# Patient Record
Sex: FEMALE | Race: WHITE | NOT HISPANIC OR LATINO | Employment: OTHER | URBAN - METROPOLITAN AREA
[De-identification: names, ages, dates, MRNs, and addresses within clinical notes are randomized per-mention and may not be internally consistent; named-entity substitution may affect disease eponyms.]

---

## 2017-01-13 ENCOUNTER — OFFICE VISIT (OUTPATIENT)
Dept: INTERNAL MEDICINE | Facility: CLINIC | Age: 52
End: 2017-01-13
Payer: MEDICARE

## 2017-01-13 VITALS
SYSTOLIC BLOOD PRESSURE: 140 MMHG | TEMPERATURE: 99 F | WEIGHT: 240.31 LBS | HEART RATE: 90 BPM | BODY MASS INDEX: 45.4 KG/M2 | DIASTOLIC BLOOD PRESSURE: 80 MMHG | OXYGEN SATURATION: 98 %

## 2017-01-13 DIAGNOSIS — R39.81 FUNCTIONAL URINARY INCONTINENCE: ICD-10-CM

## 2017-01-13 DIAGNOSIS — I89.0 LYMPHEDEMA OF BOTH LOWER EXTREMITIES: ICD-10-CM

## 2017-01-13 DIAGNOSIS — N39.46 MIXED STRESS AND URGE URINARY INCONTINENCE: ICD-10-CM

## 2017-01-13 DIAGNOSIS — G80.9 CEREBRAL PALSY, UNSPECIFIED TYPE: ICD-10-CM

## 2017-01-13 DIAGNOSIS — I10 ESSENTIAL HYPERTENSION: Primary | ICD-10-CM

## 2017-01-13 PROCEDURE — 99999 PR PBB SHADOW E&M-EST. PATIENT-LVL IV: CPT | Mod: PBBFAC,,, | Performed by: FAMILY MEDICINE

## 2017-01-13 PROCEDURE — 99213 OFFICE O/P EST LOW 20 MIN: CPT | Mod: S$PBB,,, | Performed by: FAMILY MEDICINE

## 2017-01-13 PROCEDURE — 99214 OFFICE O/P EST MOD 30 MIN: CPT | Mod: PBBFAC,PO | Performed by: FAMILY MEDICINE

## 2017-01-13 RX ORDER — OXYMORPHONE HYDROCHLORIDE 20 MG/1
20 TABLET, FILM COATED, EXTENDED RELEASE ORAL
COMMUNITY
End: 2020-05-15 | Stop reason: ALTCHOICE

## 2017-01-13 RX ORDER — AMLODIPINE AND BENAZEPRIL HYDROCHLORIDE 5; 10 MG/1; MG/1
1 CAPSULE ORAL DAILY
Qty: 30 CAPSULE | Refills: 2 | Status: SHIPPED | OUTPATIENT
Start: 2017-01-13 | End: 2017-02-06 | Stop reason: DRUGHIGH

## 2017-01-13 NOTE — MR AVS SNAPSHOT
[facility]  [address] [facility] [address] [address]  Phone: [phone]  Fax: [phone]                  Jimena Pierce   2017 2:20 PM   Office Visit    Description:  Female : 1965   Provider:  Sayda Skelton MD   Department:  [facility]           Reason for Visit     Follow-up     discuss about BP medication     referral to lymphedema clinic           Diagnoses this Visit        Comments    Essential hypertension    -  Primary     Cerebral palsy, unspecified type         Lymphedema of both lower extremities         Mixed stress and urge urinary incontinence         Functional urinary incontinence                To Do List           Future Appointments        Provider Department Dept Phone    2017 4:20 PM Moy Chaudhary IV, MD [facility] [phone]      Goals (5 Years of Data)     None       These Medications        Disp Refills Start End    amlodipine-benazepril 5-10 mg (LOTREL) 5-10 mg per capsule 30 capsule 2 2017    Take 1 capsule by mouth once daily. - Oral    Pharmacy: [facility] [address] Ph #: [phone]         [facility] On Call     [facility] On Call Nurse [facility] -  Assistance  Registered nurses in the [facility] Center provide clinical advisement, health education, appointment booking, and other advisory services.  Call for this free service at [phone].             Medications           Message regarding Medications     Verify the changes and/or additions to your medication regime listed below are the same as discussed with your clinician today.  If any of these changes or additions are incorrect, please notify your healthcare provider.        START taking these NEW medications        Refills    amlodipine-benazepril 5-10 mg (LOTREL) 5-10 mg per capsule 2    Sig: Take 1 capsule by mouth once daily.    Class: Normal    Route: Oral      STOP taking  these medications     lisinopril-hydrochlorothiazide (PRINZIDE,ZESTORETIC) 10-12.5 mg per tablet Take 1 tablet by mouth once daily.           Verify that the below list of medications is an accurate representation of the medications you are currently taking.  If none reported, the list may be blank. If incorrect, please contact your healthcare provider. Carry this list with you in case of emergency.           Current Medications     NAPROXEN ORAL Take by mouth as needed.    oxycodone (ROXICODONE) 30 MG Tab Take 5 mg by mouth 3 (three) times daily as needed.    oxyMORphone (OPANA ER) 20 MG 12 hr tablet Take 20 mg by mouth every 12 (twelve) hours.    promethazine (PHENERGAN) 25 MG tablet Take 25 mg by mouth every 4 (four) hours as needed for Nausea.    amlodipine-benazepril 5-10 mg (LOTREL) 5-10 mg per capsule Take 1 capsule by mouth once daily.           Clinical Reference Information           Vital Signs - Last Recorded  Most recent update: 1/13/2017  3:12 PM by Sayda Skelton MD    BP Pulse Temp Wt SpO2 BMI    (!) 140/80 90 98.6 °F (37 °C) (Tympanic) 109 kg (240 lb 4.8 oz) 98% 45.4 kg/m2      Blood Pressure          Most Recent Value    BP  (!)  140/80      Allergies as of 1/13/2017     No Known Allergies      Immunizations Administered on Date of Encounter - 1/13/2017     None      Orders Placed During Today's Visit      Normal Orders This Visit    Ambulatory Referral to Physical/Occupational Therapy     Ambulatory referral to Urology     COMMODE FOR HOME USE       [facility] Sign-Up     Activating your [facility] account is as easy as 1-2-3!     1) Visit [email or web address], select Sign Up Now, enter this activation code and your date of birth, then select Next.  [email or web address]  Expires: 2/27/2017  3:45 PM      2) Create a username and password to use when you visit [facility] in the future and select a security question in case you lose your password and select Next.    3) Enter your e-mail address and click  Sign Up!    Additional Information  If you have questions, please e-mail [email or web address] or call [phone] to talk to our [facility] staff. Remember, [facility] is NOT to be used for urgent needs. For medical emergencies, dial 911.

## 2017-01-13 NOTE — PROGRESS NOTES
Subjective:       Patient ID: Jimena Pierce is a 51 y.o. female.    Chief Complaint: Follow-up; discuss about BP medication; and referral to lymphedema clinic    HPI Comments: She is here for recheck - HTN, lymphedema. She saw [facility] LYmphedema clinic in past 2012 or so - finds increased swelling to LLE. She is seeing [facility] Care for R lower leg wound almost healed.  She would like a new referral to the lymphedema clinic for help with the left lower extremity problems.    She wants to be on a BP med without a diuretic. Her BP checks are 110s/80s.  She is having severe urge incontinence for 6 months.  She has severe financial constraints and cannot afford to use commercial depends etc.    Review of Systems   Cardiovascular: Positive for leg swelling.   Genitourinary: Positive for enuresis.   Musculoskeletal: Positive for gait problem.   Skin: Positive for wound.       Objective:      Physical Exam   Constitutional: She is oriented to person, place, and time. She appears well-developed.   Using Quad cane   HENT:   Head: Normocephalic and atraumatic.   Cardiovascular: Normal rate, regular rhythm and normal heart sounds.    Pulmonary/Chest: Effort normal and breath sounds normal.   Musculoskeletal: She exhibits edema (to LLE 1+ edema).   RLE enlarged and wrapped to knee.   Neurological: She is alert and oriented to person, place, and time.   Skin: Skin is warm and dry.   Psychiatric: She has a normal mood and affect. Her behavior is normal.       Assessment:       1. Essential hypertension    2. Cerebral palsy, unspecified type    3. Lymphedema of both lower extremities    4. Mixed stress and urge urinary incontinence    5. Functional urinary incontinence        Plan:     1. Essential hypertension  amlodipine-benazepril 5-10 mg (LOTREL) 5-10 mg per capsule   2. Cerebral palsy, unspecified type     3. Lymphedema of both lower extremities  Ambulatory Referral to Physical/Occupational Therapy   4. Mixed stress  and urge urinary incontinence  Ambulatory referral to Urology   5. Functional urinary incontinence  COMMODE FOR HOME USE    Ambulatory referral to Urology    she was offered a trial of medication for overactive bladder but defers until she sees urology.  I ordered bedside commode to see if this would help her problem on getting out of bed in the morning and need to urinate immediately.  She obviously has a component of functional incontinence, but she is having more problem now than in the past probably due to urge incontinence.    She may call if swelling increases off the HCTZ componet. She has new hospital bed just arrived this week - this may help her dependent edema.

## 2017-02-06 ENCOUNTER — TELEPHONE (OUTPATIENT)
Dept: INTERNAL MEDICINE | Facility: CLINIC | Age: 52
End: 2017-02-06

## 2017-02-06 DIAGNOSIS — I10 ESSENTIAL HYPERTENSION: Primary | ICD-10-CM

## 2017-02-06 RX ORDER — AMLODIPINE AND BENAZEPRIL HYDROCHLORIDE 10; 20 MG/1; MG/1
1 CAPSULE ORAL DAILY
Qty: 90 CAPSULE | Refills: 0 | Status: SHIPPED | OUTPATIENT
Start: 2017-02-06 | End: 2018-02-06

## 2017-02-06 NOTE — TELEPHONE ENCOUNTER
Mendy with [facility] said that every time she is asked about her pain the patient says its at a 6   [facility] has been notified of the lab orders and will get them at the next visit       Also called and notified the patient of the change in her blood pressure medication.  Asked her about her pain and she said it was continuous pain but better then it has been before

## 2017-02-06 NOTE — TELEPHONE ENCOUNTER
Mendy states that her BP has been high for the last couple of weeks.  The patient has not been complaining of any other systems

## 2017-02-06 NOTE — TELEPHONE ENCOUNTER
Is her pain level controlled?  1. Please give order to HH to have pt increase her amlodipine/benazepril 5/10 by taking two daily - to give 10/20 dose.. Continue to check BPS. I will send rX to her pharmacy for the single tablet 10/20.    2. There are no recent labs - please give verbal order for BMP/CBC to be drawn by HH.

## 2017-02-09 ENCOUNTER — TELEPHONE (OUTPATIENT)
Dept: INTERNAL MEDICINE | Facility: CLINIC | Age: 52
End: 2017-02-09

## 2017-02-09 NOTE — TELEPHONE ENCOUNTER
----- Message from Mary Adams sent at 2/9/2017  2:46 PM CST -----  Contact: pt  Pt is requesting nurse give her a call regarding status of referral to lymphedema clinic. Pls call pt back at [phone]

## 2017-02-10 ENCOUNTER — TELEPHONE (OUTPATIENT)
Dept: INTERNAL MEDICINE | Facility: CLINIC | Age: 52
End: 2017-02-10

## 2017-02-10 NOTE — TELEPHONE ENCOUNTER
----- Message from Ricky Ruelas sent at 2/10/2017 10:39 AM CST -----  Contact: [facility]  Caller request call from nurse to have pt order for PT refax due to a technical issue they had, please fax to [phone], please contact caller at [phone]

## 2017-02-14 ENCOUNTER — OFFICE VISIT (OUTPATIENT)
Dept: UROLOGY | Facility: CLINIC | Age: 52
End: 2017-02-14
Payer: MEDICARE

## 2017-02-14 VITALS
DIASTOLIC BLOOD PRESSURE: 88 MMHG | BODY MASS INDEX: 44.4 KG/M2 | WEIGHT: 235 LBS | HEART RATE: 86 BPM | SYSTOLIC BLOOD PRESSURE: 142 MMHG

## 2017-02-14 DIAGNOSIS — R31.29 HEMATURIA, MICROSCOPIC: ICD-10-CM

## 2017-02-14 DIAGNOSIS — N32.81 OAB (OVERACTIVE BLADDER): Primary | ICD-10-CM

## 2017-02-14 LAB
BILIRUB SERPL-MCNC: NORMAL MG/DL
BLOOD URINE, POC: NORMAL
COLOR, POC UA: YELLOW
GLUCOSE UR QL STRIP: NORMAL
KETONES UR QL STRIP: NORMAL
LEUKOCYTE ESTERASE URINE, POC: NORMAL
NITRITE, POC UA: NORMAL
PH, POC UA: 6
POC RESIDUAL URINE VOLUME: 0 ML (ref 0–100)
PROTEIN, POC: NORMAL
SPECIFIC GRAVITY, POC UA: 1.01
UROBILINOGEN, POC UA: NORMAL

## 2017-02-14 PROCEDURE — 99204 OFFICE O/P NEW MOD 45 MIN: CPT | Mod: S$PBB,,, | Performed by: UROLOGY

## 2017-02-14 PROCEDURE — 81002 URINALYSIS NONAUTO W/O SCOPE: CPT | Mod: PBBFAC | Performed by: UROLOGY

## 2017-02-14 PROCEDURE — 99213 OFFICE O/P EST LOW 20 MIN: CPT | Mod: PBBFAC | Performed by: UROLOGY

## 2017-02-14 PROCEDURE — 81001 URINALYSIS AUTO W/SCOPE: CPT

## 2017-02-14 PROCEDURE — 87086 URINE CULTURE/COLONY COUNT: CPT

## 2017-02-14 PROCEDURE — 99999 PR PBB SHADOW E&M-EST. PATIENT-LVL III: CPT | Mod: PBBFAC,,, | Performed by: UROLOGY

## 2017-02-14 PROCEDURE — 51798 US URINE CAPACITY MEASURE: CPT | Mod: PBBFAC | Performed by: UROLOGY

## 2017-02-14 NOTE — PATIENT INSTRUCTIONS
For constipation please purchase Miralax at the drug store.  One capful per day is the starting dose.  If the stool gets too loose take 1/2 cap a day or 1/4 cap a day.  Adjust the amount so you can use it every day without causing diarrhea because it works better taken every day.

## 2017-02-14 NOTE — MR AVS SNAPSHOT
[facility]  [address] [facility]  [place] [address]  Phone: [phone]  Fax: [phone]                  Jimena Pierce   2017 4:20 PM   Office Visit    Description:  Female : 1965   Provider:  Moy Chaudhary IV, MD   Department:  [facility]                To Do List           Goals (5 Years of Data)     None      Follow-Up and Disposition     Return if symptoms worsen or fail to improve.      [facility] On Call     [facility] On Call Nurse Care Line -  Assistance  Registered nurses in the [facility] On Call Center provide clinical advisement, health education, appointment booking, and other advisory services.  Call for this free service at [phone].             Medications           Message regarding Medications     Verify the changes and/or additions to your medication regime listed below are the same as discussed with your clinician today.  If any of these changes or additions are incorrect, please notify your healthcare provider.             Verify that the below list of medications is an accurate representation of the medications you are currently taking.  If none reported, the list may be blank. If incorrect, please contact your healthcare provider. Carry this list with you in case of emergency.           Current Medications     amlodipine-benazepril 10-20mg (LOTREL) 10-20 mg per capsule Take 1 capsule by mouth once daily.    NAPROXEN ORAL Take by mouth as needed.    oxycodone (ROXICODONE) 30 MG Tab Take 5 mg by mouth 3 (three) times daily as needed.    oxyMORphone (OPANA ER) 20 MG 12 hr tablet Take 20 mg by mouth every 12 (twelve) hours.    promethazine (PHENERGAN) 25 MG tablet Take 25 mg by mouth every 4 (four) hours as needed for Nausea.           Clinical Reference Information           Your Vitals Were     BP Pulse Weight BMI       142/88 86 106.6 kg (235 lb) 44.4 kg/m2       Blood Pressure          Most Recent Value    BP  (!)  142/88       Allergies as of 2/14/2017     No Known Allergies      Immunizations Administered on Date of Encounter - 2/14/2017     None      [facility] Sign-Up     Activating your [facility] account is as easy as 1-2-3!     1) Visit [email or web address], select Sign Up Now, enter this activation code and your date of birth, then select Next.  [email or web address]  Expires: 2/27/2017  3:45 PM      2) Create a username and password to use when you visit [facility] in the future and select a security question in case you lose your password and select Next.    3) Enter your e-mail address and click Sign Up!    Additional Information  If you have questions, please e-mail [email or web address] or call [phone] to talk to our [facility] staff. Remember, [facility] is NOT to be used for urgent needs. For medical emergencies, dial 911.         Instructions    For constipation please purchase Miralax at the drug store.  One capful per day is the starting dose.  If the stool gets too loose take 1/2 cap a day or 1/4 cap a day.  Adjust the amount so you can use it every day without causing diarrhea because it works better taken every day.         Language Assistance Services     ATTENTION: Language assistance services are available, free of charge. Please call [phone].      ATENCIÓN: Si habla español, tiene a king disposición servicios gratuitos de asistencia lingüística. Llame al [phone].     CHÚ Ý: N?u b?n nói Ti?ng Vi?t, có các d?ch v? h? tr? ngôn ng? mi?n phí dành cho b?n. G?i s? [phone].         [facility] complies with applicable Federal civil rights laws and does not discriminate on the basis of race, color, national origin, age, disability, or sex.

## 2017-02-14 NOTE — LETTER
February 14, 2017      Sayda Skelton MD  [address] [place] [address] [address] [place] [address]           [facility]  [address] [place] [address]  [place] [address]  Phone: [phone]  Fax: [phone]          Patient: Jimena Pierce   MR Number: [id number]   YOB: 1965   Date of Visit: 2/14/2017       Dear Dr. Sayda Skelton:    Thank you for referring Jimena iPerce to me for evaluation. Attached you will find relevant portions of my assessment and plan of care.    If you have questions, please do not hesitate to call me. I look forward to following Jimena Pierce along with you.    Sincerely,    Moy Chaudhary IV, MD    Enclosure  CC:  No Recipients    If you would like to receive this communication electronically, please contact [email or web address] or [phone] to request more information on [facility] access.    For providers and/or their staff who would like to refer a patient to [facility], please contact us through our one-stop-shop provider referral line, [facility], at [phone].    If you feel you have received this communication in error or would no longer like to receive these types of communications, please e-mail [email or web address]

## 2017-02-14 NOTE — PROGRESS NOTES
Chief Complaint: LUTS    HPI:   2/14/17: 50 yo woman for 6-12 mo has had worsening problems of frequency, hesitancy, starting/stopping stream, need to push, urgency with low output.  No abd/pelvic pain and no exac/rel factors.  No hematuria.  No urolithiasis.  No other urinary bother.  No  history.  Normal sexual function but inactive.  Chronic lower back pain in pain management.  Coffee two cups a day, not much other caffeine, no chocolate.  Often constipated and finds she can clear that without meds by drinking a lot of water.  Nocturia x2.  Bothersome symptoms predominate in daytime.    Allergies:  Review of patient's allergies indicates no known allergies.    Medications: has a current medication list which includes the following prescription(s): amlodipine-benazepril 10-20mg, naproxen, oxycodone, oxymorphone, and promethazine.    Review of Systems:  General: No fever, chills, fatigability, or weight loss.  Skin: No rashes, itching, or changes in color or texture of skin.  Chest: Denies JAMES, cyanosis, wheezing, cough, and sputum production.  Abdomen: Appetite fine. No weight loss. Denies diarrhea, abdominal pain, hematemesis, or blood in stool.  Musculoskeletal: Some joint stiffness or swelling. Some back pain.  : As above.  All other review of systems negative.    PMH:   has a past medical history of Chronic low back pain; Congenital cerebral palsy; HTN (hypertension); and Lymphedema.    PSH:   has a past surgical history that includes Tonsillectomy; Hip surgery (Bilateral); Tendon release (Bilateral); and Colonoscopy (N/A, 10/5/2016).    FamHx: family history includes Hypertension in her mother.    SocHx:  reports that she has quit smoking. She has a 0.75 pack-year smoking history. She does not have any smokeless tobacco history on file. She reports that she drinks alcohol. She reports that she does not use illicit drugs.     Physical Exam:  Vitals:   Vitals:    02/14/17 1638   BP: (!) 142/88   Pulse: 86      General: A&Ox3. No apparent distress. No deformities.  Neck: No masses. Normal thyroid.  Lungs: normal inspiration. No use of accessory muscles.  Heart: normal pulse. No arrhythmias.  Abdomen: Soft. NT. ND. No masses. No hernias. No hepatosplenomegaly.  Lymphatic: Neck and groin nodes negative.  Skin: The skin is warm and dry. No jaundice.  Ext: No c/c/e.  : External genitalia normal.     Labs/Studies: Urinalysis performed in clinic, summary: UA normal exc tr blood  Bladder Scan performed in office: PVR 0 ml.    Impression/Plan:   1. Will send cath UA/UCx, but OAB is most likely diagnosis.  Caffeine cessation and better constipation management may be all that's needed.  Reluctant to add anticholinergic due to constipation concerns.

## 2017-02-15 LAB
BACTERIA #/AREA URNS AUTO: ABNORMAL /HPF
MICROSCOPIC COMMENT: ABNORMAL
RBC #/AREA URNS AUTO: >100 /HPF (ref 0–4)
SQUAMOUS #/AREA URNS AUTO: 13 /HPF
WBC #/AREA URNS AUTO: 21 /HPF (ref 0–5)

## 2017-02-16 LAB — BACTERIA UR CULT: NO GROWTH

## 2017-02-17 ENCOUNTER — TELEPHONE (OUTPATIENT)
Dept: UROLOGY | Facility: CLINIC | Age: 52
End: 2017-02-17

## 2017-02-17 DIAGNOSIS — R31.9 HEMATURIA: Primary | ICD-10-CM

## 2017-02-17 NOTE — TELEPHONE ENCOUNTER
Called patient to inform her of U/A results and schedule CT and Cysto. No answer; left message to call back.

## 2017-02-21 ENCOUNTER — TELEPHONE (OUTPATIENT)
Dept: INTERNAL MEDICINE | Facility: CLINIC | Age: 52
End: 2017-02-21

## 2017-02-22 NOTE — TELEPHONE ENCOUNTER
Recent labs ordered for CBC/BMP through home health.  Results dated 2/16/17 are received.  BMP all within normal limits with an EGFR of greater than 100.  Glucose is 95.  CBC with white blood cells 8.1, H&H 14.3/44.6, platelets 224.    Please inform patient all lab results are good.

## 2017-02-23 ENCOUNTER — TELEPHONE (OUTPATIENT)
Dept: UROLOGY | Facility: CLINIC | Age: 52
End: 2017-02-23

## 2017-02-23 NOTE — TELEPHONE ENCOUNTER
----- Message from Ronda Sexton sent at 2/23/2017  1:48 PM CST -----  Contact: self- [phone]  Pt needs a return call regarding her test results. Pt can be reached at [phone]. Thanks, SOHAN

## 2017-02-27 ENCOUNTER — TELEPHONE (OUTPATIENT)
Dept: RADIOLOGY | Facility: HOSPITAL | Age: 52
End: 2017-02-27

## 2017-02-28 ENCOUNTER — HOSPITAL ENCOUNTER (OUTPATIENT)
Dept: RADIOLOGY | Facility: HOSPITAL | Age: 52
Discharge: HOME | End: 2017-02-28
Attending: UROLOGY
Payer: MEDICARE

## 2017-02-28 DIAGNOSIS — R31.9 HEMATURIA: ICD-10-CM

## 2017-02-28 PROCEDURE — 74178 CT ABD&PLV WO CNTR FLWD CNTR: CPT | Mod: TC,PO

## 2017-02-28 PROCEDURE — 74178 CT ABD&PLV WO CNTR FLWD CNTR: CPT | Mod: 26,,, | Performed by: RADIOLOGY

## 2017-02-28 PROCEDURE — 25500020 PHARM REV CODE 255: Mod: PO | Performed by: UROLOGY

## 2017-02-28 RX ADMIN — IOHEXOL 120 ML: 350 INJECTION, SOLUTION INTRAVENOUS at 03:02

## 2017-03-30 ENCOUNTER — TELEPHONE (OUTPATIENT)
Dept: UROLOGY | Facility: CLINIC | Age: 52
End: 2017-03-30

## 2017-05-15 ENCOUNTER — OFFICE VISIT (OUTPATIENT)
Dept: INTERNAL MEDICINE | Facility: CLINIC | Age: 52
End: 2017-05-15
Payer: MEDICARE

## 2017-05-15 VITALS
OXYGEN SATURATION: 96 % | DIASTOLIC BLOOD PRESSURE: 84 MMHG | HEART RATE: 70 BPM | BODY MASS INDEX: 45.05 KG/M2 | TEMPERATURE: 98 F | WEIGHT: 238.44 LBS | SYSTOLIC BLOOD PRESSURE: 131 MMHG

## 2017-05-15 DIAGNOSIS — R23.4 SKIN INDURATION: ICD-10-CM

## 2017-05-15 DIAGNOSIS — H57.11 PAIN IN RIGHT EYE: Primary | ICD-10-CM

## 2017-05-15 DIAGNOSIS — M79.89 RIGHT LEG SWELLING: ICD-10-CM

## 2017-05-15 PROCEDURE — 99213 OFFICE O/P EST LOW 20 MIN: CPT | Mod: PBBFAC,PO | Performed by: FAMILY MEDICINE

## 2017-05-15 PROCEDURE — 99213 OFFICE O/P EST LOW 20 MIN: CPT | Mod: S$PBB,,, | Performed by: FAMILY MEDICINE

## 2017-05-15 PROCEDURE — 99999 PR PBB SHADOW E&M-EST. PATIENT-LVL III: CPT | Mod: PBBFAC,,, | Performed by: FAMILY MEDICINE

## 2017-05-15 RX ORDER — OXYMORPHONE HYDROCHLORIDE 15 MG/1
TABLET, FILM COATED, EXTENDED RELEASE ORAL
COMMUNITY
Start: 2017-05-08 | End: 2020-05-15 | Stop reason: ALTCHOICE

## 2017-05-15 NOTE — MR AVS SNAPSHOT
[address]  [place] [address]  Phone: [phone]  Fax: [phone]                  Jimena Pierce   5/15/2017 1:40 PM   Office Visit    Description:  Female : 1965   Provider:  Sayda Skelton MD   Department:  [facility]           Reason for Visit     right eye pain           Diagnoses this Visit        Comments    Pain in right eye    -  Primary     Right leg swelling         Skin induration                To Do List           Future Appointments        Provider Department Dept Phone    2017 1:20 PM MD Anju Figueroa [facility] - Urology [phone]    2017 3:45 PM Magnus Fierro OD [facility] [phone]    2017 1:15 PM [address] - OB/ [phone]      Goals (5 Years of Data)     None      Follow-Up and Disposition     Follow-up and Disposition History      [facility]     [facility] On Call Nurse Care Line -  Assistance  Unless otherwise directed by your provider, please contact [facility], our nurse care line that is available for  assistance.     Registered nurses in the [facility] Center provide: appointment scheduling, clinical advisement, health education, and other advisory services.  Call: [phone] (toll free)               Medications           Message regarding Medications     Verify the changes and/or additions to your medication regime listed below are the same as discussed with your clinician today.  If any of these changes or additions are incorrect, please notify your healthcare provider.             Verify that the below list of medications is an accurate representation of the medications you are currently taking.  If none reported, the list may be blank. If incorrect, please contact your healthcare provider. Carry this list with you in case of emergency.           Current Medications     amlodipine-benazepril 10-20mg (LOTREL) 10-20 mg per  "capsule Take 1 capsule by mouth once daily.    NAPROXEN ORAL Take by mouth as needed.    oxycodone (ROXICODONE) 30 MG Tab Take 5 mg by mouth 3 (three) times daily as needed.    oxyMORphone (OPANA ER) 15 MG 12 hr tablet     promethazine (PHENERGAN) 25 MG tablet Take 25 mg by mouth every 4 (four) hours as needed for Nausea.    oxyMORphone (OPANA ER) 20 MG 12 hr tablet Take 20 mg by mouth every 12 (twelve) hours.           Clinical Reference Information           Your Vitals Were     BP Pulse Temp Weight    131/84 (BP Location: Right arm, Patient Position: Sitting, BP Method: Automatic) 70 97.6 °F (36.4 °C) (Tympanic) 108.2 kg (238 lb 6.8 oz)    Last Period SpO2 BMI    02/23/2016 96% 45.05 kg/m2      Blood Pressure          Most Recent Value    BP  131/84      Allergies as of 5/15/2017     No Known Allergies      Immunizations Administered on Date of Encounter - 5/15/2017     None      Orders Placed During Today's Visit      Normal Orders This Visit    Ambulatory consult to Optometry       [facility] Sign-Up     Activating your [facility] account is as easy as 1-2-3!     1) Visit [email or web address], select Sign Up Now, enter this activation code and your date of birth, then select Next.  [email or web address]  Expires: 6/29/2017  3:16 PM      2) Create a username and password to use when you visit [facility] in the future and select a security question in case you lose your password and select Next.    3) Enter your e-mail address and click Sign Up!    Additional Information  If you have questions, please e-mail [email or web address] or call [phone] to talk to our [facility] staff. Remember, [facility] is NOT to be used for urgent needs. For medical emergencies, dial 911.         Instructions    Use "natural tears" type eye product         Language Assistance Services     ATTENTION: Language assistance services are available, free of charge. Please call [phone].      ATENCIÓN: Si habla español, tiene a king disposición " servicios gratuitos de asistencia lingüística. Eusebio geller [phone].     [facility] Ý: N?u b?n nói Ti?ng Vi?t, có các d?ch v? h? tr? ngôn ng? mi?n phí dành cho b?n. G?i s? [phone].         [facility] complies with applicable Federal civil rights laws and does not discriminate on the basis of race, color, national origin, age, disability, or sex.

## 2017-05-15 NOTE — PROGRESS NOTES
Subjective:       Patient ID: Jimena Pierce is a 51 y.o. female.    Chief Complaint: right eye pain (feelis like something is in it, pt states that it maybe scar tissue)    HPI Comments: Here with c/o right eye pain in AM on awakening. First occurred approx 2 weeks ago. She sees an optometrist for glasses - last time more than a year.  She is on chronic pain med 8 AM and 8 PM.  Feels like there is something in it. Tries to blink it out. Painful open and closed.    She is also c/o contd redness to her RLE. She was DCd from wound care about 6 weeks ago for ulcerations to ant and post lower leg.     Eye Pain    The right eye is affected. This is a recurrent problem. The current episode started 1 to 4 weeks ago. Episode frequency: occurs only in AM and resolves after an hour. Does not occur every AM. Progression since onset: no change in severity. last occurrence 4 dyas ago and apprx  2 days prior to  that. The pain is severe. There is no known exposure to pink eye. She does not wear contacts. Associated symptoms include eye redness. Pertinent negatives include no blurred vision, eye discharge, fever, itching or photophobia. She has tried nothing for the symptoms.     Review of Systems   Constitutional: Negative for fever.   HENT: Negative for congestion, rhinorrhea and sneezing.    Eyes: Positive for pain and redness. Negative for blurred vision, photophobia and discharge.   Skin: Negative for itching.       Objective:      Physical Exam   Constitutional: She is oriented to person, place, and time. She appears well-developed.   HENT:   Head: Normocephalic and atraumatic.   Cardiovascular: Normal rate, regular rhythm and normal heart sounds.    Pulmonary/Chest: Effort normal and breath sounds normal.   Musculoskeletal: She exhibits edema (to RLE only - LLE without edema.).   Neurological: She is alert and oriented to person, place, and time.   Skin: Skin is warm and dry.   R lower leg swollen. Band of  erythema to lower 1/3, with brightest erythema to anterior lower leg. Wounds closed.   Psychiatric: She has a normal mood and affect. Her behavior is normal.         Assessment/Plan:     1. Pain in right eye  Ambulatory consult to Optometry   2. Right leg swelling     3. Skin induration     continue current care for RLE - pt advised to elevate leg at rest.  Recheck 3 months.  rec she see gyn for well woman exam.

## 2017-05-17 ENCOUNTER — OFFICE VISIT (OUTPATIENT)
Dept: OPHTHALMOLOGY | Facility: CLINIC | Age: 52
End: 2017-05-17
Payer: MEDICARE

## 2017-05-17 ENCOUNTER — OFFICE VISIT (OUTPATIENT)
Dept: UROLOGY | Facility: CLINIC | Age: 52
End: 2017-05-17
Payer: MEDICARE

## 2017-05-17 VITALS — SYSTOLIC BLOOD PRESSURE: 132 MMHG | BODY MASS INDEX: 44.97 KG/M2 | WEIGHT: 238 LBS | DIASTOLIC BLOOD PRESSURE: 88 MMHG

## 2017-05-17 DIAGNOSIS — H16.9 KERATITIS: Primary | ICD-10-CM

## 2017-05-17 DIAGNOSIS — H52.4 BILATERAL PRESBYOPIA: ICD-10-CM

## 2017-05-17 DIAGNOSIS — H52.13 MYOPIA, BILATERAL: ICD-10-CM

## 2017-05-17 DIAGNOSIS — I10 ESSENTIAL HYPERTENSION: ICD-10-CM

## 2017-05-17 DIAGNOSIS — R31.9 HEMATURIA: Primary | ICD-10-CM

## 2017-05-17 LAB
BILIRUB SERPL-MCNC: NORMAL MG/DL
BLOOD URINE, POC: NORMAL
COLOR, POC UA: NORMAL
GLUCOSE UR QL STRIP: NORMAL
KETONES UR QL STRIP: NORMAL
LEUKOCYTE ESTERASE URINE, POC: NORMAL
NITRITE, POC UA: NORMAL
PH, POC UA: 5
PROTEIN, POC: NORMAL
SPECIFIC GRAVITY, POC UA: 1.02
UROBILINOGEN, POC UA: NORMAL

## 2017-05-17 PROCEDURE — 92004 COMPRE OPH EXAM NEW PT 1/>: CPT | Mod: S$PBB,,, | Performed by: OPTOMETRIST

## 2017-05-17 PROCEDURE — 92015 DETERMINE REFRACTIVE STATE: CPT | Mod: ,,, | Performed by: OPTOMETRIST

## 2017-05-17 PROCEDURE — 99999 PR PBB SHADOW E&M-EST. PATIENT-LVL I: CPT | Mod: PBBFAC,,, | Performed by: OPTOMETRIST

## 2017-05-17 PROCEDURE — 99499 UNLISTED E&M SERVICE: CPT | Mod: S$PBB,,, | Performed by: UROLOGY

## 2017-05-17 PROCEDURE — 99999 PR PBB SHADOW E&M-EST. PATIENT-LVL II: CPT | Mod: PBBFAC,,, | Performed by: UROLOGY

## 2017-05-17 PROCEDURE — 52000 CYSTOURETHROSCOPY: CPT | Mod: S$PBB,,, | Performed by: UROLOGY

## 2017-05-17 PROCEDURE — 99211 OFF/OP EST MAY X REQ PHY/QHP: CPT | Mod: PBBFAC,27 | Performed by: OPTOMETRIST

## 2017-05-17 NOTE — PROGRESS NOTES
HPI     No visual complaints. New patient last eye exam 3 years. Update glasses   RX. Patient broke glasses a few days ago. Patient wearing ex-   glasses today.        Last edited by Shabana Garza on 5/17/2017  2:48 PM.         Assessment /Plan     For exam results, see Encounter Report.    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  RTC 1 year

## 2017-05-17 NOTE — LETTER
May 17, 2017      Sayda Skelton MD  [address] [place] [address] Dr Candace RIOS [address]           [facility]  [address] [place] [address]  [place] [address]  Phone: [phone]  Fax: [phone]          Patient: Jimena Pierce   MR Number: [id number]   YOB: 1965   Date of Visit: 5/17/2017       Dear Dr. Sayda Skelton:    Thank you for referring Jimena Pierce to me for evaluation. Attached you will find relevant portions of my assessment and plan of care.    If you have questions, please do not hesitate to call me. I look forward to following Jimena Pierce along with you.    Sincerely,    Magnus Fierro, OD    Enclosure  CC:  No Recipients    If you would like to receive this communication electronically, please contact [email or web address] or [phone] to request more information on [facility] access.    For providers and/or their staff who would like to refer a patient to [facility], please contact us through our one-stop-shop provider referral line, [facility], at [phone].    If you feel you have received this communication in error or would no longer like to receive these types of communications, please e-mail [email or web address]

## 2017-05-17 NOTE — PROGRESS NOTES
Chief Complaint: LUTS    HPI:   5/17/17: Hematuria without UTI led to workup.  CT Urogram normal except simple right renal cyst of no concern.  Cysto normal.  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  2/14/17: 50 yo woman for 6-12 mo has had worsening problems of frequency, hesitancy, starting/stopping stream, need to push, urgency with low output.  No abd/pelvic pain and no exac/rel factors.  No hematuria.  No urolithiasis.  No other urinary bother.  No  history.  Normal sexual function but inactive.  Chronic lower back pain in pain management.  Coffee two cups a day, not much other caffeine, no chocolate.  Often constipated and finds she can clear that without meds by drinking a lot of water.  Nocturia x2.  Bothersome symptoms predominate in daytime.    Allergies:  Review of patient's allergies indicates no known allergies.    Medications: has a current medication list which includes the following prescription(s): amlodipine-benazepril 10-20mg, naproxen, oxycodone, oxymorphone, oxymorphone, and promethazine.    Review of Systems:  General: No fever, chills, fatigability, or weight loss.  Skin: No rashes, itching, or changes in color or texture of skin.  Chest: Denies JAMES, cyanosis, wheezing, cough, and sputum production.  Abdomen: Appetite fine. No weight loss. Denies diarrhea, abdominal pain, hematemesis, or blood in stool.  Musculoskeletal: Some joint stiffness or swelling. Some back pain.  : As above.  All other review of systems negative.    PMH:   has a past medical history of Chronic low back pain; Congenital cerebral palsy; HTN (hypertension); and Lymphedema.    PSH:   has a past surgical history that includes Tonsillectomy; Hip surgery (Bilateral); Tendon release (Bilateral); and Colonoscopy (N/A, 10/5/2016).    FamHx: family history includes Hypertension in her mother.    SocHx:  reports that  she has quit smoking. She has a 0.75 pack-year smoking history. She does not have any smokeless tobacco history on file. She reports that she drinks alcohol. She reports that she does not use illicit drugs.     Physical Exam:  Vitals:   Vitals:    05/17/17 1405   BP: 132/88     General: A&Ox3. No apparent distress. No deformities.  Neck: No masses. Normal thyroid.  Lungs: normal inspiration. No use of accessory muscles.  Heart: normal pulse. No arrhythmias.  Abdomen: Soft. NT. ND.   Skin: The skin is warm and dry. No jaundice.  Ext: No c/c/e.  : External genitalia normal.     Labs/Studies:   Urinalysis performed in clinic, summary: UA normal   Bladder Scan performed in office:   2/14/17: PVR 0 ml.    Procedure: Diagnostic Cystoscopy    Procedure in Detail: After proper consents were obtained, the patient was prepped and draped in normal sterile fashion for diagnostic cystoscopy. 5 ml of lidocaine jelly was instilled in the urethra. The flexible cystoscope was then introduced into the urethra, and advanced into the bladder under direct vision. The urethral mucosa appeared normal, and no strictures were noted. The sphincter appeared to be normal. The bladder neck was normal. Inspection of the interior of the bladder was then carried out. The trigone was unremarkable, with no mucosal lesions. The ureteral orifices were normal in position and configuration. Systematic inspection of the mucosa of the bladder it was then carried out, rotating the cystoscope so that all areas of the left and right lateral walls, the dome of the bladder, and the posterior wall were all visualized. The cystoscope was then advanced further into the bladder, and maximum deflection of the scope was performed so that the bladder neck could be inspected. No mucosal lesions were noted there. The cystoscope was then removed, and the procedure terminated.     Findings: normal cysto except for two prominent 1-2cm diverticula of the right lateral  bladder wall.    Impression/Plan:   1. OAB, working on constipation and not adding anticholinergics  2. Idiopathic microhematuria, RTC 1 year.  3. Discussed avoiding voluntary urine retention (diverticula).

## 2017-05-17 NOTE — MR AVS SNAPSHOT
[facility]  [address] [facility] [address]  Phone: [phone]  Fax: [phone]                  Jimena Pierce   2017 1:20 PM   Office Visit    Description:  Female : 1965   Provider:  Moy Chaudhary IV, MD   Department:  [facility]           Diagnoses this Visit        Comments    Hematuria    -  Primary            To Do List           Future Appointments        Provider Department Dept Phone    2017 3:45 PM Magnus Fierro OD [facility] Ophthalmology [phone]    2017 1:15 PM Vi Gabriel NP [facility] - OB/ -[phone]    2018 1:20 PM Moy Chaudhary IV, MD [facility] [phone]      Goals (5 Years of Data)     None      [facility]     [facility] On Call Nurse Care Line -  Assistance  Unless otherwise directed by your provider, please contact [facility], our nurse care line that is available for  assistance.     Registered nurses in the [facility] provide: appointment scheduling, clinical advisement, health education, and other advisory services.  Call: [phone] (toll free)               Medications           Message regarding Medications     Verify the changes and/or additions to your medication regime listed below are the same as discussed with your clinician today.  If any of these changes or additions are incorrect, please notify your healthcare provider.             Verify that the below list of medications is an accurate representation of the medications you are currently taking.  If none reported, the list may be blank. If incorrect, please contact your healthcare provider. Carry this list with you in case of emergency.           Current Medications     amlodipine-benazepril 10-20mg (LOTREL) 10-20 mg per capsule Take 1 capsule by mouth once daily.    NAPROXEN ORAL Take by mouth as needed.    oxycodone (ROXICODONE) 30 MG Tab Take 5 mg by mouth 3 (three) times daily as needed.     oxyMORphone (OPANA ER) 15 MG 12 hr tablet     oxyMORphone (OPANA ER) 20 MG 12 hr tablet Take 20 mg by mouth every 12 (twelve) hours.    promethazine (PHENERGAN) 25 MG tablet Take 25 mg by mouth every 4 (four) hours as needed for Nausea.           Clinical Reference Information           Your Vitals Were     BP Weight Last Period BMI       132/88 108 kg (238 lb) 02/23/2016 44.97 kg/m2       Blood Pressure          Most Recent Value    BP  132/88      Allergies as of 5/17/2017     No Known Allergies      Immunizations Administered on Date of Encounter - 5/17/2017     None      Orders Placed During Today's Visit      Normal Orders This Visit    POCT URINE DIPSTICK WITHOUT MICROSCOPE       [facility] Sign-Up     Activating your [facility] account is as easy as 1-2-3!     1) Visit [email or web address], select Sign Up Now, enter this activation code and your date of birth, then select Next.  [email or web address]  Expires: 6/29/2017  3:16 PM      2) Create a username and password to use when you visit [facility] in the future and select a security question in case you lose your password and select Next.    3) Enter your e-mail address and click Sign Up!    Additional Information  If you have questions, please e-mail [email or web address] or call [phone] to talk to our [facility] staff. Remember, [facility] is NOT to be used for urgent needs. For medical emergencies, dial 911.         Language Assistance Services     ATTENTION: Language assistance services are available, free of charge. Please call [phone].      ATENCIÓN: Si habla español, tiene a king disposición servicios gratuitos de asistencia lingüística. Llame al [phone].     CHÚ Ý: N?u b?n nói Ti?ng Vi?t, có các d?ch v? h? tr? ngôn ng? mi?n phí dành cho b?n. G?i s? [phone].         [facility] complies with applicable Federal civil rights laws and does not discriminate on the basis of race, color, national origin, age, disability, or sex.

## 2018-03-12 DIAGNOSIS — I10 ESSENTIAL HYPERTENSION: ICD-10-CM

## 2018-03-12 RX ORDER — AMLODIPINE AND BENAZEPRIL HYDROCHLORIDE 5; 10 MG/1; MG/1
1 CAPSULE ORAL DAILY
Qty: 30 CAPSULE | Refills: 0 | Status: SHIPPED | OUTPATIENT
Start: 2018-03-12 | End: 2018-04-12 | Stop reason: DRUGHIGH

## 2018-04-09 ENCOUNTER — TELEPHONE (OUTPATIENT)
Dept: INTERNAL MEDICINE | Facility: CLINIC | Age: 53
End: 2018-04-09

## 2018-04-09 NOTE — TELEPHONE ENCOUNTER
Patient was calling in regards to being seen on today for a possible UTI. Advised the patient that there were not availability for today. Pt was offered to see another provider. Pt declined. Pt is scheduled to see Dr. Skelton on Thursday. Pt verbalized understanding of the appointment information given.

## 2018-04-09 NOTE — TELEPHONE ENCOUNTER
----- Message from Camilo Coy sent at 4/9/2018 12:11 PM CDT -----  Contact: pt  Call pt at [phone] (home)   Pt has a possible UTI and wants to see if she can be worked in today.        [email or web address] [address] - [facility] [address] AT [facility]  [address] [facility] [address]  Phone: [phone] Fax: [phone]    [facility] - [place] - [address] [address] [facility]  [address] [facility] [address]  Phone: [phone] Fax: [phone]

## 2018-04-12 ENCOUNTER — OFFICE VISIT (OUTPATIENT)
Dept: INTERNAL MEDICINE | Facility: CLINIC | Age: 53
End: 2018-04-12
Payer: MEDICARE

## 2018-04-12 DIAGNOSIS — N30.90 BLADDER INFECTION: Primary | ICD-10-CM

## 2018-04-12 DIAGNOSIS — Z12.31 ENCOUNTER FOR SCREENING MAMMOGRAM FOR BREAST CANCER: ICD-10-CM

## 2018-04-12 DIAGNOSIS — R35.0 URINARY FREQUENCY: ICD-10-CM

## 2018-04-12 DIAGNOSIS — I10 ESSENTIAL HYPERTENSION: ICD-10-CM

## 2018-04-12 LAB
ALBUMIN SERPL BCP-MCNC: 4.1 G/DL
ALP SERPL-CCNC: 94 U/L
ALT SERPL W/O P-5'-P-CCNC: 31 U/L
ANION GAP SERPL CALC-SCNC: 9 MMOL/L
AST SERPL-CCNC: 21 U/L
BACTERIA #/AREA URNS AUTO: ABNORMAL /HPF
BASOPHILS # BLD AUTO: 0.06 K/UL
BASOPHILS NFR BLD: 0.7 %
BILIRUB SERPL-MCNC: 0.7 MG/DL
BILIRUB SERPL-MCNC: NEGATIVE MG/DL
BILIRUB UR QL STRIP: NEGATIVE
BLOOD URINE, POC: ABNORMAL
BUN SERPL-MCNC: 14 MG/DL
CALCIUM SERPL-MCNC: 10 MG/DL
CHLORIDE SERPL-SCNC: 106 MMOL/L
CHOLEST SERPL-MCNC: 211 MG/DL
CHOLEST/HDLC SERPL: 3.6 {RATIO}
CLARITY UR REFRACT.AUTO: ABNORMAL
CO2 SERPL-SCNC: 26 MMOL/L
COLOR UR AUTO: ABNORMAL
COLOR, POC UA: ABNORMAL
CREAT SERPL-MCNC: 0.8 MG/DL
DIFFERENTIAL METHOD: NORMAL
EOSINOPHIL # BLD AUTO: 0.1 K/UL
EOSINOPHIL NFR BLD: 0.6 %
ERYTHROCYTE [DISTWIDTH] IN BLOOD BY AUTOMATED COUNT: 12.6 %
EST. GFR  (AFRICAN AMERICAN): >60 ML/MIN/1.73 M^2
EST. GFR  (NON AFRICAN AMERICAN): >60 ML/MIN/1.73 M^2
GLUCOSE SERPL-MCNC: 96 MG/DL
GLUCOSE UR QL STRIP: NEGATIVE
GLUCOSE UR QL STRIP: NORMAL
HCT VFR BLD AUTO: 44.1 %
HDLC SERPL-MCNC: 58 MG/DL
HDLC SERPL: 27.5 %
HGB BLD-MCNC: 14.2 G/DL
HGB UR QL STRIP: ABNORMAL
HYALINE CASTS UR QL AUTO: 7 /LPF
IMM GRANULOCYTES # BLD AUTO: 0.03 K/UL
IMM GRANULOCYTES NFR BLD AUTO: 0.4 %
KETONES UR QL STRIP: NEGATIVE
KETONES UR QL STRIP: NEGATIVE
LDLC SERPL CALC-MCNC: 131.8 MG/DL
LEUKOCYTE ESTERASE UR QL STRIP: ABNORMAL
LEUKOCYTE ESTERASE URINE, POC: ABNORMAL
LYMPHOCYTES # BLD AUTO: 1.8 K/UL
LYMPHOCYTES NFR BLD: 21.1 %
MCH RBC QN AUTO: 28.3 PG
MCHC RBC AUTO-ENTMCNC: 32.2 G/DL
MCV RBC AUTO: 88 FL
MICROSCOPIC COMMENT: ABNORMAL
MONOCYTES # BLD AUTO: 0.4 K/UL
MONOCYTES NFR BLD: 5.3 %
NEUTROPHILS # BLD AUTO: 6 K/UL
NEUTROPHILS NFR BLD: 71.9 %
NITRITE UR QL STRIP: NEGATIVE
NITRITE, POC UA: POSITIVE
NONHDLC SERPL-MCNC: 153 MG/DL
NRBC BLD-RTO: 0 /100 WBC
PH UR STRIP: 5 [PH] (ref 5–8)
PH, POC UA: 5
PLATELET # BLD AUTO: 273 K/UL
PMV BLD AUTO: 11.4 FL
POTASSIUM SERPL-SCNC: 4.1 MMOL/L
PROT SERPL-MCNC: 8.2 G/DL
PROT UR QL STRIP: ABNORMAL
PROTEIN, POC: ABNORMAL
RBC # BLD AUTO: 5.02 M/UL
RBC #/AREA URNS AUTO: 8 /HPF (ref 0–4)
SODIUM SERPL-SCNC: 141 MMOL/L
SP GR UR STRIP: 1.02 (ref 1–1.03)
SPECIFIC GRAVITY, POC UA: 1.02
SQUAMOUS #/AREA URNS AUTO: 19 /HPF
TRIGL SERPL-MCNC: 106 MG/DL
URN SPEC COLLECT METH UR: ABNORMAL
UROBILINOGEN UR STRIP-ACNC: NEGATIVE EU/DL
UROBILINOGEN, POC UA: NORMAL
WBC # BLD AUTO: 8.3 K/UL
WBC #/AREA URNS AUTO: >100 /HPF (ref 0–5)

## 2018-04-12 PROCEDURE — 99213 OFFICE O/P EST LOW 20 MIN: CPT | Mod: PBBFAC,PO | Performed by: FAMILY MEDICINE

## 2018-04-12 PROCEDURE — 81001 URINALYSIS AUTO W/SCOPE: CPT

## 2018-04-12 PROCEDURE — 80053 COMPREHEN METABOLIC PANEL: CPT

## 2018-04-12 PROCEDURE — 87086 URINE CULTURE/COLONY COUNT: CPT

## 2018-04-12 PROCEDURE — 99999 PR PBB SHADOW E&M-EST. PATIENT-LVL III: CPT | Mod: PBBFAC,,, | Performed by: FAMILY MEDICINE

## 2018-04-12 PROCEDURE — 87186 SC STD MICRODIL/AGAR DIL: CPT

## 2018-04-12 PROCEDURE — 85025 COMPLETE CBC W/AUTO DIFF WBC: CPT

## 2018-04-12 PROCEDURE — 87088 URINE BACTERIA CULTURE: CPT

## 2018-04-12 PROCEDURE — 87077 CULTURE AEROBIC IDENTIFY: CPT

## 2018-04-12 PROCEDURE — 80061 LIPID PANEL: CPT

## 2018-04-12 PROCEDURE — 81002 URINALYSIS NONAUTO W/O SCOPE: CPT | Mod: PBBFAC,PO | Performed by: FAMILY MEDICINE

## 2018-04-12 PROCEDURE — 99213 OFFICE O/P EST LOW 20 MIN: CPT | Mod: S$PBB,,, | Performed by: FAMILY MEDICINE

## 2018-04-12 RX ORDER — TIZANIDINE 2 MG/1
TABLET ORAL DAILY PRN
COMMUNITY
Start: 2018-03-12 | End: 2023-03-30

## 2018-04-12 RX ORDER — AMLODIPINE AND BENAZEPRIL HYDROCHLORIDE 5; 20 MG/1; MG/1
1 CAPSULE ORAL DAILY
Qty: 90 CAPSULE | Refills: 0 | Status: SHIPPED | OUTPATIENT
Start: 2018-04-12 | End: 2019-01-18 | Stop reason: SDUPTHER

## 2018-04-12 RX ORDER — CIPROFLOXACIN 500 MG/1
500 TABLET ORAL EVERY 12 HOURS
Qty: 10 TABLET | Refills: 0 | Status: SHIPPED | OUTPATIENT
Start: 2018-04-12 | End: 2018-04-17

## 2018-04-12 RX ORDER — AMLODIPINE AND BENAZEPRIL HYDROCHLORIDE 5; 10 MG/1; MG/1
1 CAPSULE ORAL DAILY
Qty: 30 CAPSULE | Refills: 0 | Status: CANCELLED | OUTPATIENT
Start: 2018-04-12 | End: 2019-04-12

## 2018-04-12 NOTE — PROGRESS NOTES
Subjective:       Patient ID: Jimena Pierce is a 52 y.o. female.    Chief Complaint: possible UTI    Bladder symptoms since 6 days.      Urinary Tract Infection    This is a new problem. The current episode started in the past 7 days. The problem occurs every urination. The problem has been unchanged. The quality of the pain is described as burning. There has been no fever. Associated symptoms include frequency and urgency. Pertinent negatives include no chills, flank pain, hematuria, nausea or vomiting. She has tried increased fluids and home medications for the symptoms.     Review of Systems   Constitutional: Negative for chills.   Gastrointestinal: Negative for nausea and vomiting.   Genitourinary: Positive for frequency and urgency. Negative for flank pain and hematuria.       Objective:      Physical Exam   Constitutional: She is oriented to person, place, and time. She appears well-developed.   HENT:   Head: Normocephalic and atraumatic.   Cardiovascular: Normal rate, regular rhythm and normal heart sounds.    Pulmonary/Chest: Effort normal and breath sounds normal.   Abdominal: Soft. She exhibits no distension. There is no tenderness. There is no guarding.   Musculoskeletal: She exhibits edema (swelling to right ankle - chronic hyperpig B ankles).   Neurological: She is alert and oriented to person, place, and time.   Skin: Skin is warm and dry.   Psychiatric: She has a normal mood and affect. Her behavior is normal.         Assessment/Plan:     1. Bladder infection  Urinalysis    Urine culture    ciprofloxacin HCl (CIPRO) 500 MG tablet   2. Essential hypertension  Lipid panel    CBC auto differential    Comprehensive metabolic panel    amlodipine-benazepril 5-20 mg (LOTREL) 5-20 mg per capsule   3. Urinary frequency  POCT urine dipstick without microscope   4. Encounter for screening mammogram for breast cancer  Mammo Digital Screening Bilat with CAD    BP controlled on my repeat.  Will refill  her meds at current dose and check labs.

## 2018-04-15 VITALS
WEIGHT: 251.19 LBS | OXYGEN SATURATION: 93 % | DIASTOLIC BLOOD PRESSURE: 84 MMHG | HEART RATE: 96 BPM | BODY MASS INDEX: 47.47 KG/M2 | TEMPERATURE: 98 F | SYSTOLIC BLOOD PRESSURE: 120 MMHG

## 2018-04-16 LAB — BACTERIA UR CULT: NORMAL

## 2018-11-20 ENCOUNTER — PATIENT OUTREACH (OUTPATIENT)
Dept: ADMINISTRATIVE | Facility: HOSPITAL | Age: 53
End: 2018-11-20

## 2019-01-18 DIAGNOSIS — I10 ESSENTIAL HYPERTENSION: ICD-10-CM

## 2019-01-18 RX ORDER — AMLODIPINE AND BENAZEPRIL HYDROCHLORIDE 5; 20 MG/1; MG/1
1 CAPSULE ORAL DAILY
Qty: 90 CAPSULE | Refills: 0 | Status: SHIPPED | OUTPATIENT
Start: 2019-01-18 | End: 2020-05-15

## 2019-09-10 ENCOUNTER — PATIENT OUTREACH (OUTPATIENT)
Dept: ADMINISTRATIVE | Facility: HOSPITAL | Age: 54
End: 2019-09-10

## 2019-09-26 ENCOUNTER — PES CALL (OUTPATIENT)
Dept: ADMINISTRATIVE | Facility: CLINIC | Age: 54
End: 2019-09-26

## 2019-11-29 ENCOUNTER — PATIENT OUTREACH (OUTPATIENT)
Dept: ADMINISTRATIVE | Facility: HOSPITAL | Age: 54
End: 2019-11-29

## 2020-05-15 ENCOUNTER — OFFICE VISIT (OUTPATIENT)
Dept: INTERNAL MEDICINE | Facility: CLINIC | Age: 55
End: 2020-05-15
Payer: MEDICARE

## 2020-05-15 DIAGNOSIS — K21.9 GASTROESOPHAGEAL REFLUX DISEASE, ESOPHAGITIS PRESENCE NOT SPECIFIED: ICD-10-CM

## 2020-05-15 DIAGNOSIS — I10 ESSENTIAL HYPERTENSION: Primary | ICD-10-CM

## 2020-05-15 PROCEDURE — 99213 PR OFFICE/OUTPT VISIT, EST, LEVL III, 20-29 MIN: ICD-10-PCS | Mod: 95,,, | Performed by: FAMILY MEDICINE

## 2020-05-15 PROCEDURE — 99213 OFFICE O/P EST LOW 20 MIN: CPT | Mod: 95,,, | Performed by: FAMILY MEDICINE

## 2020-05-15 PROCEDURE — 99211 OFF/OP EST MAY X REQ PHY/QHP: CPT

## 2020-05-15 PROCEDURE — G0463 HOSPITAL OUTPT CLINIC VISIT: HCPCS

## 2020-05-15 RX ORDER — AMLODIPINE BESYLATE 5 MG/1
5 TABLET ORAL DAILY
Qty: 30 TABLET | Refills: 3 | Status: SHIPPED | OUTPATIENT
Start: 2020-05-15 | End: 2020-06-15

## 2020-05-15 RX ORDER — OXYMORPHONE HYDROCHLORIDE 10 MG/1
TABLET, FILM COATED, EXTENDED RELEASE ORAL
COMMUNITY
Start: 2020-04-20 | End: 2021-06-23

## 2020-05-15 RX ORDER — FAMOTIDINE 40 MG/1
40 TABLET, FILM COATED ORAL DAILY
Qty: 30 TABLET | Refills: 11 | Status: SHIPPED | OUTPATIENT
Start: 2020-05-15 | End: 2021-05-27 | Stop reason: SDUPTHER

## 2020-05-15 NOTE — PROGRESS NOTES
Subjective:       Patient ID: Jimena Pierce is a 54 y.o. female.    Chief Complaint: Hypertension    The patient location is: [place] /[place]  The chief complaint leading to consultation is: high blood pressure  Visit type: audiovisual  Total time spent with patient:  - 11:55 - 12:29  Each patient to whom he or she provides medical services by telemedicine is:  (1) informed of the relationship between the physician and patient and the respective role of any other health care provider with respect to management of the patient; and (2) notified that he or she may decline to receive medical services by telemedicine and may withdraw from such care at any time.    Notes: 170/99 at pain mgmt yesterday. She was out of BP meds. (last Rx from me was for 90 days Dec 2018.) She was not taking it - BP may have been high alst month as it was not checked last month. Prior checks showed no problem.    Was on ranitidine prev and then stopped and was doing well for months until lately - persistent acid reflux. No diet changes recalled. Wants an altrernate to ranitidine.  Discussed risks of PPIs. Reviewed exacerbating items for GERD.        Hypertension   This is a recurrent problem. The current episode started more than 1 year ago. The problem has been waxing and waning since onset. The problem is uncontrolled. Associated symptoms include anxiety, headaches (this is new for her - frontal) and malaise/fatigue. Pertinent negatives include no chest pain or shortness of breath (with walking 2/2 weight). Risk factors for coronary artery disease include obesity and sedentary lifestyle. The current treatment provides significant improvement. Compliance problems include exercise.      Review of Systems   Constitutional: Positive for malaise/fatigue.   HENT: Positive for sneezing.    Respiratory: Positive for cough (little bit). Negative for chest tightness and shortness of breath (with walking 2/2 weight).    Cardiovascular: Negative  for chest pain.   Musculoskeletal: Positive for back pain.   Allergic/Immunologic: Positive for environmental allergies.   Neurological: Positive for headaches (this is new for her - frontal).   Psychiatric/Behavioral: Positive for sleep disturbance (always a problem for years).       Objective:      Physical Exam   Constitutional: She is oriented to person, place, and time. She appears well-developed and well-nourished. No distress.   HENT:   Head: Normocephalic and atraumatic.   Pulmonary/Chest: Effort normal. No respiratory distress.   Neurological: She is alert and oriented to person, place, and time.   Psychiatric: She has a normal mood and affect. Her behavior is normal.         Assessment/Plan:     1. Essential hypertension  amLODIPine (NORVASC) 5 MG tablet   2. Gastroesophageal reflux disease, esophagitis presence not specified  famotidine (PEPCID) 40 MG tablet

## 2020-05-15 NOTE — PATIENT INSTRUCTIONS
Controlling High Blood Pressure  High blood pressure (hypertension) is often called the silent killer. This is because many people who have it dont know it. High blood pressure is defined as 140/90 mm Hg or higher. Know your blood pressure and remember to check it regularly. Doing so can save your life. Here are some things you can do to help control your blood pressure.    Choose heart-healthy foods  · Select low-salt, low-fat foods. Limit sodium intake to 2,400 mg per day or the amount suggested by your healthcare provider.  · Limit canned, dried, cured, packaged, and fast foods. These can contain a lot of salt.  · Eat 8 to 10 servings of fruits and vegetables every day.  · Choose lean meats, fish, or chicken.  · Eat whole-grain pasta, brown rice, and beans.  · Eat 2 to 3 servings of low-fat or fat-free dairy products.  · Ask your doctor about the DASH eating plan. This plan helps reduce blood pressure.  · When you go to a restaurant, ask that your meal be prepared with no added salt.  Maintain a healthy weight  · Ask your healthcare provider how many calories to eat a day. Then stick to that number.  · Ask your healthcare provider what weight range is healthiest for you. If you are overweight, a weight loss of only 3% to 5% of your body weight can help lower blood pressure. Generally, a good weight loss goal is to lose 10% of your body weight in a year.  · Limit snacks and sweets.  · Get regular exercise.  Get up and get active  · Choose activities you enjoy. Find ones you can do with friends or family. This includes bicycling, dancing, walking, and jogging.  · Park farther away from building entrances.  · Use stairs instead of the elevator.  · When you can, walk or bike instead of driving.  · [place] leaves, garden, or do household repairs.  · Be active at a moderate to vigorous level of physical activity for at least 40 minutes for a minimum of 3 to 4 days a week.   Manage stress  · Make time to relax and enjoy  life. Find time to laugh.  · Communicate your concerns with your loved ones and your healthcare provider.  · Visit with family and friends, and keep up with hobbies.  Limit alcohol and quit smoking  · Men should have no more than 2 drinks per day.  · Women should have no more than 1 drink per day.  · Talk with your healthcare provider about quitting smoking. Smoking significantly increases your risk for heart disease and stroke. Ask your healthcare provider about community smoking cessation programs and other options.  Medicines  If lifestyle changes arent enough, your healthcare provider may prescribe high blood pressure medicine. Take all medicines as prescribed. If you have any questions about your medicines, ask your healthcare provider before stopping or changing them.   Date Last Reviewed: 4/27/2016 © 2000-2017 [employer]. [address] [place] [address], [place] [address]. All rights reserved. This information is not intended as a substitute for professional medical care. Always follow your healthcare professional's instructions.          Lifestyle Changes for Controlling GERD  When you have GERD, stomach acid feels as if its backing up toward your mouth. Whether or not you take medicine to control your GERD, your symptoms can often be improved with lifestyle changes. Talk to your healthcare provider about the following suggestions. These suggestions may help you get relief from your symptoms.      Raise your head  Reflux is more likely to strike when youre lying down flat, because stomach fluid can flow backward more easily. Raising the head of your bed 4 to 6 inches can help. To do this:  · Slide blocks or books under the legs at the head of your bed. Or, place a wedge under the mattress. Many [employer] can make a suitable wedge for you. The wedge should run from your waist to the top of your head.  · Dont just prop your head on several pillows. This increases pressure on your stomach. It can  make GERD worse.  Watch your eating habits  Certain foods may increase the acid in your stomach or relax the lower esophageal sphincter. This makes GERD more likely. Its best to avoid the following if they cause you symptoms:  · Coffee, tea, and carbonated drinks (with and without caffeine)  · Fatty, fried, or spicy food  · Mint, chocolate, onions, and tomatoes  · Peppermint  · Any other foods that seem to irritate your stomach or cause you pain  Relieve the pressure  Tips include the following:  · Eat smaller meals, even if you have to eat more often.  · Dont lie down right after you eat. Wait a few hours for your stomach to empty.  · Avoid tight belts and tight-fitting clothes.  · Lose excess weight.  Tobacco and alcohol  Avoid smoking tobacco and drinking alcohol. They can make GERD symptoms worse.  Date Last Reviewed: 7/1/2016  © 0186-8805 [employer]. [address] [place] [address], [place] [address]. All rights reserved. This information is not intended as a substitute for professional medical care. Always follow your healthcare professional's instructions.

## 2020-06-15 ENCOUNTER — OFFICE VISIT (OUTPATIENT)
Dept: INTERNAL MEDICINE | Facility: CLINIC | Age: 55
End: 2020-06-15
Payer: MEDICARE

## 2020-06-15 VITALS
HEIGHT: 61 IN | OXYGEN SATURATION: 97 % | HEART RATE: 85 BPM | WEIGHT: 264.31 LBS | SYSTOLIC BLOOD PRESSURE: 132 MMHG | BODY MASS INDEX: 49.9 KG/M2 | DIASTOLIC BLOOD PRESSURE: 80 MMHG | TEMPERATURE: 98 F

## 2020-06-15 DIAGNOSIS — Z11.4 SCREENING FOR HIV WITHOUT PRESENCE OF RISK FACTORS: ICD-10-CM

## 2020-06-15 DIAGNOSIS — I10 ESSENTIAL HYPERTENSION: Primary | ICD-10-CM

## 2020-06-15 DIAGNOSIS — Z12.31 ENCOUNTER FOR SCREENING MAMMOGRAM FOR BREAST CANCER: ICD-10-CM

## 2020-06-15 PROCEDURE — 99214 OFFICE O/P EST MOD 30 MIN: CPT | Mod: PBBFAC | Performed by: FAMILY MEDICINE

## 2020-06-15 PROCEDURE — 99999 PR PBB SHADOW E&M-EST. PATIENT-LVL IV: ICD-10-PCS | Mod: PBBFAC,,, | Performed by: FAMILY MEDICINE

## 2020-06-15 PROCEDURE — 99999 PR PBB SHADOW E&M-EST. PATIENT-LVL IV: CPT | Mod: PBBFAC,,, | Performed by: FAMILY MEDICINE

## 2020-06-15 PROCEDURE — 99213 PR OFFICE/OUTPT VISIT, EST, LEVL III, 20-29 MIN: ICD-10-PCS | Mod: S$PBB,,, | Performed by: FAMILY MEDICINE

## 2020-06-15 PROCEDURE — 99213 OFFICE O/P EST LOW 20 MIN: CPT | Mod: S$PBB,,, | Performed by: FAMILY MEDICINE

## 2020-06-15 RX ORDER — AMLODIPINE AND VALSARTAN 5; 160 MG/1; MG/1
1 TABLET ORAL DAILY
Qty: 30 TABLET | Refills: 3 | Status: SHIPPED | OUTPATIENT
Start: 2020-06-15 | End: 2020-11-05 | Stop reason: SDUPTHER

## 2020-06-15 NOTE — PROGRESS NOTES
Subjective:       Patient ID: Jimena Pierce is a 54 y.o. female.    Chief Complaint: Follow-up    Patient with hypertension, hyperlipidemia here for recheck.  Saw her a month ago and she has been taking the amlodipine daily since then.  She states that her pain management visit last week her blood pressure was in the 160s over 100s initially and then 160s/90s on 2nd try.  She has been checking her own blood pressures in shows me her meter.  They run in the 120s-140s/70s-90s.  BP controlled in clinic today.  Last labs 2018.  Due for screening lipids/HIV/mammogram/Pap.      BP Readings from Last 3 Encounters:  04/12/18 : 120/84  05/17/17 : 132/88  05/15/17 : 131/84  Intake today:132/80    Hypertension Medications        amLODIPine (NORVASC) 5 MG tablet Take 1 tablet (5 mg total) by mouth once daily.    Lab Results       Component                Value               Date                       WBC                      8.30                04/12/2018                 HGB                      14.2                04/12/2018                 HCT                      44.1                04/12/2018                 PLT                      273                 04/12/2018                 CHOL                     211 (H)             04/12/2018                 TRIG                     106                 04/12/2018                 HDL                      58                  04/12/2018                 LDLCALC                  131.8               04/12/2018                 ALT                      31                  04/12/2018                 AST                      21                  04/12/2018                 NA                       141                 04/12/2018                 K                        4.1                 04/12/2018                 CL                       106                 04/12/2018                 CALCIUM                  10.0                04/12/2018                 CREATININE                0.8                 04/12/2018                 BUN                      14                  04/12/2018                 CO2                      26                  04/12/2018                 GLU                      96                  04/12/2018                 ESTGFRAFRICA             >60.0               04/12/2018                 EGFRNONAA                >60.0               04/12/2018                Lab Results - diet alone       Component                Value               Date                       CHOL                     211 (H)             04/12/2018                 CHOL                     217 (H)             06/09/2016            Lab Results       Component                Value               Date                       HDL                      58                  04/12/2018                 HDL                      61                  06/09/2016            Lab Results       Component                Value               Date                       LDLCALC                  131.8               04/12/2018                 LDLCALC                  137.2               06/09/2016            Lab Results       Component                Value               Date                       TRIG                     106                 04/12/2018                 TRIG                     94                  06/09/2016                  Review of Systems   Constitutional: Negative for fever.   Respiratory: Positive for shortness of breath. Negative for cough.    Cardiovascular: Positive for leg swelling (R chronic - better than usual). Negative for chest pain.   Musculoskeletal: Positive for arthralgias (knee pain B R>L) and back pain.   Neurological: Negative for light-headedness and headaches.   Psychiatric/Behavioral: Positive for sleep disturbance (chronic insomnia - meds help).       Objective:      Physical Exam  Constitutional:       Appearance: She is well-developed.      Comments: In wheelchair   HENT:      Head:  Normocephalic and atraumatic.   Cardiovascular:      Rate and Rhythm: Normal rate and regular rhythm.      Heart sounds: Normal heart sounds.   Pulmonary:      Effort: Pulmonary effort is normal. No respiratory distress.      Breath sounds: Normal breath sounds.   Musculoskeletal:         General: Swelling (mild to RLE) present.   Skin:     General: Skin is warm and dry.   Neurological:      Mental Status: She is alert and oriented to person, place, and time.   Psychiatric:         Behavior: Behavior normal.           Assessment/Plan:     1. Essential hypertension  Lipid Panel    Comprehensive metabolic panel    amlodipine-valsartan (EXFORGE) 5-160 mg per tablet   2. Encounter for screening mammogram for breast cancer  Mammo Digital Screening Bilat   3. Screening for HIV without presence of risk factors  HIV 1/2 Ag/Ab (4th Gen)   appt for MMG. Lab check.  Add arb to [facility]. Amlo/valsartan 5/160 and recheck 6 weeks.  rec appt with gyn for PAP 2/2 difficulty getting up on tables I have in clinic for pelvic exam.

## 2020-07-17 ENCOUNTER — PATIENT OUTREACH (OUTPATIENT)
Dept: ADMINISTRATIVE | Facility: HOSPITAL | Age: 55
End: 2020-07-17

## 2020-07-17 NOTE — PROGRESS NOTES
HM reviewed and updated. Immunizations abstracted.  Care Everywhere abstracted. DigMed:n/a   AWV:jaida  CC note updated.  Attempted to call pt to schedule overdue HM items and AWV. No answer. Sent portal message.  Health Maintenance Due   Topic    HIV Screening     TETANUS VACCINE     Cervical Cancer Screening     Shingles Vaccine (1 of 2)    Mammogram     Lipid Panel      Previsit chart audit completed.  *KDL*

## 2020-09-25 ENCOUNTER — PES CALL (OUTPATIENT)
Dept: ADMINISTRATIVE | Facility: CLINIC | Age: 55
End: 2020-09-25

## 2020-10-01 ENCOUNTER — PATIENT OUTREACH (OUTPATIENT)
Dept: ADMINISTRATIVE | Facility: HOSPITAL | Age: 55
End: 2020-10-01

## 2020-10-01 NOTE — PROGRESS NOTES
Pap Smear Report. [facility] checked, no results found. Attempting to contact pt to schedule overdue pap smear. Unable to reach patient at this time. Left voicemail.

## 2020-10-15 ENCOUNTER — PATIENT OUTREACH (OUTPATIENT)
Dept: ADMINISTRATIVE | Facility: HOSPITAL | Age: 55
End: 2020-10-15

## 2020-10-15 NOTE — PROGRESS NOTES
Mammogram Report. Attempting to contact pt to schedule overdue mammogram. Unable to reach patient at this time. Left voicemail.

## 2020-11-05 ENCOUNTER — OFFICE VISIT (OUTPATIENT)
Dept: INTERNAL MEDICINE | Facility: CLINIC | Age: 55
End: 2020-11-05
Payer: MEDICARE

## 2020-11-05 ENCOUNTER — LAB VISIT (OUTPATIENT)
Dept: LAB | Facility: HOSPITAL | Age: 55
End: 2020-11-05
Attending: FAMILY MEDICINE
Payer: MEDICARE

## 2020-11-05 VITALS
BODY MASS INDEX: 49.94 KG/M2 | SYSTOLIC BLOOD PRESSURE: 116 MMHG | DIASTOLIC BLOOD PRESSURE: 80 MMHG | OXYGEN SATURATION: 97 % | HEIGHT: 61 IN | HEART RATE: 87 BPM | TEMPERATURE: 96 F

## 2020-11-05 DIAGNOSIS — I10 ESSENTIAL HYPERTENSION: Primary | ICD-10-CM

## 2020-11-05 DIAGNOSIS — Z11.4 SCREENING FOR HIV WITHOUT PRESENCE OF RISK FACTORS: ICD-10-CM

## 2020-11-05 DIAGNOSIS — I10 ESSENTIAL HYPERTENSION: ICD-10-CM

## 2020-11-05 DIAGNOSIS — H61.23 EXCESSIVE CERUMEN IN EAR CANAL, BILATERAL: ICD-10-CM

## 2020-11-05 DIAGNOSIS — E66.01 OBESITY, CLASS III, BMI 40-49.9 (MORBID OBESITY): ICD-10-CM

## 2020-11-05 LAB
ALBUMIN SERPL BCP-MCNC: 4.1 G/DL (ref 3.5–5.2)
ALP SERPL-CCNC: 90 U/L (ref 55–135)
ALT SERPL W/O P-5'-P-CCNC: 36 U/L (ref 10–44)
ANION GAP SERPL CALC-SCNC: 10 MMOL/L (ref 8–16)
AST SERPL-CCNC: 23 U/L (ref 10–40)
BILIRUB SERPL-MCNC: 0.7 MG/DL (ref 0.1–1)
BUN SERPL-MCNC: 14 MG/DL (ref 6–20)
CALCIUM SERPL-MCNC: 9.7 MG/DL (ref 8.7–10.5)
CHLORIDE SERPL-SCNC: 108 MMOL/L (ref 95–110)
CHOLEST SERPL-MCNC: 239 MG/DL (ref 120–199)
CHOLEST/HDLC SERPL: 3.6 {RATIO} (ref 2–5)
CO2 SERPL-SCNC: 25 MMOL/L (ref 23–29)
CREAT SERPL-MCNC: 0.8 MG/DL (ref 0.5–1.4)
EST. GFR  (AFRICAN AMERICAN): >60 ML/MIN/1.73 M^2
EST. GFR  (NON AFRICAN AMERICAN): >60 ML/MIN/1.73 M^2
GLUCOSE SERPL-MCNC: 95 MG/DL (ref 70–110)
HDLC SERPL-MCNC: 67 MG/DL (ref 40–75)
HDLC SERPL: 28 % (ref 20–50)
LDLC SERPL CALC-MCNC: 146.2 MG/DL (ref 63–159)
NONHDLC SERPL-MCNC: 172 MG/DL
POTASSIUM SERPL-SCNC: 4.3 MMOL/L (ref 3.5–5.1)
PROT SERPL-MCNC: 7.9 G/DL (ref 6–8.4)
SODIUM SERPL-SCNC: 143 MMOL/L (ref 136–145)
TRIGL SERPL-MCNC: 129 MG/DL (ref 30–150)

## 2020-11-05 PROCEDURE — 99999 PR PBB SHADOW E&M-EST. PATIENT-LVL IV: ICD-10-PCS | Mod: PBBFAC,,, | Performed by: FAMILY MEDICINE

## 2020-11-05 PROCEDURE — 99213 PR OFFICE/OUTPT VISIT, EST, LEVL III, 20-29 MIN: ICD-10-PCS | Mod: S$PBB,,, | Performed by: FAMILY MEDICINE

## 2020-11-05 PROCEDURE — 80061 LIPID PANEL: CPT

## 2020-11-05 PROCEDURE — 86703 HIV-1/HIV-2 1 RESULT ANTBDY: CPT

## 2020-11-05 PROCEDURE — 90686 IIV4 VACC NO PRSV 0.5 ML IM: CPT | Mod: PBBFAC

## 2020-11-05 PROCEDURE — 99213 OFFICE O/P EST LOW 20 MIN: CPT | Mod: S$PBB,,, | Performed by: FAMILY MEDICINE

## 2020-11-05 PROCEDURE — 36415 COLL VENOUS BLD VENIPUNCTURE: CPT

## 2020-11-05 PROCEDURE — 99999 PR PBB SHADOW E&M-EST. PATIENT-LVL IV: CPT | Mod: PBBFAC,,, | Performed by: FAMILY MEDICINE

## 2020-11-05 PROCEDURE — 80053 COMPREHEN METABOLIC PANEL: CPT

## 2020-11-05 PROCEDURE — 99214 OFFICE O/P EST MOD 30 MIN: CPT | Mod: PBBFAC | Performed by: FAMILY MEDICINE

## 2020-11-05 RX ORDER — AMLODIPINE AND VALSARTAN 5; 160 MG/1; MG/1
1 TABLET ORAL DAILY
Qty: 90 TABLET | Refills: 2 | Status: SHIPPED | OUTPATIENT
Start: 2020-11-05 | End: 2021-08-11 | Stop reason: SDUPTHER

## 2020-11-05 NOTE — PROGRESS NOTES
Subjective:       Patient ID: Jimena Pierce is a 55 y.o. female.    Chief Complaint: Follow-up (med)    Here for BP recheck. 110s - occas 120s/70s-80s.  Current amlodipine/valsartan 5/160 is new since June.  Previously on amlodipine/benazepril 5/20.    BP Readings from Last 3 Encounters:  11/05/20 : 116/80  06/15/20 : 132/80  04/12/18 : 120/84    Hypertension Medications        amlodipine-valsartan (EXFORGE) 5-160 mg per tablet Take 1 tablet by mouth once daily.    She is complaining problems with her ears.           Hypertension  This is a chronic problem. The current episode started more than 1 year ago. Pertinent negatives include no chest pain or shortness of breath. There are no associated agents to hypertension. Risk factors for coronary artery disease include obesity, post-menopausal state and sedentary lifestyle. Past treatments include calcium channel blockers and angiotensin blockers. The current treatment provides moderate improvement. There are no compliance problems.  There is no history of a hypertension causing med.     Review of Systems   Constitutional: Negative for fever.   HENT: Negative for congestion.    Respiratory: Negative for cough and shortness of breath.    Cardiovascular: Positive for leg swelling (RLE swelling). Negative for chest pain.       Objective:      Physical Exam  Constitutional:       Appearance: She is well-developed. She is obese.   HENT:      Head: Normocephalic and atraumatic.      Right Ear: Hearing normal. There is impacted cerumen.      Left Ear: Hearing normal. There is impacted cerumen.   Cardiovascular:      Rate and Rhythm: Normal rate and regular rhythm.      Heart sounds: Normal heart sounds.   Pulmonary:      Effort: Pulmonary effort is normal. No respiratory distress.      Breath sounds: Normal breath sounds.   Musculoskeletal:      Right lower leg: Edema (chronic lymphedema to RLE - much improved  from past) present.      Left lower leg: No edema.    Skin:     General: Skin is warm and dry.   Neurological:      Mental Status: She is alert and oriented to person, place, and time.   Psychiatric:         Behavior: Behavior normal.           Assessment/Plan:     1. Essential hypertension  amlodipine-valsartan (EXFORGE) 5-160 mg per tablet    Hypertension Digital Medicine (HDMP) Enrollment Order    Hypertension Digital Medicine ([facility]): Assign Onboarding Questionnaires   2. Obesity, Class III, BMI 40-49.9 (morbid obesity)     3. Excessive cerumen in ear canal, bilateral  Ambulatory referral/consult to ENT    Continue current dose BP med.  Advised/recommended signing up with digital hypertension program.  Impacted cerumen present bilateral EACs.

## 2020-11-06 LAB — HIV 1+2 AB+HIV1 P24 AG SERPL QL IA: NEGATIVE

## 2020-11-23 ENCOUNTER — PATIENT OUTREACH (OUTPATIENT)
Dept: ADMINISTRATIVE | Facility: OTHER | Age: 55
End: 2020-11-23

## 2020-11-23 NOTE — PROGRESS NOTES
Health Maintenance Due   Topic Date Due    TETANUS VACCINE  08/31/1981    Cervical Cancer Screening  08/31/1984    Shingles Vaccine (1 of 2) 08/31/2013    Mammogram  06/15/2017     Updates were requested from care everywhere.  Chart was reviewed for overdue Proactive [facility] Encounters ([facility]) topics (CRS, Breast Cancer Screening, Eye exam)  Health Maintenance has been updated.  LINKS immunization registry triggered.  Immunizations were reconciled.

## 2020-11-25 ENCOUNTER — PES CALL (OUTPATIENT)
Dept: ADMINISTRATIVE | Facility: CLINIC | Age: 55
End: 2020-11-25

## 2020-11-25 NOTE — PROGRESS NOTES
THIS IS THE SECOND ATTEMPT TO CONTACT PATIENT.  PATIENT DID NOT ANSWER CALL TO SCHEDULE DUE MAMMOGRAM, LEFT MESSAGE.

## 2020-12-11 ENCOUNTER — PATIENT MESSAGE (OUTPATIENT)
Dept: OTHER | Facility: OTHER | Age: 55
End: 2020-12-11

## 2021-02-26 ENCOUNTER — PATIENT OUTREACH (OUTPATIENT)
Dept: ADMINISTRATIVE | Facility: HOSPITAL | Age: 56
End: 2021-02-26

## 2021-04-28 ENCOUNTER — PATIENT MESSAGE (OUTPATIENT)
Dept: RESEARCH | Facility: HOSPITAL | Age: 56
End: 2021-04-28

## 2021-05-27 ENCOUNTER — OFFICE VISIT (OUTPATIENT)
Dept: INTERNAL MEDICINE | Facility: CLINIC | Age: 56
End: 2021-05-27
Payer: MEDICARE

## 2021-05-27 ENCOUNTER — LAB VISIT (OUTPATIENT)
Dept: LAB | Facility: HOSPITAL | Age: 56
End: 2021-05-27
Attending: INTERNAL MEDICINE
Payer: MEDICARE

## 2021-05-27 VITALS
TEMPERATURE: 100 F | OXYGEN SATURATION: 94 % | WEIGHT: 249.81 LBS | HEIGHT: 61 IN | HEART RATE: 99 BPM | BODY MASS INDEX: 47.16 KG/M2 | RESPIRATION RATE: 17 BRPM | DIASTOLIC BLOOD PRESSURE: 78 MMHG | SYSTOLIC BLOOD PRESSURE: 134 MMHG

## 2021-05-27 DIAGNOSIS — K21.9 CHRONIC GERD: ICD-10-CM

## 2021-05-27 DIAGNOSIS — E66.01 MORBID OBESITY WITH BMI OF 45.0-49.9, ADULT: ICD-10-CM

## 2021-05-27 DIAGNOSIS — Z12.4 CERVICAL CANCER SCREENING: ICD-10-CM

## 2021-05-27 DIAGNOSIS — E78.5 HYPERLIPIDEMIA LDL GOAL <130: ICD-10-CM

## 2021-05-27 DIAGNOSIS — Z12.31 ENCOUNTER FOR SCREENING MAMMOGRAM FOR MALIGNANT NEOPLASM OF BREAST: ICD-10-CM

## 2021-05-27 DIAGNOSIS — I10 ESSENTIAL HYPERTENSION: ICD-10-CM

## 2021-05-27 DIAGNOSIS — I10 ESSENTIAL HYPERTENSION: Primary | ICD-10-CM

## 2021-05-27 LAB
BASOPHILS # BLD AUTO: 0.05 K/UL (ref 0–0.2)
BASOPHILS NFR BLD: 0.7 % (ref 0–1.9)
DIFFERENTIAL METHOD: ABNORMAL
EOSINOPHIL # BLD AUTO: 0.2 K/UL (ref 0–0.5)
EOSINOPHIL NFR BLD: 2.2 % (ref 0–8)
ERYTHROCYTE [DISTWIDTH] IN BLOOD BY AUTOMATED COUNT: 12.6 % (ref 11.5–14.5)
HCT VFR BLD AUTO: 43.2 % (ref 37–48.5)
HGB BLD-MCNC: 13.8 G/DL (ref 12–16)
IMM GRANULOCYTES # BLD AUTO: 0.02 K/UL (ref 0–0.04)
IMM GRANULOCYTES NFR BLD AUTO: 0.3 % (ref 0–0.5)
LYMPHOCYTES # BLD AUTO: 2 K/UL (ref 1–4.8)
LYMPHOCYTES NFR BLD: 29.2 % (ref 18–48)
MCH RBC QN AUTO: 28.8 PG (ref 27–31)
MCHC RBC AUTO-ENTMCNC: 31.9 G/DL (ref 32–36)
MCV RBC AUTO: 90 FL (ref 82–98)
MONOCYTES # BLD AUTO: 0.5 K/UL (ref 0.3–1)
MONOCYTES NFR BLD: 7.3 % (ref 4–15)
NEUTROPHILS # BLD AUTO: 4 K/UL (ref 1.8–7.7)
NEUTROPHILS NFR BLD: 60.3 % (ref 38–73)
NRBC BLD-RTO: 0 /100 WBC
PLATELET # BLD AUTO: 227 K/UL (ref 150–450)
PMV BLD AUTO: 12.1 FL (ref 9.2–12.9)
RBC # BLD AUTO: 4.8 M/UL (ref 4–5.4)
WBC # BLD AUTO: 6.68 K/UL (ref 3.9–12.7)

## 2021-05-27 PROCEDURE — 99999 PR PBB SHADOW E&M-EST. PATIENT-LVL IV: CPT | Mod: PBBFAC,,, | Performed by: INTERNAL MEDICINE

## 2021-05-27 PROCEDURE — 36415 COLL VENOUS BLD VENIPUNCTURE: CPT | Performed by: INTERNAL MEDICINE

## 2021-05-27 PROCEDURE — 80053 COMPREHEN METABOLIC PANEL: CPT | Performed by: INTERNAL MEDICINE

## 2021-05-27 PROCEDURE — 99214 OFFICE O/P EST MOD 30 MIN: CPT | Mod: S$PBB,,, | Performed by: INTERNAL MEDICINE

## 2021-05-27 PROCEDURE — 80061 LIPID PANEL: CPT | Performed by: INTERNAL MEDICINE

## 2021-05-27 PROCEDURE — 99214 PR OFFICE/OUTPT VISIT, EST, LEVL IV, 30-39 MIN: ICD-10-PCS | Mod: S$PBB,,, | Performed by: INTERNAL MEDICINE

## 2021-05-27 PROCEDURE — 99214 OFFICE O/P EST MOD 30 MIN: CPT | Mod: PBBFAC | Performed by: INTERNAL MEDICINE

## 2021-05-27 PROCEDURE — 84443 ASSAY THYROID STIM HORMONE: CPT | Performed by: INTERNAL MEDICINE

## 2021-05-27 PROCEDURE — 85025 COMPLETE CBC W/AUTO DIFF WBC: CPT | Performed by: INTERNAL MEDICINE

## 2021-05-27 PROCEDURE — 99999 PR PBB SHADOW E&M-EST. PATIENT-LVL IV: ICD-10-PCS | Mod: PBBFAC,,, | Performed by: INTERNAL MEDICINE

## 2021-05-27 RX ORDER — FAMOTIDINE 40 MG/1
40 TABLET, FILM COATED ORAL DAILY
Qty: 30 TABLET | Refills: 11 | Status: SHIPPED | OUTPATIENT
Start: 2021-05-27 | End: 2022-06-01 | Stop reason: SDUPTHER

## 2021-05-28 LAB
ALBUMIN SERPL BCP-MCNC: 3.9 G/DL (ref 3.5–5.2)
ALP SERPL-CCNC: 82 U/L (ref 55–135)
ALT SERPL W/O P-5'-P-CCNC: 25 U/L (ref 10–44)
ANION GAP SERPL CALC-SCNC: 12 MMOL/L (ref 8–16)
AST SERPL-CCNC: 18 U/L (ref 10–40)
BILIRUB SERPL-MCNC: 0.6 MG/DL (ref 0.1–1)
BUN SERPL-MCNC: 13 MG/DL (ref 6–20)
CALCIUM SERPL-MCNC: 9.5 MG/DL (ref 8.7–10.5)
CHLORIDE SERPL-SCNC: 108 MMOL/L (ref 95–110)
CHOLEST SERPL-MCNC: 225 MG/DL (ref 120–199)
CHOLEST/HDLC SERPL: 4.1 {RATIO} (ref 2–5)
CO2 SERPL-SCNC: 22 MMOL/L (ref 23–29)
CREAT SERPL-MCNC: 0.8 MG/DL (ref 0.5–1.4)
EST. GFR  (AFRICAN AMERICAN): >60 ML/MIN/1.73 M^2
EST. GFR  (NON AFRICAN AMERICAN): >60 ML/MIN/1.73 M^2
GLUCOSE SERPL-MCNC: 93 MG/DL (ref 70–110)
HDLC SERPL-MCNC: 55 MG/DL (ref 40–75)
HDLC SERPL: 24.4 % (ref 20–50)
LDLC SERPL CALC-MCNC: 145.6 MG/DL (ref 63–159)
NONHDLC SERPL-MCNC: 170 MG/DL
POTASSIUM SERPL-SCNC: 3.8 MMOL/L (ref 3.5–5.1)
PROT SERPL-MCNC: 7.6 G/DL (ref 6–8.4)
SODIUM SERPL-SCNC: 142 MMOL/L (ref 136–145)
TRIGL SERPL-MCNC: 122 MG/DL (ref 30–150)
TSH SERPL DL<=0.005 MIU/L-ACNC: 1.89 UIU/ML (ref 0.4–4)

## 2021-06-23 ENCOUNTER — HOSPITAL ENCOUNTER (OUTPATIENT)
Dept: RADIOLOGY | Facility: HOSPITAL | Age: 56
Discharge: HOME | End: 2021-06-23
Attending: INTERNAL MEDICINE
Payer: MEDICARE

## 2021-06-23 ENCOUNTER — OFFICE VISIT (OUTPATIENT)
Dept: OBSTETRICS AND GYNECOLOGY | Facility: CLINIC | Age: 56
End: 2021-06-23
Payer: MEDICARE

## 2021-06-23 VITALS — HEIGHT: 61 IN | WEIGHT: 249.81 LBS | BODY MASS INDEX: 47.16 KG/M2

## 2021-06-23 VITALS
BODY MASS INDEX: 46.62 KG/M2 | WEIGHT: 246.94 LBS | HEIGHT: 61 IN | DIASTOLIC BLOOD PRESSURE: 84 MMHG | SYSTOLIC BLOOD PRESSURE: 148 MMHG

## 2021-06-23 DIAGNOSIS — N39.41 URGE INCONTINENCE: ICD-10-CM

## 2021-06-23 DIAGNOSIS — Z01.419 ENCOUNTER FOR GYNECOLOGICAL EXAMINATION WITHOUT ABNORMAL FINDING: Primary | ICD-10-CM

## 2021-06-23 DIAGNOSIS — Z12.4 CERVICAL CANCER SCREENING: ICD-10-CM

## 2021-06-23 DIAGNOSIS — Z12.31 ENCOUNTER FOR SCREENING MAMMOGRAM FOR MALIGNANT NEOPLASM OF BREAST: ICD-10-CM

## 2021-06-23 PROCEDURE — G0101 CA SCREEN;PELVIC/BREAST EXAM: HCPCS | Mod: S$PBB,,, | Performed by: NURSE PRACTITIONER

## 2021-06-23 PROCEDURE — 77067 SCR MAMMO BI INCL CAD: CPT | Mod: 26,,, | Performed by: RADIOLOGY

## 2021-06-23 PROCEDURE — 77067 MAMMO DIGITAL SCREENING BILAT WITH TOMO: ICD-10-PCS | Mod: 26,,, | Performed by: RADIOLOGY

## 2021-06-23 PROCEDURE — 99999 PR PBB SHADOW E&M-EST. PATIENT-LVL III: CPT | Mod: PBBFAC,,, | Performed by: NURSE PRACTITIONER

## 2021-06-23 PROCEDURE — 77063 BREAST TOMOSYNTHESIS BI: CPT | Mod: 26,,, | Performed by: RADIOLOGY

## 2021-06-23 PROCEDURE — 99213 OFFICE O/P EST LOW 20 MIN: CPT | Mod: PBBFAC,PO,25 | Performed by: NURSE PRACTITIONER

## 2021-06-23 PROCEDURE — 77063 MAMMO DIGITAL SCREENING BILAT WITH TOMO: ICD-10-PCS | Mod: 26,,, | Performed by: RADIOLOGY

## 2021-06-23 PROCEDURE — 77067 SCR MAMMO BI INCL CAD: CPT | Mod: TC

## 2021-06-23 PROCEDURE — G0101 CA SCREEN;PELVIC/BREAST EXAM: HCPCS | Mod: PBBFAC,PO | Performed by: NURSE PRACTITIONER

## 2021-06-23 PROCEDURE — 87624 HPV HI-RISK TYP POOLED RSLT: CPT | Performed by: NURSE PRACTITIONER

## 2021-06-23 PROCEDURE — 99999 PR PBB SHADOW E&M-EST. PATIENT-LVL III: ICD-10-PCS | Mod: PBBFAC,,, | Performed by: NURSE PRACTITIONER

## 2021-06-23 PROCEDURE — G0101 PR CA SCREEN;PELVIC/BREAST EXAM: ICD-10-PCS | Mod: S$PBB,,, | Performed by: NURSE PRACTITIONER

## 2021-06-23 PROCEDURE — 88175 CYTOPATH C/V AUTO FLUID REDO: CPT | Performed by: NURSE PRACTITIONER

## 2021-06-23 RX ORDER — OXYBUTYNIN CHLORIDE 10 MG/1
10 TABLET, EXTENDED RELEASE ORAL DAILY
Qty: 30 TABLET | Refills: 2 | Status: SHIPPED | OUTPATIENT
Start: 2021-06-23 | End: 2021-09-22 | Stop reason: DRUGHIGH

## 2021-06-29 LAB
HPV HR 12 DNA SPEC QL NAA+PROBE: NEGATIVE
HPV16 AG SPEC QL: NEGATIVE
HPV18 DNA SPEC QL NAA+PROBE: NEGATIVE

## 2021-06-30 LAB
FINAL PATHOLOGIC DIAGNOSIS: NORMAL
Lab: NORMAL

## 2021-07-01 ENCOUNTER — PATIENT MESSAGE (OUTPATIENT)
Dept: OBSTETRICS AND GYNECOLOGY | Facility: CLINIC | Age: 56
End: 2021-07-01

## 2021-08-11 DIAGNOSIS — I10 ESSENTIAL HYPERTENSION: ICD-10-CM

## 2021-08-11 RX ORDER — AMLODIPINE AND VALSARTAN 5; 160 MG/1; MG/1
1 TABLET ORAL DAILY
Qty: 90 TABLET | Refills: 2 | Status: SHIPPED | OUTPATIENT
Start: 2021-08-11 | End: 2022-05-17 | Stop reason: SDUPTHER

## 2021-09-22 ENCOUNTER — OFFICE VISIT (OUTPATIENT)
Dept: OBSTETRICS AND GYNECOLOGY | Facility: CLINIC | Age: 56
End: 2021-09-22
Payer: MEDICARE

## 2021-09-22 VITALS
DIASTOLIC BLOOD PRESSURE: 80 MMHG | HEIGHT: 61 IN | BODY MASS INDEX: 46.46 KG/M2 | WEIGHT: 246.06 LBS | SYSTOLIC BLOOD PRESSURE: 140 MMHG

## 2021-09-22 DIAGNOSIS — N39.41 URGE INCONTINENCE: Primary | ICD-10-CM

## 2021-09-22 PROCEDURE — 99999 PR PBB SHADOW E&M-EST. PATIENT-LVL III: CPT | Mod: PBBFAC,,, | Performed by: NURSE PRACTITIONER

## 2021-09-22 PROCEDURE — 99213 PR OFFICE/OUTPT VISIT, EST, LEVL III, 20-29 MIN: ICD-10-PCS | Mod: S$PBB,,, | Performed by: NURSE PRACTITIONER

## 2021-09-22 PROCEDURE — 99999 PR PBB SHADOW E&M-EST. PATIENT-LVL III: ICD-10-PCS | Mod: PBBFAC,,, | Performed by: NURSE PRACTITIONER

## 2021-09-22 PROCEDURE — 99213 OFFICE O/P EST LOW 20 MIN: CPT | Mod: PBBFAC,PO | Performed by: NURSE PRACTITIONER

## 2021-09-22 PROCEDURE — 99213 OFFICE O/P EST LOW 20 MIN: CPT | Mod: S$PBB,,, | Performed by: NURSE PRACTITIONER

## 2021-09-22 RX ORDER — OXYBUTYNIN CHLORIDE 15 MG/1
15 TABLET, EXTENDED RELEASE ORAL DAILY
Qty: 30 TABLET | Refills: 3 | Status: SHIPPED | OUTPATIENT
Start: 2021-09-22 | End: 2022-01-19 | Stop reason: SDUPTHER

## 2021-12-16 ENCOUNTER — PATIENT OUTREACH (OUTPATIENT)
Dept: ADMINISTRATIVE | Facility: HOSPITAL | Age: 56
End: 2021-12-16
Payer: MEDICARE

## 2022-01-18 DIAGNOSIS — N39.41 URGE INCONTINENCE: ICD-10-CM

## 2022-01-18 NOTE — TELEPHONE ENCOUNTER
----- Message from Luisa Greer sent at 1/18/2022  1:36 PM CST -----  Regarding: refill  Contact: patient  Patient is checking on the refill status of oxybutynin (DITROPAN XL) 15 MG [id number]-Rcadcdajtr pharm-, please call her back at [phone]

## 2022-01-19 DIAGNOSIS — N39.41 URGE INCONTINENCE: ICD-10-CM

## 2022-01-19 RX ORDER — OXYBUTYNIN CHLORIDE 15 MG/1
15 TABLET, EXTENDED RELEASE ORAL DAILY
Qty: 30 TABLET | Refills: 3 | Status: CANCELLED | OUTPATIENT
Start: 2022-01-19 | End: 2023-01-19

## 2022-01-19 RX ORDER — OXYBUTYNIN CHLORIDE 15 MG/1
15 TABLET, EXTENDED RELEASE ORAL DAILY
Qty: 90 TABLET | Refills: 2 | Status: SHIPPED | OUTPATIENT
Start: 2022-01-19 | End: 2022-10-27

## 2022-01-19 RX ORDER — OXYBUTYNIN CHLORIDE 15 MG/1
15 TABLET, EXTENDED RELEASE ORAL DAILY
Qty: 30 TABLET | Refills: 3 | OUTPATIENT
Start: 2022-01-19 | End: 2023-01-19

## 2022-03-04 ENCOUNTER — TELEPHONE (OUTPATIENT)
Dept: ADMINISTRATIVE | Facility: HOSPITAL | Age: 57
End: 2022-03-04
Payer: MEDICARE

## 2022-03-22 ENCOUNTER — TELEPHONE (OUTPATIENT)
Dept: ADMINISTRATIVE | Facility: HOSPITAL | Age: 57
End: 2022-03-22
Payer: MEDICARE

## 2022-04-27 ENCOUNTER — PATIENT MESSAGE (OUTPATIENT)
Dept: ADMINISTRATIVE | Facility: HOSPITAL | Age: 57
End: 2022-04-27
Payer: MEDICARE

## 2022-06-01 DIAGNOSIS — K21.9 CHRONIC GERD: ICD-10-CM

## 2022-06-01 RX ORDER — FAMOTIDINE 40 MG/1
40 TABLET, FILM COATED ORAL DAILY
Qty: 30 TABLET | Refills: 11 | Status: CANCELLED | OUTPATIENT
Start: 2022-06-01 | End: 2023-06-01

## 2022-06-01 NOTE — TELEPHONE ENCOUNTER
Care Due:                  Date            Visit Type   Department     Provider  --------------------------------------------------------------------------------                                EP -                              PRIMARY      [facility] INTER[facility]  Last Visit: 05-      CARE ([facility])   MEDICINE       Baylee Aleman  Next Visit: None Scheduled  None         None Found                                                            Last  Test          Frequency    Reason                     Performed    Due Date  --------------------------------------------------------------------------------    Office Visit  12 months..  amlodipine-valsartan.....  05- 05-    CMP.........  12 months..  amlodipine-valsartan.....  05- 05-    [facility] Embedded Care Gaps. Reference number: [id number]. 6/01/2022   4:00:27 PM CDT

## 2022-06-01 NOTE — TELEPHONE ENCOUNTER
No new care gaps identified.  [facility] Embedded Care Gaps. Reference number: [id number]. 6/01/2022   4:14:15 PM CDT

## 2022-06-02 RX ORDER — FAMOTIDINE 40 MG/1
40 TABLET, FILM COATED ORAL DAILY
Qty: 90 TABLET | Refills: 0 | Status: SHIPPED | OUTPATIENT
Start: 2022-06-02 | End: 2022-09-30

## 2022-06-02 NOTE — TELEPHONE ENCOUNTER
Refill Routing Note   Medication(s) are not appropriate for processing by [facility] for the following reason(s):      - Drug-Disease Interaction (acyclovir and Chronic renal failure, stage 3 (moderate))    [facility] action(s):  Defer          Medication reconciliation completed: No     Appointments  past 12m or future 3m with PCP    Date Provider   Last Visit   5/27/2021 Baylee Aleman DO   Next Visit   Visit date not found Baylee Aleman DO   ED visits in past 90 days: 0        Note composed:8:52 PM 06/01/2022

## 2022-07-27 ENCOUNTER — PATIENT MESSAGE (OUTPATIENT)
Dept: ADMINISTRATIVE | Facility: HOSPITAL | Age: 57
End: 2022-07-27
Payer: MEDICARE

## 2022-08-15 DIAGNOSIS — I10 ESSENTIAL HYPERTENSION: ICD-10-CM

## 2022-08-16 RX ORDER — AMLODIPINE AND VALSARTAN 5; 160 MG/1; MG/1
1 TABLET ORAL DAILY
Qty: 90 TABLET | Refills: 0 | OUTPATIENT
Start: 2022-08-16

## 2022-08-26 DIAGNOSIS — I10 ESSENTIAL HYPERTENSION: ICD-10-CM

## 2022-08-26 NOTE — TELEPHONE ENCOUNTER
LOV 5/27/21 Aleman    Patient states that her pharmacy has sent over a refill request since Friday and haven't received a respond. However, I checked I haven't received any refill request.

## 2022-08-29 DIAGNOSIS — I10 ESSENTIAL HYPERTENSION: ICD-10-CM

## 2022-08-30 ENCOUNTER — TELEPHONE (OUTPATIENT)
Dept: ADMINISTRATIVE | Facility: HOSPITAL | Age: 57
End: 2022-08-30
Payer: MEDICARE

## 2022-08-30 ENCOUNTER — PATIENT MESSAGE (OUTPATIENT)
Dept: INTERNAL MEDICINE | Facility: CLINIC | Age: 57
End: 2022-08-30
Payer: MEDICARE

## 2022-08-30 RX ORDER — AMLODIPINE AND VALSARTAN 5; 160 MG/1; MG/1
1 TABLET ORAL DAILY
Qty: 90 TABLET | Refills: 0 | OUTPATIENT
Start: 2022-08-30

## 2022-08-30 RX ORDER — AMLODIPINE AND VALSARTAN 5; 160 MG/1; MG/1
1 TABLET ORAL DAILY
Qty: 30 TABLET | Refills: 0 | Status: SHIPPED | OUTPATIENT
Start: 2022-08-30 | End: 2022-09-30 | Stop reason: SDUPTHER

## 2022-08-30 NOTE — TELEPHONE ENCOUNTER
Patient requesting refills but is overdue for an annual visit.   Please schedule.   Refill approved x 1.

## 2022-09-02 ENCOUNTER — TELEPHONE (OUTPATIENT)
Dept: INTERNAL MEDICINE | Facility: CLINIC | Age: 57
End: 2022-09-02
Payer: MEDICARE

## 2022-09-02 ENCOUNTER — TELEPHONE (OUTPATIENT)
Dept: ADMINISTRATIVE | Facility: CLINIC | Age: 57
End: 2022-09-02
Payer: MEDICARE

## 2022-09-02 NOTE — TELEPHONE ENCOUNTER
----- Message from Mimi Gu sent at 9/2/2022 11:18 AM CDT -----  .Type:  Patient Returning Call    Who Called .Jimena Pierce   Who Left Message for Patient:Ronny  Does the patient know what this is regarding?:  Would the patient rather a call back or a response via uStudioner? Call back  Best Call Back Number:.[phone]   Additional Information:

## 2022-09-02 NOTE — TELEPHONE ENCOUNTER
----- Message from Lili Dupont MA sent at 8/30/2022  2:13 PM CDT -----  Please contact patient she is wanting to follow up on her medication refills. She stated that she is out of everything.

## 2022-09-06 ENCOUNTER — TELEPHONE (OUTPATIENT)
Dept: ADMINISTRATIVE | Facility: CLINIC | Age: 57
End: 2022-09-06
Payer: MEDICARE

## 2022-09-13 ENCOUNTER — OFFICE VISIT (OUTPATIENT)
Dept: INTERNAL MEDICINE | Facility: CLINIC | Age: 57
End: 2022-09-13
Payer: MEDICARE

## 2022-09-13 VITALS
BODY MASS INDEX: 42.33 KG/M2 | WEIGHT: 224.19 LBS | HEIGHT: 61 IN | HEART RATE: 79 BPM | DIASTOLIC BLOOD PRESSURE: 68 MMHG | SYSTOLIC BLOOD PRESSURE: 124 MMHG | OXYGEN SATURATION: 96 %

## 2022-09-13 DIAGNOSIS — Z59.9 FINANCIAL DIFFICULTIES: ICD-10-CM

## 2022-09-13 DIAGNOSIS — Z00.00 ENCOUNTER FOR PREVENTIVE HEALTH EXAMINATION: Primary | ICD-10-CM

## 2022-09-13 DIAGNOSIS — G80.9 CEREBRAL PALSY, UNSPECIFIED TYPE: ICD-10-CM

## 2022-09-13 DIAGNOSIS — Z86.010 PERSONAL HISTORY OF COLONIC POLYPS: ICD-10-CM

## 2022-09-13 DIAGNOSIS — E66.01 OBESITY, CLASS III, BMI 40-49.9 (MORBID OBESITY): ICD-10-CM

## 2022-09-13 DIAGNOSIS — Z12.31 ENCOUNTER FOR SCREENING MAMMOGRAM FOR BREAST CANCER: ICD-10-CM

## 2022-09-13 DIAGNOSIS — E78.5 HYPERLIPIDEMIA, UNSPECIFIED HYPERLIPIDEMIA TYPE: ICD-10-CM

## 2022-09-13 DIAGNOSIS — Z99.89 DEPENDENCE ON OTHER ENABLING MACHINES AND DEVICES: ICD-10-CM

## 2022-09-13 DIAGNOSIS — Z74.09 OTHER REDUCED MOBILITY: ICD-10-CM

## 2022-09-13 DIAGNOSIS — I10 ESSENTIAL HYPERTENSION: ICD-10-CM

## 2022-09-13 DIAGNOSIS — R26.9 ABNORMALITY OF GAIT AND MOBILITY: ICD-10-CM

## 2022-09-13 PROBLEM — E66.813 OBESITY, CLASS III, BMI 40-49.9 (MORBID OBESITY): Status: ACTIVE | Noted: 2022-09-13

## 2022-09-13 PROCEDURE — G0439 PPPS, SUBSEQ VISIT: HCPCS | Mod: S$GLB,,, | Performed by: NURSE PRACTITIONER

## 2022-09-13 PROCEDURE — 3008F BODY MASS INDEX DOCD: CPT | Mod: CPTII,S$GLB,, | Performed by: NURSE PRACTITIONER

## 2022-09-13 PROCEDURE — 3078F DIAST BP <80 MM HG: CPT | Mod: CPTII,S$GLB,, | Performed by: NURSE PRACTITIONER

## 2022-09-13 PROCEDURE — 1159F PR MEDICATION LIST DOCUMENTED IN MEDICAL RECORD: ICD-10-PCS | Mod: CPTII,S$GLB,, | Performed by: NURSE PRACTITIONER

## 2022-09-13 PROCEDURE — 1160F PR REVIEW ALL MEDS BY PRESCRIBER/CLIN PHARMACIST DOCUMENTED: ICD-10-PCS | Mod: CPTII,S$GLB,, | Performed by: NURSE PRACTITIONER

## 2022-09-13 PROCEDURE — 99499 RISK ADDL DX/OHS AUDIT: ICD-10-PCS | Mod: S$GLB,,, | Performed by: NURSE PRACTITIONER

## 2022-09-13 PROCEDURE — 99999 PR PBB SHADOW E&M-EST. PATIENT-LVL V: ICD-10-PCS | Mod: PBBFAC,,, | Performed by: NURSE PRACTITIONER

## 2022-09-13 PROCEDURE — 3008F PR BODY MASS INDEX (BMI) DOCUMENTED: ICD-10-PCS | Mod: CPTII,S$GLB,, | Performed by: NURSE PRACTITIONER

## 2022-09-13 PROCEDURE — 1159F MED LIST DOCD IN RCRD: CPT | Mod: CPTII,S$GLB,, | Performed by: NURSE PRACTITIONER

## 2022-09-13 PROCEDURE — 99499 UNLISTED E&M SERVICE: CPT | Mod: S$GLB,,, | Performed by: NURSE PRACTITIONER

## 2022-09-13 PROCEDURE — G0439 PR MEDICARE ANNUAL WELLNESS SUBSEQUENT VISIT: ICD-10-PCS | Mod: S$GLB,,, | Performed by: NURSE PRACTITIONER

## 2022-09-13 PROCEDURE — 3078F PR MOST RECENT DIASTOLIC BLOOD PRESSURE < 80 MM HG: ICD-10-PCS | Mod: CPTII,S$GLB,, | Performed by: NURSE PRACTITIONER

## 2022-09-13 PROCEDURE — 3074F SYST BP LT 130 MM HG: CPT | Mod: CPTII,S$GLB,, | Performed by: NURSE PRACTITIONER

## 2022-09-13 PROCEDURE — 3074F PR MOST RECENT SYSTOLIC BLOOD PRESSURE < 130 MM HG: ICD-10-PCS | Mod: CPTII,S$GLB,, | Performed by: NURSE PRACTITIONER

## 2022-09-13 PROCEDURE — 4010F ACE/ARB THERAPY RXD/TAKEN: CPT | Mod: CPTII,S$GLB,, | Performed by: NURSE PRACTITIONER

## 2022-09-13 PROCEDURE — 99999 PR PBB SHADOW E&M-EST. PATIENT-LVL V: CPT | Mod: PBBFAC,,, | Performed by: NURSE PRACTITIONER

## 2022-09-13 PROCEDURE — 1160F RVW MEDS BY RX/DR IN RCRD: CPT | Mod: CPTII,S$GLB,, | Performed by: NURSE PRACTITIONER

## 2022-09-13 PROCEDURE — 4010F PR ACE/ARB THEARPY RXD/TAKEN: ICD-10-PCS | Mod: CPTII,S$GLB,, | Performed by: NURSE PRACTITIONER

## 2022-09-13 SDOH — SOCIAL DETERMINANTS OF HEALTH (SDOH): PROBLEM RELATED TO HOUSING AND ECONOMIC CIRCUMSTANCES, UNSPECIFIED: Z59.9

## 2022-09-13 NOTE — PATIENT INSTRUCTIONS
Counseling and Referral of Other Preventative  (Italic type indicates deductible and co-insurance are waived)    Patient Name: Jimena Kari  Today's Date: 9/13/2022    Health Maintenance       Date Due Completion Date    TETANUS VACCINE Never done ---    Shingles Vaccine (1 of 2) Never done ---    Colorectal Cancer Screening 10/05/2021 10/5/2016    COVID-19 Vaccine (3 - Booster for Alley series) 05/17/2022 1/17/2022    Lipid Panel 05/27/2022 5/27/2021    Mammogram 06/23/2022 6/23/2021    Influenza Vaccine (1) 09/01/2022 11/5/2020    Cervical Cancer Screening 06/23/2024 6/23/2021        Orders Placed This Encounter   Procedures    Mammo Digital Screening Bilat w/ Mino    Ambulatory referral/consult to Outpatient Case Management    Ambulatory referral/consult to Endo Procedure      The following information is provided to all patients.  This information is to help you find resources for any of the problems found today that may be affecting your health:                Living healthy guide: www.[facility].[place].gov      Understanding Diabetes: www.diabetes.org      Eating healthy: www.cdc.gov/healthyweight      CDC home safety checklist: www.cdc.gov/steadi/patient.html      Agency on Aging: www.goea.[place].gov      Alcoholics anonymous (AA): www.aa.org      Physical Activity: www.charlene.nih.gov/qh8xigr      Tobacco use: www.quitwithusla.org

## 2022-09-13 NOTE — PROGRESS NOTES
"  Jimena [place] presented for a  Medicare AWV and comprehensive Health Risk Assessment today. The following components were reviewed and updated:    Medical history  Family History  Social history  Allergies and Current Medications  Health Risk Assessment  Health Maintenance  Care Team         ** See Completed Assessments for Annual Wellness Visit within the encounter summary.**         The following assessments were completed:  Living Situation  CAGE  Depression Screening  Timed Get Up and Go  Whisper Test  Cognitive Function Screening  Nutrition Screening  ADL Screening  PAQ Screening        Vitals:    09/13/22 1452   BP: 124/68   BP Location: Right arm   Patient Position: Sitting   Pulse: 79   SpO2: 96%   Weight: 101.7 kg (224 lb 3.3 oz)   Height: 5' 1" (1.549 m)     Body mass index is 42.36 kg/m².  Physical Exam  Vitals and nursing note reviewed.   Constitutional:       Appearance: She is well-developed.   HENT:      Head: Normocephalic.   Cardiovascular:      Rate and Rhythm: Normal rate and regular rhythm.      Heart sounds: Normal heart sounds.   Pulmonary:      Effort: Pulmonary effort is normal. No respiratory distress.      Breath sounds: Normal breath sounds.   Abdominal:      Palpations: Abdomen is soft. There is no mass.      Tenderness: There is no abdominal tenderness.   Musculoskeletal:         General: Normal range of motion.   Skin:     General: Skin is warm and dry.   Neurological:      Mental Status: She is alert and oriented to person, place, and time.      Motor: No abnormal muscle tone.   Psychiatric:         Speech: Speech normal.         Behavior: Behavior normal.             Diagnoses and health risks identified today and associated recommendations/orders:    1. Encounter for preventive health examination  MA to schedule  PCP   Optometry     Discussed receiving Shingrix and tetanus vaccine at pharmacy.    Discussed locations to receive flu vaccine and COVID booster    2. Obesity, Class " III, BMI 40-49.9 (morbid obesity)  Healthy diet and exercise as tolerated to help bring BMI into normal range.    Continue current treatment plan as previously prescribed with your  pcp     3. Essential hypertension  Continue current treatment plan as previously prescribed with your  pcp     4. Hyperlipidemia, unspecified hyperlipidemia type  Advised to follow up with PCP for further evaluation and recommendations. Patient expressed understanding.      5. Financial difficulties  [facility] financial resources information page given to patient.    - Ambulatory referral/consult to Outpatient Case Management    6. Cerebral palsy, unspecified type  Reported per pt  Abnormal timed get up and go test. Denies any falls in the last 12 months. Uses a walker to aid with ambulation.   Fall precautions reviewed with patient. Advised to follow up with PCP for further recommendations. Patient expressed understanding.       7. Encounter for screening mammogram for breast cancer  - Mammo Digital Screening Bilat w/ Mino; Future    8. Personal history of colonic polyps  - Ambulatory referral/consult to Endo Procedure ; Future    9. Dependence on other enabling machines and devices  See #6    10. Abnormality of gait and mobility  See #6    11. Other reduced mobility  See #6      Provided Jimena with a 5-10 year written screening schedule and personal prevention plan. Recommendations were developed using the USPSTF age appropriate recommendations. Education, counseling, and referrals were provided as needed. After Visit Summary printed and given to patient which includes a list of additional screenings\tests needed.    Follow up in about 1 year (around 9/13/2023) for awv.    Allie Mason NP  I offered to discuss advanced care planning, including how to pick a person who would make decisions for you if you were unable to make them for yourself, called a health care power of , and what kind of decisions you might  make such as use of life sustaining treatments such as ventilators and tube feeding when faced with a life limiting illness recorded on a living will that they will need to know. (How you want to be cared for as you near the end of your natural life)     X Patient is interested in learning more about how to make advanced directives.  I provided them paperwork and offered to discuss this with them.

## 2022-09-20 ENCOUNTER — OUTPATIENT CASE MANAGEMENT (OUTPATIENT)
Dept: ADMINISTRATIVE | Facility: OTHER | Age: 57
End: 2022-09-20
Payer: MEDICARE

## 2022-09-20 NOTE — PROGRESS NOTES
SW received a referral on the above patient . Per chart review patient has PHN. Patient doesn't met criteria for OPCM. Patient will be deferred to PHN for case mgmt,

## 2022-09-29 ENCOUNTER — PATIENT MESSAGE (OUTPATIENT)
Dept: OPHTHALMOLOGY | Facility: CLINIC | Age: 57
End: 2022-09-29

## 2022-09-29 ENCOUNTER — OFFICE VISIT (OUTPATIENT)
Dept: OPHTHALMOLOGY | Facility: CLINIC | Age: 57
End: 2022-09-29
Payer: MEDICARE

## 2022-09-29 DIAGNOSIS — H04.123 DRY EYES, BILATERAL: ICD-10-CM

## 2022-09-29 DIAGNOSIS — I10 ESSENTIAL HYPERTENSION: Primary | ICD-10-CM

## 2022-09-29 DIAGNOSIS — H52.13 HIGH MYOPIA, BOTH EYES: ICD-10-CM

## 2022-09-29 PROCEDURE — 92015 DETERMINE REFRACTIVE STATE: CPT | Mod: S$GLB,,, | Performed by: OPTOMETRIST

## 2022-09-29 PROCEDURE — 1159F MED LIST DOCD IN RCRD: CPT | Mod: CPTII,S$GLB,, | Performed by: OPTOMETRIST

## 2022-09-29 PROCEDURE — 4010F ACE/ARB THERAPY RXD/TAKEN: CPT | Mod: CPTII,S$GLB,, | Performed by: OPTOMETRIST

## 2022-09-29 PROCEDURE — 92004 COMPRE OPH EXAM NEW PT 1/>: CPT | Mod: S$GLB,,, | Performed by: OPTOMETRIST

## 2022-09-29 PROCEDURE — 1159F PR MEDICATION LIST DOCUMENTED IN MEDICAL RECORD: ICD-10-PCS | Mod: CPTII,S$GLB,, | Performed by: OPTOMETRIST

## 2022-09-29 PROCEDURE — 92004 PR EYE EXAM, NEW PATIENT,COMPREHESV: ICD-10-PCS | Mod: S$GLB,,, | Performed by: OPTOMETRIST

## 2022-09-29 PROCEDURE — 4010F PR ACE/ARB THEARPY RXD/TAKEN: ICD-10-PCS | Mod: CPTII,S$GLB,, | Performed by: OPTOMETRIST

## 2022-09-29 PROCEDURE — 92015 PR REFRACTION: ICD-10-PCS | Mod: S$GLB,,, | Performed by: OPTOMETRIST

## 2022-09-29 NOTE — PROGRESS NOTES
HPI    Annual Eye Exam   No Visual Complaints   Last seen 05/17/17 TRF  Keratitis     Last edited by Chapis Cruz MA on 9/29/2022  3:09 PM.            Assessment /Plan     For exam results, see Encounter Report.    Essential hypertension    Dry eyes, bilateral    High myopia, both eyes      No HTN Retinopathy    AT prn, coupon given    Dispense Final Rx for glasses.  RTC 1 year  Discussed above and answered questions.

## 2022-09-30 ENCOUNTER — OFFICE VISIT (OUTPATIENT)
Dept: INTERNAL MEDICINE | Facility: CLINIC | Age: 57
End: 2022-09-30
Payer: MEDICARE

## 2022-09-30 ENCOUNTER — LAB VISIT (OUTPATIENT)
Dept: LAB | Facility: HOSPITAL | Age: 57
End: 2022-09-30
Attending: PHYSICIAN ASSISTANT
Payer: MEDICARE

## 2022-09-30 VITALS
BODY MASS INDEX: 42.75 KG/M2 | DIASTOLIC BLOOD PRESSURE: 88 MMHG | RESPIRATION RATE: 17 BRPM | TEMPERATURE: 98 F | OXYGEN SATURATION: 98 % | SYSTOLIC BLOOD PRESSURE: 122 MMHG | HEART RATE: 75 BPM | WEIGHT: 226.44 LBS | HEIGHT: 61 IN

## 2022-09-30 DIAGNOSIS — M54.42 CHRONIC BILATERAL LOW BACK PAIN WITH BILATERAL SCIATICA: ICD-10-CM

## 2022-09-30 DIAGNOSIS — L65.9 HAIR LOSS: ICD-10-CM

## 2022-09-30 DIAGNOSIS — K21.9 CHRONIC GERD: ICD-10-CM

## 2022-09-30 DIAGNOSIS — I10 ESSENTIAL HYPERTENSION: Primary | ICD-10-CM

## 2022-09-30 DIAGNOSIS — M54.41 CHRONIC BILATERAL LOW BACK PAIN WITH BILATERAL SCIATICA: ICD-10-CM

## 2022-09-30 DIAGNOSIS — G89.29 CHRONIC BILATERAL LOW BACK PAIN WITH BILATERAL SCIATICA: ICD-10-CM

## 2022-09-30 DIAGNOSIS — Z12.11 SCREEN FOR COLON CANCER: ICD-10-CM

## 2022-09-30 DIAGNOSIS — I10 ESSENTIAL HYPERTENSION: ICD-10-CM

## 2022-09-30 LAB
ALBUMIN SERPL BCP-MCNC: 4 G/DL (ref 3.5–5.2)
ALP SERPL-CCNC: 78 U/L (ref 55–135)
ALT SERPL W/O P-5'-P-CCNC: 29 U/L (ref 10–44)
ANION GAP SERPL CALC-SCNC: 8 MMOL/L (ref 8–16)
AST SERPL-CCNC: 18 U/L (ref 10–40)
BASOPHILS # BLD AUTO: 0.04 K/UL (ref 0–0.2)
BASOPHILS NFR BLD: 0.6 % (ref 0–1.9)
BILIRUB SERPL-MCNC: 0.8 MG/DL (ref 0.1–1)
BUN SERPL-MCNC: 15 MG/DL (ref 6–20)
CALCIUM SERPL-MCNC: 8.9 MG/DL (ref 8.7–10.5)
CHLORIDE SERPL-SCNC: 111 MMOL/L (ref 95–110)
CHOLEST SERPL-MCNC: 212 MG/DL (ref 120–199)
CHOLEST/HDLC SERPL: 3.3 {RATIO} (ref 2–5)
CO2 SERPL-SCNC: 23 MMOL/L (ref 23–29)
CREAT SERPL-MCNC: 0.7 MG/DL (ref 0.5–1.4)
DIFFERENTIAL METHOD: ABNORMAL
EOSINOPHIL # BLD AUTO: 0.1 K/UL (ref 0–0.5)
EOSINOPHIL NFR BLD: 1.6 % (ref 0–8)
ERYTHROCYTE [DISTWIDTH] IN BLOOD BY AUTOMATED COUNT: 12.8 % (ref 11.5–14.5)
EST. GFR  (NO RACE VARIABLE): >60 ML/MIN/1.73 M^2
GLUCOSE SERPL-MCNC: 94 MG/DL (ref 70–110)
HCT VFR BLD AUTO: 44 % (ref 37–48.5)
HDLC SERPL-MCNC: 65 MG/DL (ref 40–75)
HDLC SERPL: 30.7 % (ref 20–50)
HGB BLD-MCNC: 13.9 G/DL (ref 12–16)
IMM GRANULOCYTES # BLD AUTO: 0.01 K/UL (ref 0–0.04)
IMM GRANULOCYTES NFR BLD AUTO: 0.1 % (ref 0–0.5)
LDLC SERPL CALC-MCNC: 129 MG/DL (ref 63–159)
LYMPHOCYTES # BLD AUTO: 1.3 K/UL (ref 1–4.8)
LYMPHOCYTES NFR BLD: 19.1 % (ref 18–48)
MCH RBC QN AUTO: 28.8 PG (ref 27–31)
MCHC RBC AUTO-ENTMCNC: 31.6 G/DL (ref 32–36)
MCV RBC AUTO: 91 FL (ref 82–98)
MONOCYTES # BLD AUTO: 0.5 K/UL (ref 0.3–1)
MONOCYTES NFR BLD: 6.9 % (ref 4–15)
NEUTROPHILS # BLD AUTO: 4.9 K/UL (ref 1.8–7.7)
NEUTROPHILS NFR BLD: 71.7 % (ref 38–73)
NONHDLC SERPL-MCNC: 147 MG/DL
NRBC BLD-RTO: 0 /100 WBC
PLATELET # BLD AUTO: 207 K/UL (ref 150–450)
PMV BLD AUTO: 12.7 FL (ref 9.2–12.9)
POTASSIUM SERPL-SCNC: 4 MMOL/L (ref 3.5–5.1)
PROT SERPL-MCNC: 6.8 G/DL (ref 6–8.4)
RBC # BLD AUTO: 4.82 M/UL (ref 4–5.4)
SODIUM SERPL-SCNC: 142 MMOL/L (ref 136–145)
TRIGL SERPL-MCNC: 90 MG/DL (ref 30–150)
WBC # BLD AUTO: 6.77 K/UL (ref 3.9–12.7)

## 2022-09-30 PROCEDURE — 99499 RISK ADDL DX/OHS AUDIT: ICD-10-PCS | Mod: S$GLB,,, | Performed by: PHYSICIAN ASSISTANT

## 2022-09-30 PROCEDURE — 99999 PR PBB SHADOW E&M-EST. PATIENT-LVL IV: ICD-10-PCS | Mod: PBBFAC,,, | Performed by: PHYSICIAN ASSISTANT

## 2022-09-30 PROCEDURE — 3079F PR MOST RECENT DIASTOLIC BLOOD PRESSURE 80-89 MM HG: ICD-10-PCS | Mod: CPTII,S$GLB,, | Performed by: PHYSICIAN ASSISTANT

## 2022-09-30 PROCEDURE — 84443 ASSAY THYROID STIM HORMONE: CPT | Performed by: PHYSICIAN ASSISTANT

## 2022-09-30 PROCEDURE — 99214 PR OFFICE/OUTPT VISIT, EST, LEVL IV, 30-39 MIN: ICD-10-PCS | Mod: S$GLB,,, | Performed by: PHYSICIAN ASSISTANT

## 2022-09-30 PROCEDURE — 3074F SYST BP LT 130 MM HG: CPT | Mod: CPTII,S$GLB,, | Performed by: PHYSICIAN ASSISTANT

## 2022-09-30 PROCEDURE — 1160F PR REVIEW ALL MEDS BY PRESCRIBER/CLIN PHARMACIST DOCUMENTED: ICD-10-PCS | Mod: CPTII,S$GLB,, | Performed by: PHYSICIAN ASSISTANT

## 2022-09-30 PROCEDURE — 3008F PR BODY MASS INDEX (BMI) DOCUMENTED: ICD-10-PCS | Mod: CPTII,S$GLB,, | Performed by: PHYSICIAN ASSISTANT

## 2022-09-30 PROCEDURE — 3008F BODY MASS INDEX DOCD: CPT | Mod: CPTII,S$GLB,, | Performed by: PHYSICIAN ASSISTANT

## 2022-09-30 PROCEDURE — 1159F MED LIST DOCD IN RCRD: CPT | Mod: CPTII,S$GLB,, | Performed by: PHYSICIAN ASSISTANT

## 2022-09-30 PROCEDURE — 4010F PR ACE/ARB THEARPY RXD/TAKEN: ICD-10-PCS | Mod: CPTII,S$GLB,, | Performed by: PHYSICIAN ASSISTANT

## 2022-09-30 PROCEDURE — 99214 OFFICE O/P EST MOD 30 MIN: CPT | Mod: S$GLB,,, | Performed by: PHYSICIAN ASSISTANT

## 2022-09-30 PROCEDURE — 85025 COMPLETE CBC W/AUTO DIFF WBC: CPT | Performed by: PHYSICIAN ASSISTANT

## 2022-09-30 PROCEDURE — 1160F RVW MEDS BY RX/DR IN RCRD: CPT | Mod: CPTII,S$GLB,, | Performed by: PHYSICIAN ASSISTANT

## 2022-09-30 PROCEDURE — 80053 COMPREHEN METABOLIC PANEL: CPT | Performed by: PHYSICIAN ASSISTANT

## 2022-09-30 PROCEDURE — 1159F PR MEDICATION LIST DOCUMENTED IN MEDICAL RECORD: ICD-10-PCS | Mod: CPTII,S$GLB,, | Performed by: PHYSICIAN ASSISTANT

## 2022-09-30 PROCEDURE — 99999 PR PBB SHADOW E&M-EST. PATIENT-LVL IV: CPT | Mod: PBBFAC,,, | Performed by: PHYSICIAN ASSISTANT

## 2022-09-30 PROCEDURE — 80061 LIPID PANEL: CPT | Performed by: PHYSICIAN ASSISTANT

## 2022-09-30 PROCEDURE — 36415 COLL VENOUS BLD VENIPUNCTURE: CPT | Performed by: PHYSICIAN ASSISTANT

## 2022-09-30 PROCEDURE — 99499 UNLISTED E&M SERVICE: CPT | Mod: S$GLB,,, | Performed by: PHYSICIAN ASSISTANT

## 2022-09-30 PROCEDURE — 3074F PR MOST RECENT SYSTOLIC BLOOD PRESSURE < 130 MM HG: ICD-10-PCS | Mod: CPTII,S$GLB,, | Performed by: PHYSICIAN ASSISTANT

## 2022-09-30 PROCEDURE — 3079F DIAST BP 80-89 MM HG: CPT | Mod: CPTII,S$GLB,, | Performed by: PHYSICIAN ASSISTANT

## 2022-09-30 PROCEDURE — 4010F ACE/ARB THERAPY RXD/TAKEN: CPT | Mod: CPTII,S$GLB,, | Performed by: PHYSICIAN ASSISTANT

## 2022-09-30 RX ORDER — FAMOTIDINE 40 MG/1
40 TABLET, FILM COATED ORAL DAILY
Qty: 90 TABLET | Refills: 1 | Status: SHIPPED | OUTPATIENT
Start: 2022-09-30 | End: 2023-09-05 | Stop reason: SDUPTHER

## 2022-09-30 RX ORDER — AMLODIPINE AND VALSARTAN 5; 160 MG/1; MG/1
1 TABLET ORAL DAILY
Qty: 90 TABLET | Refills: 1 | Status: ON HOLD | OUTPATIENT
Start: 2022-09-30 | End: 2023-03-06 | Stop reason: HOSPADM

## 2022-09-30 NOTE — PROGRESS NOTES
"Subjective:      Patient ID: Jimena Pierce is a 57 y.o. female.    Chief Complaint: Medication Refill    Patient is new to me, being seen today for med refill.     HTN- amlodipine-valsartan 5-160mg    Due for labs/annual    Last visit May 2021 with PCP.     Review of Systems   Constitutional:  Negative for chills, diaphoresis and fever.   HENT:  Negative for congestion, rhinorrhea and sore throat.    Respiratory:  Negative for cough, shortness of breath and wheezing.    Cardiovascular:  Positive for leg swelling (h/o R leg lymphedema, stable).   Gastrointestinal:  Positive for constipation (d/t pain medication). Negative for abdominal pain, diarrhea, nausea and vomiting.   Endocrine:        +hair loss, denies changes   Skin:  Negative for rash.   Neurological:  Negative for dizziness, light-headedness and headaches.     Objective:   /88   Pulse 75   Temp 97.6 °F (36.4 °C)   Resp 17   Ht 5' 1" (1.549 m)   Wt 102.7 kg (226 lb 6.6 oz)   LMP 07/05/2016   SpO2 98%   BMI 42.78 kg/m²   Physical Exam  Constitutional:       General: She is not in acute distress.     Appearance: Normal appearance. She is well-developed. She is not ill-appearing.   HENT:      Head: Normocephalic and atraumatic.   Cardiovascular:      Rate and Rhythm: Normal rate and regular rhythm.      Heart sounds: Normal heart sounds. No murmur heard.  Pulmonary:      Effort: Pulmonary effort is normal. No respiratory distress.      Breath sounds: Normal breath sounds. No decreased breath sounds.   Musculoskeletal:      Right lower leg: No edema.      Left lower leg: No edema.   Skin:     General: Skin is warm and dry.      Findings: No rash.   Psychiatric:         Speech: Speech normal.         Behavior: Behavior normal.         Thought Content: Thought content normal.     Assessment:      1. Essential hypertension    2. Chronic GERD    3. Hair loss    4. Screen for colon cancer    5. Chronic bilateral low back pain with " bilateral sciatica       Plan:   Essential hypertension  -     CBC Auto Differential; Future; Expected date: 09/30/2022  -     Comprehensive Metabolic Panel; Future; Expected date: 09/30/2022  -     Lipid Panel; Future; Expected date: 09/30/2022  -     amlodipine-valsartan (EXFORGE) 5-160 mg per tablet; Take 1 tablet by mouth once daily.  Dispense: 90 tablet; Refill: 1    Chronic GERD  -     famotidine (PEPCID) 40 MG tablet; Take 1 tablet (40 mg total) by mouth once daily.  Dispense: 90 tablet; Refill: 1    Hair loss  -     TSH; Future; Expected date: 09/30/2022    Screen for colon cancer  -     Ambulatory referral/consult to Endo Procedure ; Future; Expected date: 10/01/2022    Chronic bilateral low back pain with bilateral sciatica   Followed by external Pain Med    Mammo is scheduled for next month     Fasting labs     6mth f/u PCP

## 2022-10-01 LAB — TSH SERPL DL<=0.005 MIU/L-ACNC: 0.47 UIU/ML (ref 0.4–4)

## 2022-10-06 ENCOUNTER — PATIENT OUTREACH (OUTPATIENT)
Dept: ADMINISTRATIVE | Facility: OTHER | Age: 57
End: 2022-10-06
Payer: MEDICARE

## 2022-10-06 NOTE — PROGRESS NOTES
CHW - Outreach Attempt    Community Health Worker left a voicemail message for 2nd attempt to contact patient regarding: SDOH  Community Health Worker to attempt to contact patient on: [phone]

## 2022-10-13 ENCOUNTER — HOSPITAL ENCOUNTER (OUTPATIENT)
Dept: RADIOLOGY | Facility: HOSPITAL | Age: 57
Discharge: HOME | End: 2022-10-13
Attending: NURSE PRACTITIONER
Payer: MEDICARE

## 2022-10-13 DIAGNOSIS — Z12.31 ENCOUNTER FOR SCREENING MAMMOGRAM FOR BREAST CANCER: ICD-10-CM

## 2022-10-13 PROCEDURE — 77067 MAMMO DIGITAL SCREENING BILAT WITH TOMO: ICD-10-PCS | Mod: 26,,, | Performed by: RADIOLOGY

## 2022-10-13 PROCEDURE — 77063 BREAST TOMOSYNTHESIS BI: CPT | Mod: TC

## 2022-10-13 PROCEDURE — 77067 SCR MAMMO BI INCL CAD: CPT | Mod: TC

## 2022-10-13 PROCEDURE — 77063 MAMMO DIGITAL SCREENING BILAT WITH TOMO: ICD-10-PCS | Mod: 26,,, | Performed by: RADIOLOGY

## 2022-10-13 PROCEDURE — 77063 BREAST TOMOSYNTHESIS BI: CPT | Mod: 26,,, | Performed by: RADIOLOGY

## 2022-10-13 PROCEDURE — 77067 SCR MAMMO BI INCL CAD: CPT | Mod: 26,,, | Performed by: RADIOLOGY

## 2022-10-17 ENCOUNTER — HOSPITAL ENCOUNTER (OUTPATIENT)
Dept: PREADMISSION TESTING | Facility: HOSPITAL | Age: 57
Discharge: HOME | End: 2022-10-17
Payer: MEDICARE

## 2022-10-17 DIAGNOSIS — Z12.11 SCREEN FOR COLON CANCER: Primary | ICD-10-CM

## 2022-10-17 RX ORDER — SOD SULF/POT CHLORIDE/MAG SULF 1.479 G
12 TABLET ORAL DAILY
Qty: 24 TABLET | Refills: 0 | Status: SHIPPED | OUTPATIENT
Start: 2022-10-17 | End: 2023-05-08

## 2022-10-18 ENCOUNTER — TELEPHONE (OUTPATIENT)
Dept: INTERNAL MEDICINE | Facility: CLINIC | Age: 57
End: 2022-10-18
Payer: MEDICARE

## 2022-10-18 NOTE — TELEPHONE ENCOUNTER
Attempted to reach patient to discuss mammogram results. I left a voicemail for her to call the office.

## 2022-10-18 NOTE — TELEPHONE ENCOUNTER
2nd attempt to reach Ms Pierce to review her mammogram results. I left a voicemail for her to call the office, her results have been mailed to her.

## 2022-10-18 NOTE — TELEPHONE ENCOUNTER
----- Message from Allie Mason NP sent at 10/17/2022  3:51 PM CDT -----  Impression:   No mammographic evidence of malignancy.  Recommendation:  Routine screening mammogram in 1 year is recommended.     Advise to follow up with PCP for other AWV findings.

## 2022-10-24 ENCOUNTER — PATIENT MESSAGE (OUTPATIENT)
Dept: INTERNAL MEDICINE | Facility: CLINIC | Age: 57
End: 2022-10-24
Payer: MEDICARE

## 2022-10-24 DIAGNOSIS — N39.41 URGE INCONTINENCE: ICD-10-CM

## 2022-10-25 RX ORDER — OXYBUTYNIN CHLORIDE 15 MG/1
15 TABLET, EXTENDED RELEASE ORAL DAILY
Qty: 90 TABLET | Refills: 2 | OUTPATIENT
Start: 2022-10-25 | End: 2023-10-25

## 2022-10-25 NOTE — TELEPHONE ENCOUNTER
No new care gaps identified.  [facility] Embedded Care Gaps. Reference number: [id number]. 10/25/2022   7:38:44 AM CORRINET

## 2022-10-26 DIAGNOSIS — N39.41 URGE INCONTINENCE: ICD-10-CM

## 2022-10-26 NOTE — TELEPHONE ENCOUNTER
----- Message from Hannah Rainey sent at 10/26/2022  4:45 PM CDT -----  Contact: Patient  Patient called to consult with nurse or staff regarding her bladder medication. She would like a call back and can be reached at [phone]. Thanks/

## 2022-10-26 NOTE — TELEPHONE ENCOUNTER
No new care gaps identified.  [facility] Embedded Care Gaps. Reference number: [id number]. 10/26/2022   5:13:23 PM CDT

## 2022-10-27 DIAGNOSIS — N39.41 URGE INCONTINENCE: ICD-10-CM

## 2022-10-27 RX ORDER — OXYBUTYNIN CHLORIDE 15 MG/1
15 TABLET, EXTENDED RELEASE ORAL DAILY
Qty: 90 TABLET | Refills: 2 | OUTPATIENT
Start: 2022-10-27

## 2022-10-27 RX ORDER — OXYBUTYNIN CHLORIDE 15 MG/1
15 TABLET, EXTENDED RELEASE ORAL DAILY
Qty: 30 TABLET | Refills: 0 | Status: SHIPPED | OUTPATIENT
Start: 2022-10-27 | End: 2022-11-18 | Stop reason: SDUPTHER

## 2022-10-27 RX ORDER — OXYBUTYNIN CHLORIDE 15 MG/1
15 TABLET, EXTENDED RELEASE ORAL DAILY
Qty: 90 TABLET | Refills: 2 | Status: CANCELLED | OUTPATIENT
Start: 2022-10-27 | End: 2023-10-27

## 2022-10-28 ENCOUNTER — TELEPHONE (OUTPATIENT)
Dept: OBSTETRICS AND GYNECOLOGY | Facility: CLINIC | Age: 57
End: 2022-10-28
Payer: MEDICARE

## 2022-10-28 NOTE — TELEPHONE ENCOUNTER
----- Message from Olga Albright NP sent at 10/27/2022 11:11 PM CDT -----  Please call client and schedule a virtual visit on Monday between 4p and 5p.  OR she need to schedule an WWE with any provider

## 2022-11-18 ENCOUNTER — OFFICE VISIT (OUTPATIENT)
Dept: OBSTETRICS AND GYNECOLOGY | Facility: CLINIC | Age: 57
End: 2022-11-18
Payer: MEDICARE

## 2022-11-18 VITALS
DIASTOLIC BLOOD PRESSURE: 78 MMHG | SYSTOLIC BLOOD PRESSURE: 142 MMHG | BODY MASS INDEX: 42.07 KG/M2 | WEIGHT: 222.69 LBS

## 2022-11-18 DIAGNOSIS — L65.9 HAIR LOSS: ICD-10-CM

## 2022-11-18 DIAGNOSIS — N39.41 URGE INCONTINENCE: Primary | ICD-10-CM

## 2022-11-18 PROCEDURE — 4010F PR ACE/ARB THEARPY RXD/TAKEN: ICD-10-PCS | Mod: CPTII,S$GLB,, | Performed by: NURSE PRACTITIONER

## 2022-11-18 PROCEDURE — 99213 PR OFFICE/OUTPT VISIT, EST, LEVL III, 20-29 MIN: ICD-10-PCS | Mod: S$GLB,,, | Performed by: NURSE PRACTITIONER

## 2022-11-18 PROCEDURE — 3008F PR BODY MASS INDEX (BMI) DOCUMENTED: ICD-10-PCS | Mod: CPTII,S$GLB,, | Performed by: NURSE PRACTITIONER

## 2022-11-18 PROCEDURE — 1159F MED LIST DOCD IN RCRD: CPT | Mod: CPTII,S$GLB,, | Performed by: NURSE PRACTITIONER

## 2022-11-18 PROCEDURE — 3008F BODY MASS INDEX DOCD: CPT | Mod: CPTII,S$GLB,, | Performed by: NURSE PRACTITIONER

## 2022-11-18 PROCEDURE — 99999 PR PBB SHADOW E&M-EST. PATIENT-LVL III: CPT | Mod: PBBFAC,,, | Performed by: NURSE PRACTITIONER

## 2022-11-18 PROCEDURE — 4010F ACE/ARB THERAPY RXD/TAKEN: CPT | Mod: CPTII,S$GLB,, | Performed by: NURSE PRACTITIONER

## 2022-11-18 PROCEDURE — 99213 OFFICE O/P EST LOW 20 MIN: CPT | Mod: S$GLB,,, | Performed by: NURSE PRACTITIONER

## 2022-11-18 PROCEDURE — 3077F PR MOST RECENT SYSTOLIC BLOOD PRESSURE >= 140 MM HG: ICD-10-PCS | Mod: CPTII,S$GLB,, | Performed by: NURSE PRACTITIONER

## 2022-11-18 PROCEDURE — 3078F DIAST BP <80 MM HG: CPT | Mod: CPTII,S$GLB,, | Performed by: NURSE PRACTITIONER

## 2022-11-18 PROCEDURE — 1160F PR REVIEW ALL MEDS BY PRESCRIBER/CLIN PHARMACIST DOCUMENTED: ICD-10-PCS | Mod: CPTII,S$GLB,, | Performed by: NURSE PRACTITIONER

## 2022-11-18 PROCEDURE — 99999 PR PBB SHADOW E&M-EST. PATIENT-LVL III: ICD-10-PCS | Mod: PBBFAC,,, | Performed by: NURSE PRACTITIONER

## 2022-11-18 PROCEDURE — 1159F PR MEDICATION LIST DOCUMENTED IN MEDICAL RECORD: ICD-10-PCS | Mod: CPTII,S$GLB,, | Performed by: NURSE PRACTITIONER

## 2022-11-18 PROCEDURE — 3077F SYST BP >= 140 MM HG: CPT | Mod: CPTII,S$GLB,, | Performed by: NURSE PRACTITIONER

## 2022-11-18 PROCEDURE — 3078F PR MOST RECENT DIASTOLIC BLOOD PRESSURE < 80 MM HG: ICD-10-PCS | Mod: CPTII,S$GLB,, | Performed by: NURSE PRACTITIONER

## 2022-11-18 PROCEDURE — 1160F RVW MEDS BY RX/DR IN RCRD: CPT | Mod: CPTII,S$GLB,, | Performed by: NURSE PRACTITIONER

## 2022-11-18 RX ORDER — OXYBUTYNIN CHLORIDE 15 MG/1
15 TABLET, EXTENDED RELEASE ORAL DAILY
Qty: 90 TABLET | Refills: 4 | Status: SHIPPED | OUTPATIENT
Start: 2022-11-18 | End: 2023-12-06 | Stop reason: SDUPTHER

## 2022-11-18 NOTE — PROGRESS NOTES
Subjective:       Patient ID: Jimena Pierce is a 57 y.o. female.    Chief Complaint:  Annual Exam      History of Present Illness  HPI   present for med refill  C/o hair loss and insomnia  Normal thyroid recently  Trouble sleeping since her pain medication was decreased  Bladder is significantly improved with ditropan  Needs refill  Occasional urgency  Not running to restroom in AM    GYN & OB History  Patient's last menstrual period was 2016.   Date of Last Pap: 2021    OB History    Para Term  AB Living   2 2 2     2   SAB IAB Ectopic Multiple Live Births           2      # Outcome Date GA Lbr Jorge A/2nd Weight Sex Delivery Anes PTL Lv   2 Term            1 Term                Review of Systems  Review of Systems   Genitourinary:  Positive for urgency. Negative for frequency and hot flashes.   Musculoskeletal:  Positive for back pain and leg pain.   Integumentary:  Positive for hair changes.   Psychiatric/Behavioral:  Positive for sleep disturbance.          Objective:      Physical Exam:   Constitutional: She is oriented to person, place, and time. She appears well-developed and well-nourished. No distress.    HENT:   Head: Normocephalic and atraumatic.    Eyes: Pupils are equal, round, and reactive to light. Conjunctivae and EOM are normal.      Pulmonary/Chest: Effort normal.                  Musculoskeletal: Normal range of motion and moves all extremeties.       Neurological: She is alert and oriented to person, place, and time.    Skin: Skin is warm and dry. No rash noted. She is not diaphoretic. No erythema. No pallor.    Psychiatric: She has a normal mood and affect. Her behavior is normal. Judgment and thought content normal.           Assessment:        1. Urge incontinence    2. Hair loss               Plan:   Rx sent for ditropan    Continue annual well woman exam     Urge incontinence  -     oxybutynin (DITROPAN XL) 15 MG TR24; Take 1 tablet (15 mg  total) by mouth once daily.  Dispense: 90 tablet; Refill: 4    Hair loss  -     Ambulatory referral/consult to Dermatology; Future; Expected date: 11/25/2022

## 2022-11-23 PROBLEM — Z86.0100 PERSONAL HISTORY OF COLONIC POLYPS: Status: ACTIVE | Noted: 2022-11-23

## 2022-11-23 PROBLEM — Z86.010 PERSONAL HISTORY OF COLONIC POLYPS: Status: ACTIVE | Noted: 2022-11-23

## 2022-12-05 ENCOUNTER — TELEPHONE (OUTPATIENT)
Dept: DERMATOLOGY | Facility: CLINIC | Age: 57
End: 2022-12-05
Payer: MEDICARE

## 2022-12-05 ENCOUNTER — PATIENT MESSAGE (OUTPATIENT)
Dept: DERMATOLOGY | Facility: CLINIC | Age: 57
End: 2022-12-05
Payer: MEDICARE

## 2022-12-05 NOTE — TELEPHONE ENCOUNTER
Attempted to call patient to reschedule appointment with Jessie Cabrera on 12/28/2022, she won't be in office until January. No answer. LVM.

## 2022-12-13 ENCOUNTER — TELEPHONE (OUTPATIENT)
Dept: PREADMISSION TESTING | Facility: HOSPITAL | Age: 57
End: 2022-12-13
Payer: MEDICARE

## 2023-01-18 ENCOUNTER — OFFICE VISIT (OUTPATIENT)
Dept: DERMATOLOGY | Facility: CLINIC | Age: 58
End: 2023-01-18
Payer: MEDICARE

## 2023-01-18 ENCOUNTER — LAB VISIT (OUTPATIENT)
Dept: LAB | Facility: HOSPITAL | Age: 58
End: 2023-01-18
Attending: PHYSICIAN ASSISTANT
Payer: MEDICARE

## 2023-01-18 DIAGNOSIS — L29.9 SCALP PRURITUS: ICD-10-CM

## 2023-01-18 DIAGNOSIS — L21.9 SEBORRHEIC DERMATITIS OF SCALP: ICD-10-CM

## 2023-01-18 DIAGNOSIS — L65.0 TELOGEN EFFLUVIUM: ICD-10-CM

## 2023-01-18 DIAGNOSIS — L65.0 TELOGEN EFFLUVIUM: Primary | ICD-10-CM

## 2023-01-18 DIAGNOSIS — L65.9 HAIR LOSS: ICD-10-CM

## 2023-01-18 PROCEDURE — 1160F RVW MEDS BY RX/DR IN RCRD: CPT | Mod: CPTII,S$GLB,, | Performed by: PHYSICIAN ASSISTANT

## 2023-01-18 PROCEDURE — 1160F PR REVIEW ALL MEDS BY PRESCRIBER/CLIN PHARMACIST DOCUMENTED: ICD-10-PCS | Mod: CPTII,S$GLB,, | Performed by: PHYSICIAN ASSISTANT

## 2023-01-18 PROCEDURE — 1159F PR MEDICATION LIST DOCUMENTED IN MEDICAL RECORD: ICD-10-PCS | Mod: CPTII,S$GLB,, | Performed by: PHYSICIAN ASSISTANT

## 2023-01-18 PROCEDURE — 36415 COLL VENOUS BLD VENIPUNCTURE: CPT | Mod: PO | Performed by: PHYSICIAN ASSISTANT

## 2023-01-18 PROCEDURE — 82728 ASSAY OF FERRITIN: CPT | Performed by: PHYSICIAN ASSISTANT

## 2023-01-18 PROCEDURE — 4010F PR ACE/ARB THEARPY RXD/TAKEN: ICD-10-PCS | Mod: CPTII,S$GLB,, | Performed by: PHYSICIAN ASSISTANT

## 2023-01-18 PROCEDURE — 1159F MED LIST DOCD IN RCRD: CPT | Mod: CPTII,S$GLB,, | Performed by: PHYSICIAN ASSISTANT

## 2023-01-18 PROCEDURE — 84466 ASSAY OF TRANSFERRIN: CPT | Performed by: PHYSICIAN ASSISTANT

## 2023-01-18 PROCEDURE — 99999 PR PBB SHADOW E&M-EST. PATIENT-LVL III: ICD-10-PCS | Mod: PBBFAC,,, | Performed by: PHYSICIAN ASSISTANT

## 2023-01-18 PROCEDURE — 99999 PR PBB SHADOW E&M-EST. PATIENT-LVL III: CPT | Mod: PBBFAC,,, | Performed by: PHYSICIAN ASSISTANT

## 2023-01-18 PROCEDURE — 4010F ACE/ARB THERAPY RXD/TAKEN: CPT | Mod: CPTII,S$GLB,, | Performed by: PHYSICIAN ASSISTANT

## 2023-01-18 PROCEDURE — 99204 PR OFFICE/OUTPT VISIT, NEW, LEVL IV, 45-59 MIN: ICD-10-PCS | Mod: S$GLB,,, | Performed by: PHYSICIAN ASSISTANT

## 2023-01-18 PROCEDURE — 84439 ASSAY OF FREE THYROXINE: CPT | Performed by: PHYSICIAN ASSISTANT

## 2023-01-18 PROCEDURE — 99204 OFFICE O/P NEW MOD 45 MIN: CPT | Mod: S$GLB,,, | Performed by: PHYSICIAN ASSISTANT

## 2023-01-18 RX ORDER — FLUOCINOLONE ACETONIDE 0.1 MG/ML
SOLUTION TOPICAL
Qty: 60 ML | Refills: 1 | Status: SHIPPED | OUTPATIENT
Start: 2023-01-18 | End: 2023-05-08

## 2023-01-18 RX ORDER — KETOCONAZOLE 20 MG/ML
SHAMPOO, SUSPENSION TOPICAL
Qty: 240 ML | Refills: 5 | Status: SHIPPED | OUTPATIENT
Start: 2023-01-18

## 2023-01-18 NOTE — PATIENT INSTRUCTIONS
There is risk for increased shedding of hair in the first 6 weeks of using Rogaine.  Rogaine may have to be continued indefinitely in cases of pattern alopecia. It will take about 3-6 months to notice any improvement.     Rogaine 5% solution or foam may be used once daily. Be careful with application as you may notice unwanted hair.

## 2023-01-19 ENCOUNTER — TELEPHONE (OUTPATIENT)
Dept: DERMATOLOGY | Facility: CLINIC | Age: 58
End: 2023-01-19
Payer: MEDICARE

## 2023-01-19 LAB
FERRITIN SERPL-MCNC: 143 NG/ML (ref 20–300)
IRON SERPL-MCNC: 117 UG/DL (ref 30–160)
SATURATED IRON: 38 % (ref 20–50)
T4 FREE SERPL-MCNC: 0.83 NG/DL (ref 0.71–1.51)
TOTAL IRON BINDING CAPACITY: 306 UG/DL (ref 250–450)
TRANSFERRIN SERPL-MCNC: 207 MG/DL (ref 200–375)

## 2023-01-19 NOTE — TELEPHONE ENCOUNTER
Called patient to give lab results. No answer. Lvm.    ----- Message from Jessie Cabrera PA-C sent at 1/19/2023  3:36 PM CST -----  Please advise pt that labs were overall stable / ok. I recommend she try the otc rogaine 5% solution as we discussed, and the rx shampoo and solution. Keep f/u in 3 months to recheck.

## 2023-02-22 ENCOUNTER — HOSPITAL ENCOUNTER (INPATIENT)
Facility: HOSPITAL | Age: 58
LOS: 10 days | Discharge: HOME HEALTH | DRG: 252 | End: 2023-03-06
Attending: EMERGENCY MEDICINE | Admitting: INTERNAL MEDICINE
Payer: MEDICARE

## 2023-02-22 DIAGNOSIS — I70.208 BRACHIAL ARTERY OCCLUSION: ICD-10-CM

## 2023-02-22 DIAGNOSIS — R10.13 EPIGASTRIC ABDOMINAL PAIN: ICD-10-CM

## 2023-02-22 DIAGNOSIS — D69.6 THROMBOCYTOPENIA: ICD-10-CM

## 2023-02-22 DIAGNOSIS — G80.9 CEREBRAL PALSY, UNSPECIFIED TYPE: ICD-10-CM

## 2023-02-22 DIAGNOSIS — D72.829 LEUKOCYTOSIS, UNSPECIFIED TYPE: ICD-10-CM

## 2023-02-22 DIAGNOSIS — D73.5 SPLENIC INFARCT: ICD-10-CM

## 2023-02-22 DIAGNOSIS — D68.61 ANTIPHOSPHOLIPID SYNDROME: ICD-10-CM

## 2023-02-22 DIAGNOSIS — M79.641 RIGHT HAND PAIN: ICD-10-CM

## 2023-02-22 DIAGNOSIS — I74.9 EMBOLISM: ICD-10-CM

## 2023-02-22 DIAGNOSIS — I70.208 BRACHIAL ARTERY OCCLUSION, RIGHT: Primary | ICD-10-CM

## 2023-02-22 LAB
ALBUMIN SERPL BCP-MCNC: 3.9 G/DL (ref 3.5–5.2)
ALP SERPL-CCNC: 78 U/L (ref 55–135)
ALT SERPL W/O P-5'-P-CCNC: 27 U/L (ref 10–44)
ANION GAP SERPL CALC-SCNC: 15 MMOL/L (ref 8–16)
APTT BLDCRRT: 26.6 SEC (ref 21–32)
AST SERPL-CCNC: 19 U/L (ref 10–40)
BASOPHILS # BLD AUTO: 0.05 K/UL (ref 0–0.2)
BASOPHILS NFR BLD: 0.6 % (ref 0–1.9)
BILIRUB SERPL-MCNC: 0.4 MG/DL (ref 0.1–1)
BNP SERPL-MCNC: 250 PG/ML (ref 0–99)
BUN SERPL-MCNC: 16 MG/DL (ref 6–20)
CALCIUM SERPL-MCNC: 9.8 MG/DL (ref 8.7–10.5)
CHLORIDE SERPL-SCNC: 109 MMOL/L (ref 95–110)
CK SERPL-CCNC: 30 U/L (ref 20–180)
CO2 SERPL-SCNC: 22 MMOL/L (ref 23–29)
CREAT SERPL-MCNC: 0.7 MG/DL (ref 0.5–1.4)
CTP QC/QA: YES
DIFFERENTIAL METHOD: ABNORMAL
EOSINOPHIL # BLD AUTO: 0.1 K/UL (ref 0–0.5)
EOSINOPHIL NFR BLD: 0.8 % (ref 0–8)
ERYTHROCYTE [DISTWIDTH] IN BLOOD BY AUTOMATED COUNT: 12.3 % (ref 11.5–14.5)
EST. GFR  (NO RACE VARIABLE): >60 ML/MIN/1.73 M^2
GLUCOSE SERPL-MCNC: 115 MG/DL (ref 70–110)
HCT VFR BLD AUTO: 41 % (ref 37–48.5)
HGB BLD-MCNC: 13.6 G/DL (ref 12–16)
IMM GRANULOCYTES # BLD AUTO: 0.02 K/UL (ref 0–0.04)
IMM GRANULOCYTES NFR BLD AUTO: 0.2 % (ref 0–0.5)
INR PPP: 1 (ref 0.8–1.2)
LIPASE SERPL-CCNC: 23 U/L (ref 4–60)
LYMPHOCYTES # BLD AUTO: 1.6 K/UL (ref 1–4.8)
LYMPHOCYTES NFR BLD: 18 % (ref 18–48)
MCH RBC QN AUTO: 29.2 PG (ref 27–31)
MCHC RBC AUTO-ENTMCNC: 33.2 G/DL (ref 32–36)
MCV RBC AUTO: 88 FL (ref 82–98)
MONOCYTES # BLD AUTO: 0.6 K/UL (ref 0.3–1)
MONOCYTES NFR BLD: 7.1 % (ref 4–15)
NEUTROPHILS # BLD AUTO: 6.6 K/UL (ref 1.8–7.7)
NEUTROPHILS NFR BLD: 73.3 % (ref 38–73)
NRBC BLD-RTO: 0 /100 WBC
PLATELET # BLD AUTO: 222 K/UL (ref 150–450)
PMV BLD AUTO: 11.2 FL (ref 9.2–12.9)
POTASSIUM SERPL-SCNC: 3.8 MMOL/L (ref 3.5–5.1)
PROT SERPL-MCNC: 7.2 G/DL (ref 6–8.4)
PROTHROMBIN TIME: 10.8 SEC (ref 9–12.5)
RBC # BLD AUTO: 4.65 M/UL (ref 4–5.4)
SARS-COV-2 RDRP RESP QL NAA+PROBE: NEGATIVE
SODIUM SERPL-SCNC: 146 MMOL/L (ref 136–145)
TROPONIN I SERPL DL<=0.01 NG/ML-MCNC: <0.006 NG/ML (ref 0–0.03)
WBC # BLD AUTO: 9 K/UL (ref 3.9–12.7)

## 2023-02-22 PROCEDURE — 84484 ASSAY OF TROPONIN QUANT: CPT | Performed by: EMERGENCY MEDICINE

## 2023-02-22 PROCEDURE — 85730 THROMBOPLASTIN TIME PARTIAL: CPT | Performed by: EMERGENCY MEDICINE

## 2023-02-22 PROCEDURE — 85025 COMPLETE CBC W/AUTO DIFF WBC: CPT | Performed by: EMERGENCY MEDICINE

## 2023-02-22 PROCEDURE — 93005 ELECTROCARDIOGRAM TRACING: CPT

## 2023-02-22 PROCEDURE — 93010 ELECTROCARDIOGRAM REPORT: CPT | Mod: ,,, | Performed by: INTERNAL MEDICINE

## 2023-02-22 PROCEDURE — 82550 ASSAY OF CK (CPK): CPT | Performed by: EMERGENCY MEDICINE

## 2023-02-22 PROCEDURE — 83690 ASSAY OF LIPASE: CPT | Performed by: EMERGENCY MEDICINE

## 2023-02-22 PROCEDURE — 99285 EMERGENCY DEPT VISIT HI MDM: CPT | Mod: 25

## 2023-02-22 PROCEDURE — 83880 ASSAY OF NATRIURETIC PEPTIDE: CPT | Performed by: EMERGENCY MEDICINE

## 2023-02-22 PROCEDURE — 80053 COMPREHEN METABOLIC PANEL: CPT | Performed by: EMERGENCY MEDICINE

## 2023-02-22 PROCEDURE — 96375 TX/PRO/DX INJ NEW DRUG ADDON: CPT

## 2023-02-22 PROCEDURE — 93010 EKG 12-LEAD: ICD-10-PCS | Mod: ,,, | Performed by: INTERNAL MEDICINE

## 2023-02-22 PROCEDURE — 85610 PROTHROMBIN TIME: CPT | Performed by: EMERGENCY MEDICINE

## 2023-02-22 PROCEDURE — 63600175 PHARM REV CODE 636 W HCPCS: Performed by: EMERGENCY MEDICINE

## 2023-02-22 RX ORDER — ONDANSETRON 2 MG/ML
4 INJECTION INTRAMUSCULAR; INTRAVENOUS
Status: COMPLETED | OUTPATIENT
Start: 2023-02-22 | End: 2023-02-22

## 2023-02-22 RX ORDER — MORPHINE SULFATE 4 MG/ML
4 INJECTION, SOLUTION INTRAMUSCULAR; INTRAVENOUS
Status: COMPLETED | OUTPATIENT
Start: 2023-02-22 | End: 2023-02-22

## 2023-02-22 RX ADMIN — MORPHINE SULFATE 4 MG: 4 INJECTION INTRAVENOUS at 11:02

## 2023-02-22 RX ADMIN — ONDANSETRON 4 MG: 2 INJECTION INTRAMUSCULAR; INTRAVENOUS at 10:02

## 2023-02-22 RX ADMIN — IOHEXOL 100 ML: 350 INJECTION, SOLUTION INTRAVENOUS at 11:02

## 2023-02-22 NOTE — Clinical Note
Diagnosis: Arterial occlusion [333944]   Future Attending Provider: JONATHAN PATE [[id number]]   Admitting Provider:: JONATHAN PATE [[id number]]

## 2023-02-23 ENCOUNTER — ANESTHESIA EVENT (OUTPATIENT)
Dept: SURGERY | Facility: HOSPITAL | Age: 58
DRG: 252 | End: 2023-02-23
Payer: MEDICARE

## 2023-02-23 ENCOUNTER — ANESTHESIA EVENT (OUTPATIENT)
Dept: CARDIOLOGY | Facility: HOSPITAL | Age: 58
DRG: 252 | End: 2023-02-23
Payer: MEDICARE

## 2023-02-23 ENCOUNTER — ANESTHESIA (OUTPATIENT)
Dept: CARDIOLOGY | Facility: HOSPITAL | Age: 58
DRG: 252 | End: 2023-02-23
Payer: MEDICARE

## 2023-02-23 ENCOUNTER — ANESTHESIA (OUTPATIENT)
Dept: SURGERY | Facility: HOSPITAL | Age: 58
DRG: 252 | End: 2023-02-23
Payer: MEDICARE

## 2023-02-23 PROBLEM — D73.5 SPLENIC INFARCTION: Status: ACTIVE | Noted: 2023-02-23

## 2023-02-23 PROBLEM — I70.208 BRACHIAL ARTERY OCCLUSION, RIGHT: Status: RESOLVED | Noted: 2023-02-23 | Resolved: 2023-02-23

## 2023-02-23 PROBLEM — G80.9 CEREBRAL PALSY: Status: ACTIVE | Noted: 2023-02-23

## 2023-02-23 PROBLEM — I70.208 BRACHIAL ARTERY OCCLUSION, RIGHT: Status: ACTIVE | Noted: 2023-02-23

## 2023-02-23 LAB
AORTIC ROOT ANNULUS: 3.16 CM
APTT BLDCRRT: 29.5 SEC (ref 21–32)
APTT BLDCRRT: 44.7 SEC (ref 21–32)
ASCENDING AORTA: 3.2 CM
AV INDEX (PROSTH): 0.94
AV MEAN GRADIENT: 4 MMHG
AV PEAK GRADIENT: 8 MMHG
AV VALVE AREA: 2.76 CM2
AV VELOCITY RATIO: 0.78
BASOPHILS # BLD AUTO: 0.04 K/UL (ref 0–0.2)
BASOPHILS NFR BLD: 0.3 % (ref 0–1.9)
BSA FOR ECHO PROCEDURE: 2.06 M2
BSA FOR ECHO PROCEDURE: 2.08 M2
CV ECHO LV RWT: 0.57 CM
DIFFERENTIAL METHOD: ABNORMAL
DOP CALC AO PEAK VEL: 1.39 M/S
DOP CALC AO VTI: 27.7 CM
DOP CALC LVOT AREA: 2.9 CM2
DOP CALC LVOT DIAMETER: 1.93 CM
DOP CALC LVOT PEAK VEL: 1.09 M/S
DOP CALC LVOT STROKE VOLUME: 76.32 CM3
DOP CALC RVOT PEAK VEL: 0.88 M/S
DOP CALC RVOT VTI: 20.2 CM
DOP CALCLVOT PEAK VEL VTI: 26.1 CM
E WAVE DECELERATION TIME: 161.92 MSEC
E/A RATIO: 1.29
E/E' RATIO: 7.58 M/S
ECHO LV POSTERIOR WALL: 1.21 CM (ref 0.6–1.1)
EJECTION FRACTION: 55 %
EJECTION FRACTION: 65 %
EOSINOPHIL # BLD AUTO: 0 K/UL (ref 0–0.5)
EOSINOPHIL NFR BLD: 0 % (ref 0–8)
ERYTHROCYTE [DISTWIDTH] IN BLOOD BY AUTOMATED COUNT: 12.1 % (ref 11.5–14.5)
FRACTIONAL SHORTENING: 30 % (ref 28–44)
HCT VFR BLD AUTO: 40.5 % (ref 37–48.5)
HGB BLD-MCNC: 13.3 G/DL (ref 12–16)
IMM GRANULOCYTES # BLD AUTO: 0.07 K/UL (ref 0–0.04)
IMM GRANULOCYTES NFR BLD AUTO: 0.5 % (ref 0–0.5)
INTERVENTRICULAR SEPTUM: 1.41 CM (ref 0.6–1.1)
IVC DIAMETER: 1.6 CM
IVRT: 60.89 MSEC
LA MAJOR: 5.24 CM
LA MINOR: 5.64 CM
LA WIDTH: 4 CM
LEFT ATRIUM SIZE: 3.91 CM
LEFT ATRIUM VOLUME INDEX: 36.8 ML/M2
LEFT ATRIUM VOLUME: 72.22 CM3
LEFT INTERNAL DIMENSION IN SYSTOLE: 2.95 CM (ref 2.1–4)
LEFT VENTRICLE DIASTOLIC VOLUME INDEX: 40.3 ML/M2
LEFT VENTRICLE DIASTOLIC VOLUME: 78.99 ML
LEFT VENTRICLE MASS INDEX: 104 G/M2
LEFT VENTRICLE SYSTOLIC VOLUME INDEX: 17.2 ML/M2
LEFT VENTRICLE SYSTOLIC VOLUME: 33.71 ML
LEFT VENTRICULAR INTERNAL DIMENSION IN DIASTOLE: 4.21 CM (ref 3.5–6)
LEFT VENTRICULAR MASS: 203.6 G
LV LATERAL E/E' RATIO: 6.55 M/S
LV SEPTAL E/E' RATIO: 9 M/S
LVOT MG: 2.77 MMHG
LVOT MV: 0.81 CM/S
LYMPHOCYTES # BLD AUTO: 1.8 K/UL (ref 1–4.8)
LYMPHOCYTES NFR BLD: 13.5 % (ref 18–48)
MCH RBC QN AUTO: 29 PG (ref 27–31)
MCHC RBC AUTO-ENTMCNC: 32.8 G/DL (ref 32–36)
MCV RBC AUTO: 88 FL (ref 82–98)
MONOCYTES # BLD AUTO: 0.8 K/UL (ref 0.3–1)
MONOCYTES NFR BLD: 6.2 % (ref 4–15)
MV PEAK A VEL: 0.56 M/S
MV PEAK E VEL: 0.72 M/S
MV STENOSIS PRESSURE HALF TIME: 46.96 MS
MV VALVE AREA P 1/2 METHOD: 4.68 CM2
NEUTROPHILS # BLD AUTO: 10.4 K/UL (ref 1.8–7.7)
NEUTROPHILS NFR BLD: 79.5 % (ref 38–73)
NRBC BLD-RTO: 0 /100 WBC
PISA MRMAX VEL: 3.12 M/S
PISA TR MAX VEL: 2.79 M/S
PLATELET # BLD AUTO: 197 K/UL (ref 150–450)
PMV BLD AUTO: 11.4 FL (ref 9.2–12.9)
POCT GLUCOSE: 111 MG/DL (ref 70–110)
POCT GLUCOSE: 118 MG/DL (ref 70–110)
PV MEAN GRADIENT: 1.84 MMHG
RA MAJOR: 3.86 CM
RA PRESSURE: 3 MMHG
RA PRESSURE: 3 MMHG
RA WIDTH: 3 CM
RBC # BLD AUTO: 4.59 M/UL (ref 4–5.4)
STJ: 3.28 CM
TDI LATERAL: 0.11 M/S
TDI SEPTAL: 0.08 M/S
TDI: 0.1 M/S
TR MAX PG: 31 MMHG
TR MEAN GRADIENT: 22 MMHG
TRICUSPID ANNULAR PLANE SYSTOLIC EXCURSION: 1.9 CM
TV REST PULMONARY ARTERY PRESSURE: 34 MMHG
WBC # BLD AUTO: 13.14 K/UL (ref 3.9–12.7)

## 2023-02-23 PROCEDURE — 27201423 OPTIME MED/SURG SUP & DEVICES STERILE SUPPLY: Performed by: SURGERY

## 2023-02-23 PROCEDURE — 85025 COMPLETE CBC W/AUTO DIFF WBC: CPT | Performed by: EMERGENCY MEDICINE

## 2023-02-23 PROCEDURE — 63600175 PHARM REV CODE 636 W HCPCS: Performed by: STUDENT IN AN ORGANIZED HEALTH CARE EDUCATION/TRAINING PROGRAM

## 2023-02-23 PROCEDURE — 25000003 PHARM REV CODE 250: Performed by: STUDENT IN AN ORGANIZED HEALTH CARE EDUCATION/TRAINING PROGRAM

## 2023-02-23 PROCEDURE — 99222 PR INITIAL HOSPITAL CARE,LEVL II: ICD-10-PCS | Mod: ,,,

## 2023-02-23 PROCEDURE — 99222 1ST HOSP IP/OBS MODERATE 55: CPT | Mod: ,,,

## 2023-02-23 PROCEDURE — 25500020 PHARM REV CODE 255: Performed by: EMERGENCY MEDICINE

## 2023-02-23 PROCEDURE — 63600175 PHARM REV CODE 636 W HCPCS: Performed by: EMERGENCY MEDICINE

## 2023-02-23 PROCEDURE — 25000003 PHARM REV CODE 250: Performed by: NURSE ANESTHETIST, CERTIFIED REGISTERED

## 2023-02-23 PROCEDURE — 63600175 PHARM REV CODE 636 W HCPCS: Performed by: NURSE ANESTHETIST, CERTIFIED REGISTERED

## 2023-02-23 PROCEDURE — 25000003 PHARM REV CODE 250: Performed by: INTERNAL MEDICINE

## 2023-02-23 PROCEDURE — 94799 UNLISTED PULMONARY SVC/PX: CPT

## 2023-02-23 PROCEDURE — 63600175 PHARM REV CODE 636 W HCPCS: Performed by: SURGERY

## 2023-02-23 PROCEDURE — 25000003 PHARM REV CODE 250: Performed by: SURGERY

## 2023-02-23 PROCEDURE — G0378 HOSPITAL OBSERVATION PER HR: HCPCS

## 2023-02-23 PROCEDURE — 99204 OFFICE O/P NEW MOD 45 MIN: CPT | Mod: 25,,, | Performed by: INTERNAL MEDICINE

## 2023-02-23 PROCEDURE — 63600175 PHARM REV CODE 636 W HCPCS: Performed by: ANESTHESIOLOGY

## 2023-02-23 PROCEDURE — 85730 THROMBOPLASTIN TIME PARTIAL: CPT | Performed by: STUDENT IN AN ORGANIZED HEALTH CARE EDUCATION/TRAINING PROGRAM

## 2023-02-23 PROCEDURE — 99900035 HC TECH TIME PER 15 MIN (STAT)

## 2023-02-23 PROCEDURE — 88304 PR  SURG PATH,LEVEL III: ICD-10-PCS | Mod: 26,,, | Performed by: PATHOLOGY

## 2023-02-23 PROCEDURE — 37000008 HC ANESTHESIA 1ST 15 MINUTES: Performed by: SURGERY

## 2023-02-23 PROCEDURE — 96365 THER/PROPH/DIAG IV INF INIT: CPT

## 2023-02-23 PROCEDURE — 37000008 HC ANESTHESIA 1ST 15 MINUTES: Performed by: INTERNAL MEDICINE

## 2023-02-23 PROCEDURE — 82962 GLUCOSE BLOOD TEST: CPT

## 2023-02-23 PROCEDURE — 99204 PR OFFICE/OUTPT VISIT, NEW, LEVL IV, 45-59 MIN: ICD-10-PCS | Mod: 25,,, | Performed by: INTERNAL MEDICINE

## 2023-02-23 PROCEDURE — C1757 CATH, THROMBECTOMY/EMBOLECT: HCPCS | Performed by: SURGERY

## 2023-02-23 PROCEDURE — 88304 TISSUE EXAM BY PATHOLOGIST: CPT | Mod: 26,,, | Performed by: PATHOLOGY

## 2023-02-23 PROCEDURE — 36000707: Performed by: SURGERY

## 2023-02-23 PROCEDURE — 63600175 PHARM REV CODE 636 W HCPCS: Performed by: INTERNAL MEDICINE

## 2023-02-23 PROCEDURE — 25500020 PHARM REV CODE 255: Performed by: INTERNAL MEDICINE

## 2023-02-23 PROCEDURE — 36415 COLL VENOUS BLD VENIPUNCTURE: CPT | Performed by: EMERGENCY MEDICINE

## 2023-02-23 PROCEDURE — 88304 TISSUE EXAM BY PATHOLOGIST: CPT | Performed by: PATHOLOGY

## 2023-02-23 PROCEDURE — 71000033 HC RECOVERY, INTIAL HOUR: Performed by: SURGERY

## 2023-02-23 PROCEDURE — 36000706: Performed by: SURGERY

## 2023-02-23 PROCEDURE — 37000009 HC ANESTHESIA EA ADD 15 MINS: Performed by: SURGERY

## 2023-02-23 RX ORDER — HYDROMORPHONE HYDROCHLORIDE 2 MG/ML
INJECTION, SOLUTION INTRAMUSCULAR; INTRAVENOUS; SUBCUTANEOUS
Status: DISCONTINUED | OUTPATIENT
Start: 2023-02-23 | End: 2023-02-23

## 2023-02-23 RX ORDER — MORPHINE SULFATE 4 MG/ML
2 INJECTION, SOLUTION INTRAMUSCULAR; INTRAVENOUS EVERY 4 HOURS PRN
Status: DISCONTINUED | OUTPATIENT
Start: 2023-02-23 | End: 2023-02-23

## 2023-02-23 RX ORDER — SODIUM CHLORIDE, SODIUM LACTATE, POTASSIUM CHLORIDE, CALCIUM CHLORIDE 600; 310; 30; 20 MG/100ML; MG/100ML; MG/100ML; MG/100ML
INJECTION, SOLUTION INTRAVENOUS CONTINUOUS
Status: DISCONTINUED | OUTPATIENT
Start: 2023-02-23 | End: 2023-02-24

## 2023-02-23 RX ORDER — AMLODIPINE BESYLATE 5 MG/1
5 TABLET ORAL DAILY
Status: DISCONTINUED | OUTPATIENT
Start: 2023-02-23 | End: 2023-02-26

## 2023-02-23 RX ORDER — MORPHINE SULFATE 4 MG/ML
4 INJECTION, SOLUTION INTRAMUSCULAR; INTRAVENOUS EVERY 4 HOURS PRN
Status: DISCONTINUED | OUTPATIENT
Start: 2023-02-23 | End: 2023-03-06 | Stop reason: HOSPADM

## 2023-02-23 RX ORDER — PROPOFOL 10 MG/ML
VIAL (ML) INTRAVENOUS
Status: DISCONTINUED | OUTPATIENT
Start: 2023-02-23 | End: 2023-02-23

## 2023-02-23 RX ORDER — CEFAZOLIN SODIUM 1 G/3ML
INJECTION, POWDER, FOR SOLUTION INTRAMUSCULAR; INTRAVENOUS
Status: DISCONTINUED | OUTPATIENT
Start: 2023-02-23 | End: 2023-02-23 | Stop reason: HOSPADM

## 2023-02-23 RX ORDER — ACETAMINOPHEN 325 MG/1
650 TABLET ORAL EVERY 6 HOURS PRN
Status: DISCONTINUED | OUTPATIENT
Start: 2023-02-23 | End: 2023-03-06 | Stop reason: HOSPADM

## 2023-02-23 RX ORDER — HYDROCODONE BITARTRATE AND ACETAMINOPHEN 5; 325 MG/1; MG/1
1 TABLET ORAL EVERY 4 HOURS PRN
Status: DISCONTINUED | OUTPATIENT
Start: 2023-02-23 | End: 2023-02-23

## 2023-02-23 RX ORDER — HEPARIN SODIUM,PORCINE/D5W 25000/250
0-40 INTRAVENOUS SOLUTION INTRAVENOUS CONTINUOUS
Status: DISCONTINUED | OUTPATIENT
Start: 2023-02-23 | End: 2023-02-24

## 2023-02-23 RX ORDER — KETOROLAC TROMETHAMINE 30 MG/ML
15 INJECTION, SOLUTION INTRAMUSCULAR; INTRAVENOUS EVERY 8 HOURS PRN
Status: DISCONTINUED | OUTPATIENT
Start: 2023-02-23 | End: 2023-02-23 | Stop reason: HOSPADM

## 2023-02-23 RX ORDER — SODIUM CHLORIDE 9 MG/ML
INJECTION, SOLUTION INTRAVENOUS CONTINUOUS
Status: DISCONTINUED | OUTPATIENT
Start: 2023-02-23 | End: 2023-02-23

## 2023-02-23 RX ORDER — CHLORHEXIDINE GLUCONATE ORAL RINSE 1.2 MG/ML
10 SOLUTION DENTAL 2 TIMES DAILY
Status: COMPLETED | OUTPATIENT
Start: 2023-02-23 | End: 2023-02-27

## 2023-02-23 RX ORDER — HEPARIN SODIUM 1000 [USP'U]/ML
INJECTION, SOLUTION INTRAVENOUS; SUBCUTANEOUS
Status: DISCONTINUED | OUTPATIENT
Start: 2023-02-23 | End: 2023-02-23 | Stop reason: HOSPADM

## 2023-02-23 RX ORDER — ONDANSETRON 2 MG/ML
4 INJECTION INTRAMUSCULAR; INTRAVENOUS DAILY PRN
Status: DISCONTINUED | OUTPATIENT
Start: 2023-02-23 | End: 2023-02-23 | Stop reason: HOSPADM

## 2023-02-23 RX ORDER — LIDOCAINE HYDROCHLORIDE 20 MG/ML
SOLUTION OROPHARYNGEAL
Status: DISCONTINUED | OUTPATIENT
Start: 2023-02-23 | End: 2023-02-23 | Stop reason: HOSPADM

## 2023-02-23 RX ORDER — DIPHENHYDRAMINE HYDROCHLORIDE 50 MG/ML
25 INJECTION INTRAMUSCULAR; INTRAVENOUS EVERY 6 HOURS PRN
Status: DISCONTINUED | OUTPATIENT
Start: 2023-02-23 | End: 2023-02-23 | Stop reason: HOSPADM

## 2023-02-23 RX ORDER — ONDANSETRON 2 MG/ML
4 INJECTION INTRAMUSCULAR; INTRAVENOUS EVERY 8 HOURS PRN
Status: DISCONTINUED | OUTPATIENT
Start: 2023-02-23 | End: 2023-03-06 | Stop reason: HOSPADM

## 2023-02-23 RX ORDER — AMLODIPINE AND VALSARTAN 5; 160 MG/1; MG/1
1 TABLET ORAL DAILY
Status: DISCONTINUED | OUTPATIENT
Start: 2023-02-23 | End: 2023-02-23

## 2023-02-23 RX ORDER — SODIUM CHLORIDE, SODIUM LACTATE, POTASSIUM CHLORIDE, CALCIUM CHLORIDE 600; 310; 30; 20 MG/100ML; MG/100ML; MG/100ML; MG/100ML
INJECTION, SOLUTION INTRAVENOUS CONTINUOUS PRN
Status: DISCONTINUED | OUTPATIENT
Start: 2023-02-23 | End: 2023-02-23

## 2023-02-23 RX ORDER — VALSARTAN 160 MG/1
160 TABLET ORAL DAILY
Status: DISCONTINUED | OUTPATIENT
Start: 2023-02-23 | End: 2023-03-06 | Stop reason: HOSPADM

## 2023-02-23 RX ORDER — BACITRACIN ZINC 500 UNIT/G
OINTMENT (GRAM) TOPICAL
Status: DISCONTINUED | OUTPATIENT
Start: 2023-02-23 | End: 2023-02-23 | Stop reason: HOSPADM

## 2023-02-23 RX ORDER — NEOSTIGMINE METHYLSULFATE 1 MG/ML
INJECTION, SOLUTION INTRAVENOUS
Status: DISCONTINUED | OUTPATIENT
Start: 2023-02-23 | End: 2023-02-23

## 2023-02-23 RX ORDER — OXYCODONE HYDROCHLORIDE 5 MG/1
5 TABLET ORAL
Status: DISCONTINUED | OUTPATIENT
Start: 2023-02-23 | End: 2023-02-23 | Stop reason: HOSPADM

## 2023-02-23 RX ORDER — SODIUM CHLORIDE 0.9 % (FLUSH) 0.9 %
3 SYRINGE (ML) INJECTION
Status: DISCONTINUED | OUTPATIENT
Start: 2023-02-23 | End: 2023-03-06 | Stop reason: HOSPADM

## 2023-02-23 RX ORDER — EPHEDRINE SULFATE 50 MG/ML
INJECTION, SOLUTION INTRAVENOUS
Status: DISCONTINUED | OUTPATIENT
Start: 2023-02-23 | End: 2023-02-23

## 2023-02-23 RX ORDER — ONDANSETRON 2 MG/ML
4 INJECTION INTRAMUSCULAR; INTRAVENOUS EVERY 12 HOURS PRN
Status: DISCONTINUED | OUTPATIENT
Start: 2023-02-23 | End: 2023-03-06 | Stop reason: HOSPADM

## 2023-02-23 RX ORDER — MORPHINE SULFATE 4 MG/ML
3 INJECTION, SOLUTION INTRAMUSCULAR; INTRAVENOUS
Status: DISCONTINUED | OUTPATIENT
Start: 2023-02-23 | End: 2023-02-23

## 2023-02-23 RX ORDER — LIDOCAINE HYDROCHLORIDE 10 MG/ML
INJECTION, SOLUTION EPIDURAL; INFILTRATION; INTRACAUDAL; PERINEURAL
Status: DISCONTINUED | OUTPATIENT
Start: 2023-02-23 | End: 2023-02-23

## 2023-02-23 RX ORDER — SUCCINYLCHOLINE CHLORIDE 20 MG/ML
INJECTION INTRAMUSCULAR; INTRAVENOUS
Status: DISCONTINUED | OUTPATIENT
Start: 2023-02-23 | End: 2023-02-23

## 2023-02-23 RX ORDER — FENTANYL CITRATE 50 UG/ML
INJECTION, SOLUTION INTRAMUSCULAR; INTRAVENOUS
Status: DISCONTINUED | OUTPATIENT
Start: 2023-02-23 | End: 2023-02-23

## 2023-02-23 RX ORDER — ROCURONIUM BROMIDE 10 MG/ML
INJECTION, SOLUTION INTRAVENOUS
Status: DISCONTINUED | OUTPATIENT
Start: 2023-02-23 | End: 2023-02-23

## 2023-02-23 RX ORDER — CEFAZOLIN SODIUM 2 G/50ML
2 SOLUTION INTRAVENOUS
Status: COMPLETED | OUTPATIENT
Start: 2023-02-23 | End: 2023-02-23

## 2023-02-23 RX ORDER — MEPERIDINE HYDROCHLORIDE 25 MG/ML
12.5 INJECTION INTRAMUSCULAR; INTRAVENOUS; SUBCUTANEOUS ONCE
Status: DISCONTINUED | OUTPATIENT
Start: 2023-02-23 | End: 2023-02-23 | Stop reason: HOSPADM

## 2023-02-23 RX ORDER — MAG HYDROX/ALUMINUM HYD/SIMETH 200-200-20
30 SUSPENSION, ORAL (FINAL DOSE FORM) ORAL EVERY 6 HOURS PRN
Status: DISCONTINUED | OUTPATIENT
Start: 2023-02-23 | End: 2023-03-06 | Stop reason: HOSPADM

## 2023-02-23 RX ORDER — OXYCODONE HYDROCHLORIDE 30 MG/1
30 TABLET ORAL EVERY 6 HOURS PRN
Status: DISCONTINUED | OUTPATIENT
Start: 2023-02-23 | End: 2023-03-06 | Stop reason: HOSPADM

## 2023-02-23 RX ORDER — HEPARIN SODIUM 1000 [USP'U]/ML
INJECTION, SOLUTION INTRAVENOUS; SUBCUTANEOUS
Status: DISCONTINUED | OUTPATIENT
Start: 2023-02-23 | End: 2023-02-23

## 2023-02-23 RX ORDER — CEFAZOLIN SODIUM 1 G/3ML
INJECTION, POWDER, FOR SOLUTION INTRAMUSCULAR; INTRAVENOUS
Status: DISCONTINUED | OUTPATIENT
Start: 2023-02-23 | End: 2023-02-23

## 2023-02-23 RX ORDER — HEPARIN SODIUM,PORCINE/D5W 25000/250
0-40 INTRAVENOUS SOLUTION INTRAVENOUS CONTINUOUS
Status: DISCONTINUED | OUTPATIENT
Start: 2023-02-23 | End: 2023-02-23

## 2023-02-23 RX ORDER — HYDROMORPHONE HYDROCHLORIDE 2 MG/ML
0.2 INJECTION, SOLUTION INTRAMUSCULAR; INTRAVENOUS; SUBCUTANEOUS EVERY 5 MIN PRN
Status: DISCONTINUED | OUTPATIENT
Start: 2023-02-23 | End: 2023-02-23 | Stop reason: HOSPADM

## 2023-02-23 RX ORDER — ALBUTEROL SULFATE 0.83 MG/ML
2.5 SOLUTION RESPIRATORY (INHALATION) EVERY 4 HOURS PRN
Status: DISCONTINUED | OUTPATIENT
Start: 2023-02-23 | End: 2023-02-23 | Stop reason: HOSPADM

## 2023-02-23 RX ORDER — PROCHLORPERAZINE EDISYLATE 5 MG/ML
5 INJECTION INTRAMUSCULAR; INTRAVENOUS EVERY 30 MIN PRN
Status: DISCONTINUED | OUTPATIENT
Start: 2023-02-23 | End: 2023-02-23 | Stop reason: HOSPADM

## 2023-02-23 RX ORDER — OXYBUTYNIN CHLORIDE 5 MG/1
15 TABLET, EXTENDED RELEASE ORAL DAILY
Status: DISCONTINUED | OUTPATIENT
Start: 2023-02-23 | End: 2023-03-06 | Stop reason: HOSPADM

## 2023-02-23 RX ORDER — GUAIFENESIN 100 MG/5ML
200 SOLUTION ORAL EVERY 4 HOURS PRN
Status: DISCONTINUED | OUTPATIENT
Start: 2023-02-23 | End: 2023-03-06 | Stop reason: HOSPADM

## 2023-02-23 RX ADMIN — VALSARTAN 160 MG: 160 TABLET, FILM COATED ORAL at 08:02

## 2023-02-23 RX ADMIN — OXYBUTYNIN CHLORIDE 15 MG: 5 TABLET, EXTENDED RELEASE ORAL at 08:02

## 2023-02-23 RX ADMIN — LIDOCAINE HYDROCHLORIDE 100 MG: 10 INJECTION, SOLUTION EPIDURAL; INFILTRATION; INTRACAUDAL; PERINEURAL at 02:02

## 2023-02-23 RX ADMIN — PROPOFOL 130 MG: 10 INJECTION, EMULSION INTRAVENOUS at 02:02

## 2023-02-23 RX ADMIN — EPHEDRINE SULFATE 25 MG: 50 INJECTION INTRAVENOUS at 03:02

## 2023-02-23 RX ADMIN — SODIUM CHLORIDE, POTASSIUM CHLORIDE, SODIUM LACTATE AND CALCIUM CHLORIDE: 600; 310; 30; 20 INJECTION, SOLUTION INTRAVENOUS at 06:02

## 2023-02-23 RX ADMIN — PROPOFOL 40 MG: 10 INJECTION, EMULSION INTRAVENOUS at 04:02

## 2023-02-23 RX ADMIN — PROPOFOL 50 MG: 10 INJECTION, EMULSION INTRAVENOUS at 02:02

## 2023-02-23 RX ADMIN — OXYCODONE HYDROCHLORIDE 30 MG: 30 TABLET ORAL at 08:02

## 2023-02-23 RX ADMIN — CEFAZOLIN SODIUM 2 G: 2 SOLUTION INTRAVENOUS at 12:02

## 2023-02-23 RX ADMIN — FENTANYL CITRATE 100 MCG: 50 INJECTION, SOLUTION INTRAMUSCULAR; INTRAVENOUS at 02:02

## 2023-02-23 RX ADMIN — CHLORHEXIDINE GLUCONATE 0.12% ORAL RINSE 10 ML: 1.2 LIQUID ORAL at 09:02

## 2023-02-23 RX ADMIN — IOHEXOL 100 ML: 350 INJECTION, SOLUTION INTRAVENOUS at 02:02

## 2023-02-23 RX ADMIN — HEPARIN SODIUM 18 UNITS/KG/HR: 10000 INJECTION, SOLUTION INTRAVENOUS at 11:02

## 2023-02-23 RX ADMIN — ROCURONIUM BROMIDE 15 MG: 10 INJECTION, SOLUTION INTRAVENOUS at 03:02

## 2023-02-23 RX ADMIN — MORPHINE SULFATE 4 MG: 4 INJECTION INTRAVENOUS at 06:02

## 2023-02-23 RX ADMIN — CEFAZOLIN 3 G: 330 INJECTION, POWDER, FOR SOLUTION INTRAMUSCULAR; INTRAVENOUS at 03:02

## 2023-02-23 RX ADMIN — ROCURONIUM BROMIDE 10 MG: 10 INJECTION, SOLUTION INTRAVENOUS at 02:02

## 2023-02-23 RX ADMIN — AMLODIPINE BESYLATE 5 MG: 5 TABLET ORAL at 08:02

## 2023-02-23 RX ADMIN — CHLORHEXIDINE GLUCONATE 0.12% ORAL RINSE 10 ML: 1.2 LIQUID ORAL at 08:02

## 2023-02-23 RX ADMIN — SUCCINYLCHOLINE CHLORIDE 100 MG: 20 INJECTION, SOLUTION INTRAMUSCULAR; INTRAVENOUS; PARENTERAL at 02:02

## 2023-02-23 RX ADMIN — GLYCOPYRROLATE 0.2 MG: 0.2 INJECTION, SOLUTION INTRAMUSCULAR; INTRAVITREAL at 02:02

## 2023-02-23 RX ADMIN — HYDROMORPHONE HYDROCHLORIDE 1 MG: 2 INJECTION INTRAMUSCULAR; INTRAVENOUS; SUBCUTANEOUS at 03:02

## 2023-02-23 RX ADMIN — HEPARIN SODIUM 8000 UNITS: 1000 INJECTION, SOLUTION INTRAVENOUS; SUBCUTANEOUS at 03:02

## 2023-02-23 RX ADMIN — SODIUM CHLORIDE, SODIUM LACTATE, POTASSIUM CHLORIDE, AND CALCIUM CHLORIDE: 600; 310; 30; 20 INJECTION, SOLUTION INTRAVENOUS at 02:02

## 2023-02-23 RX ADMIN — GLYCOPYRROLATE 0.4 MG: 0.2 INJECTION, SOLUTION INTRAMUSCULAR; INTRAVENOUS at 04:02

## 2023-02-23 RX ADMIN — NEOSTIGMINE METHYLSULFATE 2 MG: 1 INJECTION INTRAVENOUS at 04:02

## 2023-02-23 RX ADMIN — ONDANSETRON 4 MG: 2 INJECTION INTRAMUSCULAR; INTRAVENOUS at 04:02

## 2023-02-23 RX ADMIN — CEFAZOLIN SODIUM 2 G: 2 SOLUTION INTRAVENOUS at 06:02

## 2023-02-23 RX ADMIN — HEPARIN SODIUM 18 UNITS/KG/HR: 10000 INJECTION, SOLUTION INTRAVENOUS at 02:02

## 2023-02-23 NOTE — NURSING
Spoke to CHIDI Zuleta with Vascular regarding clarification on what rate to restart heparin.  Notified NP that the ending rate was 18u/kg/hr but the rate on the heparin order was 18u/kg/hr. Per Merlyn restart at ending rate of 18 u/kg/hr.  Notified FATOUMATA Ho

## 2023-02-23 NOTE — ASSESSMENT & PLAN NOTE
Body mass index is 41.32 kg/m². Morbid obesity complicates all aspects of disease management from diagnostic modalities to treatment. Weight loss encouraged and health benefits explained to patient.

## 2023-02-23 NOTE — CARE UPDATE
POD0 embolectomy of right brachial artery. Pt seen this AM postop. Continue care plan per HPI and f/u vascular surgery recs.   ROSY today with cards  Heme/onc consult for hypercoagulable workup

## 2023-02-23 NOTE — NURSING
1514  Cautioned pt to eat food slowly and drink cautiously d/t lidocaine po .  Verbalized understanding

## 2023-02-23 NOTE — PLAN OF CARE
Call button within reach. Pain medication given per MD order. Informed by OR nurse to hold heparin until 9AM according to .  Informed day nurse to hold heparin until 9AM.  Blood glucose checked after arriving to the floor. . All active order reviewed.   Problem: Adult Inpatient Plan of Care  Goal: Plan of Care Review  Outcome: Ongoing, Progressing  Goal: Patient-Specific Goal (Individualized)  Outcome: Ongoing, Progressing  Goal: Absence of Hospital-Acquired Illness or Injury  Outcome: Ongoing, Progressing  Goal: Optimal Comfort and Wellbeing  Outcome: Ongoing, Progressing  Goal: Readiness for Transition of Care  Outcome: Ongoing, Progressing     Problem: Bariatric Environmental Safety  Goal: Safety Maintained with Care  Outcome: Ongoing, Progressing     Problem: Skin Injury Risk Increased  Goal: Skin Health and Integrity  Outcome: Ongoing, Progressing     Problem: Impaired Wound Healing  Goal: Optimal Wound Healing  Outcome: Ongoing, Progressing

## 2023-02-23 NOTE — PROGRESS NOTES
"Jimena Pierce is a 57 y.o. female patient. POD #0 s/p R arm brachial artery thrombectomy.   1. Brachial artery occlusion, right    2. Epigastric abdominal pain    3. Right hand pain    4. Embolism    5. Splenic infarct    6. Brachial artery occlusion      Blood pressure 133/74, pulse 69, temperature 98 °F (36.7 °C), temperature source Oral, resp. rate 18, height 5' 1" (1.549 m), weight 98.9 kg (218 lb), last menstrual period 02/23/2016, SpO2 98 %.    Subjective:   Diet: Adequate intake.  Patient reports no nausea or vomiting.    Activity level: Returning to normal.    Pain control: Partially controlled.    Objective:  Vital signs (most recent): Blood pressure 133/74, pulse 69, temperature 98 °F (36.7 °C), temperature source Oral, resp. rate 18, height 5' 1" (1.549 m), weight 98.9 kg (218 lb), last menstrual period 02/23/2016, SpO2 98 %.  General appearance: Comfortable and in no acute distress.    Lungs:  Normal effort.    Wound:  Objective wound description: R brachial incision- occlusive dressing in place, no drainage on dressing, minimal swelling, no palpable hematoma.    Extremities: There is normal range of motion.  (R arm: +2 radial pulse, unable to palpate ulnar pulse. Motor and sensory function intact in R hand and arm. Color and temperature good. ROM normal. ).    Neurological: The patient is alert and oriented to person, place and time.  Normal strength.     Assessment:   Post-op: 0 days (R brachial artery embolectomy).    Condition: In stable condition.     Plan:  Continue wound care as written.  Consults: Medicine, general surgery and cards on board.     Echo done which showed no thrombus. Recommend anticoagulation to start as early as this afternoon, depending on status of hematology wk up. Pt can f/u with Dr. Sun in clinic on 3/7 @ 1pm. We will order a CTA of the chest to be done prior to her f/u but not necessary to be done as an inpatient. Vascular will sign off. Please let us know if " there are any questions.     Tom Patricia PA-C  2/23/2023

## 2023-02-23 NOTE — HPI
Ms. Pierce is a 57-year-old  female with PMH significant for cerebral palsy (mild), hypertension, hyperlipidemia, presented to the ED complaining of acute onset of right forearm pain, and abdominal pain that started around the same time since yesterday evening.  She was feeling numbness, tingling of the right fingers. Right UE arterial ultrasound reveals evidence of acute occlusion at the brachial artery. CTA of the right upper extremity was unremarkable. Patient was having numbness, paresthesias, coldness of the right forearm. Dr. Sun with vascular surgery evaluated the patient, has taken her to the OR emergently for open embolectomy. She was found to have large clot burden at the bifurcation of the brachial artery on the right, as well as small amount of thrombus in the distal ulnar and radial arteries.  Patient was started on heparin drip prior to the procedure and to be resumed 10:34 p.m. postprocedure. Patient reports improved pain, paresthesias of the right arm. 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      Cardiology consulted for ROSY. Pt seen and examined today, denies any numbness and tingling in tiffanie UE, denies any palpitations, denies any history of snoring that she is aware of. Hematoma noted on RUE, right ulnar pulses weak. Labs reviewed, stable. CTA abdomen and pelvis revealed splenic infarct, Echo pending will likely do ROSY after Echo is read

## 2023-02-23 NOTE — CONSULTS
Consulted on this 56 y/o F patient due to present on admission skin breakdown to groin and BLE. BLE assessed. Patient reports hx of lymphedema and ulcerations. RLE brawny edema (nonpitting). BLE with hemosiderin staining, dry scaly skin, no open wounds or ulcerations. Recommend apply moisturizer daily and prn.  Groins assessed with mild MASD noted, erythema. Moisture barrier paste applied. Versette in use. No further wound care issues, will sign off.

## 2023-02-23 NOTE — CONSULTS
[facility] - Emergency Dept.  Vascular Surgery  Consult Note    Inpatient consult to Vascular Surgery  Consult performed by: Mario Sun MD  Consult ordered by: Rick Frazier MD      Subjective:     Chief Complaint/Reason for Admission:  Right arm pain    History of Present Illness:  Asked to evaluate 57-year-old female with a history of ++BMI hypertension, hyperlipidemia, cerebral palsy, GERD, chronic back pain currently in pain management.  She denies any history of coronary disease.  She is a remote former smoker.  She presents to the [facility] Emergency Department this evening with complaints of acute onset severe right arm and hand pain.  Patient reports pain began suddenly and then felt the hand co cold and numb.  Currently she reports some return of feeling in the hand.  Incidentally she does also note acute onset of some abdominal pain that started after her hand patent.  On evaluation by the emergency department staff patient found to have nonpalpable radial and ulnar pulse on the right.  an acute thromboembolus in the right brachial artery identified on upper extremity    (Not in a hospital admission)    Arterial duplex  Review of patient's allergies indicates:  No Known Allergies    Past Medical History:   Diagnosis Date    Chronic low back pain     Congenital cerebral palsy     GERD (gastroesophageal reflux disease)     Hyperlipidemia     Hypertension     Lymphedema     right foot     Past Surgical History:   Procedure Laterality Date    COLONOSCOPY N/A 10/05/2016    Polyp x 1 repeat 5 yrs. Procedure: COLONOSCOPY;  Surgeon: Aicha Ravi MD;  Location: [facility];  Service: Endoscopy;  Laterality: N/A;    HIP SURGERY Bilateral     as child    LEG SURGERY      TENDON RELEASE Bilateral     as child to knees and hip adductors    TONSILLECTOMY       Family History       Problem Relation (Age of Onset)    Arthritis Mother    Cataracts Maternal Grandmother, Maternal Grandfather    Hypertension Mother           Tobacco Use    Smoking status: Former     Packs/day: 0.25     Years: 3.00     Pack years: 0.75     Types: Cigarettes    Smokeless tobacco: Never    Tobacco comments:     quit smoking in 1985   Substance and Sexual Activity    Alcohol use: Not Currently     Comment: occasionally    Drug use: No    Sexual activity: Not Currently     Review of Systems mild nausea no vomiting no fevers no chills  Objective:     Vital Signs (Most Recent):  Temp: 98.2 °F (36.8 °C) (02/22/23 2119)  Pulse: (!) 48 (02/22/23 2138)  Resp: 18 (02/22/23 2344)  BP: 135/78 (02/22/23 2119)  SpO2: 97 % (02/22/23 2119)   Vital Signs (24h Range):  Temp:  [98.2 °F (36.8 °C)] 98.2 °F (36.8 °C)  Pulse:  [48-52] 48  Resp:  [18] 18  SpO2:  [97 %] 97 %  BP: (135)/(78) 135/78     Weight: 106.8 kg (235 lb 6.4 oz)  Body mass index is 44.48 kg/m².        Physical Exam  Awake alert  Oriented no apparent distress  Regular rate and rhythm, normal S1-S2  Lungs clear to auscultation bilaterally  Right hand warm, delayed capillary refill  Normal motor and sensation  Nonpalpable radial and ulnar pulse  Left hand exam 2+ palpable radial artery pulse, within normal limits  Significant Labs:  CBC:   Recent Labs   Lab 02/22/23 2154   WBC 9.00   RBC 4.65   HGB 13.6   HCT 41.0      MCV 88   MCH 29.2   MCHC 33.2     CMP:   Recent Labs   Lab 02/22/23 2154   *   CALCIUM 9.8   ALBUMIN 3.9   PROT 7.2   *   K 3.8   CO2 22*      BUN 16   CREATININE 0.7   ALKPHOS 78   ALT 27   AST 19   BILITOT 0.4     Coagulation:   Recent Labs   Lab 02/22/23 2154   LABPROT 10.8   INR 1.0   APTT 26.6       Significant Diagnostics:  U/S:  As noted above  Assessment/Plan:     There are no hospital problems to display for this patient.      Thank you for your consult.  57-year-old female with acute right upper extremity thromboembolus to the brachial artery with class 1 ischemic symptoms.  Unclear etiology.  We will plan to proceed to the OR for urgent open embolectomy  of the right brachial artery  Risks benefits potential complications discussed with the patient  She understands and agrees to proceed    Mario Sun MD  Vascular Surgery  [facility] - Emergency Dept.

## 2023-02-23 NOTE — ASSESSMENT & PLAN NOTE
-acute brachial artery occlusion, on the right side.    -status post emergent thromboembolectomy.    -found to have large clot burden at the bifurcation of the brachial artery, with some thrombus in the distal ulnar and radial arteries.    -etiology unclear  -continue heparin drip.    -vascular surgery following.  -consult cardiology for ROSY

## 2023-02-23 NOTE — PLAN OF CARE
Patient remains injury free.  No signs or symptoms of distress noted.  Patient currently denies pain or discomfort.  All active orders reviewed and followed.  12 hour chart completed.

## 2023-02-23 NOTE — SUBJECTIVE & OBJECTIVE
Past Medical History:   Diagnosis Date    Chronic low back pain     Congenital cerebral palsy     GERD (gastroesophageal reflux disease)     Hyperlipidemia     Hypertension     Lymphedema     right foot       Past Surgical History:   Procedure Laterality Date    COLONOSCOPY N/A 10/05/2016    Polyp x 1 repeat 5 yrs. Procedure: COLONOSCOPY;  Surgeon: Aicha Ravi MD;  Location: [facility];  Service: Endoscopy;  Laterality: N/A;    HIP SURGERY Bilateral     as child    LEG SURGERY      TENDON RELEASE Bilateral     as child to knees and hip adductors    TONSILLECTOMY         Review of patient's allergies indicates:  No Known Allergies    No current facility-administered medications on file prior to encounter.     Current Outpatient Medications on File Prior to Encounter   Medication Sig    amlodipine-valsartan (EXFORGE) 5-160 mg per tablet Take 1 tablet by mouth once daily.    fluocinolone (SYNALAR) 0.01 % external solution AAA of scalp twice a day prn itching. Mild steroid.    ketoconazole (NIZORAL) 2 % shampoo Shampoo scalp 1-2 times weekly. Lather x 5 minutes then rinse well.    NAPROXEN ORAL Take 225 mg by mouth as needed.     oxybutynin (DITROPAN XL) 15 MG TR24 Take 1 tablet (15 mg total) by mouth once daily.    oxycodone (ROXICODONE) 30 MG Tab Take 30 mg by mouth as needed. Takes four times a day as needed    promethazine (PHENERGAN) 25 MG tablet Take 25 mg by mouth every 4 (four) hours as needed for Nausea.    tiZANidine (ZANAFLEX) 2 MG tablet daily as needed.     famotidine (PEPCID) 40 MG tablet Take 1 tablet (40 mg total) by mouth once daily.    sod sulf-pot chloride-mag sulf (SUTAB) 1.479-0.188- 0.225 gram tablet Take 12 tablets by mouth once daily. Take according to package instructions with indicated amount of water.     Family History       Problem Relation (Age of Onset)    Arthritis Mother    Cataracts Maternal Grandmother, Maternal Grandfather    Hypertension Mother          Tobacco Use    Smoking  status: Former     Packs/day: 0.25     Years: 3.00     Pack years: 0.75     Types: Cigarettes    Smokeless tobacco: Never    Tobacco comments:     quit smoking in 1985   Substance and Sexual Activity    Alcohol use: Not Currently     Comment: occasionally    Drug use: No    Sexual activity: Not Currently     Review of Systems   Constitutional: Negative.  Negative for chills and fever.   HENT: Negative.  Negative for congestion, rhinorrhea, sore throat and trouble swallowing.    Eyes: Negative.  Negative for visual disturbance.   Respiratory: Negative.  Negative for cough, shortness of breath and wheezing.    Cardiovascular: Negative.  Negative for chest pain and palpitations.   Gastrointestinal:  Positive for abdominal pain and nausea. Negative for blood in stool, diarrhea and vomiting.   Endocrine: Negative.    Genitourinary: Negative.  Negative for dysuria and flank pain.   Musculoskeletal: Negative.  Negative for back pain.   Skin: Negative.  Negative for rash.   Allergic/Immunologic: Negative.    Neurological:  Positive for numbness (Right arm, improving postprocedure). Negative for speech difficulty, weakness and headaches.   Hematological: Negative.    Psychiatric/Behavioral: Negative.  Negative for hallucinations. The patient is not nervous/anxious.    All other systems reviewed and are negative.  Objective:     Vital Signs (Most Recent):  Temp: 97.7 °F (36.5 °C) (02/23/23 0515)  Pulse: 82 (02/23/23 0515)  Resp: 18 (02/23/23 0515)  BP: 130/77 (02/23/23 0515)  SpO2: 100 % (02/23/23 0515) Vital Signs (24h Range):  Temp:  [97.7 °F (36.5 °C)-98.5 °F (36.9 °C)] 97.7 °F (36.5 °C)  Pulse:  [48-93] 82  Resp:  [10-20] 18  SpO2:  [97 %-100 %] 100 %  BP: (130-167)/(63-82) 130/77     Weight: 99.2 kg (218 lb 11.1 oz)  Body mass index is 41.32 kg/m².    Physical Exam  Vitals and nursing note reviewed.   Constitutional:       General: She is awake. She is not in acute distress.     Appearance: She is obese. She is not  ill-appearing.   HENT:      Head: Normocephalic and atraumatic.      Mouth/Throat:      Mouth: Mucous membranes are moist.   Eyes:      General: No scleral icterus.     Conjunctiva/sclera: Conjunctivae normal.   Cardiovascular:      Rate and Rhythm: Normal rate and regular rhythm.      Heart sounds: No murmur heard.  Pulmonary:      Effort: Pulmonary effort is normal. No respiratory distress.      Breath sounds: Normal breath sounds. No wheezing.   Abdominal:      Palpations: Abdomen is soft.      Tenderness: There is abdominal tenderness (Generalized).   Musculoskeletal:         General: No swelling.      Cervical back: Neck supple.   Skin:     General: Skin is warm.      Coloration: Skin is not jaundiced.   Neurological:      General: No focal deficit present.      Mental Status: She is alert and oriented to person, place, and time. Mental status is at baseline.   Psychiatric:         Attention and Perception: Attention normal.         Speech: Speech normal.         Behavior: Behavior is cooperative.           Significant Labs: All pertinent labs within the past 24 hours have been reviewed.  BMP:   Recent Labs   Lab 02/22/23  2154   *   *   K 3.8      CO2 22*   BUN 16   CREATININE 0.7   CALCIUM 9.8     CBC:   Recent Labs   Lab 02/22/23  2154   WBC 9.00   HGB 13.6   HCT 41.0        CMP:   Recent Labs   Lab 02/22/23  2154   *   K 3.8      CO2 22*   *   BUN 16   CREATININE 0.7   CALCIUM 9.8   PROT 7.2   ALBUMIN 3.9   BILITOT 0.4   ALKPHOS 78   AST 19   ALT 27   ANIONGAP 15     Significant Imaging: I have reviewed all pertinent imaging results/findings within the past 24 hours.  I have reviewed and interpreted all pertinent imaging results/findings within the past 24 hours.    Imaging Results              CTA Abdomen and Pelvis (In process)                      US Upper Extremity Arteries Right (In process)                      CTA Upper Extremity Right (Final result)   Result time 02/23/23 00:40:45      Final result by Era Murrieta MD (02/23/23 00:40:45)                   Impression:      This exam is nondiagnostic.    All CT scans at this facility use dose modulation, iterative reconstruction and/or weight based dosing when appropriate to reduce radiation dose to as low as reasonably achievable.      Electronically signed by: Era Murrieta  Date:    02/23/2023  Time:    00:40               Narrative:    EXAMINATION:  CTA UPPER EXTREMITY RIGHT    CLINICAL HISTORY:  [id number]    TECHNIQUE:  After the intravenous administration of 100 cc of Omni 350 nonionic contrast using CTangio technique, 1.25  mm axial images were acquired using helical CT technique axial mips, sagittal and coronal reformats were also submitted for interpretation.    COMPARISON:  None available                                       X-Ray Chest AP Portable (Final result)  Result time 02/22/23 23:25:29      Final result by Era Murrieta MD (02/22/23 23:25:29)                   Impression:      No acute abnormality.      Electronically signed by: Era Murrieta  Date:    02/22/2023  Time:    23:25               Narrative:    EXAMINATION:  XR CHEST AP PORTABLE    CLINICAL HISTORY:  Epigastric pain;.    TECHNIQUE:  Single frontal portable view of the chest was performed.    COMPARISON:  None    FINDINGS:  Support devices: None    The lungs are clear, with normal appearance of pulmonary vasculature and no pleural effusion or pneumothorax.    The cardiac silhouette is normal in size. The hilar and mediastinal contours are unremarkable.    Bones are intact.                                      I have independently reviewed all pertinent labs within the past 24 hours.    I have independently reviewed, visualized and interpreted all pertinent imaging results within the past 24 hours and discussed the findings with the ED physician, Dr. Frazier

## 2023-02-23 NOTE — ANESTHESIA PREPROCEDURE EVALUATION
02/23/2023  Jimena Pierce is a 57 y.o., female.    Patient Active Problem List   Diagnosis    Elevated BP    Essential hypertension    Chronic bilateral low back pain with bilateral sciatica    Obesity, Class III, BMI 40-49.9 (morbid obesity)    Personal history of colonic polyps     Pre-op Assessment    I have reviewed the Patient Summary Reports.     I have reviewed the Nursing Notes. I have reviewed the NPO Status.   I have reviewed the Medications.     Review of Systems  Anesthesia Hx:  No problems with previous Anesthesia  Denies Family Hx of Anesthesia complications.   Denies Personal Hx of Anesthesia complications.   Social:  Former Smoker    Hematology/Oncology:  Hematology Normal   Oncology Normal     EENT/Dental:EENT/Dental Normal   Cardiovascular:   Hypertension Brachial a embolus   Pulmonary:  Pulmonary Normal    Renal/:  Renal/ Normal     Hepatic/GI:   GERD    Musculoskeletal:  Spine Disorders: lumbar Chronic Pain    Neurological:   Neuromuscular Disease, Cerebral palsy  Patient requires a walker to ambulate   Endocrine:  Endocrine Normal  Morbid Obesity / BMI > 40  Dermatological:  Skin Normal    Psych:  Psychiatric Normal           Physical Exam  General: Alert and Oriented    Airway:  Mallampati: II   Mouth Opening: Normal  TM Distance: Normal  Tongue: Normal  Neck ROM: Normal ROM        Anesthesia Plan  Type of Anesthesia, risks & benefits discussed:    Anesthesia Type: Gen ETT  Intra-op Monitoring Plan: Standard ASA Monitors  Induction:  IV  Informed Consent: Informed consent signed with the Patient and all parties understand the risks and agree with anesthesia plan.  All questions answered.   ASA Score: 3 Emergent  Day of Surgery Review of History & Physical: I have interviewed and examined the patient. I have reviewed the patient's H&P dated:     Ready For Surgery  From Anesthesia Perspective.     .

## 2023-02-23 NOTE — ED PROVIDER NOTES
SCRIBE #1 NOTE: I, Naida Banegas, am scribing for, and in the presence of, Rick Frazier MD. I have scribed the entire note.       History     Chief Complaint   Patient presents with    Abdominal Pain     Abd pain, pain and tingling to right hand; onset this evening. Nausea no vomiting.      Review of patient's allergies indicates:  No Known Allergies      History of Present Illness     HPI    2/22/2023, 10:15 PM  History obtained from the patient      History of Present Illness: Jimena Pierce is a 57 y.o. female patient with a PMHx of GERD, HLD, and HTD who presents to the Emergency Department for evaluation of R hand pain which onset this evening. Symptoms are constant and moderate in severity. Pt reports having R arm pain that traveled down to her R hand followed by R hand tingling and a cold sensation. Since, R arm pain has resolved, but tingling remains. No mitigating or exacerbating factors reported. Associated sxs include epigastric abdominal pain and nausea which started after the hand pain. Patient denies any HA, vomiting, diarrhea, SOB, dizziness, weakness, and all other sxs at this time. No prior Tx reported. No further complaints or concerns at this time.       Arrival mode: EMS    PCP: Baylee Aleman DO        Past Medical History:  Past Medical History:   Diagnosis Date    Chronic low back pain     Congenital cerebral palsy     GERD (gastroesophageal reflux disease)     Hyperlipidemia     Hypertension     Lymphedema     right foot       Past Surgical History:  Past Surgical History:   Procedure Laterality Date    COLONOSCOPY N/A 10/05/2016    Polyp x 1 repeat 5 yrs. Procedure: COLONOSCOPY;  Surgeon: Aicha Ravi MD;  Location: [facility];  Service: Endoscopy;  Laterality: N/A;    HIP SURGERY Bilateral     as child    LEG SURGERY      TENDON RELEASE Bilateral     as child to knees and hip adductors    TONSILLECTOMY           Family History:  Family History   Problem Relation Age of  Onset    Hypertension Mother     Arthritis Mother     Cataracts Maternal Grandmother     Cataracts Maternal Grandfather     Breast cancer Neg Hx     Ovarian cancer Neg Hx     Colon cancer Neg Hx        Social History:  Social History     Tobacco Use    Smoking status: Former     Packs/day: 0.25     Years: 3.00     Pack years: 0.75     Types: Cigarettes    Smokeless tobacco: Never    Tobacco comments:     quit smoking in 1985   Substance and Sexual Activity    Alcohol use: Not Currently     Comment: occasionally    Drug use: No    Sexual activity: Not Currently        Review of Systems     Review of Systems   Constitutional:  Negative for fever.   HENT:  Negative for sore throat.    Respiratory:  Negative for shortness of breath.    Cardiovascular:  Negative for chest pain.   Gastrointestinal:  Positive for abdominal pain (epigastric) and nausea. Negative for diarrhea and vomiting.   Genitourinary:  Negative for dysuria.   Musculoskeletal:  Positive for arthralgias (R hand) and myalgias (R arm (resolved upon evaluation)). Negative for back pain.        (+) Cold sensation in R hand     Skin:  Negative for rash.   Neurological:  Negative for dizziness, weakness and headaches.        (+) R hand tingling     Hematological:  Does not bruise/bleed easily.   All other systems reviewed and are negative.     Physical Exam     Initial Vitals [02/22/23 2119]   BP Pulse Resp Temp SpO2   135/78 (!) 52 18 98.2 °F (36.8 °C) 97 %      MAP       --          Physical Exam  Nursing Notes and Vital Signs Reviewed.  Constitutional: Patient is in no apparent distress. Well-developed and well-nourished.  Head: Atraumatic. Normocephalic.  Eyes: PERRL. EOM intact. Conjunctivae are not pale. No scleral icterus.  ENT: Mucous membranes are moist. Oropharynx is clear and symmetric.    Neck: Supple. Full ROM. No lymphadenopathy.  Cardiovascular: Bradycardia. Regular rhythm. No murmurs, rubs, or gallops. Distal pulses in legs are 2+ and symmetric.  Stronger than normal 2++ R antecubital brachial pulse. Unable to doppler R wrist radial and ulnar pulse. R hand is cooler to touch than left.  Pulmonary/Chest: No respiratory distress. Clear to auscultation bilaterally. No wheezing or rales.  Abdominal: Soft and non-distended.  Epigastric tenderness to palpation.  No rebound, guarding, or rigidity. Good bowel sounds.  Genitourinary: No CVA tenderness  Musculoskeletal: Moves all extremities. No obvious deformities. No edema. No calf tenderness.  Skin: Warm and dry.  Neurological:  Alert, awake, and appropriate.  Normal speech.  No acute focal neurological deficits are appreciated.  Psychiatric: Normal affect. Good eye contact. Appropriate in content.     ED Course   Critical Care    Date/Time: 2/23/2023 3:19 AM  Performed by: Rick Frazier MD  Authorized by: Rick Frazier MD   Direct patient critical care time: 6 minutes  Additional history critical care time: 7 minutes  Ordering / reviewing critical care time: 8 minutes  Documentation critical care time: 5 minutes  Consulting other physicians critical care time: 6 minutes  Consult with family critical care time: 7 minutes  Other critical care time: 5 minutes  Total critical care time (exclusive of procedural time) : 44 minutes  Critical care time was exclusive of separately billable procedures and treating other patients and teaching time.  Critical care was necessary to treat or prevent imminent or life-threatening deterioration of the following conditions: circulatory failure (Arterial embolism requirnig emergent surgery.).  Critical care was time spent personally by me on the following activities: blood draw for specimens, development of treatment plan with patient or surrogate, discussions with consultants, interpretation of cardiac output measurements, evaluation of patient's response to treatment, examination of patient, obtaining history from patient or surrogate, ordering and performing treatments and  "interventions, ordering and review of laboratory studies, ordering and review of radiographic studies, pulse oximetry, re-evaluation of patient's condition and review of old charts.      ED Vital Signs:  Vitals:    02/22/23 2119 02/22/23 2138 02/22/23 2344 02/23/23 0128   BP: 135/78      Pulse: (!) 52 (!) 48     Resp: 18  18    Temp: 98.2 °F (36.8 °C)      SpO2: 97%      Weight:    106.8 kg (235 lb 6.4 oz)   Height: 5' 1" (1.549 m)          Abnormal Lab Results:  Labs Reviewed   CBC W/ AUTO DIFFERENTIAL - Abnormal; Notable for the following components:       Result Value    Gran % 73.3 (*)     All other components within normal limits   COMPREHENSIVE METABOLIC PANEL - Abnormal; Notable for the following components:    Sodium 146 (*)     CO2 22 (*)     Glucose 115 (*)     All other components within normal limits   B-TYPE NATRIURETIC PEPTIDE - Abnormal; Notable for the following components:     (*)     All other components within normal limits   LIPASE   PROTIME-INR   APTT   TROPONIN I   CK   URINALYSIS, REFLEX TO URINE CULTURE   APTT   CBC W/ AUTO DIFFERENTIAL   SARS-COV-2 RDRP GENE    Narrative:     This test utilizes isothermal nucleic acid amplification technology to detect the SARS-CoV-2 RdRp nucleic acid segment. The analytical sensitivity (limit of detection) is 500 copies/swab.     A POSITIVE result is indicative of the presence of SARS-CoV-2 RNA; clinical correlation with patient history and other diagnostic information is necessary to determine patient infection status.    A NEGATIVE result means that SARS-CoV-2 nucleic acids are not present above the limit of detection. A NEGATIVE result should be treated as presumptive. It does not rule out the possibility of COVID-19 and should not be the sole basis for treatment decisions. If COVID-19 is strongly suspected based on clinical and exposure history, re-testing using an alternate molecular assay should be considered.     This test is only for use " under the Food and Drug Administration s Emergency Use Authorization (EUA).     Commercial kits are provided by [employer]. Performance characteristics of the EUA have been independently verified by [facility] Department of Pathology and Laboratory Medicine.   _________________________________________________________________   The authorized Fact Sheet for Healthcare Providers and the authorized Fact Sheet for Patients of the ID NOW COVID-19 are available on the FDA website:    https://www.fda.gov/media/425103/download      https://www.fda.gov/media/570661/download           All Lab Results:  Results for orders placed or performed during the hospital encounter of 02/22/23   CBC auto differential   Result Value Ref Range    WBC 9.00 3.90 - 12.70 K/uL    RBC 4.65 4.00 - 5.40 M/uL    Hemoglobin 13.6 12.0 - 16.0 g/dL    Hematocrit 41.0 37.0 - 48.5 %    MCV 88 82 - 98 fL    MCH 29.2 27.0 - 31.0 pg    MCHC 33.2 32.0 - 36.0 g/dL    RDW 12.3 11.5 - 14.5 %    Platelets 222 150 - 450 K/uL    MPV 11.2 9.2 - 12.9 fL    Immature Granulocytes 0.2 0.0 - 0.5 %    Gran # (ANC) 6.6 1.8 - 7.7 K/uL    Immature Grans (Abs) 0.02 0.00 - 0.04 K/uL    Lymph # 1.6 1.0 - 4.8 K/uL    Mono # 0.6 0.3 - 1.0 K/uL    Eos # 0.1 0.0 - 0.5 K/uL    Baso # 0.05 0.00 - 0.20 K/uL    nRBC 0 0 /100 WBC    Gran % 73.3 (H) 38.0 - 73.0 %    Lymph % 18.0 18.0 - 48.0 %    Mono % 7.1 4.0 - 15.0 %    Eosinophil % 0.8 0.0 - 8.0 %    Basophil % 0.6 0.0 - 1.9 %    Differential Method Automated    Comprehensive metabolic panel   Result Value Ref Range    Sodium 146 (H) 136 - 145 mmol/L    Potassium 3.8 3.5 - 5.1 mmol/L    Chloride 109 95 - 110 mmol/L    CO2 22 (L) 23 - 29 mmol/L    Glucose 115 (H) 70 - 110 mg/dL    BUN 16 6 - 20 mg/dL    Creatinine 0.7 0.5 - 1.4 mg/dL    Calcium 9.8 8.7 - 10.5 mg/dL    Total Protein 7.2 6.0 - 8.4 g/dL    Albumin 3.9 3.5 - 5.2 g/dL    Total Bilirubin 0.4 0.1 - 1.0 mg/dL    Alkaline Phosphatase 78 55 - 135 U/L    AST  19 10 - 40 U/L    ALT 27 10 - 44 U/L    Anion Gap 15 8 - 16 mmol/L    eGFR >60 >60 mL/min/1.73 m^2   Lipase   Result Value Ref Range    Lipase 23 4 - 60 U/L   Protime-INR   Result Value Ref Range    Prothrombin Time 10.8 9.0 - 12.5 sec    INR 1.0 0.8 - 1.2   APTT   Result Value Ref Range    aPTT 26.6 21.0 - 32.0 sec   Troponin I   Result Value Ref Range    Troponin I <0.006 0.000 - 0.026 ng/mL   Brain natriuretic peptide   Result Value Ref Range     (H) 0 - 99 pg/mL   CPK   Result Value Ref Range    CPK 30 20 - 180 U/L   POCT COVID-19 Rapid Screening   Result Value Ref Range    POC Rapid COVID Negative Negative     Acceptable Yes          Imaging Results:  Imaging Results              CTA Abdomen and Pelvis (In process)                      US Upper Extremity Arteries Right (In process)                      CTA Upper Extremity Right (Final result)  Result time 02/23/23 00:40:45      Final result by Era Murrieta MD (02/23/23 00:40:45)                   Impression:      This exam is nondiagnostic.    All CT scans at this facility use dose modulation, iterative reconstruction and/or weight based dosing when appropriate to reduce radiation dose to as low as reasonably achievable.      Electronically signed by: Era Murrieta  Date:    02/23/2023  Time:    00:40               Narrative:    EXAMINATION:  CTA UPPER EXTREMITY RIGHT    CLINICAL HISTORY:  [id number]    TECHNIQUE:  After the intravenous administration of 100 cc of Omni 350 nonionic contrast using CTangio technique, 1.25  mm axial images were acquired using helical CT technique axial mips, sagittal and coronal reformats were also submitted for interpretation.    COMPARISON:  None available                                       X-Ray Chest AP Portable (Final result)  Result time 02/22/23 23:25:29      Final result by Era Murrieta MD (02/22/23 23:25:29)                   Impression:      No acute abnormality.      Electronically  signed by: Era Saadia Singletonargentina  Date:    02/22/2023  Time:    23:25               Narrative:    EXAMINATION:  XR CHEST AP PORTABLE    CLINICAL HISTORY:  Epigastric pain;.    TECHNIQUE:  Single frontal portable view of the chest was performed.    COMPARISON:  None    FINDINGS:  Support devices: None    The lungs are clear, with normal appearance of pulmonary vasculature and no pleural effusion or pneumothorax.    The cardiac silhouette is normal in size. The hilar and mediastinal contours are unremarkable.    Bones are intact.                                     1:29 AM: Per [facility] radiology, pt's CTA RUE with IV contrast results showed:    -Non-diagnostic Ct angiogram right upper extremity    1:30 AM: Per [facility] radiology, pt's US results showed:     -Thromboembolic occlusion noted at the distal brachial artery. There is reconstitution with decreased peak systolic velocities of the distal ulnar artery however there is no obvious reconstitution of the radial artery.      The EKG was ordered, reviewed, and independently interpreted by the ED provider.  Interpretation time: 21:44  Rate: 49 BPM  Rhythm: sinus bradycardia  Interpretation: No acute ST changes. No STEMI.             The Emergency Provider reviewed the vital signs and test results, which are outlined above.     ED Discussion     11:04 PM: Discussed pt's case with Mario Sun MD (Vascular Surgery) who recommends an ultrasound of arteries and a CTA of R arm.    12:50 AM: Discussed case with SEKOU Rios ([facility]). Dr. Arias agrees with current care and management of pt and accepts admission.   Admitting Service: Hospital Medicine  Admitting Physician: Dr. Arias  Admit to: obs med-tele    12:53 AM: Re-evaluated pt. I have discussed test results, shared treatment plan, and the need for admission with patient and family at bedside. Pt and family express understanding at this time and agree with all information. All questions answered. Pt and  family have no further questions or concerns at this time. Pt is ready for admit.    12:57 AM: Discussed pt's case with Dr. Sun (Vascular Surgery) who will be bringing the pt to the OR for surgery. He recommended STAT CTA abdomen.           ED Course as of 02/23/23 0145   Wed Feb 22, 2023   2303 US ARTERIES, CTA OF RIGHT ARM [BA]   Thu Feb 23, 2023   0048 US Upper Extremity Arteries Right [BA]      ED Course User Index  [BA] Rick Frazier MD     Medical Decision Making:   Clinical Tests:   Lab Tests: Ordered and Reviewed  Radiological Study: Ordered and Reviewed  Medical Tests: Ordered and Reviewed         ED Medication(s):  Medications   heparin 25,000 units in dextrose 5% (100 units/ml) IV bolus from bag INITIAL BOLUS (has no administration in time range)   heparin 25,000 units in dextrose 5% 250 mL (100 units/mL) infusion HIGH INTENSITY nomogram - [facility] (has no administration in time range)   heparin 25,000 units in dextrose 5% (100 units/ml) IV bolus from bag - ADDITIONAL PRN BOLUS - 60 units/kg (has no administration in time range)   heparin 25,000 units in dextrose 5% (100 units/ml) IV bolus from bag - ADDITIONAL PRN BOLUS - 30 units/kg (has no administration in time range)   ondansetron injection 4 mg (4 mg Intravenous Given 2/22/23 2234)   morphine injection 4 mg (4 mg Intravenous Given 2/22/23 2344)   iohexoL (OMNIPAQUE 350) injection 100 mL (100 mLs Intravenous Given 2/22/23 2351)       New Prescriptions    No medications on file               Scribe Attestation:   Scribe #1: I performed the above scribed service and the documentation accurately describes the services I performed. I attest to the accuracy of the note.     Attending:   Physician Attestation Statement for Scribe #1: I, Rick Frazier MD, personally performed the services described in this documentation, as scribed by Naida Banegas, in my presence, and it is both accurate and complete.           Clinical Impression       ICD-10-CM ICD-9-CM    1. Brachial artery occlusion, right  I70.208 444.21   2. Epigastric abdominal pain  R10.13 789.06   3. Right hand pain  M79.641 729.5       Disposition:   Disposition: Placed in Observation  Condition: Conor Frazier MD  02/23/23 0329

## 2023-02-23 NOTE — ANESTHESIA PREPROCEDURE EVALUATION
02/23/2023  Jimena Pierce is a 57 y.o., female.    Patient Active Problem List   Diagnosis    Elevated BP    Essential hypertension    Chronic bilateral low back pain with bilateral sciatica    Obesity, Class III, BMI 40-49.9 (morbid obesity)    Personal history of colonic polyps    Brachial artery occlusion, right    Splenic infarct    Cerebral palsy     Pre-op Assessment    I have reviewed the Patient Summary Reports.     I have reviewed the Nursing Notes. I have reviewed the NPO Status.   I have reviewed the Medications.     Review of Systems  Anesthesia Hx:  No problems with previous Anesthesia  Denies Family Hx of Anesthesia complications.   Denies Personal Hx of Anesthesia complications.   Social:  Former Smoker    Hematology/Oncology:  Hematology Normal   Oncology Normal     EENT/Dental:EENT/Dental Normal   Cardiovascular:   Hypertension Brachial a embolus Hypertension    Pulmonary:  Pulmonary Normal    Renal/:  Renal/ Normal     Hepatic/GI:   GERD    Musculoskeletal:  Spine Disorders: lumbar Chronic Pain    Neurological:   Neuromuscular Disease, Cerebral palsy  Patient requires a walker to ambulate   Endocrine:  Endocrine Normal  Morbid Obesity / BMI > 40  Dermatological:  Skin Normal    Psych:  Psychiatric Normal           Physical Exam  General: Alert, Oriented, Well nourished and Cooperative    Airway:  Mallampati: II   Mouth Opening: Normal  TM Distance: Normal  Tongue: Normal  Neck ROM: Normal ROM        Anesthesia Plan  Type of Anesthesia, risks & benefits discussed:    Anesthesia Type: Gen ETT  Intra-op Monitoring Plan: Standard ASA Monitors  Induction:  IV  Informed Consent: Informed consent signed with the Patient and all parties understand the risks and agree with anesthesia plan.  All questions answered.   ASA Score: 3 Emergent  Day of Surgery Review of History &  Physical: I have interviewed and examined the patient. I have reviewed the patient's H&P dated:     Ready For Surgery From Anesthesia Perspective.     .

## 2023-02-23 NOTE — ASSESSMENT & PLAN NOTE
-unclear etiology.    -pain medications as needed.    -general surgery consult.  -continue heparin drip.

## 2023-02-23 NOTE — ANESTHESIA POSTPROCEDURE EVALUATION
Anesthesia Post Evaluation    Patient: Jimena Pierce    Procedure(s) Performed: Procedure(s) (LRB):  ECHOCARDIOGRAM, TRANSESOPHAGEAL (N/A)    Final Anesthesia Type: MAC      Patient location during evaluation: GI PACU  Patient participation: Yes- Able to Participate  Level of consciousness: awake and alert and oriented  Post-procedure vital signs: reviewed and stable  Pain management: adequate  Airway patency: patent  BRIONNA mitigation strategies: Multimodal analgesia  PONV status at discharge: No PONV  Anesthetic complications: no      Cardiovascular status: blood pressure returned to baseline  Respiratory status: unassisted  Hydration status: euvolemic  Follow-up not needed.          Vitals Value Taken Time   /74 02/23/23 1130   Temp 36.7 °C (98 °F) 02/23/23 0703   Pulse 83 02/23/23 1329   Resp 18 02/23/23 1130   SpO2 98 % 02/23/23 1130         No case tracking events are documented in the log.      Pain/Messi Score: Pain Rating Prior to Med Admin: 8 (2/23/2023 11:20 AM)  Pain Rating Post Med Admin: 6 (2/23/2023 11:20 AM)  Messi Score: 10 (2/23/2023  1:51 PM)

## 2023-02-23 NOTE — OP NOTE
Date of service 02/23/2023  Procedure: Right arm open embolectomy  Attending surgeon:  Dr. Mario Sun MD  Anesthesia: General  Specimens: Right arm clot  Complications: None  EBL:  100 cc  Medications given:  IV heparin    Indication:  57-year-old female with a history of ++BMI who presented with acute right arm pain with signs and symptoms of ischemia found to have a acute occlusive thromboembolus to the right brachial artery.  She presents now for the above procedure    Procedure: After discussion with the patient regarding risks benefits potential complications she agreed and signed informed consent.  She was brought to the operative suite placed in supine position.  After adequate general anesthesia and monitoring prepped and draped the patient's right arm in the standard surgical fashion.  At this point I made incision below the antecubital fossa.  I dissected down through skin subcutaneous tissue.  The brachial artery was dissected at the bifurcation into the ulnar and radial vessels.  Vessel loops were used to obtain proximal distal control.  At this point we heparinized the patient I then clamped the vessels distally and proximally I made a transverse arteriotomy on the brachial artery.  I immediately encountered acute thromboembolus.  This was removed under direct visualization and passed off the field as specimen I then passed a number 3 Tammy embolectomy catheter proximally with retrieval of some further acute thrombus once both pulsatile inflow bleeding was obtained the brachial artery was flushed with heparinized saline heparinized saline and then clamped and then passed 2. Tammy embolectomy catheters distally down the ulnar and radial vessels I did retrieve a small amount of thrombus.  Once adequate back bleeding was obtained these vessels were flushed with heparinized saline and then re-clamped it did appear that the the bulk of the acute thromboembolus was at the bifurcation at this point we  closed the arteriotomy primarily using 6 0 Prolene running fashion.  After repair of the arteriotomy all clamps released flow was reestablished through the brachial and radial and ulnar vessels.  The patient had strong ulnar signal as well as a palmar arch signal.  At this point no further interventions were undertaken all the wound was irrigated hemostasis was achieved and then closed in layers using Vicryl and skin staples.  Sterile dressings were applied patient was awoken from anesthesia tolerated procedure and transferred recovery room stable signs    Recommendations:  Satisfactory embolectomy of right brachial artery.  Would recommend resuming anticoagulation IV heparin 3-4 hours postop.  Patient does also have what appears to be a splenic artery infarct which will need to be further evaluated.  Recommend possible general surgery evaluation for this.  Would also recommend further workup for etiology of this patient's thromboembolus.  Recommend cardiology and TTE

## 2023-02-23 NOTE — CONSULTS
[facility] Surg  General Surgery  Consult Note    Inpatient consult to General Surgery  Consult performed by: Mandy Freed PA-C  Consult ordered by: Breezy Arias MD      Subjective:     Chief Complaint/Reason for Admission: Right arm pain, abdominal pain     History of Present Illness: Patient presented to the ED for evaluation of right arm pain. She also reports abdominal pain, severe and onset last night, periumbilical and on the left abdomen. She had associated nausea as well, no vomiting. Denies changes in bowel movements, hematochezia, and melena. No past abdominal surgical history. Pain is improved since admission. CTA abdomen showing wedge infarct of spleen but no other ischemic signs in abdomen. She is being worked-up for the cause of her clotting.     No current facility-administered medications on file prior to encounter.     Current Outpatient Medications on File Prior to Encounter   Medication Sig    amlodipine-valsartan (EXFORGE) 5-160 mg per tablet Take 1 tablet by mouth once daily.    fluocinolone (SYNALAR) 0.01 % external solution AAA of scalp twice a day prn itching. Mild steroid.    ketoconazole (NIZORAL) 2 % shampoo Shampoo scalp 1-2 times weekly. Lather x 5 minutes then rinse well.    NAPROXEN ORAL Take 225 mg by mouth as needed.     oxybutynin (DITROPAN XL) 15 MG TR24 Take 1 tablet (15 mg total) by mouth once daily.    oxycodone (ROXICODONE) 30 MG Tab Take 30 mg by mouth as needed. Takes four times a day as needed    promethazine (PHENERGAN) 25 MG tablet Take 25 mg by mouth every 4 (four) hours as needed for Nausea.    tiZANidine (ZANAFLEX) 2 MG tablet daily as needed.     famotidine (PEPCID) 40 MG tablet Take 1 tablet (40 mg total) by mouth once daily.    sod sulf-pot chloride-mag sulf (SUTAB) 1.479-0.188- 0.225 gram tablet Take 12 tablets by mouth once daily. Take according to package instructions with indicated amount of water.       Review of patient's allergies indicates:  No Known  Allergies    Past Medical History:   Diagnosis Date    Chronic low back pain     Congenital cerebral palsy     GERD (gastroesophageal reflux disease)     Hyperlipidemia     Hypertension     Lymphedema     right foot     Past Surgical History:   Procedure Laterality Date    COLONOSCOPY N/A 10/05/2016    Polyp x 1 repeat 5 yrs. Procedure: COLONOSCOPY;  Surgeon: Aicha Ravi MD;  [facility];  Service: Endoscopy;  Laterality: N/A;    HIP SURGERY Bilateral     as child    LEG SURGERY      TENDON RELEASE Bilateral     as child to knees and hip adductors    TONSILLECTOMY       Family History       Problem Relation (Age of Onset)    Arthritis Mother    Cataracts Maternal Grandmother, Maternal Grandfather    Hypertension Mother          Tobacco Use    Smoking status: Former     Packs/day: 0.25     Years: 3.00     Pack years: 0.75     Types: Cigarettes    Smokeless tobacco: Never    Tobacco comments:     quit smoking in 1985   Substance and Sexual Activity    Alcohol use: Not Currently     Comment: occasionally    Drug use: No    Sexual activity: Not Currently     Review of Systems   Constitutional:  Negative for chills, fatigue, fever and unexpected weight change.   Respiratory:  Negative for cough, shortness of breath, wheezing and stridor.    Cardiovascular:  Negative for chest pain, palpitations and leg swelling.   Gastrointestinal:  Positive for abdominal pain and nausea. Negative for abdominal distention, anal bleeding, blood in stool, constipation, diarrhea and vomiting.   Genitourinary:  Negative for difficulty urinating, dysuria, frequency, hematuria and urgency.   Musculoskeletal:         Right arm pain   Skin:  Negative for color change, pallor, rash and wound.   Hematological:  Does not bruise/bleed easily.   Objective:     Vital Signs (Most Recent):  Temp: 98 °F (36.7 °C) (02/23/23 0703)  Pulse: 75 (02/23/23 0703)  Resp: 18 (02/23/23 0703)  BP: 129/68 (02/23/23 0703)  SpO2: 98 % (02/23/23 0703)  Vital Signs (24h Range):  Temp:  [97.7 °F (36.5 °C)-98.5 °F (36.9 °C)] 98 °F (36.7 °C)  Pulse:  [48-93] 75  Resp:  [10-20] 18  SpO2:  [97 %-100 %] 98 %  BP: (129-167)/(63-82) 129/68     Weight: 99.2 kg (218 lb 11.1 oz)  Body mass index is 41.32 kg/m².      Intake/Output Summary (Last 24 hours) at 2/23/2023 0833  Last data filed at 2/23/2023 0416  Gross per 24 hour   Intake 730 ml   Output 100 ml   Net 630 ml       Physical Exam  Vitals and nursing note reviewed.   Constitutional:       General: She is not in acute distress.     Appearance: She is well-developed. She is obese. She is not ill-appearing.   HENT:      Head: Normocephalic and atraumatic.      Right Ear: External ear normal.      Left Ear: External ear normal.      Nose: Nose normal.      Mouth/Throat:      Mouth: Mucous membranes are moist.   Eyes:      General: No scleral icterus.        Right eye: No discharge.         Left eye: No discharge.      Extraocular Movements: Extraocular movements intact.      Conjunctiva/sclera: Conjunctivae normal.   Cardiovascular:      Rate and Rhythm: Normal rate.   Pulmonary:      Effort: Pulmonary effort is normal. No respiratory distress.   Abdominal:      Comments: Abdomen soft and non-distended, minimal nazario-umbilical and left upper quadrant TTP. No previous surgical incisions   Musculoskeletal:      Cervical back: Neck supple.   Skin:     General: Skin is warm and dry.   Neurological:      Mental Status: She is alert and oriented to person, place, and time.   Psychiatric:         Behavior: Behavior normal.       Significant Labs:  CBC:   Recent Labs   Lab 02/23/23  0539   WBC 13.14*   RBC 4.59   HGB 13.3   HCT 40.5      MCV 88   MCH 29.0   MCHC 32.8     CMP:   Recent Labs   Lab 02/22/23  2154   *   CALCIUM 9.8   ALBUMIN 3.9   PROT 7.2   *   K 3.8   CO2 22*      BUN 16   CREATININE 0.7   ALKPHOS 78   ALT 27   AST 19   BILITOT 0.4       Significant Diagnostics:  CTA abdomen reviewed as per  HPI    Assessment/Plan:     58 y/o female with splenic infarct.    - Continue work-up for cause of hypercoagulable state.  - No surgical intervention warranted at this time.        Active Diagnoses:    Diagnosis Date Noted POA    PRINCIPAL PROBLEM:  Brachial artery occlusion, right [I70.208] 02/23/2023 Yes    Splenic infarction [D73.5] 02/23/2023 Yes    Cerebral palsy [G80.9] 02/23/2023 Yes    Obesity, Class III, BMI 40-49.9 (morbid obesity) [E66.01] 09/13/2022 Yes    Chronic bilateral low back pain with bilateral sciatica [M54.42, M54.41, G89.29] 11/25/2016 Yes    Essential hypertension [I10] 08/02/2016 Yes      Problems Resolved During this Admission:       Thank you for your consult. I will sign off. Please contact us if you have any additional questions.    Mandy Freed PA-C  General Surgery  [facility]

## 2023-02-23 NOTE — NURSING TRANSFER
Nursing Transfer Note      2/23/2023  1515  Report to Ewel[facility] MORAL[facility]  [facility].  Cooperative/communicative.  May have food and drink with caution and to eat and drink slowly for next couple of hours.  Chart ret'd to floor w/patient.  Tele in place.

## 2023-02-23 NOTE — TRANSFER OF CARE
"Anesthesia Transfer of Care Note    Patient: Jimena Pierce    Procedure(s) Performed: Procedure(s) (LRB):  ECHOCARDIOGRAM, TRANSESOPHAGEAL (N/A)    Patient location: Other: CVRU    Anesthesia Type: MAC    Transport from OR: Transported from OR on room air with adequate spontaneous ventilation    Post pain: adequate analgesia    Post assessment: no apparent anesthetic complications    Post vital signs: stable    Level of consciousness: responds to stimulation and awake    Nausea/Vomiting: no nausea/vomiting    Complications: none    Transfer of care protocol was followed      Last vitals:   Visit Vitals  /74 (Patient Position: Lying)   Pulse 83   Temp 36.7 °C (98 °F) (Oral)   Resp 18   Ht 5' 1" (1.549 m)   Wt 98.9 kg (218 lb)   LMP 02/23/2016   SpO2 98%   BMI 41.19 kg/m²     "

## 2023-02-23 NOTE — H&P
[facility] Medicine  History & Physical    Patient Name: Jimena Pierce  MRN: [id number]  Patient Class: OP- Outpatient Recovery  Admission Date: 2/22/2023  Attending Physician: Breezy Arias MD   Primary Care Provider: Baylee Aleman DO         Patient information was obtained from patient, past medical records and ER records.     Subjective:     Principal Problem:Brachial artery occlusion, right    Chief Complaint:   Chief Complaint   Patient presents with    Abdominal Pain     Abd pain, pain and tingling to right hand; onset this evening. Nausea no vomiting.         HPI: Ms. Pierce is a 57-year-old  female with PMH significant for cerebral palsy (mild), hypertension, hyperlipidemia, presented to the ED complaining of acute onset of right forearm pain, and abdominal pain that started around the same time since yesterday evening.  She was feeling numbness, tingling of the right fingers.  Right UE arterial ultrasound reveals evidence of acute occlusion at the brachial artery.  CTA of the right upper extremity was unremarkable.  Patient was having numbness, paresthesias, coldness of the right forearm.  Dr. Sun with vascular surgery evaluated the patient, has taken her to the OR emergently for open embolectomy.  She was found to have large clot burden at the bifurcation of the brachial artery on the right, as well as small amount of thrombus in the distal ulnar and radial arteries.  Patient was started on heparin drip prior to the procedure and to be resumed 10:34 p.m. postprocedure.  Patient reports improved pain, paresthesias of the right arm.  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      Admitting diagnosis:  Acute thromboembolism right brachial artery, distal ulnar, radial arteries.  Status post emergent thromboembolectomy.  Splenic artery infarction.      Past Medical History:   Diagnosis Date     Chronic low back pain     Congenital cerebral palsy     GERD (gastroesophageal reflux disease)     Hyperlipidemia     Hypertension     Lymphedema     right foot       Past Surgical History:   Procedure Laterality Date    COLONOSCOPY N/A 10/05/2016    Polyp x 1 repeat 5 yrs. Procedure: COLONOSCOPY;  Surgeon: Aicha Ravi MD;  Location: [facility];  Service: Endoscopy;  Laterality: N/A;    HIP SURGERY Bilateral     as child    LEG SURGERY      TENDON RELEASE Bilateral     as child to knees and hip adductors    TONSILLECTOMY         Review of patient's allergies indicates:  No Known Allergies    No current facility-administered medications on file prior to encounter.     Current Outpatient Medications on File Prior to Encounter   Medication Sig    amlodipine-valsartan (EXFORGE) 5-160 mg per tablet Take 1 tablet by mouth once daily.    fluocinolone (SYNALAR) 0.01 % external solution AAA of scalp twice a day prn itching. Mild steroid.    ketoconazole (NIZORAL) 2 % shampoo Shampoo scalp 1-2 times weekly. Lather x 5 minutes then rinse well.    NAPROXEN ORAL Take 225 mg by mouth as needed.     oxybutynin (DITROPAN XL) 15 MG TR24 Take 1 tablet (15 mg total) by mouth once daily.    oxycodone (ROXICODONE) 30 MG Tab Take 30 mg by mouth as needed. Takes four times a day as needed    promethazine (PHENERGAN) 25 MG tablet Take 25 mg by mouth every 4 (four) hours as needed for Nausea.    tiZANidine (ZANAFLEX) 2 MG tablet daily as needed.     famotidine (PEPCID) 40 MG tablet Take 1 tablet (40 mg total) by mouth once daily.    sod sulf-pot chloride-mag sulf (SUTAB) 1.479-0.188- 0.225 gram tablet Take 12 tablets by mouth once daily. Take according to package instructions with indicated amount of water.     Family History       Problem Relation (Age of Onset)    Arthritis Mother    Cataracts Maternal Grandmother, Maternal Grandfather    Hypertension Mother          Tobacco Use    Smoking status: Former      Packs/day: 0.25     Years: 3.00     Pack years: 0.75     Types: Cigarettes    Smokeless tobacco: Never    Tobacco comments:     quit smoking in 1985   Substance and Sexual Activity    Alcohol use: Not Currently     Comment: occasionally    Drug use: No    Sexual activity: Not Currently     Review of Systems   Constitutional: Negative.  Negative for chills and fever.   HENT: Negative.  Negative for congestion, rhinorrhea, sore throat and trouble swallowing.    Eyes: Negative.  Negative for visual disturbance.   Respiratory: Negative.  Negative for cough, shortness of breath and wheezing.    Cardiovascular: Negative.  Negative for chest pain and palpitations.   Gastrointestinal:  Positive for abdominal pain and nausea. Negative for blood in stool, diarrhea and vomiting.   Endocrine: Negative.    Genitourinary: Negative.  Negative for dysuria and flank pain.   Musculoskeletal: Negative.  Negative for back pain.   Skin: Negative.  Negative for rash.   Allergic/Immunologic: Negative.    Neurological:  Positive for numbness (Right arm, improving postprocedure). Negative for speech difficulty, weakness and headaches.   Hematological: Negative.    Psychiatric/Behavioral: Negative.  Negative for hallucinations. The patient is not nervous/anxious.    All other systems reviewed and are negative.  Objective:     Vital Signs (Most Recent):  Temp: 97.7 °F (36.5 °C) (02/23/23 0515)  Pulse: 82 (02/23/23 0515)  Resp: 18 (02/23/23 0515)  BP: 130/77 (02/23/23 0515)  SpO2: 100 % (02/23/23 0515) Vital Signs (24h Range):  Temp:  [97.7 °F (36.5 °C)-98.5 °F (36.9 °C)] 97.7 °F (36.5 °C)  Pulse:  [48-93] 82  Resp:  [10-20] 18  SpO2:  [97 %-100 %] 100 %  BP: (130-167)/(63-82) 130/77     Weight: 99.2 kg (218 lb 11.1 oz)  Body mass index is 41.32 kg/m².    Physical Exam  Vitals and nursing note reviewed.   Constitutional:       General: She is awake. She is not in acute distress.     Appearance: She is obese. She is not ill-appearing.    HENT:      Head: Normocephalic and atraumatic.      Mouth/Throat:      Mouth: Mucous membranes are moist.   Eyes:      General: No scleral icterus.     Conjunctiva/sclera: Conjunctivae normal.   Cardiovascular:      Rate and Rhythm: Normal rate and regular rhythm.      Heart sounds: No murmur heard.  Pulmonary:      Effort: Pulmonary effort is normal. No respiratory distress.      Breath sounds: Normal breath sounds. No wheezing.   Abdominal:      Palpations: Abdomen is soft.      Tenderness: There is abdominal tenderness (Generalized).   Musculoskeletal:         General: No swelling.      Cervical back: Neck supple.   Skin:     General: Skin is warm.      Coloration: Skin is not jaundiced.   Neurological:      General: No focal deficit present.      Mental Status: She is alert and oriented to person, place, and time. Mental status is at baseline.   Psychiatric:         Attention and Perception: Attention normal.         Speech: Speech normal.         Behavior: Behavior is cooperative.           Significant Labs: All pertinent labs within the past 24 hours have been reviewed.  BMP:   Recent Labs   Lab 02/22/23  2154   *   *   K 3.8      CO2 22*   BUN 16   CREATININE 0.7   CALCIUM 9.8     CBC:   Recent Labs   Lab 02/22/23  2154   WBC 9.00   HGB 13.6   HCT 41.0        CMP:   Recent Labs   Lab 02/22/23  2154   *   K 3.8      CO2 22*   *   BUN 16   CREATININE 0.7   CALCIUM 9.8   PROT 7.2   ALBUMIN 3.9   BILITOT 0.4   ALKPHOS 78   AST 19   ALT 27   ANIONGAP 15     Significant Imaging: I have reviewed all pertinent imaging results/findings within the past 24 hours.  I have reviewed and interpreted all pertinent imaging results/findings within the past 24 hours.    Imaging Results              CTA Abdomen and Pelvis (In process)                      US Upper Extremity Arteries Right (In process)                      CTA Upper Extremity Right (Final result)  Result time  02/23/23 00:40:45      Final result by Era Murrieta MD (02/23/23 00:40:45)                   Impression:      This exam is nondiagnostic.    All CT scans at this facility use dose modulation, iterative reconstruction and/or weight based dosing when appropriate to reduce radiation dose to as low as reasonably achievable.      Electronically signed by: Era Murrieta  Date:    02/23/2023  Time:    00:40               Narrative:    EXAMINATION:  CTA UPPER EXTREMITY RIGHT    CLINICAL HISTORY:  [id number]    TECHNIQUE:  After the intravenous administration of 100 cc of Omni 350 nonionic contrast using CTangio technique, 1.25  mm axial images were acquired using helical CT technique axial mips, sagittal and coronal reformats were also submitted for interpretation.    COMPARISON:  None available                                       X-Ray Chest AP Portable (Final result)  Result time 02/22/23 23:25:29      Final result by Era Murrieta MD (02/22/23 23:25:29)                   Impression:      No acute abnormality.      Electronically signed by: Era Murrieta  Date:    02/22/2023  Time:    23:25               Narrative:    EXAMINATION:  XR CHEST AP PORTABLE    CLINICAL HISTORY:  Epigastric pain;.    TECHNIQUE:  Single frontal portable view of the chest was performed.    COMPARISON:  None    FINDINGS:  Support devices: None    The lungs are clear, with normal appearance of pulmonary vasculature and no pleural effusion or pneumothorax.    The cardiac silhouette is normal in size. The hilar and mediastinal contours are unremarkable.    Bones are intact.                                      I have independently reviewed all pertinent labs within the past 24 hours.    I have independently reviewed, visualized and interpreted all pertinent imaging results within the past 24 hours and discussed the findings with the ED physician, Dr. Frazier          Assessment/Plan:     * Brachial artery occlusion, right  -acute  brachial artery occlusion, on the right side.    -status post emergent thromboembolectomy.    -found to have large clot burden at the bifurcation of the brachial artery, with some thrombus in the distal ulnar and radial arteries.    -etiology unclear  -continue heparin drip.    -vascular surgery following.  -consult cardiology for ROSY      Splenic infarction  -unclear etiology.    -pain medications as needed.    -general surgery consult.  -continue heparin drip.      Chronic bilateral low back pain with bilateral sciatica  -continue home dose pain medications.      Essential hypertension  -continue amlodipine, valsartan.    -monitor      Obesity, Class III, BMI 40-49.9 (morbid obesity)  Body mass index is 41.32 kg/m². Morbid obesity complicates all aspects of disease management from diagnostic modalities to treatment. Weight loss encouraged and health benefits explained to patient.         Cerebral palsy  -mild.    -able to ambulate with walker at home.        VTE Risk Mitigation (From admission, onward)         Ordered     heparin 25,000 units in dextrose 5% 250 mL (100 units/mL) infusion LOW INTENSITY nomogram - [facility]  Continuous        Question Answer Comment   Heparin Infusion Adjustment (DO NOT MODIFY ANSWER) \[employer] [email or web address]\Images\Pharmacy\HeparinInfusions\heparin LOW INTENSITY nomogram for [facility] [id number].pdf    Begin at (in units/kg/hr) 12        02/23/23 0541     heparin 25,000 units in dextrose 5% (100 units/ml) IV bolus from bag INITIAL BOLUS  Once        Question:  Heparin Infusion Adjustment (DO NOT MODIFY ANSWER)  Answer:  \[employer] [email or web address]\Images\Pharmacy\HeparinInfusions\heparin LOW INTENSITY nomogram for [facility] [id number]    02/23/23 0541     IP VTE HIGH RISK PATIENT  Once         02/23/23 0541     Place sequential compression device  Until discontinued         02/23/23 0541     heparin 25,000 units in dextrose 5% (100 units/ml) IV bolus from bag - ADDITIONAL PRN BOLUS - 60 units/kg  As needed (PRN)         Question:  Heparin Infusion Adjustment (DO NOT MODIFY ANSWER)  Answer:  [email or web address]\[employer]\Images\Pharmacy\HeparinInfusions\heparin LOW INTENSITY nomogram for [facility] [id number]    02/23/23 0541     heparin 25,000 units in dextrose 5% (100 units/ml) IV bolus from bag - ADDITIONAL PRN BOLUS - 30 units/kg  As needed (PRN)        Question:  Heparin Infusion Adjustment (DO NOT MODIFY ANSWER)  Answer:  \\[email or web address]\epic\Images\Pharmacy\HeparinInfusions\heparin LOW INTENSITY nomogram for [facility] [id number]    02/23/23 0541     Place sequential compression device  Until discontinued         02/23/23 0586                   Breezy Arias MD  Department of Hospital Medicine   [facility]

## 2023-02-23 NOTE — SUBJECTIVE & OBJECTIVE
Past Medical History:   Diagnosis Date    Chronic low back pain     Congenital cerebral palsy     GERD (gastroesophageal reflux disease)     Hyperlipidemia     Hypertension     Lymphedema     right foot       Past Surgical History:   Procedure Laterality Date    COLONOSCOPY N/A 10/05/2016    Polyp x 1 repeat 5 yrs. Procedure: COLONOSCOPY;  Surgeon: Aicha Ravi MD;  Location: [facility];  Service: Endoscopy;  Laterality: N/A;    HIP SURGERY Bilateral     as child    LEG SURGERY      TENDON RELEASE Bilateral     as child to knees and hip adductors    TONSILLECTOMY         Review of patient's allergies indicates:  No Known Allergies    No current facility-administered medications on file prior to encounter.     Current Outpatient Medications on File Prior to Encounter   Medication Sig    amlodipine-valsartan (EXFORGE) 5-160 mg per tablet Take 1 tablet by mouth once daily.    fluocinolone (SYNALAR) 0.01 % external solution AAA of scalp twice a day prn itching. Mild steroid.    ketoconazole (NIZORAL) 2 % shampoo Shampoo scalp 1-2 times weekly. Lather x 5 minutes then rinse well.    NAPROXEN ORAL Take 225 mg by mouth as needed.     oxybutynin (DITROPAN XL) 15 MG TR24 Take 1 tablet (15 mg total) by mouth once daily.    oxycodone (ROXICODONE) 30 MG Tab Take 30 mg by mouth as needed. Takes four times a day as needed    promethazine (PHENERGAN) 25 MG tablet Take 25 mg by mouth every 4 (four) hours as needed for Nausea.    tiZANidine (ZANAFLEX) 2 MG tablet daily as needed.     famotidine (PEPCID) 40 MG tablet Take 1 tablet (40 mg total) by mouth once daily.    sod sulf-pot chloride-mag sulf (SUTAB) 1.479-0.188- 0.225 gram tablet Take 12 tablets by mouth once daily. Take according to package instructions with indicated amount of water.     Family History       Problem Relation (Age of Onset)    Arthritis Mother    Cataracts Maternal Grandmother, Maternal Grandfather    Hypertension Mother          Tobacco Use    Smoking  status: Former     Packs/day: 0.25     Years: 3.00     Pack years: 0.75     Types: Cigarettes    Smokeless tobacco: Never    Tobacco comments:     quit smoking in 1985   Substance and Sexual Activity    Alcohol use: Not Currently     Comment: occasionally    Drug use: No    Sexual activity: Not Currently     Review of Systems   Constitutional: Negative.   HENT: Negative.     Eyes: Negative.    Cardiovascular: Negative.    Respiratory: Negative.     Skin: Negative.    Musculoskeletal:  Positive for muscle cramps and myalgias.   Gastrointestinal:  Positive for bloating and abdominal pain.   Genitourinary: Negative.    Neurological: Negative.    Psychiatric/Behavioral: Negative.     Objective:     Vital Signs (Most Recent):  Temp: 98 °F (36.7 °C) (02/23/23 0703)  Pulse: 75 (02/23/23 0703)  Resp: 18 (02/23/23 0859)  BP: 129/68 (02/23/23 0703)  SpO2: 98 % (02/23/23 0703) Vital Signs (24h Range):  Temp:  [97.7 °F (36.5 °C)-98.5 °F (36.9 °C)] 98 °F (36.7 °C)  Pulse:  [48-93] 75  Resp:  [10-20] 18  SpO2:  [97 %-100 %] 98 %  BP: (129-167)/(63-82) 129/68     Weight: 99.2 kg (218 lb 11.1 oz)  Body mass index is 41.32 kg/m².    SpO2: 98 %         Intake/Output Summary (Last 24 hours) at 2/23/2023 0953  Last data filed at 2/23/2023 0416  Gross per 24 hour   Intake 730 ml   Output 100 ml   Net 630 ml       Lines/Drains/Airways       Peripheral Intravenous Line  Duration                  Peripheral IV - Single Lumen 02/22/23 2051 20 G Anterior;Left Hand <1 day         Peripheral IV - Single Lumen 02/22/23 2155 18 G Left Antecubital <1 day                    Physical Exam  Vitals and nursing note reviewed.   Constitutional:       Appearance: She is obese.   HENT:      Head: Normocephalic.   Eyes:      Pupils: Pupils are equal, round, and reactive to light.   Cardiovascular:      Rate and Rhythm: Normal rate and regular rhythm.      Heart sounds: Normal heart sounds, S1 normal and S2 normal. No murmur heard.    No S3 or S4 sounds.    Pulmonary:      Effort: Pulmonary effort is normal.      Breath sounds: Normal breath sounds.   Abdominal:      General: Bowel sounds are normal.      Palpations: Abdomen is soft.      Tenderness: There is abdominal tenderness.   Musculoskeletal:         General: Normal range of motion.      Cervical back: Normal range of motion.   Skin:     Capillary Refill: Capillary refill takes less than 2 seconds.      Findings: Bruising present.   Neurological:      General: No focal deficit present.      Mental Status: She is alert and oriented to person, place, and time.   Psychiatric:         Mood and Affect: Mood normal.         Behavior: Behavior normal.         Thought Content: Thought content normal.       Significant Labs: BMP:   Recent Labs   Lab 02/22/23 2154   *   *   K 3.8      CO2 22*   BUN 16   CREATININE 0.7   CALCIUM 9.8   , CMP   Recent Labs   Lab 02/22/23 2154   *   K 3.8      CO2 22*   *   BUN 16   CREATININE 0.7   CALCIUM 9.8   PROT 7.2   ALBUMIN 3.9   BILITOT 0.4   ALKPHOS 78   AST 19   ALT 27   ANIONGAP 15   , CBC   Recent Labs   Lab 02/22/23 2154 02/23/23  0539   WBC 9.00 13.14*   HGB 13.6 13.3   HCT 41.0 40.5    197   , INR   Recent Labs   Lab 02/22/23 2154   INR 1.0   , Lipid Panel No results for input(s): CHOL, HDL, LDLCALC, TRIG, CHOLHDL in the last 48 hours., Troponin   Recent Labs   Lab 02/22/23 2154   TROPONINI <0.006   , and All pertinent lab results from the last 24 hours have been reviewed.    Significant Imaging: Cardiac Cath: reviewed, Echocardiogram: Transthoracic echo (TTE) complete (Cupid Only): No results found for this or any previous visit., EKG: reviewed, Stress Test: reviewed, and X-Ray: CXR: X-Ray Chest 1 View (CXR): No results found for this visit on 02/22/23.

## 2023-02-23 NOTE — ANESTHESIA POSTPROCEDURE EVALUATION
Anesthesia Post Evaluation    Patient: Jimena Pierce    Procedure(s) Performed: Procedure(s) (LRB):  EMBOLECTOMY (Right)    Final Anesthesia Type: general      Patient location during evaluation: PACU  Patient participation: Yes- Able to Participate  Level of consciousness: awake  Post-procedure vital signs: reviewed and stable  Pain management: adequate  Airway patency: patent    PONV status at discharge: No PONV  Anesthetic complications: no      Cardiovascular status: hemodynamically stable  Respiratory status: unassisted  Hydration status: euvolemic  Follow-up not needed.          Vitals Value Taken Time   /68 02/23/23 0703   Temp 36.7 °C (98 °F) 02/23/23 0703   Pulse 75 02/23/23 0703   Resp 18 02/23/23 0703   SpO2 98 % 02/23/23 0703         Event Time   Out of Recovery 02/23/2023 05:01:44         Pain/Messi Score: Pain Rating Prior to Med Admin: 7 (2/23/2023  6:04 AM)  Pain Rating Post Med Admin: 7 (2/23/2023 12:02 AM)  Messi Score: 10 (2/23/2023  4:45 AM)

## 2023-02-23 NOTE — NURSING
There has been a delay in re-starting patient's heparin due to lab not being able to draw her APTT.  However, I have just now been able to draw patient's lab, and medication will be started once lab has been resulted.

## 2023-02-23 NOTE — CONSULTS
[facility] S[facility]  Cardiology  Consult Note    Patient Name: Jimena Pierce  MRN: [id number]  Admission Date: 2/22/2023  Hospital Length of Stay: 0 days  Code Status: No Order   Attending Provider: Juan Benson MD   Consulting Provider: Jacqueline Bentley NP  Primary Care Physician: Baylee Aleman DO  Principal Problem:Brachial artery occlusion, right    Patient information was obtained from patient and ER records.     Inpatient consult to Cardiology  Consult performed by: Jacqueline Bentley NP  Consult ordered by: Breezy Arias MD        Subjective:     Chief Complaint:  RUE pain     HPI:   Ms. Pierce is a 57-year-old  female with PMH significant for cerebral palsy (mild), hypertension, hyperlipidemia, presented to the ED complaining of acute onset of right forearm pain, and abdominal pain that started around the same time since yesterday evening.  She was feeling numbness, tingling of the right fingers. Right UE arterial ultrasound reveals evidence of acute occlusion at the brachial artery. CTA of the right upper extremity was unremarkable. Patient was having numbness, paresthesias, coldness of the right forearm. Dr. Sun with vascular surgery evaluated the patient, has taken her to the OR emergently for open embolectomy. She was found to have large clot burden at the bifurcation of the brachial artery on the right, as well as small amount of thrombus in the distal ulnar and radial arteries.  Patient was started on heparin drip prior to the procedure and to be resumed 10:34 p.m. postprocedure. Patient reports improved pain, paresthesias of the right arm. 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      Cardiology consulted for ROSY. Pt seen and examined today, denies any numbness and tingling in tiffanie UE, denies any palpitations, denies any history of snoring that she is aware of. Hematoma noted on RUE, right ulnar  pulses weak. Labs reviewed, stable. CTA abdomen and pelvis revealed splenic infarct, Echo pending will likely do ROSY after Echo is read      Past Medical History:   Diagnosis Date    Chronic low back pain     Congenital cerebral palsy     GERD (gastroesophageal reflux disease)     Hyperlipidemia     Hypertension     Lymphedema     right foot       Past Surgical History:   Procedure Laterality Date    COLONOSCOPY N/A 10/05/2016    Polyp x 1 repeat 5 yrs. Procedure: COLONOSCOPY;  Surgeon: Aicha Ravi MD;  Location: [facility];  Service: Endoscopy;  Laterality: N/A;    HIP SURGERY Bilateral     as child    LEG SURGERY      TENDON RELEASE Bilateral     as child to knees and hip adductors    TONSILLECTOMY         Review of patient's allergies indicates:  No Known Allergies    No current facility-administered medications on file prior to encounter.     Current Outpatient Medications on File Prior to Encounter   Medication Sig    amlodipine-valsartan (EXFORGE) 5-160 mg per tablet Take 1 tablet by mouth once daily.    fluocinolone (SYNALAR) 0.01 % external solution AAA of scalp twice a day prn itching. Mild steroid.    ketoconazole (NIZORAL) 2 % shampoo Shampoo scalp 1-2 times weekly. Lather x 5 minutes then rinse well.    NAPROXEN ORAL Take 225 mg by mouth as needed.     oxybutynin (DITROPAN XL) 15 MG TR24 Take 1 tablet (15 mg total) by mouth once daily.    oxycodone (ROXICODONE) 30 MG Tab Take 30 mg by mouth as needed. Takes four times a day as needed    promethazine (PHENERGAN) 25 MG tablet Take 25 mg by mouth every 4 (four) hours as needed for Nausea.    tiZANidine (ZANAFLEX) 2 MG tablet daily as needed.     famotidine (PEPCID) 40 MG tablet Take 1 tablet (40 mg total) by mouth once daily.    sod sulf-pot chloride-mag sulf (SUTAB) 1.479-0.188- 0.225 gram tablet Take 12 tablets by mouth once daily. Take according to package instructions with indicated amount of water.     Family History       Problem Relation (Age  of Onset)    Arthritis Mother    Cataracts Maternal Grandmother, Maternal Grandfather    Hypertension Mother          Tobacco Use    Smoking status: Former     Packs/day: 0.25     Years: 3.00     Pack years: 0.75     Types: Cigarettes    Smokeless tobacco: Never    Tobacco comments:     quit smoking in 1985   Substance and Sexual Activity    Alcohol use: Not Currently     Comment: occasionally    Drug use: No    Sexual activity: Not Currently     Review of Systems   Constitutional: Negative.   HENT: Negative.     Eyes: Negative.    Cardiovascular: Negative.    Respiratory: Negative.     Skin: Negative.    Musculoskeletal:  Positive for muscle cramps and myalgias.   Gastrointestinal:  Positive for bloating and abdominal pain.   Genitourinary: Negative.    Neurological: Negative.    Psychiatric/Behavioral: Negative.     Objective:     Vital Signs (Most Recent):  Temp: 98 °F (36.7 °C) (02/23/23 0703)  Pulse: 75 (02/23/23 0703)  Resp: 18 (02/23/23 0859)  BP: 129/68 (02/23/23 0703)  SpO2: 98 % (02/23/23 0703) Vital Signs (24h Range):  Temp:  [97.7 °F (36.5 °C)-98.5 °F (36.9 °C)] 98 °F (36.7 °C)  Pulse:  [48-93] 75  Resp:  [10-20] 18  SpO2:  [97 %-100 %] 98 %  BP: (129-167)/(63-82) 129/68     Weight: 99.2 kg (218 lb 11.1 oz)  Body mass index is 41.32 kg/m².    SpO2: 98 %         Intake/Output Summary (Last 24 hours) at 2/23/2023 0953  Last data filed at 2/23/2023 0416  Gross per 24 hour   Intake 730 ml   Output 100 ml   Net 630 ml       Lines/Drains/Airways       Peripheral Intravenous Line  Duration                  Peripheral IV - Single Lumen 02/22/23 2051 20 G Anterior;Left Hand <1 day         Peripheral IV - Single Lumen 02/22/23 2155 18 G Left Antecubital <1 day                    Physical Exam  Vitals and nursing note reviewed.   Constitutional:       Appearance: She is obese.   HENT:      Head: Normocephalic.   Eyes:      Pupils: Pupils are equal, round, and reactive to light.   Cardiovascular:      Rate and  Rhythm: Normal rate and regular rhythm.      Heart sounds: Normal heart sounds, S1 normal and S2 normal. No murmur heard.    No S3 or S4 sounds.   Pulmonary:      Effort: Pulmonary effort is normal.      Breath sounds: Normal breath sounds.   Abdominal:      General: Bowel sounds are normal.      Palpations: Abdomen is soft.      Tenderness: There is abdominal tenderness.   Musculoskeletal:         General: Normal range of motion.      Cervical back: Normal range of motion.   Skin:     Capillary Refill: Capillary refill takes less than 2 seconds.      Findings: Bruising present.   Neurological:      General: No focal deficit present.      Mental Status: She is alert and oriented to person, place, and time.   Psychiatric:         Mood and Affect: Mood normal.         Behavior: Behavior normal.         Thought Content: Thought content normal.       Significant Labs: BMP:   Recent Labs   Lab 02/22/23 2154   *   *   K 3.8      CO2 22*   BUN 16   CREATININE 0.7   CALCIUM 9.8   , CMP   Recent Labs   Lab 02/22/23 2154   *   K 3.8      CO2 22*   *   BUN 16   CREATININE 0.7   CALCIUM 9.8   PROT 7.2   ALBUMIN 3.9   BILITOT 0.4   ALKPHOS 78   AST 19   ALT 27   ANIONGAP 15   , CBC   Recent Labs   Lab 02/22/23 2154 02/23/23  0539   WBC 9.00 13.14*   HGB 13.6 13.3   HCT 41.0 40.5    197   , INR   Recent Labs   Lab 02/22/23 2154   INR 1.0   , Lipid Panel No results for input(s): CHOL, HDL, LDLCALC, TRIG, CHOLHDL in the last 48 hours., Troponin   Recent Labs   Lab 02/22/23 2154   TROPONINI <0.006   , and All pertinent lab results from the last 24 hours have been reviewed.    Significant Imaging: Cardiac Cath: reviewed, Echocardiogram: Transthoracic echo (TTE) complete (Cupid Only): No results found for this or any previous visit., EKG: reviewed, Stress Test: reviewed, and X-Ray: CXR: X-Ray Chest 1 View (CXR): No results found for this visit on 02/22/23.    Assessment and Plan:     *  Brachial artery occlusion, right  Vascular following  Cont Hep gtt  Needs full hypercoagulation work up    Splenic infarct  ROSY today with Dr. Tavia Wheatley NPO  Needs full hypercoagulation work up   BRIONNA work up OP    Chronic bilateral low back pain with bilateral sciatica  tx per primary team    Essential hypertension  Cont medications        VTE Risk Mitigation (From admission, onward)           Ordered     heparin 25,000 units in dextrose 5% 250 mL (100 units/mL) infusion LOW INTENSITY nomogram - [facility]  Continuous        Question Answer Comment   Heparin Infusion Adjustment (DO NOT MODIFY ANSWER) [email or web address]\epic\Images\Pharmacy\HeparinInfusions\heparin LOW INTENSITY nomogram for [facility] [id number]    Begin at (in units/kg/hr) 12        02/23/23 0541     heparin 25,000 units in dextrose 5% (100 units/ml) IV bolus from bag INITIAL BOLUS  Once        Question:  Heparin Infusion Adjustment (DO NOT MODIFY ANSWER)  Answer:  [email or web address]\epic\Images\Pharmacy\HeparinInfusions\heparin LOW INTENSITY nomogram for [facility] [id number]    02/23/23 0541     IP VTE HIGH RISK PATIENT  Once         02/23/23 0541     Place sequential compression device  Until discontinued         02/23/23 0541     heparin 25,000 units in dextrose 5% (100 units/ml) IV bolus from bag - ADDITIONAL PRN BOLUS - 60 units/kg  As needed (PRN)        Question:  Heparin Infusion Adjustment (DO NOT MODIFY ANSWER)  Answer:  [email or web address]\epic\Images\Pharmacy\HeparinInfusions\heparin LOW INTENSITY nomogram for [facility] [id number].pdf    02/23/23 0541     heparin 25,000 units in dextrose 5% (100 units/ml) IV bolus from bag - ADDITIONAL PRN BOLUS - 30 units/kg  As needed (PRN)        Question:  Heparin Infusion Adjustment (DO NOT MODIFY ANSWER)  Answer:  [email or web address]\epic\Images\Pharmacy\HeparinInfusions\heparin LOW INTENSITY nomogram for [facility] [id number]    02/23/23 0541     Place sequential compression device  Until discontinued         02/23/23 0533                    Thank  you for your consult. I will follow-up with patient. Please contact us if you have any additional questions.    Jacqueline Bentley NP  Cardiology   [facility] S[facility]

## 2023-02-23 NOTE — HPI
Ms. Piecre is a 57-year-old  female with PMH significant for cerebral palsy (mild), hypertension, hyperlipidemia, presented to the ED complaining of acute onset of right forearm pain, and abdominal pain that started around the same time since yesterday evening.  She was feeling numbness, tingling of the right fingers.  Right UE arterial ultrasound reveals evidence of acute occlusion at the brachial artery.  CTA of the right upper extremity was unremarkable.  Patient was having numbness, paresthesias, coldness of the right forearm.  Dr. Sun with vascular surgery evaluated the patient, has taken her to the OR emergently for open embolectomy.  She was found to have large clot burden at the bifurcation of the brachial artery on the right, as well as small amount of thrombus in the distal ulnar and radial arteries.  Patient was started on heparin drip prior to the procedure and to be resumed 10:34 p.m. postprocedure.  Patient reports improved pain, paresthesias of the right arm.  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      Admitting diagnosis:  Acute thromboembolism right brachial artery, distal ulnar, radial arteries.  Status post emergent thromboembolectomy.  Splenic artery infarction.

## 2023-02-24 PROBLEM — D72.829 LEUKOCYTOSIS: Status: ACTIVE | Noted: 2023-02-24

## 2023-02-24 PROBLEM — D69.6 THROMBOCYTOPENIA: Status: ACTIVE | Noted: 2023-02-24

## 2023-02-24 LAB
ALBUMIN SERPL BCP-MCNC: 3.3 G/DL (ref 3.5–5.2)
ALP SERPL-CCNC: 71 U/L (ref 55–135)
ALT SERPL W/O P-5'-P-CCNC: 14 U/L (ref 10–44)
ANION GAP SERPL CALC-SCNC: 10 MMOL/L (ref 8–16)
APTT BLDCRRT: 48.2 SEC (ref 21–32)
AST SERPL-CCNC: 34 U/L (ref 10–40)
BASOPHILS # BLD AUTO: 0.04 K/UL (ref 0–0.2)
BASOPHILS NFR BLD: 0.3 % (ref 0–1.9)
BILIRUB SERPL-MCNC: 1.3 MG/DL (ref 0.1–1)
BILIRUB UR QL STRIP: NEGATIVE
BUN SERPL-MCNC: 7 MG/DL (ref 6–20)
CALCIUM SERPL-MCNC: 8.7 MG/DL (ref 8.7–10.5)
CHLORIDE SERPL-SCNC: 106 MMOL/L (ref 95–110)
CLARITY UR: CLEAR
CO2 SERPL-SCNC: 22 MMOL/L (ref 23–29)
COLOR UR: ABNORMAL
CREAT SERPL-MCNC: 0.6 MG/DL (ref 0.5–1.4)
DIFFERENTIAL METHOD: ABNORMAL
EOSINOPHIL # BLD AUTO: 0 K/UL (ref 0–0.5)
EOSINOPHIL NFR BLD: 0.1 % (ref 0–8)
ERYTHROCYTE [DISTWIDTH] IN BLOOD BY AUTOMATED COUNT: 12.1 % (ref 11.5–14.5)
EST. GFR  (NO RACE VARIABLE): >60 ML/MIN/1.73 M^2
GLUCOSE SERPL-MCNC: 106 MG/DL (ref 70–110)
GLUCOSE UR QL STRIP: NEGATIVE
HCT VFR BLD AUTO: 38 % (ref 37–48.5)
HGB BLD-MCNC: 12.7 G/DL (ref 12–16)
HGB UR QL STRIP: ABNORMAL
HYALINE CASTS #/AREA URNS LPF: 1 /LPF
IMM GRANULOCYTES # BLD AUTO: 0.05 K/UL (ref 0–0.04)
IMM GRANULOCYTES NFR BLD AUTO: 0.4 % (ref 0–0.5)
KETONES UR QL STRIP: ABNORMAL
LEUKOCYTE ESTERASE UR QL STRIP: ABNORMAL
LYMPHOCYTES # BLD AUTO: 1.3 K/UL (ref 1–4.8)
LYMPHOCYTES NFR BLD: 9.7 % (ref 18–48)
MAGNESIUM SERPL-MCNC: 1.7 MG/DL (ref 1.6–2.6)
MCH RBC QN AUTO: 29.3 PG (ref 27–31)
MCHC RBC AUTO-ENTMCNC: 33.4 G/DL (ref 32–36)
MCV RBC AUTO: 88 FL (ref 82–98)
MICROSCOPIC COMMENT: ABNORMAL
MONOCYTES # BLD AUTO: 0.9 K/UL (ref 0.3–1)
MONOCYTES NFR BLD: 6.7 % (ref 4–15)
NEUTROPHILS # BLD AUTO: 11.4 K/UL (ref 1.8–7.7)
NEUTROPHILS NFR BLD: 82.8 % (ref 38–73)
NITRITE UR QL STRIP: NEGATIVE
NRBC BLD-RTO: 0 /100 WBC
PH UR STRIP: 7 [PH] (ref 5–8)
PLATELET # BLD AUTO: 82 K/UL (ref 150–450)
PMV BLD AUTO: 10.8 FL (ref 9.2–12.9)
POTASSIUM SERPL-SCNC: 3.4 MMOL/L (ref 3.5–5.1)
PROT SERPL-MCNC: 6.3 G/DL (ref 6–8.4)
PROT UR QL STRIP: NEGATIVE
RBC # BLD AUTO: 4.34 M/UL (ref 4–5.4)
RBC #/AREA URNS HPF: 5 /HPF (ref 0–4)
SODIUM SERPL-SCNC: 138 MMOL/L (ref 136–145)
SP GR UR STRIP: 1.02 (ref 1–1.03)
SQUAMOUS #/AREA URNS HPF: 1 /HPF
URN SPEC COLLECT METH UR: ABNORMAL
UROBILINOGEN UR STRIP-ACNC: NEGATIVE EU/DL
WBC # BLD AUTO: 13.73 K/UL (ref 3.9–12.7)
WBC #/AREA URNS HPF: 8 /HPF (ref 0–5)

## 2023-02-24 PROCEDURE — 99223 PR INITIAL HOSPITAL CARE,LEVL III: ICD-10-PCS | Mod: ,,, | Performed by: INTERNAL MEDICINE

## 2023-02-24 PROCEDURE — 85730 THROMBOPLASTIN TIME PARTIAL: CPT | Performed by: STUDENT IN AN ORGANIZED HEALTH CARE EDUCATION/TRAINING PROGRAM

## 2023-02-24 PROCEDURE — 81000 URINALYSIS NONAUTO W/SCOPE: CPT | Performed by: SURGERY

## 2023-02-24 PROCEDURE — 25000003 PHARM REV CODE 250: Performed by: INTERNAL MEDICINE

## 2023-02-24 PROCEDURE — 25000003 PHARM REV CODE 250: Performed by: SURGERY

## 2023-02-24 PROCEDURE — 99223 1ST HOSP IP/OBS HIGH 75: CPT | Mod: ,,, | Performed by: INTERNAL MEDICINE

## 2023-02-24 PROCEDURE — 99900035 HC TECH TIME PER 15 MIN (STAT)

## 2023-02-24 PROCEDURE — 80053 COMPREHEN METABOLIC PANEL: CPT | Performed by: INTERNAL MEDICINE

## 2023-02-24 PROCEDURE — 99232 PR SUBSEQUENT HOSPITAL CARE,LEVL II: ICD-10-PCS | Mod: ,,,

## 2023-02-24 PROCEDURE — 63600175 PHARM REV CODE 636 W HCPCS: Performed by: SURGERY

## 2023-02-24 PROCEDURE — 85025 COMPLETE CBC W/AUTO DIFF WBC: CPT | Performed by: SURGERY

## 2023-02-24 PROCEDURE — 36415 COLL VENOUS BLD VENIPUNCTURE: CPT | Performed by: SURGERY

## 2023-02-24 PROCEDURE — 94761 N-INVAS EAR/PLS OXIMETRY MLT: CPT

## 2023-02-24 PROCEDURE — 83735 ASSAY OF MAGNESIUM: CPT | Performed by: INTERNAL MEDICINE

## 2023-02-24 PROCEDURE — 11000001 HC ACUTE MED/SURG PRIVATE ROOM

## 2023-02-24 PROCEDURE — 25000003 PHARM REV CODE 250: Performed by: STUDENT IN AN ORGANIZED HEALTH CARE EDUCATION/TRAINING PROGRAM

## 2023-02-24 PROCEDURE — 99232 SBSQ HOSP IP/OBS MODERATE 35: CPT | Mod: ,,,

## 2023-02-24 PROCEDURE — 36415 COLL VENOUS BLD VENIPUNCTURE: CPT | Performed by: STUDENT IN AN ORGANIZED HEALTH CARE EDUCATION/TRAINING PROGRAM

## 2023-02-24 RX ADMIN — CHLORHEXIDINE GLUCONATE 0.12% ORAL RINSE 10 ML: 1.2 LIQUID ORAL at 09:02

## 2023-02-24 RX ADMIN — OXYBUTYNIN CHLORIDE 15 MG: 5 TABLET, EXTENDED RELEASE ORAL at 09:02

## 2023-02-24 RX ADMIN — OXYCODONE HYDROCHLORIDE 30 MG: 30 TABLET ORAL at 09:02

## 2023-02-24 RX ADMIN — VALSARTAN 160 MG: 160 TABLET, FILM COATED ORAL at 09:02

## 2023-02-24 RX ADMIN — AMLODIPINE BESYLATE 5 MG: 5 TABLET ORAL at 09:02

## 2023-02-24 RX ADMIN — APIXABAN 10 MG: 2.5 TABLET, FILM COATED ORAL at 09:02

## 2023-02-24 RX ADMIN — HEPARIN SODIUM 18 UNITS/KG/HR: 10000 INJECTION, SOLUTION INTRAVENOUS at 02:02

## 2023-02-24 NOTE — SUBJECTIVE & OBJECTIVE
Review of Systems   Constitutional: Negative.  Negative for chills and fever.   HENT: Negative.  Negative for congestion, rhinorrhea, sore throat and trouble swallowing.    Eyes: Negative.  Negative for visual disturbance.   Respiratory: Negative.  Negative for cough, shortness of breath and wheezing.    Cardiovascular: Negative.  Negative for chest pain and palpitations.   Gastrointestinal:  Positive for abdominal pain and nausea. Negative for blood in stool, diarrhea and vomiting.   Endocrine: Negative.    Genitourinary: Negative.  Negative for dysuria and flank pain.   Musculoskeletal: Negative.  Negative for back pain.   Skin: Negative.  Negative for rash.   Allergic/Immunologic: Negative.    Neurological:  Positive for numbness (Right arm, improving postprocedure). Negative for speech difficulty, weakness and headaches.   Hematological: Negative.    Psychiatric/Behavioral: Negative.  Negative for hallucinations. The patient is not nervous/anxious.    All other systems reviewed and are negative.  Objective:     Vital Signs (Most Recent):  Temp: 98.8 °F (37.1 °C) (02/24/23 1528)  Pulse: 74 (02/24/23 1528)  Resp: 18 (02/24/23 1528)  BP: (!) 103/59 (02/24/23 1528)  SpO2: (!) 90 % (02/24/23 1528)   Vital Signs (24h Range):  Temp:  [98.5 °F (36.9 °C)-99.9 °F (37.7 °C)] 98.8 °F (37.1 °C)  Pulse:  [74-89] 74  Resp:  [17-18] 18  SpO2:  [90 %-95 %] 90 %  BP: (103-137)/(55-73) 103/59     Weight: 98.9 kg (218 lb)  Body mass index is 41.19 kg/m².    Intake/Output Summary (Last 24 hours) at 2/24/2023 1720  Last data filed at 2/24/2023 0731  Gross per 24 hour   Intake 2720.68 ml   Output 1200 ml   Net 1520.68 ml      Physical Exam  Vitals and nursing note reviewed.   Constitutional:       General: She is awake. She is not in acute distress.     Appearance: She is obese. She is not ill-appearing.   HENT:      Head: Normocephalic and atraumatic.      Mouth/Throat:      Mouth: Mucous membranes are moist.   Eyes:      General: No  scleral icterus.     Conjunctiva/sclera: Conjunctivae normal.   Cardiovascular:      Rate and Rhythm: Normal rate and regular rhythm.      Heart sounds: No murmur heard.  Pulmonary:      Effort: Pulmonary effort is normal. No respiratory distress.      Breath sounds: Normal breath sounds. No wheezing.   Abdominal:      Palpations: Abdomen is soft.      Tenderness: There is abdominal tenderness (Generalized).   Musculoskeletal:         General: No swelling.      Cervical back: Neck supple.   Skin:     General: Skin is warm.      Coloration: Skin is not jaundiced.   Neurological:      General: No focal deficit present.      Mental Status: She is alert and oriented to person, place, and time. Mental status is at baseline.   Psychiatric:         Attention and Perception: Attention normal.         Speech: Speech normal.         Behavior: Behavior is cooperative.       Significant Labs: All pertinent labs within the past 24 hours have been reviewed.    Significant Imaging: I have reviewed all pertinent imaging results/findings within the past 24 hours.

## 2023-02-24 NOTE — ASSESSMENT & PLAN NOTE
Body mass index is 41.19 kg/m². Morbid obesity complicates all aspects of disease management from diagnostic modalities to treatment. Weight loss encouraged and health benefits explained to patient.

## 2023-02-24 NOTE — ASSESSMENT & PLAN NOTE
-acute brachial artery occlusion, on the right side.    -status post emergent thromboembolectomy.  POD1  -found to have large clot burden at the bifurcation of the brachial artery, with some thrombus in the distal ulnar and radial arteries.    -continue heparin drip, transition to PO AC in AM 2/25 per heme/onc recs  -heme/onc consult, f/u recs

## 2023-02-24 NOTE — SUBJECTIVE & OBJECTIVE
Review of Systems   Constitutional: Negative.   HENT: Negative.     Eyes: Negative.    Cardiovascular: Negative.    Respiratory: Negative.     Skin:  Positive for color change.   Musculoskeletal:  Positive for muscle cramps and myalgias.   Gastrointestinal: Negative.    Genitourinary: Negative.    Neurological: Negative.    Psychiatric/Behavioral: Negative.     Objective:     Vital Signs (Most Recent):  Temp: 98.8 °F (37.1 °C) (02/24/23 1528)  Pulse: 74 (02/24/23 1528)  Resp: 18 (02/24/23 1528)  BP: (!) 103/59 (02/24/23 1528)  SpO2: (!) 90 % (02/24/23 1528)   Vital Signs (24h Range):  Temp:  [98.5 °F (36.9 °C)-99.9 °F (37.7 °C)] 98.8 °F (37.1 °C)  Pulse:  [74-89] 74  Resp:  [17-18] 18  SpO2:  [90 %-95 %] 90 %  BP: (103-137)/(55-73) 103/59     Weight: 98.9 kg (218 lb)  Body mass index is 41.19 kg/m².     SpO2: (!) 90 %         Intake/Output Summary (Last 24 hours) at 2/24/2023 1700  Last data filed at 2/24/2023 0731  Gross per 24 hour   Intake 2720.68 ml   Output 1200 ml   Net 1520.68 ml       Lines/Drains/Airways       Peripheral Intravenous Line  Duration                  Peripheral IV - Single Lumen 02/22/23 2051 20 G Anterior;Left Hand 1 day         Peripheral IV - Single Lumen 02/22/23 2155 18 G Left Antecubital 1 day                    Physical Exam  Vitals and nursing note reviewed.   Constitutional:       Appearance: Normal appearance.   HENT:      Head: Normocephalic.   Eyes:      Pupils: Pupils are equal, round, and reactive to light.   Cardiovascular:      Rate and Rhythm: Normal rate and regular rhythm.      Heart sounds: Normal heart sounds, S1 normal and S2 normal. No murmur heard.    No S3 or S4 sounds.   Pulmonary:      Effort: Pulmonary effort is normal.      Breath sounds: Normal breath sounds.   Abdominal:      General: Bowel sounds are normal.      Palpations: Abdomen is soft.   Musculoskeletal:         General: Normal range of motion.      Cervical back: Normal range of motion.   Skin:      Capillary Refill: Capillary refill takes less than 2 seconds.      Findings: Lesion present.   Neurological:      General: No focal deficit present.      Mental Status: She is alert and oriented to person, place, and time.   Psychiatric:         Mood and Affect: Mood normal.         Behavior: Behavior normal.         Thought Content: Thought content normal.       Significant Labs: BMP:   Recent Labs   Lab 02/22/23 2154 02/24/23  0705   * 106   * 138   K 3.8 3.4*    106   CO2 22* 22*   BUN 16 7   CREATININE 0.7 0.6   CALCIUM 9.8 8.7   MG  --  1.7   , CMP   Recent Labs   Lab 02/22/23 2154 02/24/23  0705   * 138   K 3.8 3.4*    106   CO2 22* 22*   * 106   BUN 16 7   CREATININE 0.7 0.6   CALCIUM 9.8 8.7   PROT 7.2 6.3   ALBUMIN 3.9 3.3*   BILITOT 0.4 1.3*   ALKPHOS 78 71   AST 19 34   ALT 27 14   ANIONGAP 15 10   , CBC   Recent Labs   Lab 02/22/23 2154 02/23/23  0539 02/24/23  0705   WBC 9.00 13.14* 13.73*   HGB 13.6 13.3 12.7   HCT 41.0 40.5 38.0    197 82*   , INR   Recent Labs   Lab 02/22/23 2154   INR 1.0   , Lipid Panel No results for input(s): CHOL, HDL, LDLCALC, TRIG, CHOLHDL in the last 48 hours., Troponin   Recent Labs   Lab 02/22/23 2154   TROPONINI <0.006   , and All pertinent lab results from the last 24 hours have been reviewed.    Significant Imaging: Cardiac Cath: reviewed, Echocardiogram: Transthoracic echo (TTE) complete (Cupid Only):   Results for orders placed or performed during the hospital encounter of 02/22/23   Echo   Result Value Ref Range    BSA 2.06 m2    TDI SEPTAL 0.08 m/s    LV LATERAL E/E' RATIO 6.55 m/s    LV SEPTAL E/E' RATIO 9.00 m/s    LA WIDTH 4.00 cm    IVC diameter 1.6 cm    Left Ventricular Outflow Tract Mean Velocity 0.81 cm/s    Left Ventricular Outflow Tract Mean Gradient 2.77 mmHg    TV mean gradient 22 mmHg    TDI LATERAL 0.11 m/s    LVIDd 4.21 3.5 - 6.0 cm    IVS 1.41 (A) 0.6 - 1.1 cm    Posterior Wall 1.21 (A) 0.6 - 1.1 cm     Ao root annulus 3.16 cm    LVIDs 2.95 2.1 - 4.0 cm    FS 30 28 - 44 %    LA volume 72.22 cm3    STJ 3.28 cm    Ascending aorta 3.20 cm    LV mass 203.60 g    LA size 3.91 cm    TAPSE 1.90 cm    Left Ventricle Relative Wall Thickness 0.57 cm    AV mean gradient 4 mmHg    AV valve area 2.76 cm2    AV Velocity Ratio 0.78     AV index (prosthetic) 0.94     MV valve area p 1/2 method 4.68 cm2    E/A ratio 1.29     Mean e' 0.10 m/s    E wave deceleration time 161.92 msec    IVRT 60.89 msec    LVOT diameter 1.93 cm    LVOT area 2.9 cm2    LVOT peak rene 1.09 m/s    LVOT peak VTI 26.10 cm    Ao peak rene 1.39 m/s    Ao VTI 27.7 cm    RVOT peak rene 0.88 m/s    RVOT peak VTI 20.2 cm    Mr max rene 3.12 m/s    LVOT stroke volume 76.32 cm3    AV peak gradient 8 mmHg    PV mean gradient 1.84 mmHg    E/E' ratio 7.58 m/s    MV Peak E Rene 0.72 m/s    TR Max Rene 2.79 m/s    MV stenosis pressure 1/2 time 46.96 ms    MV Peak A Rene 0.56 m/s    LV Systolic Volume 33.71 mL    LV Systolic Volume Index 17.2 mL/m2    LV Diastolic Volume 78.99 mL    LV Diastolic Volume Index 40.30 mL/m2    LA Volume Index 36.8 mL/m2    LV Mass Index 104 g/m2    RA Major Axis 3.86 cm    Left Atrium Minor Axis 5.64 cm    Left Atrium Major Axis 5.24 cm    Triscuspid Valve Regurgitation Peak Gradient 31 mmHg    RA Width 3.00 cm    Right Atrial Pressure (from IVC) 3 mmHg    EF 55 %    TV rest pulmonary artery pressure 34 mmHg    Narrative    · The left ventricle is normal in size with moderate concentric   hypertrophy and normal systolic function.  · Mild left atrial enlargement.  · The estimated ejection fraction is 55%.  · Normal left ventricular diastolic function.  · Normal right ventricular size with normal right ventricular systolic   function.  · Normal central venous pressure (3 mmHg).  · The estimated PA systolic pressure is 34 mmHg.  · Trivial posterior pericardial effusion.      , EKG: reviewed, Stress Test: reviewed, and X-Ray: CXR: X-Ray Chest 1 View  (CXR): No results found for this visit on 02/22/23.

## 2023-02-24 NOTE — PROGRESS NOTES
[facility] - Med Surg  Cardiology  Progress Note    Patient Name: Jimena Pierce  MRN: [id number]  Admission Date: 2/22/2023  Hospital Length of Stay: 0 days  Code Status: No Order   Attending Physician: Juan Benson MD   Primary Care Physician: Baylee Aleman DO  Expected Discharge Date:   Principal Problem:Brachial artery occlusion, right    Subjective:     Hospital Course:   2/24/23 Pt seen and examined today. Hematoma noted RUE, recommend vascular see patient. Labs reviewed K 3.4          Review of Systems   Constitutional: Negative.   HENT: Negative.     Eyes: Negative.    Cardiovascular: Negative.    Respiratory: Negative.     Skin:  Positive for color change.   Musculoskeletal:  Positive for muscle cramps and myalgias.   Gastrointestinal: Negative.    Genitourinary: Negative.    Neurological: Negative.    Psychiatric/Behavioral: Negative.     Objective:     Vital Signs (Most Recent):  Temp: 98.8 °F (37.1 °C) (02/24/23 1528)  Pulse: 74 (02/24/23 1528)  Resp: 18 (02/24/23 1528)  BP: (!) 103/59 (02/24/23 1528)  SpO2: (!) 90 % (02/24/23 1528)   Vital Signs (24h Range):  Temp:  [98.5 °F (36.9 °C)-99.9 °F (37.7 °C)] 98.8 °F (37.1 °C)  Pulse:  [74-89] 74  Resp:  [17-18] 18  SpO2:  [90 %-95 %] 90 %  BP: (103-137)/(55-73) 103/59     Weight: 98.9 kg (218 lb)  Body mass index is 41.19 kg/m².     SpO2: (!) 90 %         Intake/Output Summary (Last 24 hours) at 2/24/2023 1700  Last data filed at 2/24/2023 0731  Gross per 24 hour   Intake 2720.68 ml   Output 1200 ml   Net 1520.68 ml       Lines/Drains/Airways       Peripheral Intravenous Line  Duration                  Peripheral IV - Single Lumen 02/22/23 2051 20 G Anterior;Left Hand 1 day         Peripheral IV - Single Lumen 02/22/23 2155 18 G Left Antecubital 1 day                    Physical Exam  Vitals and nursing note reviewed.   Constitutional:       Appearance: Normal appearance.   HENT:      Head: Normocephalic.   Eyes:      Pupils: Pupils are equal,  round, and reactive to light.   Cardiovascular:      Rate and Rhythm: Normal rate and regular rhythm.      Heart sounds: Normal heart sounds, S1 normal and S2 normal. No murmur heard.    No S3 or S4 sounds.   Pulmonary:      Effort: Pulmonary effort is normal.      Breath sounds: Normal breath sounds.   Abdominal:      General: Bowel sounds are normal.      Palpations: Abdomen is soft.   Musculoskeletal:         General: Normal range of motion.      Cervical back: Normal range of motion.   Skin:     Capillary Refill: Capillary refill takes less than 2 seconds.      Findings: Lesion present.   Neurological:      General: No focal deficit present.      Mental Status: She is alert and oriented to person, place, and time.   Psychiatric:         Mood and Affect: Mood normal.         Behavior: Behavior normal.         Thought Content: Thought content normal.       Significant Labs: BMP:   Recent Labs   Lab 02/22/23 2154 02/24/23  0705   * 106   * 138   K 3.8 3.4*    106   CO2 22* 22*   BUN 16 7   CREATININE 0.7 0.6   CALCIUM 9.8 8.7   MG  --  1.7   , CMP   Recent Labs   Lab 02/22/23 2154 02/24/23  0705   * 138   K 3.8 3.4*    106   CO2 22* 22*   * 106   BUN 16 7   CREATININE 0.7 0.6   CALCIUM 9.8 8.7   PROT 7.2 6.3   ALBUMIN 3.9 3.3*   BILITOT 0.4 1.3*   ALKPHOS 78 71   AST 19 34   ALT 27 14   ANIONGAP 15 10   , CBC   Recent Labs   Lab 02/22/23 2154 02/23/23  0539 02/24/23  0705   WBC 9.00 13.14* 13.73*   HGB 13.6 13.3 12.7   HCT 41.0 40.5 38.0    197 82*   , INR   Recent Labs   Lab 02/22/23 2154   INR 1.0   , Lipid Panel No results for input(s): CHOL, HDL, LDLCALC, TRIG, CHOLHDL in the last 48 hours., Troponin   Recent Labs   Lab 02/22/23 2154   TROPONINI <0.006   , and All pertinent lab results from the last 24 hours have been reviewed.    Significant Imaging: Cardiac Cath: reviewed, Echocardiogram: Transthoracic echo (TTE) complete (Cupid Only):   Results for orders  placed or performed during the hospital encounter of 02/22/23   Echo   Result Value Ref Range    BSA 2.06 m2    TDI SEPTAL 0.08 m/s    LV LATERAL E/E' RATIO 6.55 m/s    LV SEPTAL E/E' RATIO 9.00 m/s    LA WIDTH 4.00 cm    IVC diameter 1.6 cm    Left Ventricular Outflow Tract Mean Velocity 0.81 cm/s    Left Ventricular Outflow Tract Mean Gradient 2.77 mmHg    TV mean gradient 22 mmHg    TDI LATERAL 0.11 m/s    LVIDd 4.21 3.5 - 6.0 cm    IVS 1.41 (A) 0.6 - 1.1 cm    Posterior Wall 1.21 (A) 0.6 - 1.1 cm    Ao root annulus 3.16 cm    LVIDs 2.95 2.1 - 4.0 cm    FS 30 28 - 44 %    LA volume 72.22 cm3    STJ 3.28 cm    Ascending aorta 3.20 cm    LV mass 203.60 g    LA size 3.91 cm    TAPSE 1.90 cm    Left Ventricle Relative Wall Thickness 0.57 cm    AV mean gradient 4 mmHg    AV valve area 2.76 cm2    AV Velocity Ratio 0.78     AV index (prosthetic) 0.94     MV valve area p 1/2 method 4.68 cm2    E/A ratio 1.29     Mean e' 0.10 m/s    E wave deceleration time 161.92 msec    IVRT 60.89 msec    LVOT diameter 1.93 cm    LVOT area 2.9 cm2    LVOT peak rene 1.09 m/s    LVOT peak VTI 26.10 cm    Ao peak rene 1.39 m/s    Ao VTI 27.7 cm    RVOT peak rene 0.88 m/s    RVOT peak VTI 20.2 cm    Mr max rene 3.12 m/s    LVOT stroke volume 76.32 cm3    AV peak gradient 8 mmHg    PV mean gradient 1.84 mmHg    E/E' ratio 7.58 m/s    MV Peak E Rene 0.72 m/s    TR Max Rene 2.79 m/s    MV stenosis pressure 1/2 time 46.96 ms    MV Peak A Rene 0.56 m/s    LV Systolic Volume 33.71 mL    LV Systolic Volume Index 17.2 mL/m2    LV Diastolic Volume 78.99 mL    LV Diastolic Volume Index 40.30 mL/m2    LA Volume Index 36.8 mL/m2    LV Mass Index 104 g/m2    RA Major Axis 3.86 cm    Left Atrium Minor Axis 5.64 cm    Left Atrium Major Axis 5.24 cm    Triscuspid Valve Regurgitation Peak Gradient 31 mmHg    RA Width 3.00 cm    Right Atrial Pressure (from IVC) 3 mmHg    EF 55 %    TV rest pulmonary artery pressure 34 mmHg    Narrative    · The left ventricle is  normal in size with moderate concentric   hypertrophy and normal systolic function.  · Mild left atrial enlargement.  · The estimated ejection fraction is 55%.  · Normal left ventricular diastolic function.  · Normal right ventricular size with normal right ventricular systolic   function.  · Normal central venous pressure (3 mmHg).  · The estimated PA systolic pressure is 34 mmHg.  · Trivial posterior pericardial effusion.      , EKG: reviewed, Stress Test: reviewed, and X-Ray: CXR: X-Ray Chest 1 View (CXR): No results found for this visit on 02/22/23.    Assessment and Plan:       * Brachial artery occlusion, right  Vascular following  Cont Hep gtt  Needs full hypercoagulation work up    2/24/23  Site oozing  Vascular seeing pt    Splenic infarct  ROSY today with Dr. Tavia Wheatley NPO  Needs full hypercoagulation work up   BRIONNA work up OP    2/24/23  ROSY yesterday negative  Hematology consulted    Chronic bilateral low back pain with bilateral sciatica  tx per primary team    Essential hypertension  Cont medications        VTE Risk Mitigation (From admission, onward)         Ordered     heparin 25,000 units in dextrose 5% 250 mL (100 units/mL) infusion LOW INTENSITY nomogram - [facility]  Continuous        Question Answer Comment   Heparin Infusion Adjustment (DO NOT MODIFY ANSWER) [email or web address]\[employer]\Images\Pharmacy\HeparinInfusions\heparin LOW INTENSITY nomogram for [facility] [id number]    Begin at (in units/kg/hr) 12        02/23/23 0541     heparin 25,000 units in dextrose 5% (100 units/ml) IV bolus from bag INITIAL BOLUS  Once        Question:  Heparin Infusion Adjustment (DO NOT MODIFY ANSWER)  Answer:  [email or web address]\Images\Pharmacy\HeparinInfusions\heparin LOW INTENSITY nomogram for [facility] [id number]    02/23/23 0541     IP VTE HIGH RISK PATIENT  Once         02/23/23 0541     Place sequential compression device  Until discontinued         02/23/23 0541     heparin 25,000 units in dextrose 5% (100 units/ml) IV bolus  from bag - ADDITIONAL PRN BOLUS - 60 units/kg  As needed (PRN)        Question:  Heparin Infusion Adjustment (DO NOT MODIFY ANSWER)  Answer:  [email or web address]\[employer]\Images\Pharmacy\HeparinInfusions\heparin LOW INTENSITY nomogram for [facility] [id number]    02/23/23 0541     heparin 25,000 units in dextrose 5% (100 units/ml) IV bolus from bag - ADDITIONAL PRN BOLUS - 30 units/kg  As needed (PRN)        Question:  Heparin Infusion Adjustment (DO NOT MODIFY ANSWER)  Answer:  [email or web address]\[employer]\Images\Pharmacy\HeparinInfusions\heparin LOW INTENSITY nomogram for [facility] [id number].pdf    02/23/23 0541     Place sequential compression device  Until discontinued         02/23/23 0533                Jacqueline Bentley NP  Cardiology  [facility] - [facility]

## 2023-02-24 NOTE — ASSESSMENT & PLAN NOTE
Vascular following  Cont Hep gtt  Needs full hypercoagulation work up    2/24/23  Site oozing  Vascular seeing pt

## 2023-02-24 NOTE — PLAN OF CARE
Pt remains free of pain and injuries, heparin in progress with x 2 therapeutic APTT, will continue to monitor. Chart check completed.  Problem: Adult Inpatient Plan of Care  Goal: Plan of Care Review  Outcome: Ongoing, Progressing  Goal: Patient-Specific Goal (Individualized)  Outcome: Ongoing, Progressing  Goal: Absence of Hospital-Acquired Illness or Injury  Outcome: Ongoing, Progressing  Goal: Optimal Comfort and Wellbeing  Outcome: Ongoing, Progressing  Goal: Readiness for Transition of Care  Outcome: Ongoing, Progressing     Problem: Bariatric Environmental Safety  Goal: Safety Maintained with Care  Outcome: Ongoing, Progressing     Problem: Skin Injury Risk Increased  Goal: Skin Health and Integrity  Outcome: Ongoing, Progressing     Problem: Impaired Wound Healing  Goal: Optimal Wound Healing  Outcome: Ongoing, Progressing     Problem: Fall Injury Risk  Goal: Absence of Fall and Fall-Related Injury  Outcome: Ongoing, Progressing

## 2023-02-24 NOTE — HOSPITAL COURSE
2/24/23 Pt seen and examined today. Hematoma noted RUE, recommend vascular see patient. Labs reviewed K 3.4

## 2023-02-24 NOTE — ASSESSMENT & PLAN NOTE
-unclear etiology.    -pain medications as needed.    -general surgery consult  -ROSY negative  -continue heparin drip.

## 2023-02-24 NOTE — ASSESSMENT & PLAN NOTE
ROSY today with Dr. Squires  Keep NPO  Needs full hypercoagulation work up   BRIONNA work up OP    2/24/23  ROSY yesterday negative  Hematology consulted

## 2023-02-24 NOTE — CONSULTS
[facility]  [facility]ology[facility]On[facility]  Consult Note    Patient Name: Jimena Pierce  MRN: [id number]  Admission Date: 2/22/2023  Hospital Length of Stay: 0 days  Code Status: No Order   Attending Provider: Juan Benson MD  Consulting Provider: Brooklyn Barney MD  Primary Care Physician: Baylee Aleman DO  Principal Problem:Brachial artery occlusion, right    Inpatient consult to [facility] Oncology  Consult performed by: Brooklyn Barney MD  Consult ordered by: Brooklyn Barney MD  Reason for consult: Right brachial artery thrombosis, acute  Assessment/Recommendations:     Right brachial artery thrombosis:  Hematology consulted for consideration of hypercoagulable workup given acute unprovoked right brachial artery thrombosis.  No prior history of thrombotic event arterial or venous.  No history of miscarriage.  Recommend workup with vascular/cardiology regarding associated risk factors.  I discussed hypercoagulable workup can be performed however these typically are more associated with venous thrombotic event of which she is no history of although there is some overlap.  No concerning constitutional symptoms and review of lab work no over concerns for myeloproliferative neoplasm.  We can perform with testing in the morning and follow-up with her as outpatient. She should follow current recommendations of vascular surgery/hospital medicine.  Recommend she stay up-to-date on age-appropriate cancer screening.    Leukocytosis: Most recent lab value with leukocytosis may be reactive inflammatory given acute clinical circumstances recent procedure and thrombotic event.  Recommend follow-up.  No prior CBC abnormality.      Thrombocytopenia:  Most recent CBC with thrombocytopenia to 70 K. recommend repeat lab if persistent low recommend blue top tube to confirm and follow-up if persistent/progressive thrombocytopenia further workup may be indicated.  No prior history of  cytopenia/thrombocytopenia or known heparin allergy.      Subjective:     HPI:  Ms. Pierce is a 57-year-old woman we obesity, hypertension, hyperlipidemia, cerebral palsy and chronic back pain.  She presented after acute onset right arm pain followed by numbness tingling and cold extremity.  She was evaluated in emergency room and by vascular surgery found to have right particular artery thrombosis.  She underwent thrombectomy and heparin drip.  Hematology has been consulted due to thrombotic event consideration of hypercoagulable workup.    Oncology Treatment Plan:   [No matching plan found]    Medications:  Continuous Infusions:   heparin (porcine) in D5W 18 Units/kg/hr (02/24/23 0731)    lactated ringers 125 mL/hr at 02/24/23 0731     Scheduled Meds:   amLODIPine  5 mg Oral Daily    chlorhexidine  10 mL Mouth/Throat BID    heparin (PORCINE)  60 Units/kg (Adjusted) Intravenous Once    oxybutynin  15 mg Oral Daily    valsartan  160 mg Oral Daily     PRN Meds:acetaminophen, aluminum-magnesium hydroxide-simethicone, guaiFENesin 100 mg/5 ml, heparin (PORCINE), heparin (PORCINE), morphine, ondansetron, ondansetron, oxyCODONE, sodium chloride 0.9%     Review of patient's allergies indicates:  No Known Allergies     Past Medical History:   Diagnosis Date    Chronic low back pain     Congenital cerebral palsy     GERD (gastroesophageal reflux disease)     Hyperlipidemia     Hypertension     Lymphedema     right foot     Past Surgical History:   Procedure Laterality Date    COLONOSCOPY N/A 10/05/2016    Polyp x 1 repeat 5 yrs. Procedure: COLONOSCOPY;  Surgeon: Aicha Ravi MD;  Location: [facility] ENDO;  Service: Endoscopy;  Laterality: N/A;    EMBOLECTOMY Right 2/23/2023    Procedure: EMBOLECTOMY;  Surgeon: Mario Sun MD;  Location: [facility] OR;  Service: Cardiovascular;  Laterality: Right;    HIP SURGERY Bilateral     as child    LEG SURGERY      TENDON RELEASE Bilateral     as child to knees and hip adductors     TONSILLECTOMY      TRANSESOPHAGEAL ECHOCARDIOGRAPHY N/A 2/23/2023    Procedure: ECHOCARDIOGRAM, TRANSESOPHAGEAL;  Surgeon: Roula Squires MD;  Location: [facility] CATH LAB;  Service: Cardiology;  Laterality: N/A;     Family History       Problem Relation (Age of Onset)    Arthritis Mother    Cataracts Maternal Grandmother, Maternal Grandfather    Hypertension Mother          Tobacco Use    Smoking status: Former     Packs/day: 0.25     Years: 3.00     Pack years: 0.75     Types: Cigarettes    Smokeless tobacco: Never    Tobacco comments:     quit smoking in 1985   Substance and Sexual Activity    Alcohol use: Not Currently     Comment: occasionally    Drug use: No    Sexual activity: Not Currently       Review of Systems   Constitutional:  Positive for fatigue. Negative for activity change, appetite change, chills, diaphoresis, fever and unexpected weight change.   HENT:  Negative for congestion, rhinorrhea and sinus pain.    Respiratory:  Negative for cough, choking, chest tightness, shortness of breath, wheezing and stridor.    Cardiovascular:  Negative for chest pain, palpitations and leg swelling.   Gastrointestinal:  Negative for abdominal distention, abdominal pain, anal bleeding, blood in stool, constipation, diarrhea, nausea, rectal pain and vomiting.   Skin:  Negative for rash.   Hematological:  Does not bruise/bleed easily.   Psychiatric/Behavioral: Negative.     Objective:     Vital Signs (Most Recent):  Temp: 98.8 °F (37.1 °C) (02/24/23 1528)  Pulse: 74 (02/24/23 1528)  Resp: 18 (02/24/23 1528)  BP: (!) 103/59 (02/24/23 1528)  SpO2: (!) 90 % (02/24/23 1528)   Vital Signs (24h Range):  Temp:  [98.5 °F (36.9 °C)-99.9 °F (37.7 °C)] 98.8 °F (37.1 °C)  Pulse:  [74-89] 74  Resp:  [17-18] 18  SpO2:  [90 %-95 %] 90 %  BP: (103-137)/(55-73) 103/59     Weight: 98.9 kg (218 lb)  Body mass index is 41.19 kg/m².  Body surface area is 2.06 meters squared.      Intake/Output Summary (Last 24 hours) at 2/24/2023  1727  Last data filed at 2/24/2023 0731  Gross per 24 hour   Intake 2720.68 ml   Output 1200 ml   Net 1520.68 ml       Physical Exam  Vitals reviewed.   Constitutional:       General: She is not in acute distress.     Appearance: Normal appearance.   HENT:      Head: Normocephalic and atraumatic.      Nose: No congestion.      Mouth/Throat:      Mouth: Mucous membranes are moist.   Eyes:      Extraocular Movements: Extraocular movements intact.      Conjunctiva/sclera: Conjunctivae normal.   Cardiovascular:      Rate and Rhythm: Normal rate.   Pulmonary:      Effort: Pulmonary effort is normal. No respiratory distress.   Abdominal:      General: There is no distension.      Palpations: Abdomen is soft.   Musculoskeletal:         General: No swelling.      Cervical back: Neck supple.   Skin:     General: Skin is warm.      Coloration: Skin is not jaundiced.   Neurological:      General: No focal deficit present.      Mental Status: She is alert and oriented to person, place, and time.   Psychiatric:         Mood and Affect: Mood normal.         Behavior: Behavior normal.         Thought Content: Thought content normal.         Judgment: Judgment normal.       Significant Labs:   CBC:   Recent Labs   Lab 02/22/23 2154 02/23/23  0539 02/24/23  0705   WBC 9.00 13.14* 13.73*   HGB 13.6 13.3 12.7   HCT 41.0 40.5 38.0    197 82*   , CMP:   Recent Labs   Lab 02/22/23 2154 02/24/23  0705   * 138   K 3.8 3.4*    106   CO2 22* 22*   * 106   BUN 16 7   CREATININE 0.7 0.6   CALCIUM 9.8 8.7   PROT 7.2 6.3   ALBUMIN 3.9 3.3*   BILITOT 0.4 1.3*   ALKPHOS 78 71   AST 19 34   ALT 27 14   ANIONGAP 15 10   , and Coagulation:   Recent Labs   Lab 02/22/23 2154 02/23/23  1014 02/23/23  1820 02/24/23  0121   INR 1.0  --   --   --    APTT 26.6 29.5 44.7* 48.2*       Diagnostic Results:  I have reviewed all pertinent imaging results/findings within the past 24 hours.    Assessment/Plan:     Active Diagnoses:     Diagnosis Date Noted POA    PRINCIPAL PROBLEM:  Brachial artery occlusion, right [I70.208] 02/23/2023 Yes    Splenic infarct [D73.5] 02/23/2023 Yes    Cerebral palsy [G80.9] 02/23/2023 Yes    Obesity, Class III, BMI 40-49.9 (morbid obesity) [E66.01] 09/13/2022 Yes    Chronic bilateral low back pain with bilateral sciatica [M54.42, M54.41, G89.29] 11/25/2016 Yes    Essential hypertension [I10] 08/02/2016 Yes      Problems Resolved During this Admission:       See above    Thank you for your consult. I will follow-up with patient. Please contact us if you have any additional questions.    Brooklyn Barney MD  Hematology/Oncology  [facility] Surg    VIRTUAL TELENOTE    Start time:  4:30pm  Chief complaint:  Thrombosis  The patient location is:  Hospital room  The patient arrived at:  Hospital room  Present with the patient at the time of the telemed/virtual assessment:  Nursing             End time:  445pm    Total time spent with patient:  15 minutes    The attending portion of this evaluation, treatment, and documentation was performed per Brooklyn Barney MD via Telemedicine AudioVisual using the secure [employer] software platform with 2 way audio/video. The provider was located off-site and the patient is located in the hospital. The aforementioned video software was utilized to document the relevant history and physical exam.

## 2023-02-24 NOTE — PROGRESS NOTES
[facility] Medicine  Progress Note    Patient Name: Jimena Pierce  MRN: [id number]  Patient Class: OP- Outpatient Recovery   Admission Date: 2/22/2023  Length of Stay: 0 days  Attending Physician: Juan Benson MD  Primary Care Provider: Baylee Aleman DO        Subjective:     Principal Problem:Brachial artery occlusion, right        HPI:  Ms. Pierce is a 57-year-old  female with PMH significant for cerebral palsy (mild), hypertension, hyperlipidemia, presented to the ED complaining of acute onset of right forearm pain, and abdominal pain that started around the same time since yesterday evening.  She was feeling numbness, tingling of the right fingers.  Right UE arterial ultrasound reveals evidence of acute occlusion at the brachial artery.  CTA of the right upper extremity was unremarkable.  Patient was having numbness, paresthesias, coldness of the right forearm.  Dr. Sun with vascular surgery evaluated the patient, has taken her to the OR emergently for open embolectomy.  She was found to have large clot burden at the bifurcation of the brachial artery on the right, as well as small amount of thrombus in the distal ulnar and radial arteries.  Patient was started on heparin drip prior to the procedure and to be resumed 10:34 p.m. postprocedure.  Patient reports improved pain, paresthesias of the right arm.  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      Admitting diagnosis:  Acute thromboembolism right brachial artery, distal ulnar, radial arteries.  Status post emergent thromboembolectomy.  Splenic artery infarction.      Overview/Hospital Course:  POD1 embolectomy of right brachial artery, continued on heparin with plan to transition to PO anticoagulation pending heme/onc consult      Review of Systems   Constitutional: Negative.  Negative for chills and fever.   HENT: Negative.  Negative for  congestion, rhinorrhea, sore throat and trouble swallowing.    Eyes: Negative.  Negative for visual disturbance.   Respiratory: Negative.  Negative for cough, shortness of breath and wheezing.    Cardiovascular: Negative.  Negative for chest pain and palpitations.   Gastrointestinal:  Positive for abdominal pain and nausea. Negative for blood in stool, diarrhea and vomiting.   Endocrine: Negative.    Genitourinary: Negative.  Negative for dysuria and flank pain.   Musculoskeletal: Negative.  Negative for back pain.   Skin: Negative.  Negative for rash.   Allergic/Immunologic: Negative.    Neurological:  Positive for numbness (Right arm, improving postprocedure). Negative for speech difficulty, weakness and headaches.   Hematological: Negative.    Psychiatric/Behavioral: Negative.  Negative for hallucinations. The patient is not nervous/anxious.    All other systems reviewed and are negative.  Objective:     Vital Signs (Most Recent):  Temp: 98.8 °F (37.1 °C) (02/24/23 1528)  Pulse: 74 (02/24/23 1528)  Resp: 18 (02/24/23 1528)  BP: (!) 103/59 (02/24/23 1528)  SpO2: (!) 90 % (02/24/23 1528)   Vital Signs (24h Range):  Temp:  [98.5 °F (36.9 °C)-99.9 °F (37.7 °C)] 98.8 °F (37.1 °C)  Pulse:  [74-89] 74  Resp:  [17-18] 18  SpO2:  [90 %-95 %] 90 %  BP: (103-137)/(55-73) 103/59     Weight: 98.9 kg (218 lb)  Body mass index is 41.19 kg/m².    Intake/Output Summary (Last 24 hours) at 2/24/2023 1720  Last data filed at 2/24/2023 0731  Gross per 24 hour   Intake 2720.68 ml   Output 1200 ml   Net 1520.68 ml      Physical Exam  Vitals and nursing note reviewed.   Constitutional:       General: She is awake. She is not in acute distress.     Appearance: She is obese. She is not ill-appearing.   HENT:      Head: Normocephalic and atraumatic.      Mouth/Throat:      Mouth: Mucous membranes are moist.   Eyes:      General: No scleral icterus.     Conjunctiva/sclera: Conjunctivae normal.   Cardiovascular:      Rate and Rhythm: Normal  rate and regular rhythm.      Heart sounds: No murmur heard.  Pulmonary:      Effort: Pulmonary effort is normal. No respiratory distress.      Breath sounds: Normal breath sounds. No wheezing.   Abdominal:      Palpations: Abdomen is soft.      Tenderness: There is abdominal tenderness (Generalized).   Musculoskeletal:         General: No swelling.      Cervical back: Neck supple.   Skin:     General: Skin is warm.      Coloration: Skin is not jaundiced.   Neurological:      General: No focal deficit present.      Mental Status: She is alert and oriented to person, place, and time. Mental status is at baseline.   Psychiatric:         Attention and Perception: Attention normal.         Speech: Speech normal.         Behavior: Behavior is cooperative.       Significant Labs: All pertinent labs within the past 24 hours have been reviewed.    Significant Imaging: I have reviewed all pertinent imaging results/findings within the past 24 hours.      Assessment/Plan:      * Brachial artery occlusion, right  -acute brachial artery occlusion, on the right side.    -status post emergent thromboembolectomy.  POD1  -found to have large clot burden at the bifurcation of the brachial artery, with some thrombus in the distal ulnar and radial arteries.    -continue heparin drip, transition to PO AC in AM 2/25 per vascular recs  -heme/onc consult, f/u recs    Cerebral palsy  -mild.    -able to ambulate with walker at home.      Splenic infarct  -unclear etiology.    -pain medications as needed.    -general surgery consult  -ROSY negative  -continue heparin drip.      Obesity, Class III, BMI 40-49.9 (morbid obesity)  Body mass index is 41.19 kg/m². Morbid obesity complicates all aspects of disease management from diagnostic modalities to treatment. Weight loss encouraged and health benefits explained to patient.         Chronic bilateral low back pain with bilateral sciatica  -continue home dose pain medications.      Essential  hypertension  -continue amlodipine, valsartan.    -monitor      VTE Risk Mitigation (From admission, onward)           Ordered     heparin 25,000 units in dextrose 5% 250 mL (100 units/mL) infusion LOW INTENSITY nomogram - [facility]  Continuous        Question Answer Comment   Heparin Infusion Adjustment (DO NOT MODIFY ANSWER) [email or web address]\[email or web address]\Images\Pharmacy\HeparinInfusions\heparin LOW INTENSITY nomogram for [facility] [id number]    Begin at (in units/kg/hr) 12        02/23/23 0541     heparin 25,000 units in dextrose 5% (100 units/ml) IV bolus from bag INITIAL BOLUS  Once        Question:  Heparin Infusion Adjustment (DO NOT MODIFY ANSWER)  Answer:  [email or web address]\epic\Images\Pharmacy\HeparinInfusions\heparin LOW INTENSITY nomogram for [facility] [id number]    02/23/23 0541     IP VTE HIGH RISK PATIENT  Once         02/23/23 0541     Place sequential compression device  Until discontinued         02/23/23 0541     heparin 25,000 units in dextrose 5% (100 units/ml) IV bolus from bag - ADDITIONAL PRN BOLUS - 60 units/kg  As needed (PRN)        Question:  Heparin Infusion Adjustment (DO NOT MODIFY ANSWER)  Answer:  [email or web address]\Images\Pharmacy\HeparinInfusions\heparin LOW INTENSITY nomogram for [facility] [id number]    02/23/23 0541     heparin 25,000 units in dextrose 5% (100 units/ml) IV bolus from bag - ADDITIONAL PRN BOLUS - 30 units/kg  As needed (PRN)        Question:  Heparin Infusion Adjustment (DO NOT MODIFY ANSWER)  Answer:  [email or web address]\Images\Pharmacy\HeparinInfusions\heparin LOW INTENSITY nomogram for [facility] [id number]    02/23/23 0541     Place sequential compression device  Until discontinued         02/23/23 0533                    Discharge Planning   TUCKER:      Code Status: Not on file   Is the patient medically ready for discharge?:     Reason for patient still in hospital (select all that apply): Patient trending condition, Treatment, Consult recommendations and Pending disposition  Discharge Plan A: Home with  family                  Juan Benson MD  Department of Hospital Medicine   '[facility] Surg

## 2023-02-24 NOTE — HOSPITAL COURSE
[facility]  However she was noted to develop thrombocytopenia.  Hematology consulted for further workup.  She was subsequently found to have positive anticardiolipin antibody.  Given new diagnosis of antiphospholipid syndrome which likely is the etiology of splenic infarct, hematology recommended anticoagulation with Coumadin with goal INR between 2.5-3.5.  Patient started on Lovenox bridging in the intervening time.  Hospital course complicated by pneumonia, treated to completion with Rocephin and azithromycin.  PT/OT initially recommended discharge home with , however patient initially did not feel comfortable being discharged home due to lack of support at home.  SNF referral initiated, however patient was subsequently able to make arrangements for sister to stay with her and felt comfortable being discharged home with  service, subsequently requested to cancel plan for SNF discharge.   arranged, patient discharged home to continue Coumadin bridging with [facility].  She had also been made follow-up appointment with Dr. Sun for 03/07/2023 for staples removal.  Seen and examined personally on the day of discharge.  Patient notes RUE embolectomy site to be hard and appears to be bulging out.  Area examined and found to be nonfluctuant with no erythema, excess warmth or drainage.  Patient reports that this is unchanged from prior days.  Advised that she discuss this further at her follow-up appointment with Dr. Sun.  Patient also to follow-up with Hematology outpatient.  She is agreeable with discharge plan.  All questions answered to her satisfaction.  Stable for discharge home at this time.

## 2023-02-25 LAB
BASOPHILS # BLD AUTO: 0.05 K/UL (ref 0–0.2)
BASOPHILS NFR BLD: 0.4 % (ref 0–1.9)
DIFFERENTIAL METHOD: ABNORMAL
EOSINOPHIL # BLD AUTO: 0.1 K/UL (ref 0–0.5)
EOSINOPHIL NFR BLD: 0.6 % (ref 0–8)
ERYTHROCYTE [DISTWIDTH] IN BLOOD BY AUTOMATED COUNT: 12.3 % (ref 11.5–14.5)
HCT VFR BLD AUTO: 39.7 % (ref 37–48.5)
HGB BLD-MCNC: 13 G/DL (ref 12–16)
IMM GRANULOCYTES # BLD AUTO: 0.04 K/UL (ref 0–0.04)
IMM GRANULOCYTES NFR BLD AUTO: 0.4 % (ref 0–0.5)
LYMPHOCYTES # BLD AUTO: 2.2 K/UL (ref 1–4.8)
LYMPHOCYTES NFR BLD: 19.3 % (ref 18–48)
MCH RBC QN AUTO: 29.3 PG (ref 27–31)
MCHC RBC AUTO-ENTMCNC: 32.7 G/DL (ref 32–36)
MCV RBC AUTO: 90 FL (ref 82–98)
MONOCYTES # BLD AUTO: 1 K/UL (ref 0.3–1)
MONOCYTES NFR BLD: 9 % (ref 4–15)
NEUTROPHILS # BLD AUTO: 7.8 K/UL (ref 1.8–7.7)
NEUTROPHILS NFR BLD: 70.3 % (ref 38–73)
NRBC BLD-RTO: 0 /100 WBC
PLATELET # BLD AUTO: 55 K/UL (ref 150–450)
PMV BLD AUTO: 11.9 FL (ref 9.2–12.9)
RBC # BLD AUTO: 4.43 M/UL (ref 4–5.4)
WBC # BLD AUTO: 11.17 K/UL (ref 3.9–12.7)

## 2023-02-25 PROCEDURE — 25000003 PHARM REV CODE 250: Performed by: STUDENT IN AN ORGANIZED HEALTH CARE EDUCATION/TRAINING PROGRAM

## 2023-02-25 PROCEDURE — 85732 THROMBOPLASTIN TIME PARTIAL: CPT | Performed by: STUDENT IN AN ORGANIZED HEALTH CARE EDUCATION/TRAINING PROGRAM

## 2023-02-25 PROCEDURE — 97162 PT EVAL MOD COMPLEX 30 MIN: CPT

## 2023-02-25 PROCEDURE — 85597 PHOSPHOLIPID PLTLT NEUTRALIZ: CPT | Performed by: STUDENT IN AN ORGANIZED HEALTH CARE EDUCATION/TRAINING PROGRAM

## 2023-02-25 PROCEDURE — 81000 URINALYSIS NONAUTO W/SCOPE: CPT | Performed by: NURSE PRACTITIONER

## 2023-02-25 PROCEDURE — 99233 SBSQ HOSP IP/OBS HIGH 50: CPT | Mod: ,,, | Performed by: INTERNAL MEDICINE

## 2023-02-25 PROCEDURE — 36415 COLL VENOUS BLD VENIPUNCTURE: CPT | Performed by: STUDENT IN AN ORGANIZED HEALTH CARE EDUCATION/TRAINING PROGRAM

## 2023-02-25 PROCEDURE — 81240 F2 GENE: CPT | Performed by: STUDENT IN AN ORGANIZED HEALTH CARE EDUCATION/TRAINING PROGRAM

## 2023-02-25 PROCEDURE — 81241 F5 GENE: CPT | Performed by: STUDENT IN AN ORGANIZED HEALTH CARE EDUCATION/TRAINING PROGRAM

## 2023-02-25 PROCEDURE — 99900035 HC TECH TIME PER 15 MIN (STAT)

## 2023-02-25 PROCEDURE — 25000003 PHARM REV CODE 250: Performed by: NURSE PRACTITIONER

## 2023-02-25 PROCEDURE — 99233 PR SUBSEQUENT HOSPITAL CARE,LEVL III: ICD-10-PCS | Mod: ,,, | Performed by: INTERNAL MEDICINE

## 2023-02-25 PROCEDURE — 85613 RUSSELL VIPER VENOM DILUTED: CPT | Mod: 91 | Performed by: STUDENT IN AN ORGANIZED HEALTH CARE EDUCATION/TRAINING PROGRAM

## 2023-02-25 PROCEDURE — 85300 ANTITHROMBIN III ACTIVITY: CPT | Performed by: STUDENT IN AN ORGANIZED HEALTH CARE EDUCATION/TRAINING PROGRAM

## 2023-02-25 PROCEDURE — 85670 THROMBIN TIME PLASMA: CPT | Performed by: STUDENT IN AN ORGANIZED HEALTH CARE EDUCATION/TRAINING PROGRAM

## 2023-02-25 PROCEDURE — 85025 COMPLETE CBC W/AUTO DIFF WBC: CPT | Performed by: STUDENT IN AN ORGANIZED HEALTH CARE EDUCATION/TRAINING PROGRAM

## 2023-02-25 PROCEDURE — 85303 CLOT INHIBIT PROT C ACTIVITY: CPT | Performed by: STUDENT IN AN ORGANIZED HEALTH CARE EDUCATION/TRAINING PROGRAM

## 2023-02-25 PROCEDURE — 25000003 PHARM REV CODE 250: Performed by: INTERNAL MEDICINE

## 2023-02-25 PROCEDURE — 85305 CLOT INHIBIT PROT S TOTAL: CPT | Performed by: STUDENT IN AN ORGANIZED HEALTH CARE EDUCATION/TRAINING PROGRAM

## 2023-02-25 PROCEDURE — 11000001 HC ACUTE MED/SURG PRIVATE ROOM

## 2023-02-25 PROCEDURE — 97167 OT EVAL HIGH COMPLEX 60 MIN: CPT

## 2023-02-25 PROCEDURE — 86147 CARDIOLIPIN ANTIBODY EA IG: CPT | Mod: 59 | Performed by: STUDENT IN AN ORGANIZED HEALTH CARE EDUCATION/TRAINING PROGRAM

## 2023-02-25 PROCEDURE — 85730 THROMBOPLASTIN TIME PARTIAL: CPT | Performed by: STUDENT IN AN ORGANIZED HEALTH CARE EDUCATION/TRAINING PROGRAM

## 2023-02-25 PROCEDURE — 97530 THERAPEUTIC ACTIVITIES: CPT

## 2023-02-25 PROCEDURE — 25000003 PHARM REV CODE 250: Performed by: SURGERY

## 2023-02-25 PROCEDURE — 85613 RUSSELL VIPER VENOM DILUTED: CPT | Performed by: STUDENT IN AN ORGANIZED HEALTH CARE EDUCATION/TRAINING PROGRAM

## 2023-02-25 RX ORDER — FAMOTIDINE 20 MG/1
20 TABLET, FILM COATED ORAL 2 TIMES DAILY
Status: DISCONTINUED | OUTPATIENT
Start: 2023-02-25 | End: 2023-03-06 | Stop reason: HOSPADM

## 2023-02-25 RX ADMIN — APIXABAN 10 MG: 2.5 TABLET, FILM COATED ORAL at 10:02

## 2023-02-25 RX ADMIN — OXYCODONE HYDROCHLORIDE 30 MG: 30 TABLET ORAL at 02:02

## 2023-02-25 RX ADMIN — CHLORHEXIDINE GLUCONATE 0.12% ORAL RINSE 10 ML: 1.2 LIQUID ORAL at 10:02

## 2023-02-25 RX ADMIN — CHLORHEXIDINE GLUCONATE 0.12% ORAL RINSE 10 ML: 1.2 LIQUID ORAL at 08:02

## 2023-02-25 RX ADMIN — FAMOTIDINE 20 MG: 20 TABLET ORAL at 08:02

## 2023-02-25 RX ADMIN — OXYCODONE HYDROCHLORIDE 30 MG: 30 TABLET ORAL at 05:02

## 2023-02-25 RX ADMIN — OXYBUTYNIN CHLORIDE 15 MG: 5 TABLET, EXTENDED RELEASE ORAL at 10:02

## 2023-02-25 RX ADMIN — APIXABAN 10 MG: 2.5 TABLET, FILM COATED ORAL at 08:02

## 2023-02-25 NOTE — PT/OT/SLP EVAL
Physical Therapy Evaluation    Patient Name:  Jimena Pierce   MRN:  [id number]    Recommendations:     Discharge Recommendations: home health PT   Discharge Equipment Recommendations: wheelchair, hospital bed   Barriers to discharge: Decreased caregiver support    Assessment:     Jimena Pierce is a 57 y.o. female admitted with a medical diagnosis of Brachial artery occlusion, right. Pt underwent emergent thromboembolectomy. She presents with the following impairments/functional limitations: weakness, impaired endurance, impaired functional mobility, gait instability, impaired balance, decreased safety awareness, pain, decreased coordination which limits mobility and increases risk of falls. Pt hx of CP also contributes to functional deficits and likely exacerbated due to immobility in hospital. Pt will benefit from continued PT services to address deficits and provide safety training in new home.    Rehab Prognosis: Fair; patient would benefit from acute skilled PT services to address these deficits and reach maximum level of function.    Recent Surgery: Procedure(s) (LRB):  ECHOCARDIOGRAM, TRANSESOPHAGEAL (N/A) 2 Days Post-Op    Plan:     During this hospitalization, patient to be seen 2 x/week to address the identified rehab impairments via gait training, therapeutic activities, therapeutic exercises, neuromuscular re-education and progress toward the following goals:    Plan of Care Expires:  03/11/23    Subjective     Chief Complaint: brachial artery occlusion s/p thromboembolectomy  Patient/Family Comments/goals: to go home  Pain/Comfort:  Pain Rating 1:  (pain to L shoulder, sorness to R arm, back and legs)  Pain Addressed 1: Reposition, Distraction    Patients cultural, spiritual, Yazidism conflicts given the current situation: no    Living Environment:  Pt lives with adult son in ground floor apartment.  Prior to admission, patients level of function was mod I with ADLs and gait.   Equipment used at home:  (transport chair, standard walker, lift chair, adjustable bed but rail is broken).  DME owned (not currently used): none.  Upon discharge, patient will have assistance from son but pt is alone during the day.    Objective:     Communicated with nursing (Ewelina) and performed chart review per epic system prior to session.  Patient found supine with telemetry, peripheral IV, PureWick  upon PT entry to room.    General Precautions: Standard, fall  Orthopedic Precautions:N/A   Braces: N/A  Respiratory Status: Room air    Exams:  Cognitive Exam:  Patient is oriented to Person, Place, Time, and Situation  Gross Motor Coordination:  impaired due to CP  Postural Exam:  Patient presented with the following abnormalities:    -       Rounded shoulders  -       Forward head  -       Kyphosis  -       lean to Left  BLE Strength and ROM: grossly limited by CP, increased tone/spasticity. Grossly 15-25% of normal    Functional Mobility:  Bed Mobility:     Scooting: contact guard assistance  Supine to Sit: contact guard assistance, increased time needed  Transfers:     Sit to Stand:  moderate assistance with rolling walker  Bed to Chair: moderate assistance with  rolling walker  using  Stand Pivot  Gait: 3ft min A with HHA/RW with cues for hand placement, sequencing and overall sequencing to decrease risk falls. Typically pt wears pants in order to use UE to pull legs into place underneath her in preparation for standing. Required mod A for this.  Balance: poor dynamic standing balance      AM-PAC 6 CLICK MOBILITY  Total Score:13       Treatment & Education:  Educated pt on benefits of consistent participation in PT services to meet functional goals. Educated pt on supine/seated therex to promote strength and joint mobility. Exercises included AP. Educated to perform exercises intermittently throughout day to tolerance. Educated pt on importance of sitting OOB to promote endurance and overall activity  tolerance encouraged to sit OOB 1-2 hrs. Educated pt on call don't fall policy and use of call button to alert nursing staff of needs.  Pt expressed understanding. Educated nsg on tips to assist pt back to bed. Nsg expressed understanding.    Patient left up in chair with all lines intact, call button in reach, and nursing notified.    GOALS:   Multidisciplinary Problems       Physical Therapy Goals          Problem: Physical Therapy    Goal Priority Disciplines Outcome Goal Variances Interventions   Physical Therapy Goal     PT, PT/OT      Description: Pt will perform bed mobility mod I in order to participate in EOB activity.  Pt will perform transfers mod I in order to participate in OOB activity.   Pt will ambulate 75 ft mod I with LRAD in order to participate in daily tasks.                         History:     Past Medical History:   Diagnosis Date    Chronic low back pain     Congenital cerebral palsy     GERD (gastroesophageal reflux disease)     Hyperlipidemia     Hypertension     Lymphedema     right foot       Past Surgical History:   Procedure Laterality Date    COLONOSCOPY N/A 10/05/2016    Polyp x 1 repeat 5 yrs. Procedure: COLONOSCOPY;  Surgeon: Aicha Ravi MD;  Location: [facility] ENDO;  Service: Endoscopy;  Laterality: N/A;    EMBOLECTOMY Right 2/23/2023    Procedure: EMBOLECTOMY;  Surgeon: Mario Sun MD;  Location: [facility] OR;  Service: Cardiovascular;  Laterality: Right;    HIP SURGERY Bilateral     as child    LEG SURGERY      TENDON RELEASE Bilateral     as child to knees and hip adductors    TONSILLECTOMY      TRANSESOPHAGEAL ECHOCARDIOGRAPHY N/A 2/23/2023    Procedure: ECHOCARDIOGRAM, TRANSESOPHAGEAL;  Surgeon: Roula Squires MD;  Location: [facility] CATH LAB;  Service: Cardiology;  Laterality: N/A;       Time Tracking:     PT Received On: 02/25/23  PT Start Time: 1100     PT Stop Time: 1133  PT Total Time (min): 33 min     Billable Minutes: Evaluation 15 and Therapeutic Activity  18      02/25/2023

## 2023-02-25 NOTE — ASSESSMENT & PLAN NOTE
Can repeat platelet count in blue top tube and peripheral blood smear review to ensure no clumping/pseudo thrombocytopenia and for further evaluation of megakaryocytes.  Progressive decline in platelet count since 1st day of admission platelet count 222 on 02/22/2023 down to 55 on 02/25/2023.  Pattern of decline immediately on admission with 1st heparin exposure is inconsistent with a diagnosis of heparin-induced thrombocytopenia.  No other concerning new medication use.  History is most suggestive of consumptive thrombocytopenia.  No concomitant cytopenia/anemia to suggest thrombotic microangiopathy.  Given ongoing anticoagulation for acute ischemic event recommend close monitoring of platelet count daily and monitoring for any bleeding weighing risks and benefits of anticoagulation.

## 2023-02-25 NOTE — ASSESSMENT & PLAN NOTE
Acute development of arterial thrombosis without inciting event.  No known trauma or known anatomic abnormality/instrumentation.  While more typically arterial thrombotic event upper extremity etiology related to cardiac source/comorbidity and recommend full evaluation of this with Cardiology/vascular colleagues etiology less commonly hypercoagulable state may contribute.   Recommend hematologic workup for hypercoagulable state including evaluation of factor 5 Leiden, prothrombin gene mutation, protein C and S level, antithrombin level, anticardiolipin antibody, lupus anticoagulant, evaluation of myeloproliferative neoplasm (history and lab work/CBC without overt concerns for MPN).  No prior heparin exposure and therefore history does not support heparin-induced thrombocytopenia.  Recommend ensuring up-to-date on age-appropriate cancer screening.  - primary hospital medicine team/vascular surgery have recommended transition to oral anticoagulation currently on apixaban 5 mg b.i.d..    Due to lower platelet count recommend close monitoring CBC daily or sooner if any bleeding concerns.

## 2023-02-25 NOTE — SUBJECTIVE & OBJECTIVE
Review of Systems   Constitutional: Negative.  Negative for chills and fever.   HENT: Negative.  Negative for congestion, rhinorrhea, sore throat and trouble swallowing.    Eyes: Negative.  Negative for visual disturbance.   Respiratory: Negative.  Negative for cough, shortness of breath and wheezing.    Cardiovascular: Negative.  Negative for chest pain and palpitations.   Gastrointestinal:  Positive for abdominal pain and nausea. Negative for blood in stool, diarrhea and vomiting.   Endocrine: Negative.    Genitourinary: Negative.  Negative for dysuria and flank pain.   Musculoskeletal: Negative.  Negative for back pain.   Skin: Negative.  Negative for rash.   Allergic/Immunologic: Negative.    Neurological:  Positive for numbness (Right arm, improving postprocedure). Negative for speech difficulty, weakness and headaches.   Hematological: Negative.    Psychiatric/Behavioral: Negative.  Negative for hallucinations. The patient is not nervous/anxious.    All other systems reviewed and are negative.  Objective:     Vital Signs (Most Recent):  Temp: 98.7 °F (37.1 °C) (02/25/23 1246)  Pulse: 79 (02/25/23 1246)  Resp: 18 (02/25/23 1246)  BP: 128/68 (02/25/23 1246)  SpO2: (!) 91 % (02/25/23 1246)   Vital Signs (24h Range):  Temp:  [98.2 °F (36.8 °C)-99.7 °F (37.6 °C)] 98.7 °F (37.1 °C)  Pulse:  [72-98] 79  Resp:  [18-20] 18  SpO2:  [89 %-91 %] 91 %  BP: ()/(52-82) 128/68     Weight: 101.1 kg (222 lb 14.2 oz)  Body mass index is 42.11 kg/m².    Intake/Output Summary (Last 24 hours) at 2/25/2023 1406  Last data filed at 2/25/2023 0304  Gross per 24 hour   Intake --   Output 600 ml   Net -600 ml        Physical Exam  Vitals and nursing note reviewed.   Constitutional:       General: She is awake. She is not in acute distress.     Appearance: She is obese. She is not ill-appearing.   HENT:      Head: Normocephalic and atraumatic.      Mouth/Throat:      Mouth: Mucous membranes are moist.   Eyes:      General: No  scleral icterus.     Conjunctiva/sclera: Conjunctivae normal.   Cardiovascular:      Rate and Rhythm: Normal rate and regular rhythm.      Heart sounds: No murmur heard.  Pulmonary:      Effort: Pulmonary effort is normal. No respiratory distress.      Breath sounds: Normal breath sounds. No wheezing.   Abdominal:      Palpations: Abdomen is soft.      Tenderness: There is no abdominal tenderness.   Musculoskeletal:         General: No swelling.      Cervical back: Neck supple.   Skin:     General: Skin is warm.      Coloration: Skin is not jaundiced.   Neurological:      General: No focal deficit present.      Mental Status: She is alert and oriented to person, place, and time. Mental status is at baseline.   Psychiatric:         Attention and Perception: Attention normal.         Speech: Speech normal.         Behavior: Behavior is cooperative.       Significant Labs: All pertinent labs within the past 24 hours have been reviewed.    Significant Imaging: I have reviewed all pertinent imaging results/findings within the past 24 hours.

## 2023-02-25 NOTE — ASSESSMENT & PLAN NOTE
-acute brachial artery occlusion, on the right side.    -status post emergent thromboembolectomy.  POD2  -found to have large clot burden at the bifurcation of the brachial artery, with some thrombus in the distal ulnar and radial arteries.    -continue Eliquis  -heme/onc consult, f/u recs

## 2023-02-25 NOTE — PROGRESS NOTES
[facility]urg  Hematology/Oncology  Progress Note    Patient Name: Jimena Pierce  Admission Date: 2/22/2023  Hospital Length of Stay: 1 days  Code Status: No Order     Subjective:     HPI:  No notes on file    Interval History:  She has been transition by hospital medicine/vascular surgery recommendations to oral anticoagulation apixaban from heparin drip discontinued.  She denies any evidence of bleeding.  She continues to endorse right upper extremity numbness although improved since admission/treatment.  Abdominal pain has also improved.  She denies any new edema. Blood pressure medication held this am per pt due to lower blood pressure and she notes lightheadedness.     Oncology Treatment Plan:   [No matching plan found]    Medications:  Continuous Infusions:  Scheduled Meds:   amLODIPine  5 mg Oral Daily    apixaban  10 mg Oral BID    [START ON 3/3/2023] apixaban  5 mg Oral BID    chlorhexidine  10 mL Mouth/Throat BID    oxybutynin  15 mg Oral Daily    valsartan  160 mg Oral Daily     PRN Meds:acetaminophen, aluminum-magnesium hydroxide-simethicone, guaiFENesin 100 mg/5 ml, morphine, ondansetron, ondansetron, oxyCODONE, sodium chloride 0.9%     Review of Systems   Constitutional:  Positive for fatigue. Negative for activity change, appetite change, chills, diaphoresis, fever and unexpected weight change.   HENT:  Negative for congestion, rhinorrhea and sinus pain.    Respiratory:  Negative for cough, choking, chest tightness, shortness of breath, wheezing and stridor.    Cardiovascular:  Negative for chest pain, palpitations and leg swelling.   Gastrointestinal:  Negative for abdominal distention, abdominal pain, anal bleeding, blood in stool, constipation, diarrhea, nausea, rectal pain and vomiting.   Musculoskeletal:  Positive for gait problem.   Skin:  Negative for rash.   Neurological:  Positive for weakness and light-headedness.   Hematological:  Does not bruise/bleed easily.    Psychiatric/Behavioral: Negative.     Objective:     Vital Signs (Most Recent):  Temp: 98.4 °F (36.9 °C) (02/25/23 0831)  Pulse: 72 (02/25/23 1017)  Resp: 18 (02/25/23 0831)  BP: (!) 105/57 (02/25/23 1017)  SpO2: (!) 90 % (02/25/23 1017)   Vital Signs (24h Range):  Temp:  [98.2 °F (36.8 °C)-99.7 °F (37.6 °C)] 98.4 °F (36.9 °C)  Pulse:  [72-85] 72  Resp:  [18-20] 18  SpO2:  [89 %-92 %] 90 %  BP: ()/(52-82) 105/57     Weight: 101.1 kg (222 lb 14.2 oz)  Body mass index is 42.11 kg/m².  Body surface area is 2.09 meters squared.      Intake/Output Summary (Last 24 hours) at 2/25/2023 1056  Last data filed at 2/25/2023 0304  Gross per 24 hour   Intake --   Output 600 ml   Net -600 ml       Physical Exam  Vitals reviewed.   Constitutional:       General: She is not in acute distress.     Appearance: Normal appearance.   HENT:      Head: Normocephalic and atraumatic.      Nose: No congestion.      Mouth/Throat:      Mouth: Mucous membranes are moist.   Eyes:      Extraocular Movements: Extraocular movements intact.      Conjunctiva/sclera: Conjunctivae normal.   Cardiovascular:      Rate and Rhythm: Normal rate.   Pulmonary:      Effort: Respiratory distress present.      Breath sounds: No wheezing.   Abdominal:      General: There is no distension.      Palpations: Abdomen is soft.   Musculoskeletal:         General: No swelling.      Cervical back: Neck supple.   Skin:     General: Skin is warm.      Coloration: Skin is not jaundiced.   Neurological:      General: No focal deficit present.      Mental Status: She is alert and oriented to person, place, and time.   Psychiatric:         Mood and Affect: Mood normal.         Behavior: Behavior normal.         Thought Content: Thought content normal.         Judgment: Judgment normal.       Significant Labs:   CBC:   Recent Labs   Lab 02/24/23  0705 02/25/23  0515   WBC 13.73* 11.17   HGB 12.7 13.0   HCT 38.0 39.7   PLT 82* 55*    and CMP:   Recent Labs   Lab  02/24/23  0705      K 3.4*      CO2 22*      BUN 7   CREATININE 0.6   CALCIUM 8.7   PROT 6.3   ALBUMIN 3.3*   BILITOT 1.3*   ALKPHOS 71   AST 34   ALT 14   ANIONGAP 10       Diagnostic Results:  I have reviewed all pertinent imaging results/findings within the past 24 hours.    Assessment/Plan:     * Brachial artery occlusion, right  Acute development of arterial thrombosis without inciting event.  No known trauma or known anatomic abnormality/instrumentation.  While more typically arterial thrombotic event upper extremity etiology related to cardiac source/comorbidity and recommend full evaluation of this with Cardiology/vascular colleagues etiology less commonly hypercoagulable state may contribute.   Recommend hematologic workup for hypercoagulable state including evaluation of factor 5 Leiden, prothrombin gene mutation, protein C and S level, antithrombin level, anticardiolipin antibody, lupus anticoagulant, evaluation of myeloproliferative neoplasm (history and lab work/CBC without overt concerns for MPN).  No prior heparin exposure and therefore history does not support heparin-induced thrombocytopenia.  Recommend ensuring up-to-date on age-appropriate cancer screening.  - primary hospital medicine team/vascular surgery have recommended transition to oral anticoagulation currently on apixaban 5 mg b.i.d..    Due to lower platelet count recommend close monitoring CBC daily or sooner if any bleeding concerns.    Thrombocytopenia  Can repeat platelet count in blue top tube and peripheral blood smear review to ensure no clumping/pseudo thrombocytopenia and for further evaluation of megakaryocytes.  Progressive decline in platelet count since 1st day of admission platelet count 222 on 02/22/2023 down to 55 on 02/25/2023.  Pattern of decline immediately on admission with 1st heparin exposure is inconsistent with a diagnosis of heparin-induced thrombocytopenia.  No other concerning new medication  use.  History is most suggestive of consumptive thrombocytopenia.  No concomitant cytopenia/anemia to suggest thrombotic microangiopathy.  Given ongoing anticoagulation for acute ischemic event recommend close monitoring of platelet count daily and monitoring for any bleeding weighing risks and benefits of anticoagulation.    Leukocytosis  Suspect inflammatory, resolution on 02/25/2023 labs.    Splenic infarct  Workup for hypercoagulable state per above        Thank you for your consult. I will follow-up with patient. Please contact us if you have any additional questions.     Brooklyn Barney MD  Hematology/Oncology  [facility]

## 2023-02-25 NOTE — ASSESSMENT & PLAN NOTE
-unclear etiology.    -pain medications as needed.    -general surgery consult  -ROSY negative  -heme/onc consult for hypercoagulable work up

## 2023-02-25 NOTE — PLAN OF CARE
OT EVAL COMPLETE.  PATIENT WOULD BENEFIT FROM CONT SKILLED OT INTERVENTION.  OT RECOMMENDS OT HH AT D/C.

## 2023-02-25 NOTE — PT/OT/SLP EVAL
Occupational Therapy   Evaluation    Name: Jimena Pierce  MRN: [id number]  Admitting Diagnosis: Brachial artery occlusion, right  Recent Surgery: Procedure(s) (LRB):  ECHOCARDIOGRAM, TRANSESOPHAGEAL (N/A) 2 Days Post-Op    Recommendations:     Discharge Recommendations: home health OT  Discharge Equipment Recommendations:  wheelchair, hospital bed  Barriers to discharge:  Decreased caregiver support (38 Y.O. SON WORKS IN [employer]  PATIENT STATED SHE HAS NO OTHER CAREGIVER SUPPORT.)    Assessment:     Jimena Pierce is a 57 y.o. female with a medical diagnosis of Brachial artery occlusion, right.  She presents with SELF CARE DEBILITY . Performance deficits affecting function: weakness, impaired endurance, impaired self care skills, impaired functional mobility, gait instability, impaired balance, decreased coordination, decreased upper extremity function, decreased lower extremity function, pain, impaired coordination.      Rehab Prognosis: Good; patient would benefit from acute skilled OT services to address these deficits and reach maximum level of function.       Plan:     Patient to be seen 2 x/week to address the above listed problems via self-care/home management, therapeutic activities, therapeutic exercises  Plan of Care Expires: 03/11/23  Plan of Care Reviewed with: patient    Subjective     Chief Complaint: INABILITY TO ADJUST BLE'S DUE TO LACK OF PANTS LEGS TO PULL ON AS WOULD BE DONE AT HOME.  Patient/Family Comments/goals: NEED FOR HOSPITAL BED.  C/O LIMITED CAREGIVER SUPPORT WHEN SON AT WORK.    Occupational Profile:  Living Environment: PATIENT LIVES WITH 38 Y.O. SON IN A 1ST FLOOR APARTMENT WITH NO  STEPS.   Previous level of function: PATIENT STATED SHE WAS (I) TO DRESS AND BATHE.  SON MAINLY PERFORMS MEAL PREP, OR PATIENT STATED SHE WARMS SOMETHING LIKE A CAN OF SPAGHETTI IF NO FOOD IS PREPPED.  Roles and Routines: PATIENT STATED SHE RELIES ON PULLING ON PANTS LEGS TO ADJUST BLE'S  AS NEEDED AND RELIES ON LIFT CHAIR TO (A) WITH SIT > STAND.  PATIENT STATED SHE WILL NOT USE WALKERS WITH WHEELS NOR GROCERY BASKETS FOR STABILITY DUE TO FEELING OF HIGH FALL RISK AND PREFERS SW ONLY.  Equipment Used at Home: walker, standard (TRANSPORT W/C, LIFT CHAIR, ADJUSTABLE BED WITH PATIENT C/O NEED FOR A HOSPITAL BED DUE TO RAIL BROKEN AND BED NOT WORKING WELL.)  Assistance upon Discharge: LIMITED (A) DUE TO SON WORKS IN [employer].    Pain/Comfort:  Pain Rating 1: 5/10 ((L) SHLD, RUE, BACK WITH C/O NERVE DAMAGE, BLE'S)  Pain Addressed 1: Reposition, Distraction, Cessation of Activity    Patients cultural, spiritual, Mandaeism conflicts given the current situation:      Objective:     Communicated with: NURSE JASIS prior to session.  Patient found supine with telemetry, peripheral IV, PureWick upon OT entry to room.    General Precautions: Standard, fall  Orthopedic Precautions:    Braces:    Respiratory Status: Room air    Occupational Performance:    Bed Mobility:    Patient completed Rolling/Turning to Left with  contact guard assistance  Patient completed Scooting/Bridging with contact guard assistance  Patient completed Supine to Sit with contact guard assistance    Functional Mobility/Transfers:  Patient completed Sit <> Stand Transfer with moderate assistance  with  HHA AND BEDRAIL   Patient completed Bed <> Chair Transfer using Stand Pivot technique with moderate assistance with PATIENT HOLDING ONTO (B) ARMREST OF B/S CHAIR   Functional Mobility: MOD (A) WITH T/F EOB > B/S CHAIR.    Activities of Daily Living:  Upper Body Dressing: moderate assistance    Lower Body Dressing: dependence      Cognitive/Visual Perceptual:  PATIENT WEARS CORRECTIVE LENSES.  NO COGNITIVE DEFICITS NOTED.     Physical Exam:  Balance:    -       MOD (A) WITH SIT > STAND > STAND PIVOT TO B/S CHAIR  Upper Extremity Range of Motion:     -       Right Upper Extremity: DECREASED AROM DUE TO SX SITE  -       Left Upper Extremity:  DECREASED AROM DUE TO C/O (L) SHLD PAIN.    AMPAC 6 Click ADL:  AMPAC Total Score: 14    Treatment & Education:  OT EVAL PERFORMED.  PATIENT EDUCATED RE:  PURPOSE OF OT.  PATIENT PARTICIPATED IN FUNC MOBILITY AND ATTEMPTED SELF CARE TASKS.     Patient left up in chair with all lines intact and call button in reach    GOALS:   Multidisciplinary Problems       Occupational Therapy Goals          Problem: Occupational Therapy    Goal Priority Disciplines Outcome Interventions   Occupational Therapy Goal     OT, PT/OT     Description: LTG'S TO BE MET IN 14 DAYS (3/11/23)    1)  PATIENT WILL PERFORM UB DRESSING WITH SBA TO DECREASE (D) ON CAREGIVER.    2)  PATIENT WILL PERFORM LB DRESSING WITH MIN (A) TO DECREASE (D) ON CAREGIVER..    3)  PATIENT WILL PERFORM TOILET T/F WITH MIN (A) TO DECREASE (D) ON CAREGIVER..    4)  PATIENT WILL PERFORM BUE HEP WITH (I) FOR PROPER TECHNIQUE TO INCREASE FUNC STRENGTH AND FUNC ENDURANCE TO DECREASE (D) ON CAREGIVER TO SAFELY PERFORM SELF CARE TASKS.                         History:     Past Medical History:   Diagnosis Date    Chronic low back pain     Congenital cerebral palsy     GERD (gastroesophageal reflux disease)     Hyperlipidemia     Hypertension     Lymphedema     right foot         Past Surgical History:   Procedure Laterality Date    COLONOSCOPY N/A 10/05/2016    Polyp x 1 repeat 5 yrs. Procedure: COLONOSCOPY;  Surgeon: Aicha Ravi MD;  Location: [facility] ENDO;  Service: Endoscopy;  Laterality: N/A;    EMBOLECTOMY Right 2/23/2023    Procedure: EMBOLECTOMY;  Surgeon: Mario Sun MD;  Location: [facility] OR;  Service: Cardiovascular;  Laterality: Right;    HIP SURGERY Bilateral     as child    LEG SURGERY      TENDON RELEASE Bilateral     as child to knees and hip adductors    TONSILLECTOMY      TRANSESOPHAGEAL ECHOCARDIOGRAPHY N/A 2/23/2023    Procedure: ECHOCARDIOGRAM, TRANSESOPHAGEAL;  Surgeon: Roula Squires MD;  Location: [facility] CATH LAB;  Service: Cardiology;   Laterality: N/A;       Time Tracking:     OT Date of Treatment: 02/25/23  OT Start Time: 1056  OT Stop Time: 1120  OT Total Time (min): 24 min    Billable Minutes:Evaluation 10  Therapeutic Activity 14    2/25/2023

## 2023-02-25 NOTE — PLAN OF CARE
Discussed poc with pt, pt verbalized understanding    Purposeful rounding every 2hours    VS wnl  Cardiac monitoring in use, pt is NSR, tele monitor # [id number]  Pain under control with PRN meds  Patient tolerated sitting in the chair for 2 hours    Accurate I&Os  Bed locked at lowest position  Call light within reach    Chart check complete  Will cont with POC

## 2023-02-25 NOTE — PROGRESS NOTES
[facility] [place] Medicine  Progress Note    Patient Name: Jimena Pierce  MRN: [id number]  Patient Class: IP- Inpatient   Admission Date: 2/22/2023  Length of Stay: 1 days  Attending Physician: Juna Benson MD  Primary Care Provider: Baylee Aleman DO        Subjective:     Principal Problem:Brachial artery occlusion, right        HPI:  Ms. Pierce is a 57-year-old  female with PMH significant for cerebral palsy (mild), hypertension, hyperlipidemia, presented to the ED complaining of acute onset of right forearm pain, and abdominal pain that started around the same time since yesterday evening.  She was feeling numbness, tingling of the right fingers.  Right UE arterial ultrasound reveals evidence of acute occlusion at the brachial artery.  CTA of the right upper extremity was unremarkable.  Patient was having numbness, paresthesias, coldness of the right forearm.  Dr. uSn with vascular surgery evaluated the patient, has taken her to the OR emergently for open embolectomy.  She was found to have large clot burden at the bifurcation of the brachial artery on the right, as well as small amount of thrombus in the distal ulnar and radial arteries.  Patient was started on heparin drip prior to the procedure and to be resumed 10:34 p.m. postprocedure.  Patient reports improved pain, paresthesias of the right arm.  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      Admitting diagnosis:  Acute thromboembolism right brachial artery, distal ulnar, radial arteries.  Status post emergent thromboembolectomy.  Splenic artery infarction.      Overview/Hospital Course:  POD2 embolectomy of right brachial artery, transitioned off heparin to Eliquis. Decreasing platelet count, heme/onc consulted. PT/OT eval pending      Review of Systems   Constitutional: Negative.  Negative for chills and fever.   HENT: Negative.  Negative for  congestion, rhinorrhea, sore throat and trouble swallowing.    Eyes: Negative.  Negative for visual disturbance.   Respiratory: Negative.  Negative for cough, shortness of breath and wheezing.    Cardiovascular: Negative.  Negative for chest pain and palpitations.   Gastrointestinal:  Positive for abdominal pain and nausea. Negative for blood in stool, diarrhea and vomiting.   Endocrine: Negative.    Genitourinary: Negative.  Negative for dysuria and flank pain.   Musculoskeletal: Negative.  Negative for back pain.   Skin: Negative.  Negative for rash.   Allergic/Immunologic: Negative.    Neurological:  Positive for numbness (Right arm, improving postprocedure). Negative for speech difficulty, weakness and headaches.   Hematological: Negative.    Psychiatric/Behavioral: Negative.  Negative for hallucinations. The patient is not nervous/anxious.    All other systems reviewed and are negative.  Objective:     Vital Signs (Most Recent):  Temp: 98.7 °F (37.1 °C) (02/25/23 1246)  Pulse: 79 (02/25/23 1246)  Resp: 18 (02/25/23 1246)  BP: 128/68 (02/25/23 1246)  SpO2: (!) 91 % (02/25/23 1246)   Vital Signs (24h Range):  Temp:  [98.2 °F (36.8 °C)-99.7 °F (37.6 °C)] 98.7 °F (37.1 °C)  Pulse:  [72-98] 79  Resp:  [18-20] 18  SpO2:  [89 %-91 %] 91 %  BP: ()/(52-82) 128/68     Weight: 101.1 kg (222 lb 14.2 oz)  Body mass index is 42.11 kg/m².    Intake/Output Summary (Last 24 hours) at 2/25/2023 1406  Last data filed at 2/25/2023 0304  Gross per 24 hour   Intake --   Output 600 ml   Net -600 ml        Physical Exam  Vitals and nursing note reviewed.   Constitutional:       General: She is awake. She is not in acute distress.     Appearance: She is obese. She is not ill-appearing.   HENT:      Head: Normocephalic and atraumatic.      Mouth/Throat:      Mouth: Mucous membranes are moist.   Eyes:      General: No scleral icterus.     Conjunctiva/sclera: Conjunctivae normal.   Cardiovascular:      Rate and Rhythm: Normal rate  and regular rhythm.      Heart sounds: No murmur heard.  Pulmonary:      Effort: Pulmonary effort is normal. No respiratory distress.      Breath sounds: Normal breath sounds. No wheezing.   Abdominal:      Palpations: Abdomen is soft.      Tenderness: There is no abdominal tenderness.   Musculoskeletal:         General: No swelling.      Cervical back: Neck supple.   Skin:     General: Skin is warm.      Coloration: Skin is not jaundiced.   Neurological:      General: No focal deficit present.      Mental Status: She is alert and oriented to person, place, and time. Mental status is at baseline.   Psychiatric:         Attention and Perception: Attention normal.         Speech: Speech normal.         Behavior: Behavior is cooperative.       Significant Labs: All pertinent labs within the past 24 hours have been reviewed.    Significant Imaging: I have reviewed all pertinent imaging results/findings within the past 24 hours.      Assessment/Plan:      * Brachial artery occlusion, right  -acute brachial artery occlusion, on the right side.    -status post emergent thromboembolectomy.  POD2  -found to have large clot burden at the bifurcation of the brachial artery, with some thrombus in the distal ulnar and radial arteries.    -continue Eliquis  -heme/onc consult, f/u recs    Thrombocytopenia  Heme/onc consult  Repeat CBC in AM      Cerebral palsy  -mild.    -able to ambulate with walker at home.      Splenic infarct  -unclear etiology.    -pain medications as needed.    -general surgery consult  -ROSY negative  -heme/onc consult for hypercoagulable work up      Obesity, Class III, BMI 40-49.9 (morbid obesity)  Body mass index is 41.19 kg/m². Morbid obesity complicates all aspects of disease management from diagnostic modalities to treatment. Weight loss encouraged and health benefits explained to patient.         Chronic bilateral low back pain with bilateral sciatica  -continue home dose pain  medications.      Essential hypertension  -continue amlodipine, valsartan.    -monitor      VTE Risk Mitigation (From admission, onward)         Ordered     apixaban tablet 5 mg  2 times daily         02/24/23 1740     apixaban tablet 10 mg  2 times daily         02/24/23 1753     IP VTE HIGH RISK PATIENT  Once         02/23/23 0541     Place sequential compression device  Until discontinued         02/23/23 0541     Place sequential compression device  Until discontinued         02/23/23 0533                Discharge Planning   TUCKER:      Code Status: Not on file   Is the patient medically ready for discharge?:     Reason for patient still in hospital (select all that apply): Patient trending condition, Treatment, Consult recommendations and PT / OT recommendations  Discharge Plan A: Home with family                  Juan Benson MD  Department of Hospital Medicine   [facility] Med S[facility]

## 2023-02-25 NOTE — PLAN OF CARE
EVAL AND TX COMPLETED: facilitated bed mobility with CGA and increased time, transfers with mod A. Ambulated 2-3 ft mod A HHA. Recommend HHPT, hospital bed and wheelchair

## 2023-02-25 NOTE — PLAN OF CARE
Patient is in stable condition, no acute distress, remained free from injuries, dressing to R arm intact with dried drainage, pain adequately controlled with PRN, on cardiac monitoring (NSR), VSS, and all active orders reviewed. 24 hr chart check performed.

## 2023-02-25 NOTE — SUBJECTIVE & OBJECTIVE
Interval History:  She has been transition by hospital medicine/vascular surgery recommendations to oral anticoagulation apixaban from heparin drip discontinued.  She denies any evidence of bleeding.  She continues to endorse right upper extremity numbness although improved since admission/treatment.  Abdominal pain has also improved.  She denies any new edema. Blood pressure medication held this am per pt due to lower blood pressure and she notes lightheadedness.     Oncology Treatment Plan:   [No matching plan found]    Medications:  Continuous Infusions:  Scheduled Meds:   amLODIPine  5 mg Oral Daily    apixaban  10 mg Oral BID    [START ON 3/3/2023] apixaban  5 mg Oral BID    chlorhexidine  10 mL Mouth/Throat BID    oxybutynin  15 mg Oral Daily    valsartan  160 mg Oral Daily     PRN Meds:acetaminophen, aluminum-magnesium hydroxide-simethicone, guaiFENesin 100 mg/5 ml, morphine, ondansetron, ondansetron, oxyCODONE, sodium chloride 0.9%     Review of Systems   Constitutional:  Positive for fatigue. Negative for activity change, appetite change, chills, diaphoresis, fever and unexpected weight change.   HENT:  Negative for congestion, rhinorrhea and sinus pain.    Respiratory:  Negative for cough, choking, chest tightness, shortness of breath, wheezing and stridor.    Cardiovascular:  Negative for chest pain, palpitations and leg swelling.   Gastrointestinal:  Negative for abdominal distention, abdominal pain, anal bleeding, blood in stool, constipation, diarrhea, nausea, rectal pain and vomiting.   Musculoskeletal:  Positive for gait problem.   Skin:  Negative for rash.   Neurological:  Positive for weakness and light-headedness.   Hematological:  Does not bruise/bleed easily.   Psychiatric/Behavioral: Negative.     Objective:     Vital Signs (Most Recent):  Temp: 98.4 °F (36.9 °C) (02/25/23 0831)  Pulse: 72 (02/25/23 1017)  Resp: 18 (02/25/23 0831)  BP: (!) 105/57 (02/25/23 1017)  SpO2: (!) 90 % (02/25/23  1017)   Vital Signs (24h Range):  Temp:  [98.2 °F (36.8 °C)-99.7 °F (37.6 °C)] 98.4 °F (36.9 °C)  Pulse:  [72-85] 72  Resp:  [18-20] 18  SpO2:  [89 %-92 %] 90 %  BP: ()/(52-82) 105/57     Weight: 101.1 kg (222 lb 14.2 oz)  Body mass index is 42.11 kg/m².  Body surface area is 2.09 meters squared.      Intake/Output Summary (Last 24 hours) at 2/25/2023 1056  Last data filed at 2/25/2023 0304  Gross per 24 hour   Intake --   Output 600 ml   Net -600 ml       Physical Exam  Vitals reviewed.   Constitutional:       General: She is not in acute distress.     Appearance: Normal appearance.   HENT:      Head: Normocephalic and atraumatic.      Nose: No congestion.      Mouth/Throat:      Mouth: Mucous membranes are moist.   Eyes:      Extraocular Movements: Extraocular movements intact.      Conjunctiva/sclera: Conjunctivae normal.   Cardiovascular:      Rate and Rhythm: Normal rate.   Pulmonary:      Effort: Respiratory distress present.      Breath sounds: No wheezing.   Abdominal:      General: There is no distension.      Palpations: Abdomen is soft.   Musculoskeletal:         General: No swelling.      Cervical back: Neck supple.   Skin:     General: Skin is warm.      Coloration: Skin is not jaundiced.   Neurological:      General: No focal deficit present.      Mental Status: She is alert and oriented to person, place, and time.   Psychiatric:         Mood and Affect: Mood normal.         Behavior: Behavior normal.         Thought Content: Thought content normal.         Judgment: Judgment normal.       Significant Labs:   CBC:   Recent Labs   Lab 02/24/23  0705 02/25/23  0515   WBC 13.73* 11.17   HGB 12.7 13.0   HCT 38.0 39.7   PLT 82* 55*    and CMP:   Recent Labs   Lab 02/24/23  0705      K 3.4*      CO2 22*      BUN 7   CREATININE 0.6   CALCIUM 8.7   PROT 6.3   ALBUMIN 3.3*   BILITOT 1.3*   ALKPHOS 71   AST 34   ALT 14   ANIONGAP 10       Diagnostic Results:  I have reviewed all  pertinent imaging results/findings within the past 24 hours.

## 2023-02-26 LAB
BACTERIA #/AREA URNS HPF: ABNORMAL /HPF
BASOPHILS # BLD AUTO: 0.05 K/UL (ref 0–0.2)
BASOPHILS NFR BLD: 0.5 % (ref 0–1.9)
BILIRUB UR QL STRIP: NEGATIVE
CLARITY UR: CLEAR
COLOR UR: ABNORMAL
DIFFERENTIAL METHOD: ABNORMAL
EOSINOPHIL # BLD AUTO: 0.2 K/UL (ref 0–0.5)
EOSINOPHIL NFR BLD: 1.7 % (ref 0–8)
ERYTHROCYTE [DISTWIDTH] IN BLOOD BY AUTOMATED COUNT: 12.1 % (ref 11.5–14.5)
GLUCOSE UR QL STRIP: ABNORMAL
HCT VFR BLD AUTO: 34.2 % (ref 37–48.5)
HGB BLD-MCNC: 11.7 G/DL (ref 12–16)
HGB UR QL STRIP: ABNORMAL
HYALINE CASTS #/AREA URNS LPF: 0 /LPF
IMM GRANULOCYTES # BLD AUTO: 0.07 K/UL (ref 0–0.04)
IMM GRANULOCYTES NFR BLD AUTO: 0.7 % (ref 0–0.5)
KETONES UR QL STRIP: ABNORMAL
LEUKOCYTE ESTERASE UR QL STRIP: ABNORMAL
LYMPHOCYTES # BLD AUTO: 1.8 K/UL (ref 1–4.8)
LYMPHOCYTES NFR BLD: 16.5 % (ref 18–48)
MCH RBC QN AUTO: 29.4 PG (ref 27–31)
MCHC RBC AUTO-ENTMCNC: 34.2 G/DL (ref 32–36)
MCV RBC AUTO: 86 FL (ref 82–98)
MICROSCOPIC COMMENT: ABNORMAL
MONOCYTES # BLD AUTO: 0.9 K/UL (ref 0.3–1)
MONOCYTES NFR BLD: 8.7 % (ref 4–15)
NEUTROPHILS # BLD AUTO: 7.6 K/UL (ref 1.8–7.7)
NEUTROPHILS NFR BLD: 71.9 % (ref 38–73)
NITRITE UR QL STRIP: NEGATIVE
NRBC BLD-RTO: 0 /100 WBC
PH UR STRIP: 6 [PH] (ref 5–8)
PLATELET # BLD AUTO: 68 K/UL (ref 150–450)
PMV BLD AUTO: 13.5 FL (ref 9.2–12.9)
PROT UR QL STRIP: ABNORMAL
RBC # BLD AUTO: 3.98 M/UL (ref 4–5.4)
RBC #/AREA URNS HPF: 8 /HPF (ref 0–4)
SP GR UR STRIP: 1.03 (ref 1–1.03)
SQUAMOUS #/AREA URNS HPF: 3 /HPF
URN SPEC COLLECT METH UR: ABNORMAL
UROBILINOGEN UR STRIP-ACNC: NEGATIVE EU/DL
WBC # BLD AUTO: 10.58 K/UL (ref 3.9–12.7)
WBC #/AREA URNS HPF: 4 /HPF (ref 0–5)

## 2023-02-26 PROCEDURE — 25000003 PHARM REV CODE 250: Performed by: NURSE PRACTITIONER

## 2023-02-26 PROCEDURE — 85025 COMPLETE CBC W/AUTO DIFF WBC: CPT | Performed by: STUDENT IN AN ORGANIZED HEALTH CARE EDUCATION/TRAINING PROGRAM

## 2023-02-26 PROCEDURE — 25000003 PHARM REV CODE 250: Performed by: INTERNAL MEDICINE

## 2023-02-26 PROCEDURE — 25000003 PHARM REV CODE 250: Performed by: SURGERY

## 2023-02-26 PROCEDURE — 99233 PR SUBSEQUENT HOSPITAL CARE,LEVL III: ICD-10-PCS | Mod: ,,, | Performed by: INTERNAL MEDICINE

## 2023-02-26 PROCEDURE — 36415 COLL VENOUS BLD VENIPUNCTURE: CPT | Performed by: STUDENT IN AN ORGANIZED HEALTH CARE EDUCATION/TRAINING PROGRAM

## 2023-02-26 PROCEDURE — 99233 SBSQ HOSP IP/OBS HIGH 50: CPT | Mod: ,,, | Performed by: INTERNAL MEDICINE

## 2023-02-26 PROCEDURE — 25000003 PHARM REV CODE 250: Performed by: STUDENT IN AN ORGANIZED HEALTH CARE EDUCATION/TRAINING PROGRAM

## 2023-02-26 PROCEDURE — 11000001 HC ACUTE MED/SURG PRIVATE ROOM

## 2023-02-26 RX ADMIN — APIXABAN 10 MG: 2.5 TABLET, FILM COATED ORAL at 09:02

## 2023-02-26 RX ADMIN — FAMOTIDINE 20 MG: 20 TABLET ORAL at 09:02

## 2023-02-26 RX ADMIN — OXYCODONE HYDROCHLORIDE 30 MG: 30 TABLET ORAL at 07:02

## 2023-02-26 RX ADMIN — OXYCODONE HYDROCHLORIDE 30 MG: 30 TABLET ORAL at 02:02

## 2023-02-26 RX ADMIN — CHLORHEXIDINE GLUCONATE 0.12% ORAL RINSE 10 ML: 1.2 LIQUID ORAL at 09:02

## 2023-02-26 RX ADMIN — OXYBUTYNIN CHLORIDE 15 MG: 5 TABLET, EXTENDED RELEASE ORAL at 09:02

## 2023-02-26 NOTE — ASSESSMENT & PLAN NOTE
-acute brachial artery occlusion, on the right side.    -status post emergent thromboembolectomy.  POD3  -found to have large clot burden at the bifurcation of the brachial artery, with some thrombus in the distal ulnar and radial arteries.    -continue Eliquis  -heme/onc consult, f/u recs

## 2023-02-26 NOTE — PROGRESS NOTES
[facility] Medicine  Progress Note    Patient Name: Jimena Pierce  MRN: [id number]  Patient Class: IP- Inpatient   Admission Date: 2/22/2023  Length of Stay: 2 days  Attending Physician: Juan Benson MD  Primary Care Provider: Baylee Aleman DO        Subjective:     Principal Problem:Brachial artery occlusion, right        HPI:  Ms. Pierce is a 57-year-old  female with PMH significant for cerebral palsy (mild), hypertension, hyperlipidemia, presented to the ED complaining of acute onset of right forearm pain, and abdominal pain that started around the same time since yesterday evening.  She was feeling numbness, tingling of the right fingers.  Right UE arterial ultrasound reveals evidence of acute occlusion at the brachial artery.  CTA of the right upper extremity was unremarkable.  Patient was having numbness, paresthesias, coldness of the right forearm.  Dr. Sun with vascular surgery evaluated the patient, has taken her to the OR emergently for open embolectomy.  She was found to have large clot burden at the bifurcation of the brachial artery on the right, as well as small amount of thrombus in the distal ulnar and radial arteries.  Patient was started on heparin drip prior to the procedure and to be resumed 10:34 p.m. postprocedure.  Patient reports improved pain, paresthesias of the right arm.  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      Admitting diagnosis:  Acute thromboembolism right brachial artery, distal ulnar, radial arteries.  Status post emergent thromboembolectomy.  Splenic artery infarction.      Overview/Hospital Course:  POD3 embolectomy of right brachial artery, transitioned off heparin to Eliquis. Decreasing platelet count, heme/onc consulted. PT/OT eval recs   2/26 AM pt reports she feels dizziness and generalized weakness with movement. Stable for d/c however will monitor  platelet count with repeat CBC in AM given pt reluctance to d/c home. Home amlodipine discontinued given symptoms and hypotensive BP trend. Expect stable for d/c in AM      Review of Systems   Constitutional: Negative.  Negative for chills and fever.   HENT: Negative.  Negative for congestion, rhinorrhea, sore throat and trouble swallowing.    Eyes: Negative.  Negative for visual disturbance.   Respiratory: Negative.  Negative for cough, shortness of breath and wheezing.    Cardiovascular: Negative.  Negative for chest pain and palpitations.   Gastrointestinal:  Positive for abdominal pain and nausea. Negative for blood in stool, diarrhea and vomiting.   Endocrine: Negative.    Genitourinary: Negative.  Negative for dysuria and flank pain.   Musculoskeletal: Negative.  Negative for back pain.   Skin: Negative.  Negative for rash.   Allergic/Immunologic: Negative.    Neurological:  Positive for dizziness, light-headedness and numbness (Right arm, improving postprocedure). Negative for speech difficulty, weakness and headaches.   Hematological: Negative.    Psychiatric/Behavioral: Negative.  Negative for hallucinations. The patient is not nervous/anxious.    All other systems reviewed and are negative.  Objective:     Vital Signs (Most Recent):  Temp: 98 °F (36.7 °C) (02/26/23 1152)  Pulse: 81 (02/26/23 1152)  Resp: 17 (02/26/23 1152)  BP: 113/63 (02/26/23 1152)  SpO2: (!) 92 % (02/26/23 1152)   Vital Signs (24h Range):  Temp:  [98 °F (36.7 °C)-98.9 °F (37.2 °C)] 98 °F (36.7 °C)  Pulse:  [66-81] 81  Resp:  [17-20] 17  SpO2:  [90 %-92 %] 92 %  BP: ()/(53-63) 113/63     Weight: 101 kg (222 lb 10.6 oz)  Body mass index is 42.07 kg/m².    Intake/Output Summary (Last 24 hours) at 2/26/2023 1608  Last data filed at 2/25/2023 1805  Gross per 24 hour   Intake --   Output 300 ml   Net -300 ml        Physical Exam  Vitals and nursing note reviewed.   Constitutional:       General: She is awake. She is not in acute  distress.     Appearance: She is obese. She is not ill-appearing.   HENT:      Head: Normocephalic and atraumatic.      Mouth/Throat:      Mouth: Mucous membranes are moist.   Eyes:      General: No scleral icterus.     Conjunctiva/sclera: Conjunctivae normal.   Cardiovascular:      Rate and Rhythm: Normal rate and regular rhythm.      Heart sounds: No murmur heard.  Pulmonary:      Effort: Pulmonary effort is normal. No respiratory distress.      Breath sounds: Normal breath sounds. No wheezing.   Abdominal:      Palpations: Abdomen is soft.      Tenderness: There is no abdominal tenderness.   Musculoskeletal:         General: No swelling.      Cervical back: Neck supple.   Skin:     General: Skin is warm.      Coloration: Skin is not jaundiced.   Neurological:      General: No focal deficit present.      Mental Status: She is alert and oriented to person, place, and time. Mental status is at baseline.   Psychiatric:         Attention and Perception: Attention normal.         Speech: Speech normal.         Behavior: Behavior is cooperative.       Significant Labs: All pertinent labs within the past 24 hours have been reviewed.    Significant Imaging: I have reviewed all pertinent imaging results/findings within the past 24 hours.      Assessment/Plan:      * Brachial artery occlusion, right  -acute brachial artery occlusion, on the right side.    -status post emergent thromboembolectomy.  POD3  -found to have large clot burden at the bifurcation of the brachial artery, with some thrombus in the distal ulnar and radial arteries.    -continue Eliquis  -heme/onc consult, f/u recs    Thrombocytopenia  Heme/onc consult  Repeat CBC in AM      Cerebral palsy  -mild.    -able to ambulate with walker at home.      Splenic infarct  -unclear etiology.    -pain medications as needed.    -general surgery consult  -ROSY negative  -heme/onc consult for hypercoagulable work up      Obesity, Class III, BMI 40-49.9 (morbid  obesity)  Body mass index is 41.19 kg/m². Morbid obesity complicates all aspects of disease management from diagnostic modalities to treatment. Weight loss encouraged and health benefits explained to patient.         Chronic bilateral low back pain with bilateral sciatica  -continue home dose pain medications.      Essential hypertension  -continue amlodipine, valsartan.    -monitor      VTE Risk Mitigation (From admission, onward)         Ordered     apixaban tablet 5 mg  2 times daily         02/24/23 1740     apixaban tablet 10 mg  2 times daily         02/24/23 1753     IP VTE HIGH RISK PATIENT  Once         02/23/23 0541     Place sequential compression device  Until discontinued         02/23/23 0541     Place sequential compression device  Until discontinued         02/23/23 0533                Discharge Planning   TUCKER:      Code Status: Not on file   Is the patient medically ready for discharge?:     Reason for patient still in hospital (select all that apply): Patient trending condition, Laboratory test, Treatment, Consult recommendations and Pending disposition  Discharge Plan A: Home with family                  Juan Benson MD  Department of Hospital Medicine   [facility] - [facility]

## 2023-02-26 NOTE — PLAN OF CARE
Patient remains free of falls and injuries during shift. Pain managed with PRN medications. Purewick in place with orange tinged urine. Patient states burning when urinating. Hospital medicine notified. UA collected and sent to lab.Telemonitoring in place. Call bell within reach. Chart check complete. Plan of care ongoing.

## 2023-02-26 NOTE — SUBJECTIVE & OBJECTIVE
Review of Systems   Constitutional: Negative.  Negative for chills and fever.   HENT: Negative.  Negative for congestion, rhinorrhea, sore throat and trouble swallowing.    Eyes: Negative.  Negative for visual disturbance.   Respiratory: Negative.  Negative for cough, shortness of breath and wheezing.    Cardiovascular: Negative.  Negative for chest pain and palpitations.   Gastrointestinal:  Positive for abdominal pain and nausea. Negative for blood in stool, diarrhea and vomiting.   Endocrine: Negative.    Genitourinary: Negative.  Negative for dysuria and flank pain.   Musculoskeletal: Negative.  Negative for back pain.   Skin: Negative.  Negative for rash.   Allergic/Immunologic: Negative.    Neurological:  Positive for dizziness, light-headedness and numbness (Right arm, improving postprocedure). Negative for speech difficulty, weakness and headaches.   Hematological: Negative.    Psychiatric/Behavioral: Negative.  Negative for hallucinations. The patient is not nervous/anxious.    All other systems reviewed and are negative.  Objective:     Vital Signs (Most Recent):  Temp: 98 °F (36.7 °C) (02/26/23 1152)  Pulse: 81 (02/26/23 1152)  Resp: 17 (02/26/23 1152)  BP: 113/63 (02/26/23 1152)  SpO2: (!) 92 % (02/26/23 1152)   Vital Signs (24h Range):  Temp:  [98 °F (36.7 °C)-98.9 °F (37.2 °C)] 98 °F (36.7 °C)  Pulse:  [66-81] 81  Resp:  [17-20] 17  SpO2:  [90 %-92 %] 92 %  BP: ()/(53-63) 113/63     Weight: 101 kg (222 lb 10.6 oz)  Body mass index is 42.07 kg/m².    Intake/Output Summary (Last 24 hours) at 2/26/2023 1608  Last data filed at 2/25/2023 1805  Gross per 24 hour   Intake --   Output 300 ml   Net -300 ml        Physical Exam  Vitals and nursing note reviewed.   Constitutional:       General: She is awake. She is not in acute distress.     Appearance: She is obese. She is not ill-appearing.   HENT:      Head: Normocephalic and atraumatic.      Mouth/Throat:      Mouth: Mucous membranes are moist.    Eyes:      General: No scleral icterus.     Conjunctiva/sclera: Conjunctivae normal.   Cardiovascular:      Rate and Rhythm: Normal rate and regular rhythm.      Heart sounds: No murmur heard.  Pulmonary:      Effort: Pulmonary effort is normal. No respiratory distress.      Breath sounds: Normal breath sounds. No wheezing.   Abdominal:      Palpations: Abdomen is soft.      Tenderness: There is no abdominal tenderness.   Musculoskeletal:         General: No swelling.      Cervical back: Neck supple.   Skin:     General: Skin is warm.      Coloration: Skin is not jaundiced.   Neurological:      General: No focal deficit present.      Mental Status: She is alert and oriented to person, place, and time. Mental status is at baseline.   Psychiatric:         Attention and Perception: Attention normal.         Speech: Speech normal.         Behavior: Behavior is cooperative.       Significant Labs: All pertinent labs within the past 24 hours have been reviewed.    Significant Imaging: I have reviewed all pertinent imaging results/findings within the past 24 hours.

## 2023-02-26 NOTE — PROGRESS NOTES
[facility]  Hematology/Oncology  Progress Note    Patient Name: Jimena Pierce  Admission Date: 2/22/2023  Hospital Length of Stay: 2 days  Code Status: No Order     Subjective:     HPI:  No notes on file    Interval History:  Patient notes feeling well today with improved only slight numbness right upper extremity, no edema.  Persistent ecchymoses.  No new ecchymoses or evidence of bleeding.  She does have lower blood pressure 90s over 50s and mildly symptomatic from this with lightheadedness.  She is working with physical therapy.  No falls.  Platelet count improved today from 55 K 268 K. she continues on oral anticoagulation apixaban.    Oncology Treatment Plan:   [No matching plan found]    Medications:  Continuous Infusions:  Scheduled Meds:   amLODIPine  5 mg Oral Daily    apixaban  10 mg Oral BID    [START ON 3/3/2023] apixaban  5 mg Oral BID    chlorhexidine  10 mL Mouth/Throat BID    famotidine  20 mg Oral BID    oxybutynin  15 mg Oral Daily    valsartan  160 mg Oral Daily     PRN Meds:acetaminophen, aluminum-magnesium hydroxide-simethicone, guaiFENesin 100 mg/5 ml, morphine, ondansetron, ondansetron, oxyCODONE, sodium chloride 0.9%     Review of Systems   Constitutional:  Negative for activity change, appetite change, chills, diaphoresis, fatigue, fever and unexpected weight change.   HENT:  Negative for congestion, rhinorrhea and sinus pain.    Respiratory:  Negative for cough, choking, chest tightness, shortness of breath, wheezing and stridor.    Cardiovascular:  Negative for chest pain, palpitations and leg swelling.   Gastrointestinal:  Negative for abdominal distention, abdominal pain, anal bleeding, blood in stool, constipation, diarrhea, nausea, rectal pain and vomiting.   Skin:  Negative for rash.   Hematological:  Does not bruise/bleed easily.   Psychiatric/Behavioral: Negative.     Objective:     Vital Signs (Most Recent):  Temp: 98.7 °F (37.1 °C) (02/26/23  0728)  Pulse: 73 (02/26/23 0728)  Resp: 17 (02/26/23 0728)  BP: (!) 93/55 (02/26/23 0728)  SpO2: (!) 92 % (02/26/23 0728)   Vital Signs (24h Range):  Temp:  [98.3 °F (36.8 °C)-98.9 °F (37.2 °C)] 98.7 °F (37.1 °C)  Pulse:  [63-98] 73  Resp:  [17-20] 17  SpO2:  [90 %-92 %] 92 %  BP: ()/(53-68) 93/55     Weight: 101 kg (222 lb 10.6 oz)  Body mass index is 42.07 kg/m².  Body surface area is 2.08 meters squared.      Intake/Output Summary (Last 24 hours) at 2/26/2023 1001  Last data filed at 2/25/2023 1805  Gross per 24 hour   Intake --   Output 300 ml   Net -300 ml       Physical Exam  Vitals reviewed.   Constitutional:       General: She is not in acute distress.     Appearance: Normal appearance.   HENT:      Head: Normocephalic and atraumatic.      Nose: No congestion.      Mouth/Throat:      Mouth: Mucous membranes are moist.   Eyes:      Extraocular Movements: Extraocular movements intact.      Conjunctiva/sclera: Conjunctivae normal.   Cardiovascular:      Rate and Rhythm: Normal rate.   Pulmonary:      Effort: Pulmonary effort is normal. No respiratory distress.   Abdominal:      General: There is no distension.      Palpations: Abdomen is soft.   Musculoskeletal:         General: No swelling.      Cervical back: Neck supple.      Comments: RUE ecchymosis   Skin:     General: Skin is warm.      Coloration: Skin is not jaundiced.   Neurological:      General: No focal deficit present.      Mental Status: She is alert and oriented to person, place, and time.   Psychiatric:         Mood and Affect: Mood normal.         Behavior: Behavior normal.         Thought Content: Thought content normal.         Judgment: Judgment normal.       Significant Labs:   CBC:   Recent Labs   Lab 02/25/23  0515 02/26/23  0532   WBC 11.17 10.58   HGB 13.0 11.7*   HCT 39.7 34.2*   PLT 55* 68*   , CMP: No results for input(s): NA, K, CL, CO2, GLU, BUN, CREATININE, CALCIUM, PROT, ALBUMIN, BILITOT, ALKPHOS, AST, ALT, ANIONGAP,  EGFRNONAA in the last 48 hours.    Invalid input(s): ESTGFAFRICA, and Coagulation: No results for input(s): PT, INR, APTT in the last 48 hours.    Diagnostic Results:  I have reviewed all pertinent imaging results/findings within the past 24 hours.    Assessment/Plan:     * Brachial artery occlusion, right  Acute development of arterial thrombosis without inciting event.  No known trauma or known anatomic abnormality/instrumentation.  While more typically arterial thrombotic event upper extremity etiology related to cardiac source/comorbidity and recommend full evaluation of this with Cardiology/vascular colleagues etiology less commonly hypercoagulable state may contribute.   Recommend hematologic workup for hypercoagulable state including evaluation of factor 5 Leiden, prothrombin gene mutation, protein C and S level, antithrombin level, anticardiolipin antibody, lupus anticoagulant, evaluation of myeloproliferative neoplasm (history and lab work/CBC without overt concerns for MPN).  No prior heparin exposure and therefore history does not support heparin-induced thrombocytopenia.  Recommend ensuring up-to-date on age-appropriate cancer screening.  - primary hospital medicine team/vascular surgery have recommended transition to oral anticoagulation currently on apixaban b.i.d..    Due to lower platelet count recommend close monitoring CBC daily or sooner if any bleeding concerns.  02/26/2023 platelet count gradually improving.  Mild anemia continue to monitor.    Thrombocytopenia  Can repeat platelet count in blue top tube and peripheral blood smear review to ensure no clumping/pseudo thrombocytopenia and for further evaluation of megakaryocytes.  Progressive decline in platelet count since 1st day of admission platelet count 222 on 02/22/2023 down to 55 on 02/25/2023.  Pattern of decline immediately on admission with 1st heparin exposure is inconsistent with a diagnosis of heparin-induced thrombocytopenia.  No  other concerning new medication use.  History is most suggestive of consumptive thrombocytopenia.  No concomitant cytopenia/anemia to suggest thrombotic microangiopathy.  Given ongoing anticoagulation for acute ischemic event recommend close monitoring of platelet count daily and monitoring for any bleeding weighing risks and benefits of anticoagulation.    Leukocytosis  Suspect inflammatory, resolution on 02/25/2023 labs.    Splenic infarct  Workup for hypercoagulable state per above.    Essential hypertension  History of hypertension on blood pressure medications at home; here having lower blood pressure 90s over 60s from which she is mildly symptomatic dizziness. Defer management to primary team.  Physical therapy.  Cautioned to avoid fall on anticoagulation especially.        Thank you for your consult. I will follow-up with patient. Please contact us if you have any additional questions.     Brooklyn Barney MD  Hematology/Oncology  [facility]

## 2023-02-26 NOTE — SUBJECTIVE & OBJECTIVE
Interval History:  Patient notes feeling well today with improved only slight numbness right upper extremity, no edema.  Persistent ecchymoses.  No new ecchymoses or evidence of bleeding.  She does have lower blood pressure 90s over 50s and mildly symptomatic from this with lightheadedness.  She is working with physical therapy.  No falls.  Platelet count improved today from 55 K 268 K. she continues on oral anticoagulation apixaban.    Oncology Treatment Plan:   [No matching plan found]    Medications:  Continuous Infusions:  Scheduled Meds:   amLODIPine  5 mg Oral Daily    apixaban  10 mg Oral BID    [START ON 3/3/2023] apixaban  5 mg Oral BID    chlorhexidine  10 mL Mouth/Throat BID    famotidine  20 mg Oral BID    oxybutynin  15 mg Oral Daily    valsartan  160 mg Oral Daily     PRN Meds:acetaminophen, aluminum-magnesium hydroxide-simethicone, guaiFENesin 100 mg/5 ml, morphine, ondansetron, ondansetron, oxyCODONE, sodium chloride 0.9%     Review of Systems   Constitutional:  Negative for activity change, appetite change, chills, diaphoresis, fatigue, fever and unexpected weight change.   HENT:  Negative for congestion, rhinorrhea and sinus pain.    Respiratory:  Negative for cough, choking, chest tightness, shortness of breath, wheezing and stridor.    Cardiovascular:  Negative for chest pain, palpitations and leg swelling.   Gastrointestinal:  Negative for abdominal distention, abdominal pain, anal bleeding, blood in stool, constipation, diarrhea, nausea, rectal pain and vomiting.   Skin:  Negative for rash.   Hematological:  Does not bruise/bleed easily.   Psychiatric/Behavioral: Negative.     Objective:     Vital Signs (Most Recent):  Temp: 98.7 °F (37.1 °C) (02/26/23 0728)  Pulse: 73 (02/26/23 0728)  Resp: 17 (02/26/23 0728)  BP: (!) 93/55 (02/26/23 0728)  SpO2: (!) 92 % (02/26/23 0728)   Vital Signs (24h Range):  Temp:  [98.3 °F (36.8 °C)-98.9 °F (37.2 °C)] 98.7 °F (37.1 °C)  Pulse:  [63-98] 73  Resp:   [17-20] 17  SpO2:  [90 %-92 %] 92 %  BP: ()/(53-68) 93/55     Weight: 101 kg (222 lb 10.6 oz)  Body mass index is 42.07 kg/m².  Body surface area is 2.08 meters squared.      Intake/Output Summary (Last 24 hours) at 2/26/2023 1001  Last data filed at 2/25/2023 1805  Gross per 24 hour   Intake --   Output 300 ml   Net -300 ml       Physical Exam  Vitals reviewed.   Constitutional:       General: She is not in acute distress.     Appearance: Normal appearance.   HENT:      Head: Normocephalic and atraumatic.      Nose: No congestion.      Mouth/Throat:      Mouth: Mucous membranes are moist.   Eyes:      Extraocular Movements: Extraocular movements intact.      Conjunctiva/sclera: Conjunctivae normal.   Cardiovascular:      Rate and Rhythm: Normal rate.   Pulmonary:      Effort: Pulmonary effort is normal. No respiratory distress.   Abdominal:      General: There is no distension.      Palpations: Abdomen is soft.   Musculoskeletal:         General: No swelling.      Cervical back: Neck supple.      Comments: RUE ecchymosis   Skin:     General: Skin is warm.      Coloration: Skin is not jaundiced.   Neurological:      General: No focal deficit present.      Mental Status: She is alert and oriented to person, place, and time.   Psychiatric:         Mood and Affect: Mood normal.         Behavior: Behavior normal.         Thought Content: Thought content normal.         Judgment: Judgment normal.       Significant Labs:   CBC:   Recent Labs   Lab 02/25/23  0515 02/26/23  0532   WBC 11.17 10.58   HGB 13.0 11.7*   HCT 39.7 34.2*   PLT 55* 68*   , CMP: No results for input(s): NA, K, CL, CO2, GLU, BUN, CREATININE, CALCIUM, PROT, ALBUMIN, BILITOT, ALKPHOS, AST, ALT, ANIONGAP, EGFRNONAA in the last 48 hours.    Invalid input(s): ESTGFAFRICA, and Coagulation: No results for input(s): PT, INR, APTT in the last 48 hours.    Diagnostic Results:  I have reviewed all pertinent imaging results/findings within the past 24  hours.

## 2023-02-26 NOTE — ASSESSMENT & PLAN NOTE
History of hypertension on blood pressure medications at home; here having lower blood pressure 90s over 60s from which she is mildly symptomatic dizziness. Defer management to primary team.  Physical therapy.  Cautioned to avoid fall on anticoagulation especially.

## 2023-02-26 NOTE — ASSESSMENT & PLAN NOTE
Acute development of arterial thrombosis without inciting event.  No known trauma or known anatomic abnormality/instrumentation.  While more typically arterial thrombotic event upper extremity etiology related to cardiac source/comorbidity and recommend full evaluation of this with Cardiology/vascular colleagues etiology less commonly hypercoagulable state may contribute.   Recommend hematologic workup for hypercoagulable state including evaluation of factor 5 Leiden, prothrombin gene mutation, protein C and S level, antithrombin level, anticardiolipin antibody, lupus anticoagulant, evaluation of myeloproliferative neoplasm (history and lab work/CBC without overt concerns for MPN).  No prior heparin exposure and therefore history does not support heparin-induced thrombocytopenia.  Recommend ensuring up-to-date on age-appropriate cancer screening.  - primary hospital medicine team/vascular surgery have recommended transition to oral anticoagulation currently on apixaban b.i.d..    Due to lower platelet count recommend close monitoring CBC daily or sooner if any bleeding concerns.  02/26/2023 platelet count gradually improving.  Mild anemia continue to monitor.

## 2023-02-27 LAB
ANION GAP SERPL CALC-SCNC: 13 MMOL/L (ref 8–16)
AT III ACT/NOR PPP CHRO: 134 % (ref 83–118)
BASOPHILS # BLD AUTO: 0.05 K/UL (ref 0–0.2)
BASOPHILS NFR BLD: 0.5 % (ref 0–1.9)
BUN SERPL-MCNC: 11 MG/DL (ref 6–20)
CALCIUM SERPL-MCNC: 8.5 MG/DL (ref 8.7–10.5)
CHLORIDE SERPL-SCNC: 107 MMOL/L (ref 95–110)
CO2 SERPL-SCNC: 17 MMOL/L (ref 23–29)
CREAT SERPL-MCNC: 0.7 MG/DL (ref 0.5–1.4)
DIFFERENTIAL METHOD: ABNORMAL
EOSINOPHIL # BLD AUTO: 0.2 K/UL (ref 0–0.5)
EOSINOPHIL NFR BLD: 1.6 % (ref 0–8)
ERYTHROCYTE [DISTWIDTH] IN BLOOD BY AUTOMATED COUNT: 12.3 % (ref 11.5–14.5)
EST. GFR  (NO RACE VARIABLE): >60 ML/MIN/1.73 M^2
GLUCOSE SERPL-MCNC: 103 MG/DL (ref 70–110)
HCT VFR BLD AUTO: 35.3 % (ref 37–48.5)
HGB BLD-MCNC: 12.1 G/DL (ref 12–16)
IMM GRANULOCYTES # BLD AUTO: 0.07 K/UL (ref 0–0.04)
IMM GRANULOCYTES NFR BLD AUTO: 0.7 % (ref 0–0.5)
LYMPHOCYTES # BLD AUTO: 1.3 K/UL (ref 1–4.8)
LYMPHOCYTES NFR BLD: 11.8 % (ref 18–48)
MCH RBC QN AUTO: 29.1 PG (ref 27–31)
MCHC RBC AUTO-ENTMCNC: 34.3 G/DL (ref 32–36)
MCV RBC AUTO: 85 FL (ref 82–98)
MONOCYTES # BLD AUTO: 1.2 K/UL (ref 0.3–1)
MONOCYTES NFR BLD: 11.2 % (ref 4–15)
NEUTROPHILS # BLD AUTO: 8 K/UL (ref 1.8–7.7)
NEUTROPHILS NFR BLD: 74.2 % (ref 38–73)
NRBC BLD-RTO: 0 /100 WBC
PLATELET # BLD AUTO: 115 K/UL (ref 150–450)
PLATELET BLD QL SMEAR: ABNORMAL
PMV BLD AUTO: 12.3 FL (ref 9.2–12.9)
POTASSIUM SERPL-SCNC: 3.9 MMOL/L (ref 3.5–5.1)
PROCALCITONIN SERPL IA-MCNC: 0.3 NG/ML
RBC # BLD AUTO: 4.16 M/UL (ref 4–5.4)
SODIUM SERPL-SCNC: 137 MMOL/L (ref 136–145)
WBC # BLD AUTO: 10.76 K/UL (ref 3.9–12.7)

## 2023-02-27 PROCEDURE — 97530 THERAPEUTIC ACTIVITIES: CPT

## 2023-02-27 PROCEDURE — 11000001 HC ACUTE MED/SURG PRIVATE ROOM

## 2023-02-27 PROCEDURE — 25000003 PHARM REV CODE 250: Performed by: FAMILY MEDICINE

## 2023-02-27 PROCEDURE — 25000003 PHARM REV CODE 250: Performed by: NURSE PRACTITIONER

## 2023-02-27 PROCEDURE — 25000003 PHARM REV CODE 250: Performed by: INTERNAL MEDICINE

## 2023-02-27 PROCEDURE — 99233 PR SUBSEQUENT HOSPITAL CARE,LEVL III: ICD-10-PCS | Mod: ,,, | Performed by: INTERNAL MEDICINE

## 2023-02-27 PROCEDURE — 84145 PROCALCITONIN (PCT): CPT | Performed by: FAMILY MEDICINE

## 2023-02-27 PROCEDURE — 36415 COLL VENOUS BLD VENIPUNCTURE: CPT | Performed by: FAMILY MEDICINE

## 2023-02-27 PROCEDURE — 99233 SBSQ HOSP IP/OBS HIGH 50: CPT | Mod: ,,, | Performed by: INTERNAL MEDICINE

## 2023-02-27 PROCEDURE — 85025 COMPLETE CBC W/AUTO DIFF WBC: CPT | Performed by: STUDENT IN AN ORGANIZED HEALTH CARE EDUCATION/TRAINING PROGRAM

## 2023-02-27 PROCEDURE — 36415 COLL VENOUS BLD VENIPUNCTURE: CPT | Performed by: STUDENT IN AN ORGANIZED HEALTH CARE EDUCATION/TRAINING PROGRAM

## 2023-02-27 PROCEDURE — 63600175 PHARM REV CODE 636 W HCPCS: Performed by: FAMILY MEDICINE

## 2023-02-27 PROCEDURE — 25000003 PHARM REV CODE 250: Performed by: SURGERY

## 2023-02-27 PROCEDURE — 80048 BASIC METABOLIC PNL TOTAL CA: CPT | Performed by: STUDENT IN AN ORGANIZED HEALTH CARE EDUCATION/TRAINING PROGRAM

## 2023-02-27 PROCEDURE — 97116 GAIT TRAINING THERAPY: CPT

## 2023-02-27 PROCEDURE — 25000003 PHARM REV CODE 250: Performed by: STUDENT IN AN ORGANIZED HEALTH CARE EDUCATION/TRAINING PROGRAM

## 2023-02-27 RX ADMIN — CHLORHEXIDINE GLUCONATE 0.12% ORAL RINSE 10 ML: 1.2 LIQUID ORAL at 08:02

## 2023-02-27 RX ADMIN — OXYCODONE HYDROCHLORIDE 30 MG: 30 TABLET ORAL at 11:02

## 2023-02-27 RX ADMIN — FAMOTIDINE 20 MG: 20 TABLET ORAL at 08:02

## 2023-02-27 RX ADMIN — VALSARTAN 160 MG: 160 TABLET, FILM COATED ORAL at 08:02

## 2023-02-27 RX ADMIN — APIXABAN 5 MG: 2.5 TABLET, FILM COATED ORAL at 08:02

## 2023-02-27 RX ADMIN — AZITHROMYCIN MONOHYDRATE 500 MG: 500 INJECTION, POWDER, LYOPHILIZED, FOR SOLUTION INTRAVENOUS at 08:02

## 2023-02-27 RX ADMIN — CEFTRIAXONE 1 G: 1 INJECTION, POWDER, FOR SOLUTION INTRAMUSCULAR; INTRAVENOUS at 06:02

## 2023-02-27 RX ADMIN — OXYCODONE HYDROCHLORIDE 30 MG: 30 TABLET ORAL at 08:02

## 2023-02-27 RX ADMIN — OXYBUTYNIN CHLORIDE 15 MG: 5 TABLET, EXTENDED RELEASE ORAL at 08:02

## 2023-02-27 RX ADMIN — APIXABAN 10 MG: 2.5 TABLET, FILM COATED ORAL at 08:02

## 2023-02-27 NOTE — PROGRESS NOTES
[facility] Medicine  Progress Note    Patient Name: Jimena Pierce  MRN: [id number]  Patient Class: IP- Inpatient   Admission Date: 2/22/2023  Length of Stay: 3 days  Attending Physician: Harley Alfaro MD  Primary Care Provider: Baylee Aleman DO        Subjective:     Principal Problem:Brachial artery occlusion, right        HPI:  Ms. Pierce is a 57-year-old  female with PMH significant for cerebral palsy (mild), hypertension, hyperlipidemia, presented to the ED complaining of acute onset of right forearm pain, and abdominal pain that started around the same time since yesterday evening.  She was feeling numbness, tingling of the right fingers.  Right UE arterial ultrasound reveals evidence of acute occlusion at the brachial artery.  CTA of the right upper extremity was unremarkable.  Patient was having numbness, paresthesias, coldness of the right forearm.  Dr. Sun with vascular surgery evaluated the patient, has taken her to the OR emergently for open embolectomy.  She was found to have large clot burden at the bifurcation of the brachial artery on the right, as well as small amount of thrombus in the distal ulnar and radial arteries.  Patient was started on heparin drip prior to the procedure and to be resumed 10:34 p.m. postprocedure.  Patient reports improved pain, paresthesias of the right arm.  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      Admitting diagnosis:  Acute thromboembolism right brachial artery, distal ulnar, radial arteries.  Status post emergent thromboembolectomy.  Splenic artery infarction.      Overview/Hospital Course:  POD3 embolectomy of right brachial artery, transitioned off heparin to Eliquis. Decreasing platelet count, heme/onc consulted. PT/OT eval recs   2/26 AM pt reports she feels dizziness and generalized weakness with movement. Stable for d/c however will monitor platelet  count with repeat CBC in AM given pt reluctance to d/c home. Home amlodipine discontinued given symptoms and hypotensive BP trend. Expect stable for d/c in AM  2/27: SNF placement needed, will cont on eliquis 5mg bid. Outpatient f/u with hematology.       Interval History: No acute issues overnight. Patient reports difficulty with ambulation.     Review of Systems   Constitutional: Negative.  Negative for chills and fever.   HENT: Negative.  Negative for congestion, rhinorrhea, sore throat and trouble swallowing.    Eyes: Negative.  Negative for visual disturbance.   Respiratory: Negative.  Negative for cough, shortness of breath and wheezing.    Cardiovascular: Negative.  Negative for chest pain and palpitations.   Gastrointestinal:  Positive for abdominal pain and nausea. Negative for blood in stool, diarrhea and vomiting.   Endocrine: Negative.    Genitourinary: Negative.  Negative for dysuria and flank pain.   Musculoskeletal: Negative.  Negative for back pain.   Skin: Negative.  Negative for rash.   Allergic/Immunologic: Negative.    Neurological:  Positive for dizziness, light-headedness and numbness (Right arm, improving postprocedure). Negative for speech difficulty, weakness and headaches.   Hematological: Negative.    Psychiatric/Behavioral: Negative.  Negative for hallucinations. The patient is not nervous/anxious.    All other systems reviewed and are negative.  Objective:     Vital Signs (Most Recent):  Temp: (!) 100.9 °F (38.3 °C) (02/27/23 1612)  Pulse: 72 (02/27/23 1612)  Resp: 18 (02/27/23 1612)  BP: 125/78 (02/27/23 1612)  SpO2: (!) 92 % (02/27/23 1612)   Vital Signs (24h Range):  Temp:  [97.2 °F (36.2 °C)-100.9 °F (38.3 °C)] 100.9 °F (38.3 °C)  Pulse:  [62-93] 72  Resp:  [15-19] 18  SpO2:  [92 %-94 %] 92 %  BP: (115-147)/(56-86) 125/78     Weight: 101 kg (222 lb 10.6 oz)  Body mass index is 42.07 kg/m².    Intake/Output Summary (Last 24 hours) at 2/27/2023 1623  Last data filed at 2/27/2023  1510  Gross per 24 hour   Intake 330 ml   Output 400 ml   Net -70 ml      Physical Exam  Vitals and nursing note reviewed.   Constitutional:       General: She is awake. She is not in acute distress.     Appearance: She is obese. She is not ill-appearing.   HENT:      Head: Normocephalic and atraumatic.      Mouth/Throat:      Mouth: Mucous membranes are moist.   Eyes:      General: No scleral icterus.     Conjunctiva/sclera: Conjunctivae normal.   Cardiovascular:      Rate and Rhythm: Normal rate and regular rhythm.      Heart sounds: No murmur heard.  Pulmonary:      Effort: Pulmonary effort is normal. No respiratory distress.      Breath sounds: Normal breath sounds. No wheezing.   Abdominal:      Palpations: Abdomen is soft.      Tenderness: There is no abdominal tenderness.   Musculoskeletal:         General: No swelling.      Cervical back: Neck supple.   Skin:     General: Skin is warm.      Coloration: Skin is not jaundiced.   Neurological:      General: No focal deficit present.      Mental Status: She is alert and oriented to person, place, and time. Mental status is at baseline.   Psychiatric:         Attention and Perception: Attention normal.         Speech: Speech normal.         Behavior: Behavior is cooperative.       Significant Labs: All pertinent labs within the past 24 hours have been reviewed.    Significant Imaging: I have reviewed all pertinent imaging results/findings within the past 24 hours.        Assessment/Plan:      * Brachial artery occlusion, right  -acute brachial artery occlusion, on the right side.    -status post emergent thromboembolectomy.  POD3  -found to have large clot burden at the bifurcation of the brachial artery, with some thrombus in the distal ulnar and radial arteries.    -continue Eliquis  -heme/onc consult, f/u recs    2/27:  S/p embolectomy  Will cont on eliquis 5mg bid   Outpatient f/u with hematology  Hypercoagulable workup pending  PT/OT recommending  snf    Thrombocytopenia  Heme/onc consult  Repeat CBC in AM      Cerebral palsy  -mild.    -able to ambulate with walker at home.      Splenic infarct  -unclear etiology.    -pain medications as needed.    -general surgery consult  -ROSY negative  -heme/onc consult for hypercoagulable work up      Obesity, Class III, BMI 40-49.9 (morbid obesity)  Body mass index is 41.19 kg/m². Morbid obesity complicates all aspects of disease management from diagnostic modalities to treatment. Weight loss encouraged and health benefits explained to patient.         Chronic bilateral low back pain with bilateral sciatica  -continue home dose pain medications.      Essential hypertension  -continue amlodipine, valsartan.    -monitor        VTE Risk Mitigation (From admission, onward)         Ordered     apixaban tablet 5 mg  2 times daily         02/27/23 1614     IP VTE HIGH RISK PATIENT  Once         02/23/23 0541     Place sequential compression device  Until discontinued         02/23/23 0541     Place sequential compression device  Until discontinued         02/23/23 0533                Discharge Planning   TUCKER:      Code Status: Not on file   Is the patient medically ready for discharge?:     Reason for patient still in hospital (select all that apply): Patient trending condition  Discharge Plan A: Skilled Nursing Facility   Discharge Delays: None known at this time              Harley Alfaro MD  Department of Hospital Medicine   [facility] - Med Surg

## 2023-02-27 NOTE — PLAN OF CARE
Patient tolerated intervention fair. Remains with significant pain in R UE limiting it's use. Recommendations re: d/c changed to SNF this date.

## 2023-02-27 NOTE — ASSESSMENT & PLAN NOTE
Can repeat platelet count in blue top tube and peripheral blood smear review to ensure no clumping/pseudo thrombocytopenia and for further evaluation of megakaryocytes.  Progressive decline in platelet count since 1st day of admission platelet count 222 on 02/22/2023 down to 55 on 02/25/2023.  Pattern of decline immediately on admission with 1st heparin exposure is inconsistent with a diagnosis of heparin-induced thrombocytopenia.  No other concerning new medication use.  History is most suggestive of consumptive thrombocytopenia.  No concomitant cytopenia/anemia to suggest thrombotic microangiopathy.   Continued improvement in platelet count.

## 2023-02-27 NOTE — PLAN OF CARE
02/27/23 1517   Post-Acute Status   Post-Acute Authorization Placement   Post-Acute Placement Status Referrals Sent   Discharge Delays None known at this time   Discharge Plan   Discharge Plan A Skilled Nursing Facility

## 2023-02-27 NOTE — PLAN OF CARE
TX COMPLETED: facilitated bed mobility with CGA, transfers with min A, ambulated min A 20ft with SW. Recommend SNF at this time

## 2023-02-27 NOTE — SUBJECTIVE & OBJECTIVE
Interval History:  In stable condition from yesterday.  Persistent ecchymosis right upper extremity and weakness working with physical therapy.  No new neuropathy ecchymosis or bleeding.  Chronic lymphedema lower extremities right greater than left    Oncology Treatment Plan:   [No matching plan found]    Medications:  Continuous Infusions:  Scheduled Meds:   apixaban  10 mg Oral BID    [START ON 3/3/2023] apixaban  5 mg Oral BID    chlorhexidine  10 mL Mouth/Throat BID    famotidine  20 mg Oral BID    oxybutynin  15 mg Oral Daily    valsartan  160 mg Oral Daily     PRN Meds:acetaminophen, aluminum-magnesium hydroxide-simethicone, guaiFENesin 100 mg/5 ml, morphine, ondansetron, ondansetron, oxyCODONE, sodium chloride 0.9%     Review of Systems   Constitutional:  Positive for fatigue. Negative for activity change, appetite change, chills, diaphoresis, fever and unexpected weight change.   HENT:  Negative for congestion, rhinorrhea and sinus pain.    Respiratory:  Negative for cough, choking, chest tightness, shortness of breath, wheezing and stridor.    Cardiovascular:  Negative for chest pain, palpitations and leg swelling.   Gastrointestinal:  Negative for abdominal distention, abdominal pain, anal bleeding, blood in stool, constipation, diarrhea, nausea, rectal pain and vomiting.   Skin:  Negative for rash.   Neurological:  Positive for weakness.        RUE   Hematological:  Does not bruise/bleed easily.   Psychiatric/Behavioral: Negative.     Objective:     Vital Signs (Most Recent):  Temp: 97.2 °F (36.2 °C) (02/27/23 0734)  Pulse: 66 (02/27/23 0734)  Resp: 15 (02/27/23 0734)  BP: 131/86 (02/27/23 0734)  SpO2: (!) 93 % (02/27/23 0734)   Vital Signs (24h Range):  Temp:  [97.2 °F (36.2 °C)-99.5 °F (37.5 °C)] 97.2 °F (36.2 °C)  Pulse:  [62-93] 66  Resp:  [15-19] 15  SpO2:  [92 %-94 %] 93 %  BP: (113-131)/(56-86) 131/86     Weight: 101 kg (222 lb 10.6 oz)  Body mass index is 42.07 kg/m².  Body surface area is 2.08  meters squared.      Intake/Output Summary (Last 24 hours) at 2/27/2023 1006  Last data filed at 2/26/2023 2113  Gross per 24 hour   Intake 240 ml   Output --   Net 240 ml       Physical Exam  Vitals reviewed.   Constitutional:       General: She is not in acute distress.     Appearance: Normal appearance.   HENT:      Head: Normocephalic and atraumatic.      Nose: No congestion.      Mouth/Throat:      Mouth: Mucous membranes are moist.   Eyes:      Extraocular Movements: Extraocular movements intact.      Conjunctiva/sclera: Conjunctivae normal.   Cardiovascular:      Rate and Rhythm: Normal rate.   Pulmonary:      Effort: Pulmonary effort is normal. No respiratory distress.   Abdominal:      General: There is no distension.      Palpations: Abdomen is soft.   Musculoskeletal:         General: Deformity present. No swelling.      Cervical back: Neck supple.      Left lower leg: Edema present.   Skin:     General: Skin is warm.      Coloration: Skin is not jaundiced.   Neurological:      General: No focal deficit present.      Mental Status: She is alert and oriented to person, place, and time.   Psychiatric:         Mood and Affect: Mood normal.         Behavior: Behavior normal.         Thought Content: Thought content normal.         Judgment: Judgment normal.       Significant Labs:   CBC:   Recent Labs   Lab 02/26/23  0532 02/27/23  0615   WBC 10.58 10.76   HGB 11.7* 12.1   HCT 34.2* 35.3*   PLT 68* 115*    and CMP:   Recent Labs   Lab 02/27/23  0615      K 3.9      CO2 17*      BUN 11   CREATININE 0.7   CALCIUM 8.5*   ANIONGAP 13       Diagnostic Results:  I have reviewed all pertinent imaging results/findings within the past 24 hours.

## 2023-02-27 NOTE — PT/OT/SLP PROGRESS
Physical Therapy  Treatment    Jimena Pierce   MRN: [id number]   Admitting Diagnosis: Brachial artery occlusion, right    PT Received On: 02/25/23  PT Start Time: 1125     PT Stop Time: 1149    PT Total Time (min): 24 min       Billable Minutes:  Gait Training 14 and Therapeutic Activity 10    Treatment Type: Treatment  PT/PTA: PT     PTA Visit Number: 0       General Precautions: Standard, fall  Orthopedic Precautions: N/A  Braces: N/A  Respiratory Status: Room air    Spiritual, Cultural Beliefs, Taoist Practices, Values that Affect Care: no    Subjective:  Communicated with nursing (Katarzyna) and performed chart review via epic system prior to session.  Pt agreeable to PT services, despite complaint of pain. Pt expressed concerns regarding ability to navigate home environment with limited assistance    Pain/Comfort  Pain Rating 1:  (pain to RUE and back)  Pain Addressed 1: Reposition, Distraction    Objective:   Patient found with: telemetry, peripheral IV, PureWick    Functional Mobility:  Bed Mobility:    Facilitated supine to sit with CGA and increased time     Transfers:   Sit <> stand and stand pivot with SW and min A, cues for upright posture, and proper hand placement    Gait:    Gait training with SW, 20ft and required min A. Demonstrated slow pace, flexed posture, wide MARY ALICE and increased lateral lean     Balance:   Static Sit: FAIR+: Able to take MINIMAL challenges from all directions  Dynamic Sit: FAIR: Cannot move trunk without losing balance  Static Stand: POOR+: Needs MINIMAL assist to maintain  Dynamic stand: POOR+: Needs MIN (minimal ) assist during gait       Treatment and Education:  Educated pt on benefits of consistent participation in PT services to meet functional goals. Educated pt on seated therex to promote strength and joint mobility. Exercises included AP x 10-15 reps. Educated to perform exercises intermittently throughout day to tolerance. Educated pt on importance of sitting  OOB to promote endurance and overall activity tolerance.  Educated pt on call don't fall policy and use of call button to alert nursing staff of needs.   Pt expressed understanding.    AM-PAC 6 CLICK MOBILITY  How much help from another person does this patient currently need?   1 = Unable, Total/Dependent Assistance  2 = A lot, Maximum/Moderate Assistance  3 = A little, Minimum/Contact Guard/Supervision  4 = None, Modified [place]/Independent    Turning over in bed (including adjusting bedclothes, sheets and blankets)?: 3  Sitting down on and standing up from a chair with arms (e.g., wheelchair, bedside commode, etc.): 2  Moving from lying on back to sitting on the side of the bed?: 3  Moving to and from a bed to a chair (including a wheelchair)?: 2  Need to walk in hospital room?: 3  Climbing 3-5 steps with a railing?: 1  Basic Mobility Total Score: 14    AM-PAC Raw Score CMS G-Code Modifier Level of Impairment Assistance   6 % Total / Unable   7 - 9 CM 80 - 100% Maximal Assist   10 - 14 CL 60 - 80% Moderate Assist   15 - 19 CK 40 - 60% Moderate Assist   20 - 22 CJ 20 - 40% Minimal Assist   23 CI 1-20% SBA / CGA   24 CH 0% Independent/ Mod I     Patient left up in chair with all lines intact, call button in reach, and nursing notified.    Assessment:  Jimena Pierce is a 57 y.o. female with a medical diagnosis of Brachial artery occlusion, right and presents with the impairments listed below which negatively impact functional status. Pt demonstrated improvement in ability to tolerate gait activity today but demonstrated increased dyspnea on exertion and increased risk of falls. Pt will benefit from continued PT services to address deficits and promote safety awareness prior to returning home. Recommend SNF.    Rehab identified problem list/impairments: pain, decreased safety awareness, decreased lower extremity function, weakness, impaired endurance, gait instability, impaired  balance    Rehab potential is fair.    Activity tolerance: Fair    Discharge recommendations: nursing facility, skilled      Barriers to discharge:      Equipment recommendations: TBD following SNF stay    GOALS:   Multidisciplinary Problems       Physical Therapy Goals          Problem: Physical Therapy    Goal Priority Disciplines Outcome Goal Variances Interventions   Physical Therapy Goal     PT, PT/OT Ongoing, Progressing     Description: Pt will perform bed mobility mod I in order to participate in EOB activity.  Pt will perform transfers mod I in order to participate in OOB activity.   Pt will ambulate 75 ft mod I with LRAD in order to participate in daily tasks.                         PLAN:    Patient to be seen 2 x/week to address the above listed problems via gait training, therapeutic activities, therapeutic exercises, neuromuscular re-education  Plan of Care expires: 03/11/23  Plan of Care reviewed with: patient         02/27/2023

## 2023-02-27 NOTE — PLAN OF CARE
Patient remains free of falls and injuries during shift. Pain managed with PRN medications. Purewick in place. Dressing to RUE intact with dried drainage.Telemonitoring in place. Call bell within reach. Chart check complete. Plan of care ongoing.

## 2023-02-27 NOTE — SUBJECTIVE & OBJECTIVE
Interval History: No acute issues overnight. Patient reports difficulty with ambulation.     Review of Systems   Constitutional: Negative.  Negative for chills and fever.   HENT: Negative.  Negative for congestion, rhinorrhea, sore throat and trouble swallowing.    Eyes: Negative.  Negative for visual disturbance.   Respiratory: Negative.  Negative for cough, shortness of breath and wheezing.    Cardiovascular: Negative.  Negative for chest pain and palpitations.   Gastrointestinal:  Positive for abdominal pain and nausea. Negative for blood in stool, diarrhea and vomiting.   Endocrine: Negative.    Genitourinary: Negative.  Negative for dysuria and flank pain.   Musculoskeletal: Negative.  Negative for back pain.   Skin: Negative.  Negative for rash.   Allergic/Immunologic: Negative.    Neurological:  Positive for dizziness, light-headedness and numbness (Right arm, improving postprocedure). Negative for speech difficulty, weakness and headaches.   Hematological: Negative.    Psychiatric/Behavioral: Negative.  Negative for hallucinations. The patient is not nervous/anxious.    All other systems reviewed and are negative.  Objective:     Vital Signs (Most Recent):  Temp: (!) 100.9 °F (38.3 °C) (02/27/23 1612)  Pulse: 72 (02/27/23 1612)  Resp: 18 (02/27/23 1612)  BP: 125/78 (02/27/23 1612)  SpO2: (!) 92 % (02/27/23 1612)   Vital Signs (24h Range):  Temp:  [97.2 °F (36.2 °C)-100.9 °F (38.3 °C)] 100.9 °F (38.3 °C)  Pulse:  [62-93] 72  Resp:  [15-19] 18  SpO2:  [92 %-94 %] 92 %  BP: (115-147)/(56-86) 125/78     Weight: 101 kg (222 lb 10.6 oz)  Body mass index is 42.07 kg/m².    Intake/Output Summary (Last 24 hours) at 2/27/2023 1623  Last data filed at 2/27/2023 1510  Gross per 24 hour   Intake 330 ml   Output 400 ml   Net -70 ml      Physical Exam  Vitals and nursing note reviewed.   Constitutional:       General: She is awake. She is not in acute distress.     Appearance: She is obese. She is not ill-appearing.    HENT:      Head: Normocephalic and atraumatic.      Mouth/Throat:      Mouth: Mucous membranes are moist.   Eyes:      General: No scleral icterus.     Conjunctiva/sclera: Conjunctivae normal.   Cardiovascular:      Rate and Rhythm: Normal rate and regular rhythm.      Heart sounds: No murmur heard.  Pulmonary:      Effort: Pulmonary effort is normal. No respiratory distress.      Breath sounds: Normal breath sounds. No wheezing.   Abdominal:      Palpations: Abdomen is soft.      Tenderness: There is no abdominal tenderness.   Musculoskeletal:         General: No swelling.      Cervical back: Neck supple.   Skin:     General: Skin is warm.      Coloration: Skin is not jaundiced.   Neurological:      General: No focal deficit present.      Mental Status: She is alert and oriented to person, place, and time. Mental status is at baseline.   Psychiatric:         Attention and Perception: Attention normal.         Speech: Speech normal.         Behavior: Behavior is cooperative.       Significant Labs: All pertinent labs within the past 24 hours have been reviewed.    Significant Imaging: I have reviewed all pertinent imaging results/findings within the past 24 hours.

## 2023-02-27 NOTE — ASSESSMENT & PLAN NOTE
Acute development of arterial thrombosis without inciting event.  No known trauma or known anatomic abnormality/instrumentation.  While more typically arterial thrombotic event upper extremity etiology related to cardiac source/comorbidity and recommend full evaluation of this with Cardiology/vascular colleagues etiology less commonly hypercoagulable state may contribute.   Recommend hematologic workup for hypercoagulable state including evaluation of factor 5 Leiden, prothrombin gene mutation, protein C and S level, antithrombin level, anticardiolipin antibody, lupus anticoagulant, evaluation of myeloproliferative neoplasm (history and lab work/CBC without overt concerns for MPN).  No prior heparin exposure and therefore history does not support heparin-induced thrombocytopenia.  Recommend ensuring up-to-date on age-appropriate cancer screening.  - primary hospital medicine team/vascular surgery have recommended transition to oral anticoagulation currently on apixaban b.i.d..    02/27/2023 continued improvement in thrombocytopenia 115 K.  Prior mild anemia also improved resolved hemoglobin 12.1 today.  Recommend continuation of anticoagulation and continued physical therapy for right upper extremity.    Will arrange follow-up in hematology clinic to review hypercoagulable inherited thrombophilia testing.

## 2023-02-27 NOTE — ASSESSMENT & PLAN NOTE
-acute brachial artery occlusion, on the right side.    -status post emergent thromboembolectomy.  POD3  -found to have large clot burden at the bifurcation of the brachial artery, with some thrombus in the distal ulnar and radial arteries.    -continue Eliquis  -heme/onc consult, f/u recs    2/27:  S/p embolectomy  Will cont on eliquis 5mg bid   Outpatient f/u with hematology  Hypercoagulable workup pending  PT/OT recommending snf

## 2023-02-27 NOTE — PT/OT/SLP PROGRESS
Occupational Therapy   Treatment    Name: Jimena Pierce  MRN: [id number]  Admitting Diagnosis:  Brachial artery occlusion, right  4 Days Post-Op    Recommendations:     Discharge Recommendations: nursing facility, skilled  Discharge Equipment Recommendations:   (TBD at next level of care)  Barriers to discharge:  Decreased caregiver support (son works during the day. no additional support)    Assessment:     Jimena Pierce is a 57 y.o. female with a medical diagnosis of Brachial artery occlusion, right.  She presents with the following performance deficits affecting function are weakness, impaired endurance, impaired sensation, impaired self care skills, gait instability, impaired balance, impaired cardiopulmonary response to activity, pain, decreased safety awareness, edema, impaired fine motor, decreased upper extremity function.     Rehab Prognosis:  Good; patient would benefit from acute skilled OT services to address these deficits and reach maximum level of function.       Plan:     Patient to be seen 2 x/week to address the above listed problems via self-care/home management, therapeutic exercises, therapeutic activities  Plan of Care Expires: 03/11/23  Plan of Care Reviewed with: patient    Subjective     Pain/Comfort:  Pain Rating 1: 5/10  Location - Side 1: Right  Location 1: arm  Pain Addressed 1:  (activity pacing)    Objective:     Communicated with: Nurse and EPIC prior to session.  Patient found supine with telemetry, peripheral IV, PureWick upon OT entry to room.    General Precautions: Standard, fall    Orthopedic Precautions:N/A  Braces: N/A  Respiratory Status: Room air     Occupational Performance:     Bed Mobility:    Patient completed Supine to Sit with contact guard assistance to L side of bed with increased time, HOB elevated, and significant use of bed rail.    Functional Mobility/Transfers:  Patient completed Sit <> Stand Transfer with contact guard assistance  with   standard walker and elevated hospital bed   Patient completed Bed <> Chair Transfer using Step Transfer technique with minimum assistance with standard walker  Functional Mobility: Patient completed x15ft functional mobility with SW and min A to increase dynamic standing balance and activity tolerance needed for ADL completion.    Activities of Daily Living:  Lower Body Dressing: total assistance donning socks while seated EOB    AMPAC 6 Click ADL: 14    Treatment & Education:  Patient tolerated intervention fair. Patient able to voice techniques used at home to maximize independence with mobility. Therapist adapted environment to accommodate as able. Requires increased time and prolonged rest breaks between activities due to SOB with exertion. Educated patient on importance of OOB activity TID with nursing A to decrease continued debility. Encouraged completion of B UE AROM therex throughout the day to increase functional strength and activity tolerance needed for ADL completion, with visual demonstration x3 planes of motion. Patient encouraged to use BSC for toileting as tolerated during the day. Patient stated understanding, in agreement with POC, and in agreement to call for A to transfer back to bed.    Patient left up in chair with all lines intact, call button in reach, and nurse notified    GOALS:   Multidisciplinary Problems       Occupational Therapy Goals          Problem: Occupational Therapy    Goal Priority Disciplines Outcome Interventions   Occupational Therapy Goal     OT, PT/OT Ongoing, Progressing    Description: LTG'S TO BE MET IN 14 DAYS (3/11/23)    1)  PATIENT WILL PERFORM UB DRESSING WITH SBA TO DECREASE (D) ON CAREGIVER.    2)  PATIENT WILL PERFORM LB DRESSING WITH MIN (A) TO DECREASE (D) ON CAREGIVER..    3)  PATIENT WILL PERFORM TOILET T/F WITH MIN (A) TO DECREASE (D) ON CAREGIVER..    4)  PATIENT WILL PERFORM BUE HEP WITH (I) FOR PROPER TECHNIQUE TO INCREASE FUNC STRENGTH AND FUNC  ENDURANCE TO DECREASE (D) ON CAREGIVER TO SAFELY PERFORM SELF CARE TASKS.                         Time Tracking:     OT Date of Treatment: 02/27/23  OT Start Time: 1125  OT Stop Time: 1150  OT Total Time (min): 25 min    Billable Minutes:Therapeutic Activity 25 2/27/2023

## 2023-02-27 NOTE — PROGRESS NOTES
[facility]  Hematology/On[facility]  Progress Note    Patient Name: Jimena Pierce  Admission Date: 2/22/2023  Hospital Length of Stay: 3 days  Code Status: No Order     Subjective:     HPI:  No notes on file    Interval History:  In stable condition from yesterday.  Persistent ecchymosis right upper extremity and weakness working with physical therapy.  No new neuropathy ecchymosis or bleeding.  Chronic lymphedema lower extremities right greater than left    Oncology Treatment Plan:   [No matching plan found]    Medications:  Continuous Infusions:  Scheduled Meds:   apixaban  10 mg Oral BID    [START ON 3/3/2023] apixaban  5 mg Oral BID    chlorhexidine  10 mL Mouth/Throat BID    famotidine  20 mg Oral BID    oxybutynin  15 mg Oral Daily    valsartan  160 mg Oral Daily     PRN Meds:acetaminophen, aluminum-magnesium hydroxide-simethicone, guaiFENesin 100 mg/5 ml, morphine, ondansetron, ondansetron, oxyCODONE, sodium chloride 0.9%     Review of Systems   Constitutional:  Positive for fatigue. Negative for activity change, appetite change, chills, diaphoresis, fever and unexpected weight change.   HENT:  Negative for congestion, rhinorrhea and sinus pain.    Respiratory:  Negative for cough, choking, chest tightness, shortness of breath, wheezing and stridor.    Cardiovascular:  Negative for chest pain, palpitations and leg swelling.   Gastrointestinal:  Negative for abdominal distention, abdominal pain, anal bleeding, blood in stool, constipation, diarrhea, nausea, rectal pain and vomiting.   Skin:  Negative for rash.   Neurological:  Positive for weakness.        RUE   Hematological:  Does not bruise/bleed easily.   Psychiatric/Behavioral: Negative.     Objective:     Vital Signs (Most Recent):  Temp: 97.2 °F (36.2 °C) (02/27/23 0734)  Pulse: 66 (02/27/23 0734)  Resp: 15 (02/27/23 0734)  BP: 131/86 (02/27/23 0734)  SpO2: (!) 93 % (02/27/23 0734)   Vital Signs (24h Range):  Temp:  [97.2 °F (36.2  °C)-99.5 °F (37.5 °C)] 97.2 °F (36.2 °C)  Pulse:  [62-93] 66  Resp:  [15-19] 15  SpO2:  [92 %-94 %] 93 %  BP: (113-131)/(56-86) 131/86     Weight: 101 kg (222 lb 10.6 oz)  Body mass index is 42.07 kg/m².  Body surface area is 2.08 meters squared.      Intake/Output Summary (Last 24 hours) at 2/27/2023 1006  Last data filed at 2/26/2023 2113  Gross per 24 hour   Intake 240 ml   Output --   Net 240 ml       Physical Exam  Vitals reviewed.   Constitutional:       General: She is not in acute distress.     Appearance: Normal appearance.   HENT:      Head: Normocephalic and atraumatic.      Nose: No congestion.      Mouth/Throat:      Mouth: Mucous membranes are moist.   Eyes:      Extraocular Movements: Extraocular movements intact.      Conjunctiva/sclera: Conjunctivae normal.   Cardiovascular:      Rate and Rhythm: Normal rate.   Pulmonary:      Effort: Pulmonary effort is normal. No respiratory distress.   Abdominal:      General: There is no distension.      Palpations: Abdomen is soft.   Musculoskeletal:         General: Deformity present. No swelling.      Cervical back: Neck supple.      Left lower leg: Edema present.   Skin:     General: Skin is warm.      Coloration: Skin is not jaundiced.   Neurological:      General: No focal deficit present.      Mental Status: She is alert and oriented to person, place, and time.   Psychiatric:         Mood and Affect: Mood normal.         Behavior: Behavior normal.         Thought Content: Thought content normal.         Judgment: Judgment normal.       Significant Labs:   CBC:   Recent Labs   Lab 02/26/23  0532 02/27/23  0615   WBC 10.58 10.76   HGB 11.7* 12.1   HCT 34.2* 35.3*   PLT 68* 115*    and CMP:   Recent Labs   Lab 02/27/23  0615      K 3.9      CO2 17*      BUN 11   CREATININE 0.7   CALCIUM 8.5*   ANIONGAP 13       Diagnostic Results:  I have reviewed all pertinent imaging results/findings within the past 24 hours.    Assessment/Plan:     *  Brachial artery occlusion, right  Acute development of arterial thrombosis without inciting event.  No known trauma or known anatomic abnormality/instrumentation.  While more typically arterial thrombotic event upper extremity etiology related to cardiac source/comorbidity and recommend full evaluation of this with Cardiology/vascular colleagues etiology less commonly hypercoagulable state may contribute.   Recommend hematologic workup for hypercoagulable state including evaluation of factor 5 Leiden, prothrombin gene mutation, protein C and S level, antithrombin level, anticardiolipin antibody, lupus anticoagulant, evaluation of myeloproliferative neoplasm (history and lab work/CBC without overt concerns for MPN).  No prior heparin exposure and therefore history does not support heparin-induced thrombocytopenia.  Recommend ensuring up-to-date on age-appropriate cancer screening.  - primary hospital medicine team/vascular surgery have recommended transition to oral anticoagulation currently on apixaban b.i.d..    02/27/2023 continued improvement in thrombocytopenia 115 K.  Prior mild anemia also improved resolved hemoglobin 12.1 today.  Recommend continuation of anticoagulation and continued physical therapy for right upper extremity.    Will arrange follow-up in hematology clinic to review hypercoagulable inherited thrombophilia testing.    Thrombocytopenia  Can repeat platelet count in blue top tube and peripheral blood smear review to ensure no clumping/pseudo thrombocytopenia and for further evaluation of megakaryocytes.  Progressive decline in platelet count since 1st day of admission platelet count 222 on 02/22/2023 down to 55 on 02/25/2023.  Pattern of decline immediately on admission with 1st heparin exposure is inconsistent with a diagnosis of heparin-induced thrombocytopenia.  No other concerning new medication use.  History is most suggestive of consumptive thrombocytopenia.  No concomitant  cytopenia/anemia to suggest thrombotic microangiopathy.   Continued improvement in platelet count.    Leukocytosis  Suspect inflammatory, resolution on 02/25/2023 labs.    Splenic infarct  Workup for hypercoagulable state per above.    Essential hypertension  History of hypertension on blood pressure medications at home; here having lower blood pressure 90s over 60s from which she is mildly symptomatic dizziness. Defer management to primary team.  Physical therapy.  Cautioned to avoid fall on anticoagulation especially.        Thank you for your consult. I will sign off. Please contact us if you have any additional questions.     Brooklyn Barney MD  Hematology/Oncology  [facility]

## 2023-02-28 PROBLEM — J18.9 PNA (PNEUMONIA): Status: ACTIVE | Noted: 2023-02-24

## 2023-02-28 LAB
ANION GAP SERPL CALC-SCNC: 10 MMOL/L (ref 8–16)
BASOPHILS # BLD AUTO: 0.06 K/UL (ref 0–0.2)
BASOPHILS NFR BLD: 0.6 % (ref 0–1.9)
BNP SERPL-MCNC: 176 PG/ML (ref 0–99)
BUN SERPL-MCNC: 10 MG/DL (ref 6–20)
CALCIUM SERPL-MCNC: 8.7 MG/DL (ref 8.7–10.5)
CHLORIDE SERPL-SCNC: 107 MMOL/L (ref 95–110)
CO2 SERPL-SCNC: 22 MMOL/L (ref 23–29)
CREAT SERPL-MCNC: 0.7 MG/DL (ref 0.5–1.4)
DIFFERENTIAL METHOD: ABNORMAL
EOSINOPHIL # BLD AUTO: 0.2 K/UL (ref 0–0.5)
EOSINOPHIL NFR BLD: 2 % (ref 0–8)
ERYTHROCYTE [DISTWIDTH] IN BLOOD BY AUTOMATED COUNT: 12.5 % (ref 11.5–14.5)
EST. GFR  (NO RACE VARIABLE): >60 ML/MIN/1.73 M^2
GLUCOSE SERPL-MCNC: 118 MG/DL (ref 70–110)
HCT VFR BLD AUTO: 35.9 % (ref 37–48.5)
HGB BLD-MCNC: 12.1 G/DL (ref 12–16)
IMM GRANULOCYTES # BLD AUTO: 0.05 K/UL (ref 0–0.04)
IMM GRANULOCYTES NFR BLD AUTO: 0.5 % (ref 0–0.5)
LYMPHOCYTES # BLD AUTO: 1.5 K/UL (ref 1–4.8)
LYMPHOCYTES NFR BLD: 14.9 % (ref 18–48)
MCH RBC QN AUTO: 29.2 PG (ref 27–31)
MCHC RBC AUTO-ENTMCNC: 33.7 G/DL (ref 32–36)
MCV RBC AUTO: 87 FL (ref 82–98)
MONOCYTES # BLD AUTO: 0.9 K/UL (ref 0.3–1)
MONOCYTES NFR BLD: 8.9 % (ref 4–15)
NEUTROPHILS # BLD AUTO: 7.4 K/UL (ref 1.8–7.7)
NEUTROPHILS NFR BLD: 73.1 % (ref 38–73)
NRBC BLD-RTO: 0 /100 WBC
PLATELET # BLD AUTO: 165 K/UL (ref 150–450)
PLATELET BLD QL SMEAR: ABNORMAL
PMV BLD AUTO: 11.1 FL (ref 9.2–12.9)
POTASSIUM SERPL-SCNC: 3.7 MMOL/L (ref 3.5–5.1)
PROT C ACT/NOR PPP CHRO: 98 % (ref 70–150)
PROT S ACT/NOR PPP: 140 % (ref 65–160)
RBC # BLD AUTO: 4.14 M/UL (ref 4–5.4)
SODIUM SERPL-SCNC: 139 MMOL/L (ref 136–145)
WBC # BLD AUTO: 10.11 K/UL (ref 3.9–12.7)

## 2023-02-28 PROCEDURE — 80048 BASIC METABOLIC PNL TOTAL CA: CPT | Performed by: FAMILY MEDICINE

## 2023-02-28 PROCEDURE — 85025 COMPLETE CBC W/AUTO DIFF WBC: CPT | Performed by: FAMILY MEDICINE

## 2023-02-28 PROCEDURE — 63600175 PHARM REV CODE 636 W HCPCS: Performed by: FAMILY MEDICINE

## 2023-02-28 PROCEDURE — 36415 COLL VENOUS BLD VENIPUNCTURE: CPT | Performed by: FAMILY MEDICINE

## 2023-02-28 PROCEDURE — 25000003 PHARM REV CODE 250: Performed by: FAMILY MEDICINE

## 2023-02-28 PROCEDURE — 25000003 PHARM REV CODE 250: Performed by: INTERNAL MEDICINE

## 2023-02-28 PROCEDURE — 11000001 HC ACUTE MED/SURG PRIVATE ROOM

## 2023-02-28 PROCEDURE — 25000003 PHARM REV CODE 250: Performed by: NURSE PRACTITIONER

## 2023-02-28 PROCEDURE — 94761 N-INVAS EAR/PLS OXIMETRY MLT: CPT

## 2023-02-28 PROCEDURE — 83880 ASSAY OF NATRIURETIC PEPTIDE: CPT | Performed by: FAMILY MEDICINE

## 2023-02-28 RX ORDER — FUROSEMIDE 10 MG/ML
20 INJECTION INTRAMUSCULAR; INTRAVENOUS ONCE
Status: COMPLETED | OUTPATIENT
Start: 2023-02-28 | End: 2023-02-28

## 2023-02-28 RX ORDER — KETOROLAC TROMETHAMINE 30 MG/ML
30 INJECTION, SOLUTION INTRAMUSCULAR; INTRAVENOUS ONCE
Status: COMPLETED | OUTPATIENT
Start: 2023-02-28 | End: 2023-02-28

## 2023-02-28 RX ADMIN — OXYCODONE HYDROCHLORIDE 30 MG: 30 TABLET ORAL at 09:02

## 2023-02-28 RX ADMIN — KETOROLAC TROMETHAMINE 30 MG: 30 INJECTION, SOLUTION INTRAMUSCULAR; INTRAVENOUS at 11:02

## 2023-02-28 RX ADMIN — VALSARTAN 160 MG: 160 TABLET, FILM COATED ORAL at 08:02

## 2023-02-28 RX ADMIN — FUROSEMIDE 20 MG: 10 INJECTION, SOLUTION INTRAMUSCULAR; INTRAVENOUS at 09:02

## 2023-02-28 RX ADMIN — FAMOTIDINE 20 MG: 20 TABLET ORAL at 09:02

## 2023-02-28 RX ADMIN — FAMOTIDINE 20 MG: 20 TABLET ORAL at 08:02

## 2023-02-28 RX ADMIN — OXYBUTYNIN CHLORIDE 15 MG: 5 TABLET, EXTENDED RELEASE ORAL at 08:02

## 2023-02-28 RX ADMIN — APIXABAN 5 MG: 2.5 TABLET, FILM COATED ORAL at 09:02

## 2023-02-28 RX ADMIN — AZITHROMYCIN MONOHYDRATE 500 MG: 500 INJECTION, POWDER, LYOPHILIZED, FOR SOLUTION INTRAVENOUS at 06:02

## 2023-02-28 RX ADMIN — APIXABAN 5 MG: 2.5 TABLET, FILM COATED ORAL at 08:02

## 2023-02-28 RX ADMIN — CEFTRIAXONE 1 G: 1 INJECTION, POWDER, FOR SOLUTION INTRAMUSCULAR; INTRAVENOUS at 05:02

## 2023-02-28 NOTE — ASSESSMENT & PLAN NOTE
-acute brachial artery occlusion, on the right side.    -status post emergent thromboembolectomy.  POD3  -found to have large clot burden at the bifurcation of the brachial artery, with some thrombus in the distal ulnar and radial arteries.    -continue Eliquis  -heme/onc consult, f/u recs    2/28:  S/p embolectomy  Will cont on eliquis 5mg bid   Outpatient f/u with hematology  Hypercoagulable workup pending  PT/OT recommending snf  Placement pending

## 2023-02-28 NOTE — SUBJECTIVE & OBJECTIVE
Interval History: Currently doing well. No issues.     Review of Systems   Constitutional: Negative.  Negative for chills and fever.   HENT: Negative.  Negative for congestion, rhinorrhea, sore throat and trouble swallowing.    Eyes: Negative.  Negative for visual disturbance.   Respiratory: Negative.  Negative for cough, shortness of breath and wheezing.    Cardiovascular: Negative.  Negative for chest pain and palpitations.   Gastrointestinal:  Negative for abdominal pain, blood in stool, diarrhea, nausea and vomiting.   Endocrine: Negative.    Genitourinary: Negative.  Negative for dysuria and flank pain.   Musculoskeletal: Negative.  Negative for back pain.   Skin: Negative.  Negative for rash.   Allergic/Immunologic: Negative.    Neurological:  Negative for dizziness, speech difficulty, weakness, light-headedness, numbness and headaches.   Hematological: Negative.    Psychiatric/Behavioral: Negative.  Negative for hallucinations. The patient is not nervous/anxious.    All other systems reviewed and are negative.  Objective:     Vital Signs (Most Recent):  Temp: 98.7 °F (37.1 °C) (02/28/23 1115)  Pulse: 71 (02/28/23 1115)  Resp: 16 (02/28/23 1115)  BP: (!) 103/52 (02/28/23 1115)  SpO2: (!) 93 % (02/28/23 1115)   Vital Signs (24h Range):  Temp:  [98.4 °F (36.9 °C)-100.9 °F (38.3 °C)] 98.7 °F (37.1 °C)  Pulse:  [63-77] 71  Resp:  [16-20] 16  SpO2:  [92 %-94 %] 93 %  BP: ()/(52-78) 103/52     Weight: 101.9 kg (224 lb 10.4 oz)  Body mass index is 42.45 kg/m².    Intake/Output Summary (Last 24 hours) at 2/28/2023 1405  Last data filed at 2/28/2023 1101  Gross per 24 hour   Intake 566.47 ml   Output 1300 ml   Net -733.53 ml      Physical Exam  Vitals and nursing note reviewed.   Constitutional:       General: She is awake. She is not in acute distress.     Appearance: She is obese. She is not ill-appearing.   HENT:      Head: Normocephalic and atraumatic.      Mouth/Throat:      Mouth: Mucous membranes are  moist.   Eyes:      General: No scleral icterus.     Conjunctiva/sclera: Conjunctivae normal.   Cardiovascular:      Rate and Rhythm: Normal rate and regular rhythm.      Heart sounds: No murmur heard.  Pulmonary:      Effort: Pulmonary effort is normal. No respiratory distress.      Breath sounds: Normal breath sounds. No wheezing.   Abdominal:      Palpations: Abdomen is soft.      Tenderness: There is no abdominal tenderness.   Musculoskeletal:         General: No swelling.      Cervical back: Neck supple.   Skin:     General: Skin is warm.      Coloration: Skin is not jaundiced.   Neurological:      General: No focal deficit present.      Mental Status: She is alert and oriented to person, place, and time. Mental status is at baseline.   Psychiatric:         Attention and Perception: Attention normal.         Speech: Speech normal.         Behavior: Behavior is cooperative.       Significant Labs: All pertinent labs within the past 24 hours have been reviewed.    Significant Imaging: I have reviewed all pertinent imaging results/findings within the past 24 hours.

## 2023-02-28 NOTE — PLAN OF CARE
CM met with patient/family at bedside regarding SNF. Received choice for [facility], [facility] and [facility]. [facility] referrals have been sent, pending accepting facility.    Letter of Consideration received from [place] [facility] and [facility], awaiting direction from [facility] regarding [facility].

## 2023-02-28 NOTE — PROGRESS NOTES
[facility] Medicine  Progress Note    Patient Name: Jimena Pierce  MRN: [id number]  Patient Class: IP- Inpatient   Admission Date: 2/22/2023  Length of Stay: 4 days  Attending Physician: Harley Alfaro MD  Primary Care Provider: Baylee Aleman DO        Subjective:     Principal Problem:Brachial artery occlusion, right        HPI:  Ms. Pierce is a 57-year-old  female with PMH significant for cerebral palsy (mild), hypertension, hyperlipidemia, presented to the ED complaining of acute onset of right forearm pain, and abdominal pain that started around the same time since yesterday evening.  She was feeling numbness, tingling of the right fingers.  Right UE arterial ultrasound reveals evidence of acute occlusion at the brachial artery.  CTA of the right upper extremity was unremarkable.  Patient was having numbness, paresthesias, coldness of the right forearm.  Dr. Sun with vascular surgery evaluated the patient, has taken her to the OR emergently for open embolectomy.  She was found to have large clot burden at the bifurcation of the brachial artery on the right, as well as small amount of thrombus in the distal ulnar and radial arteries.  Patient was started on heparin drip prior to the procedure and to be resumed 10:34 p.m. postprocedure.  Patient reports improved pain, paresthesias of the right arm.  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      Admitting diagnosis:  Acute thromboembolism right brachial artery, distal ulnar, radial arteries.  Status post emergent thromboembolectomy.  Splenic artery infarction.      Overview/Hospital Course:  POD3 embolectomy of right brachial artery, transitioned off heparin to Eliquis. Decreasing platelet count, heme/onc consulted. PT/OT eval recs   2/26 AM pt reports she feels dizziness and generalized weakness with movement. Stable for d/c however will monitor platelet  count with repeat CBC in AM given pt reluctance to d/c home. Home amlodipine discontinued given symptoms and hypotensive BP trend. Expect stable for d/c in AM  2/27: SNF placement needed, will cont on eliquis 5mg bid. Outpatient f/u with hematology.   2/28: awaiting snf placement. Cont eliquis. Chest xray concerning for pna vs edema. Pt febrile, sob. Will treat with azithro and rocephin. Lasix given today. Resp status improving. BNP only mildly elevated.       Interval History: Currently doing well. No issues.     Review of Systems   Constitutional: Negative.  Negative for chills and fever.   HENT: Negative.  Negative for congestion, rhinorrhea, sore throat and trouble swallowing.    Eyes: Negative.  Negative for visual disturbance.   Respiratory: Negative.  Negative for cough, shortness of breath and wheezing.    Cardiovascular: Negative.  Negative for chest pain and palpitations.   Gastrointestinal:  Negative for abdominal pain, blood in stool, diarrhea, nausea and vomiting.   Endocrine: Negative.    Genitourinary: Negative.  Negative for dysuria and flank pain.   Musculoskeletal: Negative.  Negative for back pain.   Skin: Negative.  Negative for rash.   Allergic/Immunologic: Negative.    Neurological:  Negative for dizziness, speech difficulty, weakness, light-headedness, numbness and headaches.   Hematological: Negative.    Psychiatric/Behavioral: Negative.  Negative for hallucinations. The patient is not nervous/anxious.    All other systems reviewed and are negative.  Objective:     Vital Signs (Most Recent):  Temp: 98.7 °F (37.1 °C) (02/28/23 1115)  Pulse: 71 (02/28/23 1115)  Resp: 16 (02/28/23 1115)  BP: (!) 103/52 (02/28/23 1115)  SpO2: (!) 93 % (02/28/23 1115)   Vital Signs (24h Range):  Temp:  [98.4 °F (36.9 °C)-100.9 °F (38.3 °C)] 98.7 °F (37.1 °C)  Pulse:  [63-77] 71  Resp:  [16-20] 16  SpO2:  [92 %-94 %] 93 %  BP: ()/(52-78) 103/52     Weight: 101.9 kg (224 lb 10.4 oz)  Body mass index is 42.45  kg/m².    Intake/Output Summary (Last 24 hours) at 2/28/2023 1405  Last data filed at 2/28/2023 1101  Gross per 24 hour   Intake 566.47 ml   Output 1300 ml   Net -733.53 ml      Physical Exam  Vitals and nursing note reviewed.   Constitutional:       General: She is awake. She is not in acute distress.     Appearance: She is obese. She is not ill-appearing.   HENT:      Head: Normocephalic and atraumatic.      Mouth/Throat:      Mouth: Mucous membranes are moist.   Eyes:      General: No scleral icterus.     Conjunctiva/sclera: Conjunctivae normal.   Cardiovascular:      Rate and Rhythm: Normal rate and regular rhythm.      Heart sounds: No murmur heard.  Pulmonary:      Effort: Pulmonary effort is normal. No respiratory distress.      Breath sounds: Normal breath sounds. No wheezing.   Abdominal:      Palpations: Abdomen is soft.      Tenderness: There is no abdominal tenderness.   Musculoskeletal:         General: No swelling.      Cervical back: Neck supple.   Skin:     General: Skin is warm.      Coloration: Skin is not jaundiced.   Neurological:      General: No focal deficit present.      Mental Status: She is alert and oriented to person, place, and time. Mental status is at baseline.   Psychiatric:         Attention and Perception: Attention normal.         Speech: Speech normal.         Behavior: Behavior is cooperative.       Significant Labs: All pertinent labs within the past 24 hours have been reviewed.    Significant Imaging: I have reviewed all pertinent imaging results/findings within the past 24 hours.        Assessment/Plan:      * Brachial artery occlusion, right  -acute brachial artery occlusion, on the right side.    -status post emergent thromboembolectomy.  POD3  -found to have large clot burden at the bifurcation of the brachial artery, with some thrombus in the distal ulnar and radial arteries.    -continue Eliquis  -heme/onc consult, f/u recs    2/28:  S/p embolectomy  Will cont on eliquis  5mg bid   Outpatient f/u with hematology  Hypercoagulable workup pending  PT/OT recommending snf  Placement pending     Thrombocytopenia  Heme/onc consult  Repeat CBC in AM      PNA (pneumonia)  pna  Cont rocephin and azitho      Cerebral palsy  -mild.    -able to ambulate with walker at home.      Splenic infarct  Cont eliquis  Hypercoagulable workup       Obesity, Class III, BMI 40-49.9 (morbid obesity)  Body mass index is 41.19 kg/m². Morbid obesity complicates all aspects of disease management from diagnostic modalities to treatment. Weight loss encouraged and health benefits explained to patient.         Chronic bilateral low back pain with bilateral sciatica  -continue home dose pain medications.      Essential hypertension  -continue amlodipine, valsartan.    -monitor        VTE Risk Mitigation (From admission, onward)         Ordered     apixaban tablet 5 mg  2 times daily         02/27/23 1614     IP VTE HIGH RISK PATIENT  Once         02/23/23 0541     Place sequential compression device  Until discontinued         02/23/23 0541     Place sequential compression device  Until discontinued         02/23/23 0533                Discharge Planning   TUCKER:      Code Status: Not on file   Is the patient medically ready for discharge?:     Reason for patient still in hospital (select all that apply): Pending disposition  Discharge Plan A: Skilled Nursing Facility   Discharge Delays: None known at this time              Harley Alfaro MD  Department of Hospital Medicine   [facility]

## 2023-02-28 NOTE — PLAN OF CARE
[facility] have declined. Awaiting return call from [facility]. 25 SNF referrals have been sent.

## 2023-02-28 NOTE — PLAN OF CARE
Patient remains free of falls and injuries during shift. Pain managed with PRN medications.Telemonitoring in place. IV antibiotics running as ordered. Call bell within reach. Chart check complete. Plan of care ongoing.

## 2023-03-01 PROBLEM — D68.61 ANTIPHOSPHOLIPID SYNDROME: Status: ACTIVE | Noted: 2023-03-01

## 2023-03-01 LAB
CARDIOLIPIN IGG SER IA-ACNC: <9.4 GPL (ref 0–14.99)
CARDIOLIPIN IGM SER IA-ACNC: 45.6 MPL (ref 0–12.49)
F2 C.20210G>A GENO BLD/T: NEGATIVE
F5 P.R506Q BLD/T QL: NEGATIVE
INR PPP: 1 (ref 0.8–1.2)
POCT GLUCOSE: 111 MG/DL (ref 70–110)
PROTHROMBIN TIME: 10.8 SEC (ref 9–12.5)

## 2023-03-01 PROCEDURE — 36415 COLL VENOUS BLD VENIPUNCTURE: CPT | Performed by: FAMILY MEDICINE

## 2023-03-01 PROCEDURE — 85610 PROTHROMBIN TIME: CPT | Performed by: FAMILY MEDICINE

## 2023-03-01 PROCEDURE — 25000003 PHARM REV CODE 250: Performed by: FAMILY MEDICINE

## 2023-03-01 PROCEDURE — 25000003 PHARM REV CODE 250: Performed by: INTERNAL MEDICINE

## 2023-03-01 PROCEDURE — 63600175 PHARM REV CODE 636 W HCPCS: Performed by: FAMILY MEDICINE

## 2023-03-01 PROCEDURE — 25000003 PHARM REV CODE 250: Performed by: NURSE PRACTITIONER

## 2023-03-01 PROCEDURE — 11000001 HC ACUTE MED/SURG PRIVATE ROOM

## 2023-03-01 RX ORDER — POLYETHYLENE GLYCOL 3350 17 G/17G
17 POWDER, FOR SOLUTION ORAL 2 TIMES DAILY PRN
Status: DISCONTINUED | OUTPATIENT
Start: 2023-03-01 | End: 2023-03-06 | Stop reason: HOSPADM

## 2023-03-01 RX ORDER — WARFARIN SODIUM 5 MG/1
5 TABLET ORAL DAILY
Status: DISCONTINUED | OUTPATIENT
Start: 2023-03-01 | End: 2023-03-03

## 2023-03-01 RX ORDER — ENOXAPARIN SODIUM 100 MG/ML
1 INJECTION SUBCUTANEOUS
Status: DISCONTINUED | OUTPATIENT
Start: 2023-03-01 | End: 2023-03-06 | Stop reason: HOSPADM

## 2023-03-01 RX ORDER — SODIUM CHLORIDE 9 MG/ML
INJECTION, SOLUTION INTRAVENOUS
Status: DISCONTINUED | OUTPATIENT
Start: 2023-03-01 | End: 2023-03-06 | Stop reason: HOSPADM

## 2023-03-01 RX ADMIN — OXYBUTYNIN CHLORIDE 15 MG: 5 TABLET, EXTENDED RELEASE ORAL at 08:03

## 2023-03-01 RX ADMIN — AZITHROMYCIN MONOHYDRATE 500 MG: 500 INJECTION, POWDER, LYOPHILIZED, FOR SOLUTION INTRAVENOUS at 06:03

## 2023-03-01 RX ADMIN — VALSARTAN 160 MG: 160 TABLET, FILM COATED ORAL at 08:03

## 2023-03-01 RX ADMIN — ENOXAPARIN SODIUM 100 MG: 100 INJECTION SUBCUTANEOUS at 09:03

## 2023-03-01 RX ADMIN — CEFTRIAXONE 1 G: 1 INJECTION, POWDER, FOR SOLUTION INTRAMUSCULAR; INTRAVENOUS at 05:03

## 2023-03-01 RX ADMIN — OXYCODONE HYDROCHLORIDE 30 MG: 30 TABLET ORAL at 09:03

## 2023-03-01 RX ADMIN — OXYCODONE HYDROCHLORIDE 30 MG: 30 TABLET ORAL at 02:03

## 2023-03-01 RX ADMIN — FAMOTIDINE 20 MG: 20 TABLET ORAL at 09:03

## 2023-03-01 RX ADMIN — FAMOTIDINE 20 MG: 20 TABLET ORAL at 08:03

## 2023-03-01 RX ADMIN — WARFARIN SODIUM 5 MG: 5 TABLET ORAL at 04:03

## 2023-03-01 RX ADMIN — APIXABAN 5 MG: 2.5 TABLET, FILM COATED ORAL at 08:03

## 2023-03-01 RX ADMIN — OXYCODONE HYDROCHLORIDE 30 MG: 30 TABLET ORAL at 08:03

## 2023-03-01 RX ADMIN — OXYCODONE HYDROCHLORIDE 30 MG: 30 TABLET ORAL at 01:03

## 2023-03-01 NOTE — PROGRESS NOTES
Pharmacy Consult Note: Warfarin     Jimena Pierce 's Coumadin will be dosed and monitored by Pharmacy.      Target INR goal is 2.5-3.5.    INR   Date Value Ref Range Status   03/01/2023 1.0 0.8 - 1.2 Final     Comment:     Coumadin Therapy:  2.0 - 3.0 for INR for all indicators except mechanical heart valves  and antiphospholipid syndromes which should use 2.5 - 3.5.         Indication: antiphospholipid syndrome    Patient will be initiated on a dose of 5 mg per day.    Dose for Today: 5 mg    PT/INR will be monitored daily. Dose adjustments will be made accordingly.      Thank you for allowing us to participate in this patient's care.     [place], PharmD 3/1/2023 4:47 PM

## 2023-03-01 NOTE — PLAN OF CARE
AAOx4. Pt pain is being managed on going. Incisional dressing intact. Cardiac monitoring. Pt remained free from fall and injury. No sign of any distress noted. Safety precautions alert. Pt asked to call for assistance if needed. IV access clean dry and intact saline locked. Chart checked reviewed. VSS. Plan of care continued.

## 2023-03-01 NOTE — ASSESSMENT & PLAN NOTE
-acute brachial artery occlusion, on the right side.    -status post emergent thromboembolectomy.  POD3  -found to have large clot burden at the bifurcation of the brachial artery, with some thrombus in the distal ulnar and radial arteries.    -continue Eliquis  -heme/onc consult, f/u recs    3/1:  S/p embolectomy  Will cont on eliquis 5mg bid   Outpatient f/u with hematology  Hypercoagulable workup pending  PT/OT recommending snf  Placement pending

## 2023-03-01 NOTE — ASSESSMENT & PLAN NOTE
Anti-cardiolipin antibody positive  Currently on eliquis  Will discuss with hematology on possibly switching to coumadin

## 2023-03-01 NOTE — PT/OT/SLP PROGRESS
Occupational Therapy      Patient Name:  Jimena Pierce   MRN:  [id number]    Presented to room at 1005. Patient with case management for d/c planning. Will continue efforts.    Suzie Vital OT    3/1/2023  1006

## 2023-03-01 NOTE — PLAN OF CARE
Discussed poc with pt, pt verbalized understanding    Purposeful rounding every 2hours    VS wnl  Cardiac monitoring in use, pt is NSR, tele monitor # [id number]  Fall precautions in place, remains injury free  Pt denies c/o nausea  Pain under control with PRN meds      Accurate I&Os  Abx given as prescribed  Bed locked at lowest position  Call light within reach    Chart check complete  Will cont with POC

## 2023-03-01 NOTE — PT/OT/SLP PROGRESS
Physical Therapy      Patient Name:  Jimena Pierce   MRN:  [id number]    10:05 a.m.  Patient meeting with  at this time. Will follow up during next available opportunity.

## 2023-03-01 NOTE — PLAN OF CARE
[facility] in [place] declined, no available beds. [facility] and [facility] SNFs are accepting, CM to update patient this morning.    Awaiting OCDD for 142.

## 2023-03-01 NOTE — SUBJECTIVE & OBJECTIVE
Interval History: Patient denies any current issues. Awaiting placement.       Review of Systems   Constitutional: Negative.  Negative for chills and fever.   HENT: Negative.  Negative for congestion, rhinorrhea, sore throat and trouble swallowing.    Eyes: Negative.  Negative for visual disturbance.   Respiratory: Negative.  Negative for cough, shortness of breath and wheezing.    Cardiovascular: Negative.  Negative for chest pain and palpitations.   Gastrointestinal:  Negative for abdominal pain, blood in stool, diarrhea, nausea and vomiting.   Endocrine: Negative.    Genitourinary: Negative.  Negative for dysuria and flank pain.   Musculoskeletal: Negative.  Negative for back pain.   Skin: Negative.  Negative for rash.   Allergic/Immunologic: Negative.    Neurological:  Negative for dizziness, speech difficulty, weakness, light-headedness, numbness and headaches.   Hematological: Negative.    Psychiatric/Behavioral: Negative.  Negative for hallucinations. The patient is not nervous/anxious.    All other systems reviewed and are negative.  Objective:     Vital Signs (Most Recent):  Temp: 100.1 °F (37.8 °C) (03/01/23 1157)  Pulse: 65 (03/01/23 1157)  Resp: 18 (03/01/23 1157)  BP: 132/65 (03/01/23 1157)  SpO2: (!) 94 % (03/01/23 1157)   Vital Signs (24h Range):  Temp:  [98.1 °F (36.7 °C)-100.1 °F (37.8 °C)] 100.1 °F (37.8 °C)  Pulse:  [52-79] 65  Resp:  [15-18] 18  SpO2:  [94 %-98 %] 94 %  BP: ()/(53-65) 132/65     Weight: 101.9 kg (224 lb 10.4 oz)  Body mass index is 42.45 kg/m².  No intake or output data in the 24 hours ending 03/01/23 1205   Physical Exam  Vitals and nursing note reviewed.   Constitutional:       General: She is awake. She is not in acute distress.     Appearance: She is obese. She is not ill-appearing.   HENT:      Head: Normocephalic and atraumatic.      Mouth/Throat:      Mouth: Mucous membranes are moist.   Eyes:      General: No scleral icterus.     Conjunctiva/sclera: Conjunctivae  normal.   Cardiovascular:      Rate and Rhythm: Normal rate and regular rhythm.      Heart sounds: No murmur heard.  Pulmonary:      Effort: Pulmonary effort is normal. No respiratory distress.      Breath sounds: Normal breath sounds. No wheezing.   Abdominal:      Palpations: Abdomen is soft.      Tenderness: There is no abdominal tenderness.   Musculoskeletal:         General: No swelling.      Cervical back: Neck supple.   Skin:     General: Skin is warm.      Coloration: Skin is not jaundiced.   Neurological:      General: No focal deficit present.      Mental Status: She is alert and oriented to person, place, and time. Mental status is at baseline.   Psychiatric:         Attention and Perception: Attention normal.         Speech: Speech normal.         Behavior: Behavior is cooperative.       Significant Labs: All pertinent labs within the past 24 hours have been reviewed.    Significant Imaging: I have reviewed all pertinent imaging results/findings within the past 24 hours.

## 2023-03-01 NOTE — PROGRESS NOTES
[facility] Hospital Medicine  Progress Note    Patient Name: Jimena Pierce  MRN: [id number]  Patient Class: IP- Inpatient   Admission Date: 2/22/2023  Length of Stay: 5 days  Attending Physician: Harley Alfaro MD  Primary Care Provider: Baylee Aleman DO        Subjective:     Principal Problem:Brachial artery occlusion, right        HPI:  Ms. Pierce is a 57-year-old  female with PMH significant for cerebral palsy (mild), hypertension, hyperlipidemia, presented to the ED complaining of acute onset of right forearm pain, and abdominal pain that started around the same time since yesterday evening.  She was feeling numbness, tingling of the right fingers.  Right UE arterial ultrasound reveals evidence of acute occlusion at the brachial artery.  CTA of the right upper extremity was unremarkable.  Patient was having numbness, paresthesias, coldness of the right forearm.  Dr. Sun with vascular surgery evaluated the patient, has taken her to the OR emergently for open embolectomy.  She was found to have large clot burden at the bifurcation of the brachial artery on the right, as well as small amount of thrombus in the distal ulnar and radial arteries.  Patient was started on heparin drip prior to the procedure and to be resumed 10:34 p.m. postprocedure.  Patient reports improved pain, paresthesias of the right arm.  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      Admitting diagnosis:  Acute thromboembolism right brachial artery, distal ulnar, radial arteries.  Status post emergent thromboembolectomy.  Splenic artery infarction.      Overview/Hospital Course:  POD3 embolectomy of right brachial artery, transitioned off heparin to Eliquis. Decreasing platelet count, heme/onc consulted. PT/OT eval recs   2/26 AM pt reports she feels dizziness and generalized weakness with movement. Stable for d/c however will monitor platelet  count with repeat CBC in AM given pt reluctance to d/c home. Home amlodipine discontinued given symptoms and hypotensive BP trend. Expect stable for d/c in AM  2/27: SNF placement needed, will cont on eliquis 5mg bid. Outpatient f/u with hematology.   2/28: awaiting snf placement. Cont eliquis. Chest xray concerning for pna vs edema. Pt febrile, sob. Will treat with azithro and rocephin. Lasix given today. Resp status improving. BNP only mildly elevated.   3/1: anticardiolipin antibody positive. Will discuss with hematology on management for anti-phospholipid syndrome. Awaiting snf.       Interval History: Patient denies any current issues. Awaiting placement.       Review of Systems   Constitutional: Negative.  Negative for chills and fever.   HENT: Negative.  Negative for congestion, rhinorrhea, sore throat and trouble swallowing.    Eyes: Negative.  Negative for visual disturbance.   Respiratory: Negative.  Negative for cough, shortness of breath and wheezing.    Cardiovascular: Negative.  Negative for chest pain and palpitations.   Gastrointestinal:  Negative for abdominal pain, blood in stool, diarrhea, nausea and vomiting.   Endocrine: Negative.    Genitourinary: Negative.  Negative for dysuria and flank pain.   Musculoskeletal: Negative.  Negative for back pain.   Skin: Negative.  Negative for rash.   Allergic/Immunologic: Negative.    Neurological:  Negative for dizziness, speech difficulty, weakness, light-headedness, numbness and headaches.   Hematological: Negative.    Psychiatric/Behavioral: Negative.  Negative for hallucinations. The patient is not nervous/anxious.    All other systems reviewed and are negative.  Objective:     Vital Signs (Most Recent):  Temp: 100.1 °F (37.8 °C) (03/01/23 1157)  Pulse: 65 (03/01/23 1157)  Resp: 18 (03/01/23 1157)  BP: 132/65 (03/01/23 1157)  SpO2: (!) 94 % (03/01/23 1157)   Vital Signs (24h Range):  Temp:  [98.1 °F (36.7 °C)-100.1 °F (37.8 °C)] 100.1 °F (37.8  °C)  Pulse:  [52-79] 65  Resp:  [15-18] 18  SpO2:  [94 %-98 %] 94 %  BP: ()/(53-65) 132/65     Weight: 101.9 kg (224 lb 10.4 oz)  Body mass index is 42.45 kg/m².  No intake or output data in the 24 hours ending 03/01/23 1205   Physical Exam  Vitals and nursing note reviewed.   Constitutional:       General: She is awake. She is not in acute distress.     Appearance: She is obese. She is not ill-appearing.   HENT:      Head: Normocephalic and atraumatic.      Mouth/Throat:      Mouth: Mucous membranes are moist.   Eyes:      General: No scleral icterus.     Conjunctiva/sclera: Conjunctivae normal.   Cardiovascular:      Rate and Rhythm: Normal rate and regular rhythm.      Heart sounds: No murmur heard.  Pulmonary:      Effort: Pulmonary effort is normal. No respiratory distress.      Breath sounds: Normal breath sounds. No wheezing.   Abdominal:      Palpations: Abdomen is soft.      Tenderness: There is no abdominal tenderness.   Musculoskeletal:         General: No swelling.      Cervical back: Neck supple.   Skin:     General: Skin is warm.      Coloration: Skin is not jaundiced.   Neurological:      General: No focal deficit present.      Mental Status: She is alert and oriented to person, place, and time. Mental status is at baseline.   Psychiatric:         Attention and Perception: Attention normal.         Speech: Speech normal.         Behavior: Behavior is cooperative.       Significant Labs: All pertinent labs within the past 24 hours have been reviewed.    Significant Imaging: I have reviewed all pertinent imaging results/findings within the past 24 hours.        Assessment/Plan:      * Brachial artery occlusion, right  -acute brachial artery occlusion, on the right side.    -status post emergent thromboembolectomy.  POD3  -found to have large clot burden at the bifurcation of the brachial artery, with some thrombus in the distal ulnar and radial arteries.    -continue Eliquis  -heme/onc consult,  f/u recs    3/1:  S/p embolectomy  Will cont on eliquis 5mg bid   Outpatient f/u with hematology  Hypercoagulable workup pending  PT/OT recommending snf  Placement pending     Antiphospholipid syndrome  Anti-cardiolipin antibody positive  Currently on eliquis  Will discuss with hematology on possibly switching to coumadin       Thrombocytopenia  Heme/onc consult  Repeat CBC in AM      PNA (pneumonia)  pna  Cont rocephin and azitho      Cerebral palsy  -mild.    -able to ambulate with walker at home.      Splenic infarct  Cont eliquis  Hypercoagulable workup       Obesity, Class III, BMI 40-49.9 (morbid obesity)  Body mass index is 41.19 kg/m². Morbid obesity complicates all aspects of disease management from diagnostic modalities to treatment. Weight loss encouraged and health benefits explained to patient.         Chronic bilateral low back pain with bilateral sciatica  -continue home dose pain medications.      Essential hypertension  -continue amlodipine, valsartan.    -monitor        VTE Risk Mitigation (From admission, onward)         Ordered     apixaban tablet 5 mg  2 times daily         02/27/23 1614     IP VTE HIGH RISK PATIENT  Once         02/23/23 0541     Place sequential compression device  Until discontinued         02/23/23 0541     Place sequential compression device  Until discontinued         02/23/23 0533                Discharge Planning   TUCKER:      Code Status: Not on file   Is the patient medically ready for discharge?: Yes    Reason for patient still in hospital (select all that apply): Pending disposition  Discharge Plan A: Skilled Nursing Facility   Discharge Delays: None known at this time              Harley Alfaro MD  Department of Hospital Medicine   [facility] - Med Surg

## 2023-03-02 PROBLEM — D69.6 THROMBOCYTOPENIA: Status: RESOLVED | Noted: 2023-02-24 | Resolved: 2023-03-02

## 2023-03-02 LAB
ANION GAP SERPL CALC-SCNC: 11 MMOL/L (ref 8–16)
APTT IMM NP PPP: 58 SEC (ref 32–48)
APTT P HEP NEUT PPP: ABNORMAL SEC (ref 32–48)
BASOPHILS # BLD AUTO: 0.07 K/UL (ref 0–0.2)
BASOPHILS NFR BLD: 0.8 % (ref 0–1.9)
BUN SERPL-MCNC: 17 MG/DL (ref 6–20)
CALCIUM SERPL-MCNC: 8.8 MG/DL (ref 8.7–10.5)
CHLORIDE SERPL-SCNC: 106 MMOL/L (ref 95–110)
CO2 SERPL-SCNC: 22 MMOL/L (ref 23–29)
CONFIRM APTT STACLOT: ABNORMAL
CREAT SERPL-MCNC: 0.8 MG/DL (ref 0.5–1.4)
DIFFERENTIAL METHOD: ABNORMAL
DRVVT SCREEN TO CONFIRM RATIO: NEGATIVE RATIO
EOSINOPHIL # BLD AUTO: 0.4 K/UL (ref 0–0.5)
EOSINOPHIL NFR BLD: 4 % (ref 0–8)
ERYTHROCYTE [DISTWIDTH] IN BLOOD BY AUTOMATED COUNT: 12.6 % (ref 11.5–14.5)
EST. GFR  (NO RACE VARIABLE): >60 ML/MIN/1.73 M^2
FINAL PATHOLOGIC DIAGNOSIS: NORMAL
GLUCOSE SERPL-MCNC: 113 MG/DL (ref 70–110)
HCT VFR BLD AUTO: 34.7 % (ref 37–48.5)
HGB BLD-MCNC: 11.4 G/DL (ref 12–16)
IMM GRANULOCYTES # BLD AUTO: 0.07 K/UL (ref 0–0.04)
IMM GRANULOCYTES NFR BLD AUTO: 0.8 % (ref 0–0.5)
INR PPP: 1 (ref 0.8–1.2)
LA 3 SCREEN W REFLEX-IMP: ABNORMAL
LA NT DPL PPP QL: POSITIVE
LYMPHOCYTES # BLD AUTO: 2 K/UL (ref 1–4.8)
LYMPHOCYTES NFR BLD: 22.9 % (ref 18–48)
Lab: NORMAL
MCH RBC QN AUTO: 29 PG (ref 27–31)
MCHC RBC AUTO-ENTMCNC: 32.9 G/DL (ref 32–36)
MCV RBC AUTO: 88 FL (ref 82–98)
MIXING DRVVT: 51 SEC (ref 33–44)
MONOCYTES # BLD AUTO: 0.6 K/UL (ref 0.3–1)
MONOCYTES NFR BLD: 6.6 % (ref 4–15)
NEUTROPHILS # BLD AUTO: 5.7 K/UL (ref 1.8–7.7)
NEUTROPHILS NFR BLD: 64.9 % (ref 38–73)
NRBC BLD-RTO: 0 /100 WBC
PLATELET # BLD AUTO: 271 K/UL (ref 150–450)
PMV BLD AUTO: 10.7 FL (ref 9.2–12.9)
POTASSIUM SERPL-SCNC: 4 MMOL/L (ref 3.5–5.1)
PROTHROMBIN TIME: 10.9 SEC (ref 9–12.5)
PROTHROMBIN TIME: 17.9 SEC (ref 12–15.5)
RBC # BLD AUTO: 3.93 M/UL (ref 4–5.4)
REPTILASE TIME: ABNORMAL SEC
SCREEN APTT: 67 SEC (ref 32–48)
SCREEN DRVVT: 52 SEC (ref 33–44)
SODIUM SERPL-SCNC: 139 MMOL/L (ref 136–145)
THROMBIN TIME: 17.3 SEC (ref 14.7–19.5)
WBC # BLD AUTO: 8.83 K/UL (ref 3.9–12.7)

## 2023-03-02 PROCEDURE — 25000003 PHARM REV CODE 250: Performed by: INTERNAL MEDICINE

## 2023-03-02 PROCEDURE — 63600175 PHARM REV CODE 636 W HCPCS: Performed by: FAMILY MEDICINE

## 2023-03-02 PROCEDURE — 25000003 PHARM REV CODE 250: Performed by: FAMILY MEDICINE

## 2023-03-02 PROCEDURE — 97530 THERAPEUTIC ACTIVITIES: CPT | Mod: CQ

## 2023-03-02 PROCEDURE — 97116 GAIT TRAINING THERAPY: CPT | Mod: CQ

## 2023-03-02 PROCEDURE — 85025 COMPLETE CBC W/AUTO DIFF WBC: CPT | Performed by: FAMILY MEDICINE

## 2023-03-02 PROCEDURE — 97530 THERAPEUTIC ACTIVITIES: CPT

## 2023-03-02 PROCEDURE — 80048 BASIC METABOLIC PNL TOTAL CA: CPT | Performed by: FAMILY MEDICINE

## 2023-03-02 PROCEDURE — 25000003 PHARM REV CODE 250: Performed by: NURSE PRACTITIONER

## 2023-03-02 PROCEDURE — 97535 SELF CARE MNGMENT TRAINING: CPT

## 2023-03-02 PROCEDURE — 36415 COLL VENOUS BLD VENIPUNCTURE: CPT | Performed by: FAMILY MEDICINE

## 2023-03-02 PROCEDURE — 11000001 HC ACUTE MED/SURG PRIVATE ROOM

## 2023-03-02 PROCEDURE — 85610 PROTHROMBIN TIME: CPT | Performed by: FAMILY MEDICINE

## 2023-03-02 RX ADMIN — OXYCODONE HYDROCHLORIDE 30 MG: 30 TABLET ORAL at 03:03

## 2023-03-02 RX ADMIN — FAMOTIDINE 20 MG: 20 TABLET ORAL at 08:03

## 2023-03-02 RX ADMIN — OXYBUTYNIN CHLORIDE 15 MG: 5 TABLET, EXTENDED RELEASE ORAL at 08:03

## 2023-03-02 RX ADMIN — OXYCODONE HYDROCHLORIDE 30 MG: 30 TABLET ORAL at 09:03

## 2023-03-02 RX ADMIN — VALSARTAN 160 MG: 160 TABLET, FILM COATED ORAL at 08:03

## 2023-03-02 RX ADMIN — WARFARIN SODIUM 5 MG: 5 TABLET ORAL at 05:03

## 2023-03-02 RX ADMIN — FAMOTIDINE 20 MG: 20 TABLET ORAL at 09:03

## 2023-03-02 RX ADMIN — ENOXAPARIN SODIUM 100 MG: 100 INJECTION SUBCUTANEOUS at 09:03

## 2023-03-02 RX ADMIN — CEFTRIAXONE 1 G: 1 INJECTION, POWDER, FOR SOLUTION INTRAMUSCULAR; INTRAVENOUS at 05:03

## 2023-03-02 RX ADMIN — POLYETHYLENE GLYCOL 3350 17 G: 17 POWDER, FOR SOLUTION ORAL at 08:03

## 2023-03-02 RX ADMIN — ENOXAPARIN SODIUM 100 MG: 100 INJECTION SUBCUTANEOUS at 08:03

## 2023-03-02 NOTE — PROGRESS NOTES
Pharmacy Brief Progress Note:    Patient educated on warfarin indication, side effects, and drug interactions. Discussed importance of medication compliance and INR monitoring and reviewed signs of abnormal bleeding. Patient given warfarin educational handout. Patient expressed understanding and had no further questions.    Thank you for allowing us to participate in this patient's care.     Chapis Fu RPh3/2/2023 3:41 PM      RESPIRATORY NOTE:

RN called because pt was desaturating 75% on 100% NRB, tried to suction pt through nasal 
trumpet but couldn't pass the suction catheter through it. Removed trumpet and pt started 
desaturating. STAT ABG ordered, confirmed and done. see lab for results. pt shows no 
improvement, getting pale, tachypneic. Pt placed on BiPAP 25/5, 100% for comfort measures. 
MD notified. Slight improvement on SpO2 and work  of breathing. BiPAP plugged into red 
outlet, alarms on and audible, ambu bag at bedside. Will continue to monitor pt. Awaiting 
further MD orders Will endorse to next RT.

## 2023-03-02 NOTE — PT/OT/SLP PROGRESS
Physical Therapy  Treatment    Jimena Pierce   MRN: [id number]   Admitting Diagnosis: Brachial artery occlusion, right    PT Received On: 03/02/23  PT Start Time: 0925     PT Stop Time: 0955    PT Total Time (min): 30 min       Billable Minutes:  Gait Training 15 and Therapeutic Activity 15    Treatment Type: Treatment  PT/PTA: PTA     PTA Visit Number: 1       General Precautions: Standard, fall  Orthopedic Precautions: N/A  Braces: N/A  Respiratory Status: Room air    Spiritual, Cultural Beliefs, [employer] Practices, Values that Affect Care: no    Subjective:  Communicated with charge nurse, Chely, and completed Epic chart review prior to session.  Patient agreed to PT session.     Pain/Comfort  Pain Rating 1: 0/10  Pain Rating Post-Intervention 1: 0/10    Objective:   Patient found with: telemetry, peripheral IV, PureWick    Supine > sit EOB: CGA    Forward scoot towards EOB: SBA    STS x2 trials from EOB > SW: Min A     Standing x2 min total w/ BUE support on SW for brief change   CGA-Min A to maintain standing balance    60ft w/ SW Min A (decreased overall gait speed; increased time to complete; required frequent rest breaks)    Stand pivot T/F to chair w/SW: Min A     Educated patient on importance of increased tolerance to upright position and direct impact on CV endurance and strength. Patient encouraged to sit up in chair/ EOB, for a minimum of 2 consecutive hours, 3x per day. Encouraged patient to perform AROM TE to BLE throughout the day within all available planes of motion. Re enforced importance of utilizing call light to meet needs in room and not attempt to get up without staff assistance. Patient verbalized understanding and agreed to comply.     AM-PAC 6 CLICK MOBILITY  How much help from another person does this patient currently need?   1 = Unable, Total/Dependent Assistance  2 = A lot, Maximum/Moderate Assistance  3 = A little, Minimum/Contact Guard/Supervision  4 = None, Modified  [place]/Independent    Turning over in bed (including adjusting bedclothes, sheets and blankets)?: 3  Sitting down on and standing up from a chair with arms (e.g., wheelchair, bedside commode, etc.): 3  Moving from lying on back to sitting on the side of the bed?: 3  Moving to and from a bed to a chair (including a wheelchair)?: 3  Need to walk in hospital room?: 3  Climbing 3-5 steps with a railing?: 1  Basic Mobility Total Score: 16    AM-PAC Raw Score CMS G-Code Modifier Level of Impairment Assistance   6 % Total / Unable   7 - 9 CM 80 - 100% Maximal Assist   10 - 14 CL 60 - 80% Moderate Assist   15 - 19 CK 40 - 60% Moderate Assist   20 - 22 CJ 20 - 40% Minimal Assist   23 CI 1-20% SBA / CGA   24 CH 0% Independent/ Mod I     Patient left up in chair with call button in reach.    Assessment:  Jimena Pierce is a 57 y.o. female with a medical diagnosis of Brachial artery occlusion, right and presents with overall decline in functional mobility. Patient would continue to benefit from skilled PT to address functional limitations listed below in order to return to PLOF/decrease caregiver burden.      Rehab identified problem list/impairments: weakness, impaired endurance, impaired sensation, impaired self care skills, gait instability, impaired balance, impaired cardiopulmonary response to activity, pain, decreased safety awareness, edema, decreased upper extremity function, decreased lower extremity function    Rehab potential is fair.    Activity tolerance: Fair    Discharge recommendations: nursing facility, skilled      Barriers to discharge:      Equipment recommendations: other (see comments) (TBD BY NEXT LEVEL OF CARE)     GOALS:   Multidisciplinary Problems       Physical Therapy Goals          Problem: Physical Therapy    Goal Priority Disciplines Outcome Goal Variances Interventions   Physical Therapy Goal     PT, PT/OT Ongoing, Progressing     Description: Pt will perform bed  mobility mod I in order to participate in EOB activity.  Pt will perform transfers mod I in order to participate in OOB activity.   Pt will ambulate 75 ft mod I with LRAD in order to participate in daily tasks.                         PLAN:    Patient to be seen 3 x/week to address the above listed problems via gait training, therapeutic activities, therapeutic exercises  Plan of Care expires: 03/11/23  Plan of Care reviewed with: patient         03/02/2023

## 2023-03-02 NOTE — PLAN OF CARE
Patient tolerated OT treatment well. Sup>sit with CGA. Sit>stand with min A with standard walker. Continue recommending SNF at d/c.

## 2023-03-02 NOTE — PT/OT/SLP PROGRESS
Occupational Therapy   Treatment    Name: Jimena Pierce  MRN: [id number]  Admitting Diagnosis:  Brachial artery occlusion, right  7 Days Post-Op    Recommendations:     Discharge Recommendations: nursing facility, skilled  Discharge Equipment Recommendations:   (TBD at next level of care)  Barriers to discharge:  Decreased caregiver support (son works during the day, no additional support)    Assessment:     Jimena Pierce is a 57 y.o. female with a medical diagnosis of Brachial artery occlusion, right.  She presents with the following performance deficits affecting function are weakness, impaired endurance, impaired self care skills, impaired skin, edema, impaired functional mobility, impaired balance, decreased safety awareness, decreased lower extremity function, decreased upper extremity function, impaired cardiopulmonary response to activity.     Rehab Prognosis:  Fair; patient would benefit from acute skilled OT services to address these deficits and reach maximum level of function.       Plan:     Patient to be seen 2 x/week to address the above listed problems via self-care/home management, therapeutic activities, therapeutic exercises  Plan of Care Expires: 03/11/23  Plan of Care Reviewed with: patient    Subjective     Pain/Comfort:  Pain Rating 1: 0/10  Pain Rating Post-Intervention 1: 0/10    Objective:     Communicated with: Charge nurse, Lay, prior to session.  Patient found HOB elevated with telemetry, peripheral IV, PureWick upon OT entry to room.    General Precautions: Standard, fall    Orthopedic Precautions:N/A  Braces: N/A  Respiratory Status: Room air     Occupational Performance:     Bed Mobility:    Patient completed Supine to Sit with contact guard assistance     Functional Mobility/Transfers:  Patient completed Sit <> Stand Transfer with minimum assistance  with  standard walker   Patient completed Bed <> Chair Transfer using Stand Pivot technique with minimum  assistance with standard walker  Functional Mobility: Patient completed functional mobility x 60 ft with minimum assistance with standard walker to increase activity tolerance for ADL completion. Patient took multiple standing rest breaks throughout for energy conservation. Patient's shortness of breath improved today.    Activities of Daily Living:  Grooming: set up assistance Patient completed oral care and washed face while seated in bedside chair with set up assistance.  Toileting: total assistance Patient required total assistance for doffing brief, toileting hygiene, and donning clean brief while standing. Patient stood at EOB x 2 min while therapist assisted with toileting hygiene.     AMPA 6 Click ADL: 14    Treatment & Education:  Patient tolerated OT treatment well.  Patient encouraged to remain OOB in bedside chair for at least 2 hours and to call for A upon return to bed.  Patient encouraged to complete B UE AROM therex to increase functional strength for ADL completion.    Patient left up in chair with all lines intact and call button in reach    GOALS:   Multidisciplinary Problems       Occupational Therapy Goals          Problem: Occupational Therapy    Goal Priority Disciplines Outcome Interventions   Occupational Therapy Goal     OT, PT/OT Ongoing, Progressing    Description: LTG'S TO BE MET IN 14 DAYS (3/11/23)    1)  PATIENT WILL PERFORM UB DRESSING WITH SBA TO DECREASE (D) ON CAREGIVER.    2)  PATIENT WILL PERFORM LB DRESSING WITH MIN (A) TO DECREASE (D) ON CAREGIVER..    3)  PATIENT WILL PERFORM TOILET T/F WITH MIN (A) TO DECREASE (D) ON CAREGIVER..    4)  PATIENT WILL PERFORM BUE HEP WITH (I) FOR PROPER TECHNIQUE TO INCREASE FUNC STRENGTH AND FUNC ENDURANCE TO DECREASE (D) ON CAREGIVER TO SAFELY PERFORM SELF CARE TASKS.                         Time Tracking:     OT Date of Treatment: 03/02/23  OT Start Time: 0935  OT Stop Time: 1000  OT Total Time (min): 25 min    Billable Minutes:Self  Care/Home Management 10  Therapeutic Activity 15        SHO Burks         3/2/2023

## 2023-03-02 NOTE — SUBJECTIVE & OBJECTIVE
Interval History: No acute issues overnight. Tolerating diet. Pain controlled.     Review of Systems   Constitutional: Negative.  Negative for chills and fever.   HENT: Negative.  Negative for congestion, rhinorrhea, sore throat and trouble swallowing.    Eyes: Negative.  Negative for visual disturbance.   Respiratory: Negative.  Negative for cough, shortness of breath and wheezing.    Cardiovascular: Negative.  Negative for chest pain and palpitations.   Gastrointestinal:  Negative for abdominal pain, blood in stool, diarrhea, nausea and vomiting.   Endocrine: Negative.    Genitourinary: Negative.  Negative for dysuria and flank pain.   Musculoskeletal: Negative.  Negative for back pain.   Skin: Negative.  Negative for rash.   Allergic/Immunologic: Negative.    Neurological:  Negative for dizziness, speech difficulty, weakness, light-headedness, numbness and headaches.   Hematological: Negative.    Psychiatric/Behavioral: Negative.  Negative for hallucinations. The patient is not nervous/anxious.    All other systems reviewed and are negative.  Objective:     Vital Signs (Most Recent):  Temp: 98.4 °F (36.9 °C) (03/02/23 0714)  Pulse: (!) 58 (03/02/23 0714)  Resp: 15 (03/02/23 0904)  BP: 131/64 (03/02/23 0714)  SpO2: 98 % (03/02/23 0714)   Vital Signs (24h Range):  Temp:  [98.2 °F (36.8 °C)-100.1 °F (37.8 °C)] 98.4 °F (36.9 °C)  Pulse:  [56-70] 58  Resp:  [14-18] 15  SpO2:  [94 %-98 %] 98 %  BP: (111-132)/(54-68) 131/64     Weight: 101.8 kg (224 lb 6.9 oz)  Body mass index is 42.41 kg/m².    Intake/Output Summary (Last 24 hours) at 3/2/2023 1129  Last data filed at 3/2/2023 0422  Gross per 24 hour   Intake 403.62 ml   Output 1800 ml   Net -1396.38 ml      Physical Exam  Vitals and nursing note reviewed.   Constitutional:       General: She is awake. She is not in acute distress.     Appearance: She is obese. She is not ill-appearing.   HENT:      Head: Normocephalic and atraumatic.      Mouth/Throat:      Mouth:  Mucous membranes are moist.   Eyes:      General: No scleral icterus.     Conjunctiva/sclera: Conjunctivae normal.   Cardiovascular:      Rate and Rhythm: Normal rate and regular rhythm.      Heart sounds: No murmur heard.  Pulmonary:      Effort: Pulmonary effort is normal. No respiratory distress.      Breath sounds: Normal breath sounds. No wheezing.   Abdominal:      Palpations: Abdomen is soft.      Tenderness: There is no abdominal tenderness.   Musculoskeletal:         General: No swelling.      Cervical back: Neck supple.   Skin:     General: Skin is warm.      Coloration: Skin is not jaundiced.   Neurological:      General: No focal deficit present.      Mental Status: She is alert and oriented to person, place, and time. Mental status is at baseline.   Psychiatric:         Attention and Perception: Attention normal.         Speech: Speech normal.         Behavior: Behavior is cooperative.       Significant Labs: All pertinent labs within the past 24 hours have been reviewed.    Significant Imaging: I have reviewed all pertinent imaging results/findings within the past 24 hours.

## 2023-03-02 NOTE — PLAN OF CARE
CM received phone call from Edith boateng, requesting update regarding SNF. CM confirmed with patient that hospital staff can discuss info with sister. Updated provided and contact info added to AVS.

## 2023-03-02 NOTE — PROGRESS NOTES
[facility] Medicine  Progress Note    Patient Name: Jimena Pierce  MRN: [id number]  Patient Class: IP- Inpatient   Admission Date: 2/22/2023  Length of Stay: 6 days  Attending Physician: Harley Alfaro MD  Primary Care Provider: Baylee Aleman DO        Subjective:     Principal Problem:Brachial artery occlusion, right        HPI:  Ms. Pierce is a 57-year-old  female with PMH significant for cerebral palsy (mild), hypertension, hyperlipidemia, presented to the ED complaining of acute onset of right forearm pain, and abdominal pain that started around the same time since yesterday evening.  She was feeling numbness, tingling of the right fingers.  Right UE arterial ultrasound reveals evidence of acute occlusion at the brachial artery.  CTA of the right upper extremity was unremarkable.  Patient was having numbness, paresthesias, coldness of the right forearm.  Dr. Sun with vascular surgery evaluated the patient, has taken her to the OR emergently for open embolectomy.  She was found to have large clot burden at the bifurcation of the brachial artery on the right, as well as small amount of thrombus in the distal ulnar and radial arteries.  Patient was started on heparin drip prior to the procedure and to be resumed 10:34 p.m. postprocedure.  Patient reports improved pain, paresthesias of the right arm.  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      Admitting diagnosis:  Acute thromboembolism right brachial artery, distal ulnar, radial arteries.  Status post emergent thromboembolectomy.  Splenic artery infarction.      Overview/Hospital Course:  POD3 embolectomy of right brachial artery, transitioned off heparin to Eliquis. Decreasing platelet count, heme/onc consulted. PT/OT eval recs   2/26 AM pt reports she feels dizziness and generalized weakness with movement. Stable for d/c however will monitor platelet  count with repeat CBC in AM given pt reluctance to d/c home. Home amlodipine discontinued given symptoms and hypotensive BP trend. Expect stable for d/c in AM  2/27: SNF placement needed, will cont on eliquis 5mg bid. Outpatient f/u with hematology.   2/28: awaiting snf placement. Cont eliquis. Chest xray concerning for pna vs edema. Pt febrile, sob. Will treat with azithro and rocephin. Lasix given today. Resp status improving. BNP only mildly elevated.   3/1: anticardiolipin antibody positive. Will discuss with hematology on management for anti-phospholipid syndrome. Awaiting snf.   3/2: started on coumadin. Bridge with lovenox. Goal 2.5-3.5 per hematology. Awaiting snf.       Interval History: No acute issues overnight. Tolerating diet. Pain controlled.     Review of Systems   Constitutional: Negative.  Negative for chills and fever.   HENT: Negative.  Negative for congestion, rhinorrhea, sore throat and trouble swallowing.    Eyes: Negative.  Negative for visual disturbance.   Respiratory: Negative.  Negative for cough, shortness of breath and wheezing.    Cardiovascular: Negative.  Negative for chest pain and palpitations.   Gastrointestinal:  Negative for abdominal pain, blood in stool, diarrhea, nausea and vomiting.   Endocrine: Negative.    Genitourinary: Negative.  Negative for dysuria and flank pain.   Musculoskeletal: Negative.  Negative for back pain.   Skin: Negative.  Negative for rash.   Allergic/Immunologic: Negative.    Neurological:  Negative for dizziness, speech difficulty, weakness, light-headedness, numbness and headaches.   Hematological: Negative.    Psychiatric/Behavioral: Negative.  Negative for hallucinations. The patient is not nervous/anxious.    All other systems reviewed and are negative.  Objective:     Vital Signs (Most Recent):  Temp: 98.4 °F (36.9 °C) (03/02/23 0714)  Pulse: (!) 58 (03/02/23 0714)  Resp: 15 (03/02/23 0904)  BP: 131/64 (03/02/23 0714)  SpO2: 98 % (03/02/23 0714)    Vital Signs (24h Range):  Temp:  [98.2 °F (36.8 °C)-100.1 °F (37.8 °C)] 98.4 °F (36.9 °C)  Pulse:  [56-70] 58  Resp:  [14-18] 15  SpO2:  [94 %-98 %] 98 %  BP: (111-132)/(54-68) 131/64     Weight: 101.8 kg (224 lb 6.9 oz)  Body mass index is 42.41 kg/m².    Intake/Output Summary (Last 24 hours) at 3/2/2023 1129  Last data filed at 3/2/2023 0422  Gross per 24 hour   Intake 403.62 ml   Output 1800 ml   Net -1396.38 ml      Physical Exam  Vitals and nursing note reviewed.   Constitutional:       General: She is awake. She is not in acute distress.     Appearance: She is obese. She is not ill-appearing.   HENT:      Head: Normocephalic and atraumatic.      Mouth/Throat:      Mouth: Mucous membranes are moist.   Eyes:      General: No scleral icterus.     Conjunctiva/sclera: Conjunctivae normal.   Cardiovascular:      Rate and Rhythm: Normal rate and regular rhythm.      Heart sounds: No murmur heard.  Pulmonary:      Effort: Pulmonary effort is normal. No respiratory distress.      Breath sounds: Normal breath sounds. No wheezing.   Abdominal:      Palpations: Abdomen is soft.      Tenderness: There is no abdominal tenderness.   Musculoskeletal:         General: No swelling.      Cervical back: Neck supple.   Skin:     General: Skin is warm.      Coloration: Skin is not jaundiced.   Neurological:      General: No focal deficit present.      Mental Status: She is alert and oriented to person, place, and time. Mental status is at baseline.   Psychiatric:         Attention and Perception: Attention normal.         Speech: Speech normal.         Behavior: Behavior is cooperative.       Significant Labs: All pertinent labs within the past 24 hours have been reviewed.    Significant Imaging: I have reviewed all pertinent imaging results/findings within the past 24 hours.          Assessment/Plan:      * Brachial artery occlusion, right  -acute brachial artery occlusion, on the right side.    -status post emergent  thromboembolectomy.  POD3  -found to have large clot burden at the bifurcation of the brachial artery, with some thrombus in the distal ulnar and radial arteries.    -continue Eliquis  -heme/onc consult, f/u recs    3/2:  S/p embolectomy  snf placement pending  Will cont AC  Hypercoagulation workup indicative of   Antiphospholipid syndrome    Antiphospholipid syndrome  Anti-cardiolipin antibody positive  Started coumadin  Will need lovenox bridge  Goal inr 2.5-3.5 per hematology       PNA (pneumonia)  pna  Cont rocephin   Completed azithro      Cerebral palsy  -mild.    -able to ambulate with walker at home.      Splenic infarct  Cont coumadin  No surgical intervention        Obesity, Class III, BMI 40-49.9 (morbid obesity)  Body mass index is 41.19 kg/m². Morbid obesity complicates all aspects of disease management from diagnostic modalities to treatment. Weight loss encouraged and health benefits explained to patient.         Chronic bilateral low back pain with bilateral sciatica  -continue home dose pain medications.      Essential hypertension  -continue amlodipine, valsartan.    -monitor        VTE Risk Mitigation (From admission, onward)         Ordered     enoxaparin injection 100 mg  Every 12 hours (non-standard times)         03/01/23 1502     warfarin (COUMADIN) tablet 5 mg  Daily         03/01/23 1540     IP VTE HIGH RISK PATIENT  Once         02/23/23 0541     Place sequential compression device  Until discontinued         02/23/23 0541     Place sequential compression device  Until discontinued         02/23/23 0533                Discharge Planning   TUCKER:      Code Status: Not on file   Is the patient medically ready for discharge?: Yes    Reason for patient still in hospital (select all that apply): Pending disposition  Discharge Plan A: Skilled Nursing Facility   Discharge Delays: None known at this time              Harley Alfaro MD  Department of Hospital Medicine   [facility] - Med Surg

## 2023-03-02 NOTE — PLAN OF CARE
[facility] - Med Surg  Discharge Reassessment    Primary Care Provider: Baylee Aleman DO    Expected Discharge Date:     Reassessment (most recent)       Discharge Reassessment - 03/02/23 1329          Discharge Reassessment    Assessment Type Discharge Planning Reassessment     Did the patient's condition or plan change since previous assessment? No     Discharge Plan discussed with: Patient     Communicated TUCKER with patient/caregiver Date not available/Unable to determine     Discharge Plan A Skilled Nursing Facility     DME Needed Upon Discharge  none     Discharge Barriers Identified Other (see comments)     Why the patient remains in the hospital Placement issues        Post-Acute Status    Post-Acute Authorization Placement     Post-Acute Placement Status Pending state direction/certification                   Anticipated DC Dispo: SNF, patient/sister reviewing accepting facilities  Progress Towards Plan: pending [facility] review for receipt of 142. Patient's sister is touring facilities today.    CM called [employer] and left voicemail x2.

## 2023-03-02 NOTE — PLAN OF CARE
AAOx4. Pain is being managed on going. Incisional dressing intact. Cardiac monitoring. Pt remained free from fall and injury. No sign of any distress noted. Safety precautions alert. Pt asked to call for assistance if needed. IV access clean dry and intact saline locked. Chart checked reviewed. VSS. Plan of care continued.

## 2023-03-02 NOTE — PROGRESS NOTES
Coumadin Progress Notes:  Day 2  New start on coumadin/previously on eliquis  Indication:antiphospholipid syndrome/acute brachial artery occulsion on the right side  Goal = 2.5-3.5 per hematology  INR 1.0 today   Drug interactions=APAP. CTX, enoxaparin, morphine  Will continue 5 mg po daily       Estimated Creatinine Clearance: 83 mL/min (based on SCr of 0.8 mg/dL).   Body mass index is 42.41 kg/m².   Lab Results   Component Value Date    WBC 8.83 03/02/2023    HGB 11.4 (L) 03/02/2023    HCT 34.7 (L) 03/02/2023    MCV 88 03/02/2023     03/02/2023     Will attempt to  patient today/Chapis Fu [facility] 3/2/2023 2:55 PM

## 2023-03-02 NOTE — ASSESSMENT & PLAN NOTE
-acute brachial artery occlusion, on the right side.    -status post emergent thromboembolectomy.  POD3  -found to have large clot burden at the bifurcation of the brachial artery, with some thrombus in the distal ulnar and radial arteries.    -continue Eliquis  -heme/onc consult, f/u recs    3/2:  S/p embolectomy  snf placement pending  Will cont AC  Hypercoagulation workup indicative of   Antiphospholipid syndrome

## 2023-03-02 NOTE — ASSESSMENT & PLAN NOTE
Anti-cardiolipin antibody positive  Started coumadin  Will need lovenox bridge  Goal inr 2.5-3.5 per hematology

## 2023-03-03 PROBLEM — J18.9 PNA (PNEUMONIA): Status: RESOLVED | Noted: 2023-02-24 | Resolved: 2023-03-03

## 2023-03-03 LAB
INR PPP: 1 (ref 0.8–1.2)
PROTHROMBIN TIME: 11 SEC (ref 9–12.5)

## 2023-03-03 PROCEDURE — 11000001 HC ACUTE MED/SURG PRIVATE ROOM

## 2023-03-03 PROCEDURE — 85610 PROTHROMBIN TIME: CPT | Performed by: FAMILY MEDICINE

## 2023-03-03 PROCEDURE — 63600175 PHARM REV CODE 636 W HCPCS: Performed by: FAMILY MEDICINE

## 2023-03-03 PROCEDURE — 25000003 PHARM REV CODE 250: Performed by: NURSE PRACTITIONER

## 2023-03-03 PROCEDURE — 97116 GAIT TRAINING THERAPY: CPT

## 2023-03-03 PROCEDURE — 97110 THERAPEUTIC EXERCISES: CPT

## 2023-03-03 PROCEDURE — 25000003 PHARM REV CODE 250: Performed by: INTERNAL MEDICINE

## 2023-03-03 PROCEDURE — 25000003 PHARM REV CODE 250: Performed by: FAMILY MEDICINE

## 2023-03-03 PROCEDURE — 36415 COLL VENOUS BLD VENIPUNCTURE: CPT | Performed by: FAMILY MEDICINE

## 2023-03-03 RX ORDER — WARFARIN SODIUM 5 MG/1
5 TABLET ORAL DAILY
Status: DISCONTINUED | OUTPATIENT
Start: 2023-03-04 | End: 2023-03-04

## 2023-03-03 RX ADMIN — VALSARTAN 160 MG: 160 TABLET, FILM COATED ORAL at 08:03

## 2023-03-03 RX ADMIN — ENOXAPARIN SODIUM 100 MG: 100 INJECTION SUBCUTANEOUS at 08:03

## 2023-03-03 RX ADMIN — OXYCODONE HYDROCHLORIDE 30 MG: 30 TABLET ORAL at 07:03

## 2023-03-03 RX ADMIN — OXYBUTYNIN CHLORIDE 15 MG: 5 TABLET, EXTENDED RELEASE ORAL at 08:03

## 2023-03-03 RX ADMIN — FAMOTIDINE 20 MG: 20 TABLET ORAL at 08:03

## 2023-03-03 RX ADMIN — CEFTRIAXONE 1 G: 1 INJECTION, POWDER, FOR SOLUTION INTRAMUSCULAR; INTRAVENOUS at 05:03

## 2023-03-03 RX ADMIN — WARFARIN SODIUM 7.5 MG: 2.5 TABLET ORAL at 05:03

## 2023-03-03 RX ADMIN — POLYETHYLENE GLYCOL 3350 17 G: 17 POWDER, FOR SOLUTION ORAL at 11:03

## 2023-03-03 RX ADMIN — OXYCODONE HYDROCHLORIDE 30 MG: 30 TABLET ORAL at 05:03

## 2023-03-03 NOTE — ASSESSMENT & PLAN NOTE
-acute brachial artery occlusion, on the right side.    -status post emergent thromboembolectomy.  POD3  -found to have large clot burden at the bifurcation of the brachial artery, with some thrombus in the distal ulnar and radial arteries.    -continue Eliquis  -heme/onc consult, f/u recs    3/3:  S/p embolectomy  snf placement pending  Cont lovenox bridge to coumadin  Hypercoagulation workup indicative of   Antiphospholipid syndrome  Will need to follow up with vascular 14 days post op

## 2023-03-03 NOTE — PLAN OF CARE
Patient remained free from injury. No s/s of acute distress. Wound care completed. 12hr chart check complete.

## 2023-03-03 NOTE — PLAN OF CARE
Patient tolerated OT treatment well. Patient completed B UE resistive therex with 2 lb dowel x 3 planes of motion. Continue recommending SNF at d/c.

## 2023-03-03 NOTE — PT/OT/SLP PROGRESS
Occupational Therapy   Treatment    Name: Jimena Pierce  MRN: [id number]  Admitting Diagnosis:  Brachial artery occlusion, right  8 Days Post-Op    Recommendations:     Discharge Recommendations: nursing facility, skilled  Discharge Equipment Recommendations:   (TBD at next level of care)  Barriers to discharge:  Decreased caregiver support (son works during the day, no additional support)    Assessment:     Jimena Pierce is a 57 y.o. female with a medical diagnosis of Brachial artery occlusion, right.  She presents with the following performance deficits affecting function are weakness, impaired endurance, impaired self care skills, impaired functional mobility, impaired balance, decreased safety awareness, decreased lower extremity function, impaired cardiopulmonary response to activity, decreased upper extremity function, decreased coordination, impaired skin, edema.     Rehab Prognosis:  Good; patient would benefit from acute skilled OT services to address these deficits and reach maximum level of function.       Plan:     Patient to be seen 2 x/week to address the above listed problems via self-care/home management, therapeutic activities, therapeutic exercises  Plan of Care Expires: 03/11/23  Plan of Care Reviewed with: patient    Subjective     Pain/Comfort:  Pain Rating 1: 0/10    Objective:     Communicated with: Charge nurseJossie, prior to session.  Patient found up in chair with telemetry, peripheral IV upon OT entry to room.    General Precautions: Standard, fall    Orthopedic Precautions:N/A  Braces: N/A  Respiratory Status: Room air     Occupational Performance:     Bed Mobility:    Patient OOB in bedside chair upon arrival.  Patient requested to remain up in chair therefore transfer not observed.    [facility] 6 Click ADL: 14    Treatment & Education:  Patient tolerated OT treatment well.  Patient completed B UE resistive therex with 2 lb dowel: chest press x 15 reps, shld press  x 15 reps, bicep curls x 15 reps to increase functional strength for ADL completion. Patient provided with education on technique and pacing.  Patient encouraged to remain OOB in bedside chair throughout the day and to call for A to return to bed.  Patient encouraged to complete B UE AROM therex via HEP to increase functional strength for ADL completion.    Patient left up in chair with all lines intact and call button in reach    GOALS:   Multidisciplinary Problems       Occupational Therapy Goals          Problem: Occupational Therapy    Goal Priority Disciplines Outcome Interventions   Occupational Therapy Goal     OT, PT/OT Ongoing, Progressing    Description: LTG'S TO BE MET IN 14 DAYS (3/11/23)    1)  PATIENT WILL PERFORM UB DRESSING WITH SBA TO DECREASE (D) ON CAREGIVER.    2)  PATIENT WILL PERFORM LB DRESSING WITH MIN (A) TO DECREASE (D) ON CAREGIVER..    3)  PATIENT WILL PERFORM TOILET T/F WITH MIN (A) TO DECREASE (D) ON CAREGIVER..    4)  PATIENT WILL PERFORM BUE HEP WITH (I) FOR PROPER TECHNIQUE TO INCREASE FUNC STRENGTH AND FUNC ENDURANCE TO DECREASE (D) ON CAREGIVER TO SAFELY PERFORM SELF CARE TASKS.                         Time Tracking:     OT Date of Treatment: 03/03/23  OT Start Time: 1100  OT Stop Time: 1125  OT Total Time (min): 25 min    Billable Minutes:Therapeutic Exercise 25           SHO Burks      3/3/2023

## 2023-03-03 NOTE — SUBJECTIVE & OBJECTIVE
Interval History: No acute issues overnight. Awaiting placement.     Review of Systems   Constitutional: Negative.  Negative for chills and fever.   HENT: Negative.  Negative for congestion, rhinorrhea, sore throat and trouble swallowing.    Eyes: Negative.  Negative for visual disturbance.   Respiratory: Negative.  Negative for cough, shortness of breath and wheezing.    Cardiovascular: Negative.  Negative for chest pain and palpitations.   Gastrointestinal:  Negative for abdominal pain, blood in stool, diarrhea, nausea and vomiting.   Endocrine: Negative.    Genitourinary: Negative.  Negative for dysuria and flank pain.   Musculoskeletal: Negative.  Negative for back pain.   Skin: Negative.  Negative for rash.   Allergic/Immunologic: Negative.    Neurological:  Negative for dizziness, speech difficulty, weakness, light-headedness, numbness and headaches.   Hematological: Negative.    Psychiatric/Behavioral: Negative.  Negative for hallucinations. The patient is not nervous/anxious.    All other systems reviewed and are negative.  Objective:     Vital Signs (Most Recent):  Temp: 99.5 °F (37.5 °C) (03/03/23 1113)  Pulse: 61 (03/03/23 1328)  Resp: 18 (03/03/23 1113)  BP: 134/82 (03/03/23 1113)  SpO2: (!) 94 % (03/03/23 1113)   Vital Signs (24h Range):  Temp:  [98 °F (36.7 °C)-99.5 °F (37.5 °C)] 99.5 °F (37.5 °C)  Pulse:  [61-88] 61  Resp:  [15-18] 18  SpO2:  [94 %-99 %] 94 %  BP: (121-134)/(58-82) 134/82     Weight: 101.7 kg (224 lb 3.3 oz)  Body mass index is 42.36 kg/m².    Intake/Output Summary (Last 24 hours) at 3/3/2023 1402  Last data filed at 3/3/2023 0432  Gross per 24 hour   Intake 240 ml   Output 600 ml   Net -360 ml      Physical Exam  Vitals and nursing note reviewed.   Constitutional:       General: She is awake. She is not in acute distress.     Appearance: She is well-developed. She is obese. She is not ill-appearing or diaphoretic.   HENT:      Head: Normocephalic and atraumatic.      Mouth/Throat:       Mouth: Mucous membranes are moist.   Eyes:      General: No scleral icterus.     Conjunctiva/sclera: Conjunctivae normal.      Pupils: Pupils are equal, round, and reactive to light.   Cardiovascular:      Rate and Rhythm: Normal rate and regular rhythm.      Heart sounds: Normal heart sounds. No murmur heard.    No friction rub. No gallop.   Pulmonary:      Effort: Pulmonary effort is normal. No respiratory distress.      Breath sounds: Normal breath sounds. No stridor. No wheezing or rales.   Abdominal:      General: Bowel sounds are normal. There is no distension.      Palpations: Abdomen is soft. There is no mass.      Tenderness: There is no abdominal tenderness. There is no guarding.   Musculoskeletal:         General: No swelling.      Cervical back: Neck supple.   Skin:     General: Skin is warm.      Coloration: Skin is not jaundiced.      Findings: No erythema.   Neurological:      General: No focal deficit present.      Mental Status: She is alert and oriented to person, place, and time. Mental status is at baseline.   Psychiatric:         Attention and Perception: Attention normal.         Speech: Speech normal.         Behavior: Behavior is cooperative.       Significant Labs: All pertinent labs within the past 24 hours have been reviewed.    Significant Imaging: I have reviewed all pertinent imaging results/findings within the past 24 hours.

## 2023-03-03 NOTE — PROGRESS NOTES
[facility] Medicine  Progress Note    Patient Name: Jimena Pierce  MRN: [id number]  Patient Class: IP- Inpatient   Admission Date: 2/22/2023  Length of Stay: 7 days  Attending Physician: Harley Alfaro MD  Primary Care Provider: Baylee Aleman DO        Subjective:     Principal Problem:Brachial artery occlusion, right        HPI:  Ms. Pierce is a 57-year-old  female with PMH significant for cerebral palsy (mild), hypertension, hyperlipidemia, presented to the ED complaining of acute onset of right forearm pain, and abdominal pain that started around the same time since yesterday evening.  She was feeling numbness, tingling of the right fingers.  Right UE arterial ultrasound reveals evidence of acute occlusion at the brachial artery.  CTA of the right upper extremity was unremarkable.  Patient was having numbness, paresthesias, coldness of the right forearm.  Dr. Sun with vascular surgery evaluated the patient, has taken her to the OR emergently for open embolectomy.  She was found to have large clot burden at the bifurcation of the brachial artery on the right, as well as small amount of thrombus in the distal ulnar and radial arteries.  Patient was started on heparin drip prior to the procedure and to be resumed 10:34 p.m. postprocedure.  Patient reports improved pain, paresthesias of the right arm.  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      Admitting diagnosis:  Acute thromboembolism right brachial artery, distal ulnar, radial arteries.  Status post emergent thromboembolectomy.  Splenic artery infarction.      Overview/Hospital Course:  POD3 embolectomy of right brachial artery, transitioned off heparin to Eliquis. Decreasing platelet count, heme/onc consulted. PT/OT eval recs   2/26 AM pt reports she feels dizziness and generalized weakness with movement. Stable for d/c however will monitor platelet  count with repeat CBC in AM given pt reluctance to d/c home. Home amlodipine discontinued given symptoms and hypotensive BP trend. Expect stable for d/c in AM  2/27: SNF placement needed, will cont on eliquis 5mg bid. Outpatient f/u with hematology.   2/28: awaiting snf placement. Cont eliquis. Chest xray concerning for pna vs edema. Pt febrile, sob. Will treat with azithro and rocephin. Lasix given today. Resp status improving. BNP only mildly elevated.   3/1: anticardiolipin antibody positive. Will discuss with hematology on management for anti-phospholipid syndrome. Awaiting snf.   3/2: started on coumadin. Bridge with lovenox. Goal 2.5-3.5 per hematology. Awaiting snf.   3/3: awaiting snf. Cont lovenox bridge to coumadin. Will need f/u with vascular 14 days post op for staples removal.       Interval History: No acute issues overnight. Awaiting placement.     Review of Systems   Constitutional: Negative.  Negative for chills and fever.   HENT: Negative.  Negative for congestion, rhinorrhea, sore throat and trouble swallowing.    Eyes: Negative.  Negative for visual disturbance.   Respiratory: Negative.  Negative for cough, shortness of breath and wheezing.    Cardiovascular: Negative.  Negative for chest pain and palpitations.   Gastrointestinal:  Negative for abdominal pain, blood in stool, diarrhea, nausea and vomiting.   Endocrine: Negative.    Genitourinary: Negative.  Negative for dysuria and flank pain.   Musculoskeletal: Negative.  Negative for back pain.   Skin: Negative.  Negative for rash.   Allergic/Immunologic: Negative.    Neurological:  Negative for dizziness, speech difficulty, weakness, light-headedness, numbness and headaches.   Hematological: Negative.    Psychiatric/Behavioral: Negative.  Negative for hallucinations. The patient is not nervous/anxious.    All other systems reviewed and are negative.  Objective:     Vital Signs (Most Recent):  Temp: 99.5 °F (37.5 °C) (03/03/23 1113)  Pulse: 61  (03/03/23 1328)  Resp: 18 (03/03/23 1113)  BP: 134/82 (03/03/23 1113)  SpO2: (!) 94 % (03/03/23 1113)   Vital Signs (24h Range):  Temp:  [98 °F (36.7 °C)-99.5 °F (37.5 °C)] 99.5 °F (37.5 °C)  Pulse:  [61-88] 61  Resp:  [15-18] 18  SpO2:  [94 %-99 %] 94 %  BP: (121-134)/(58-82) 134/82     Weight: 101.7 kg (224 lb 3.3 oz)  Body mass index is 42.36 kg/m².    Intake/Output Summary (Last 24 hours) at 3/3/2023 1402  Last data filed at 3/3/2023 0432  Gross per 24 hour   Intake 240 ml   Output 600 ml   Net -360 ml      Physical Exam  Vitals and nursing note reviewed.   Constitutional:       General: She is awake. She is not in acute distress.     Appearance: She is well-developed. She is obese. She is not ill-appearing or diaphoretic.   HENT:      Head: Normocephalic and atraumatic.      Mouth/Throat:      Mouth: Mucous membranes are moist.   Eyes:      General: No scleral icterus.     Conjunctiva/sclera: Conjunctivae normal.      Pupils: Pupils are equal, round, and reactive to light.   Cardiovascular:      Rate and Rhythm: Normal rate and regular rhythm.      Heart sounds: Normal heart sounds. No murmur heard.    No friction rub. No gallop.   Pulmonary:      Effort: Pulmonary effort is normal. No respiratory distress.      Breath sounds: Normal breath sounds. No stridor. No wheezing or rales.   Abdominal:      General: Bowel sounds are normal. There is no distension.      Palpations: Abdomen is soft. There is no mass.      Tenderness: There is no abdominal tenderness. There is no guarding.   Musculoskeletal:         General: No swelling.      Cervical back: Neck supple.   Skin:     General: Skin is warm.      Coloration: Skin is not jaundiced.      Findings: No erythema.   Neurological:      General: No focal deficit present.      Mental Status: She is alert and oriented to person, place, and time. Mental status is at baseline.   Psychiatric:         Attention and Perception: Attention normal.         Speech: Speech  normal.         Behavior: Behavior is cooperative.       Significant Labs: All pertinent labs within the past 24 hours have been reviewed.    Significant Imaging: I have reviewed all pertinent imaging results/findings within the past 24 hours.          Assessment/Plan:      * Brachial artery occlusion, right  -acute brachial artery occlusion, on the right side.    -status post emergent thromboembolectomy.  POD3  -found to have large clot burden at the bifurcation of the brachial artery, with some thrombus in the distal ulnar and radial arteries.    -continue Eliquis  -heme/onc consult, f/u recs    3/3:  S/p embolectomy  snf placement pending  Cont lovenox bridge to coumadin  Hypercoagulation workup indicative of   Antiphospholipid syndrome  Will need to follow up with vascular 14 days post op    Antiphospholipid syndrome  Anti-cardiolipin antibody positive  Started coumadin  Will need lovenox bridge  Goal inr 2.5-3.5 per hematology       Cerebral palsy  -mild.    -able to ambulate with walker at home.      Splenic infarct  Cont coumadin  No surgical intervention        Obesity, Class III, BMI 40-49.9 (morbid obesity)  Body mass index is 41.19 kg/m². Morbid obesity complicates all aspects of disease management from diagnostic modalities to treatment. Weight loss encouraged and health benefits explained to patient.         Chronic bilateral low back pain with bilateral sciatica  -continue home dose pain medications.      Essential hypertension  -continue amlodipine, valsartan.    -monitor        VTE Risk Mitigation (From admission, onward)         Ordered     warfarin (COUMADIN) tablet 5 mg  Daily         03/03/23 1257     warfarin tablet 7.5 mg  Once         03/03/23 1257     enoxaparin injection 100 mg  Every 12 hours (non-standard times)         03/01/23 1502     IP VTE HIGH RISK PATIENT  Once         02/23/23 0541     Place sequential compression device  Until discontinued         02/23/23 0541     Place  sequential compression device  Until discontinued         02/23/23 0533                Discharge Planning   TUCKER:      Code Status: Not on file   Is the patient medically ready for discharge?: Yes    Reason for patient still in hospital (select all that apply): Pending disposition  Discharge Plan A: Skilled Nursing Facility   Discharge Delays: None known at this time              Harley Alfaro MD  Department of Hospital Medicine   [facility] [place] - Med Surg

## 2023-03-03 NOTE — PT/OT/SLP PROGRESS
"Physical Therapy Treatment    Patient Name:  Jimena Pierce   MRN:  [id number]    Recommendations:     Discharge Recommendations: nursing facility, skilled  Discharge Equipment Recommendations: other (see comments) (TBD at next level of care)  Barriers to discharge: None    Assessment:     Jimena Pierce is a 57 y.o. female admitted with a medical diagnosis of Brachial artery occlusion, right.  She presents with the following impairments/functional limitations: weakness, impaired endurance, decreased ROM, impaired coordination, impaired self care skills, decreased upper extremity function, impaired functional mobility, gait instability, impaired balance, decreased safety awareness.    Rehab Prognosis: Good; patient would benefit from acute skilled PT services to address these deficits and reach maximum level of function.    Recent Surgery: Procedure(s) (LRB):  ECHOCARDIOGRAM, TRANSESOPHAGEAL (N/A) 8 Days Post-Op    Plan:     During this hospitalization, patient to be seen 3 x/week to address the identified rehab impairments via gait training, therapeutic activities, therapeutic exercises and progress toward the following goals:    Plan of Care Expires:  03/11/23    Subjective     Chief Complaint: "Feeling good."  Patient/Family Comments/goals: Get stronger while at rehab.  Pain/Comfort:  Pain Rating 1: 0/10      Objective:     Communicated with FATOUMATA Garrett prior to session.  Patient found supine with telemetry, peripheral IV upon PT entry to room.     General Precautions: Standard, fall  Orthopedic Precautions: N/A  Braces: N/A  Respiratory Status: Room air     Functional Mobility:  Bed Mobility:     Supine to Sit: stand by assistance  Transfers:     Sit to Stand:  stand by assistance with standard walker  Bed to Chair: contact guard assistance with  standard walker  using  Step Transfer  Gait: 50 feet, CGA with standard walker       AM-PAC 6 CLICK MOBILITY  Turning over in bed (including " adjusting bedclothes, sheets and blankets)?: 4  Sitting down on and standing up from a chair with arms (e.g., wheelchair, bedside commode, etc.): 4  Moving from lying on back to sitting on the side of the bed?: 4  Moving to and from a bed to a chair (including a wheelchair)?: 3  Need to walk in hospital room?: 3  Climbing 3-5 steps with a railing?: 1  Basic Mobility Total Score: 19       Treatment & Education:    Patient found supine in bed upon PT entrance into room. Patient agreeable to therapy session and motivated to participate. Patient completed sup>sit, SBA and STS SBA with standard walker. Patient ambulated x50 feet, CGA with standard walker. Patient ambulated with decreased stride length and kaitlyn, mostly due to knee pain and weakness. Patient returned to room and transferred to bedside chair, CGA. Patient completed BLE therex: Aps, marching, LAQ and hip add 2 sets x 10 reps.     Patient left up in chair with call button in reach..    GOALS:   Multidisciplinary Problems       Physical Therapy Goals          Problem: Physical Therapy    Goal Priority Disciplines Outcome Goal Variances Interventions   Physical Therapy Goal     PT, PT/OT Ongoing, Progressing     Description: Pt will perform bed mobility mod I in order to participate in EOB activity.  Pt will perform transfers mod I in order to participate in OOB activity.   Pt will ambulate 75 ft mod I with LRAD in order to participate in daily tasks.                         Time Tracking:     PT Received On: 03/03/23  PT Start Time: 0825     PT Stop Time: 0848  PT Total Time (min): 23 min     Billable Minutes: Gait Training 11 and Therapeutic Exercise 12    Treatment Type: Treatment  PT/PTA: PT     PTA Visit Number: 1 03/03/2023

## 2023-03-03 NOTE — PROGRESS NOTES
Pharmacy Consult Note: Warfarin     Jimena Pierce 's Coumadin will be dosed and monitored by Pharmacy.      Target INR goal is 2.5-3.5    INR   Date Value Ref Range Status   03/03/2023 1.0 0.8 - 1.2 Final     Comment:     Coumadin Therapy:  2.0 - 3.0 for INR for all indicators except mechanical heart valves  and antiphospholipid syndromes which should use 2.5 - 3.5.         Indication: antiphospholipid syndrome  Home dose: 5 mg daily  Patient will be given a booster dose of 7.5 mg today.      PT/INR will be monitored daily. Dose adjustments will be made accordingly.      Thank you for allowing us to participate in this patient's care.   La Woodson, PharmD 3/3/2023 1:21 PM

## 2023-03-04 LAB
ANION GAP SERPL CALC-SCNC: 17 MMOL/L (ref 8–16)
BASOPHILS # BLD AUTO: 0.08 K/UL (ref 0–0.2)
BASOPHILS NFR BLD: 1 % (ref 0–1.9)
BUN SERPL-MCNC: 13 MG/DL (ref 6–20)
CALCIUM SERPL-MCNC: 9.1 MG/DL (ref 8.7–10.5)
CHLORIDE SERPL-SCNC: 108 MMOL/L (ref 95–110)
CO2 SERPL-SCNC: 18 MMOL/L (ref 23–29)
CREAT SERPL-MCNC: 0.7 MG/DL (ref 0.5–1.4)
DIFFERENTIAL METHOD: ABNORMAL
EOSINOPHIL # BLD AUTO: 0.2 K/UL (ref 0–0.5)
EOSINOPHIL NFR BLD: 2.9 % (ref 0–8)
ERYTHROCYTE [DISTWIDTH] IN BLOOD BY AUTOMATED COUNT: 12.4 % (ref 11.5–14.5)
EST. GFR  (NO RACE VARIABLE): >60 ML/MIN/1.73 M^2
GLUCOSE SERPL-MCNC: 94 MG/DL (ref 70–110)
HCT VFR BLD AUTO: 35.8 % (ref 37–48.5)
HGB BLD-MCNC: 11.7 G/DL (ref 12–16)
IMM GRANULOCYTES # BLD AUTO: 0.07 K/UL (ref 0–0.04)
IMM GRANULOCYTES NFR BLD AUTO: 0.9 % (ref 0–0.5)
INR PPP: 1.1 (ref 0.8–1.2)
LYMPHOCYTES # BLD AUTO: 1.8 K/UL (ref 1–4.8)
LYMPHOCYTES NFR BLD: 23.7 % (ref 18–48)
MCH RBC QN AUTO: 29 PG (ref 27–31)
MCHC RBC AUTO-ENTMCNC: 32.7 G/DL (ref 32–36)
MCV RBC AUTO: 89 FL (ref 82–98)
MONOCYTES # BLD AUTO: 0.7 K/UL (ref 0.3–1)
MONOCYTES NFR BLD: 9.1 % (ref 4–15)
NEUTROPHILS # BLD AUTO: 4.8 K/UL (ref 1.8–7.7)
NEUTROPHILS NFR BLD: 62.4 % (ref 38–73)
NRBC BLD-RTO: 0 /100 WBC
PLATELET # BLD AUTO: 398 K/UL (ref 150–450)
PLATELET BLD QL SMEAR: ABNORMAL
PMV BLD AUTO: 10.9 FL (ref 9.2–12.9)
POTASSIUM SERPL-SCNC: 4.1 MMOL/L (ref 3.5–5.1)
PROTHROMBIN TIME: 11.6 SEC (ref 9–12.5)
RBC # BLD AUTO: 4.04 M/UL (ref 4–5.4)
SODIUM SERPL-SCNC: 143 MMOL/L (ref 136–145)
WBC # BLD AUTO: 7.69 K/UL (ref 3.9–12.7)

## 2023-03-04 PROCEDURE — 94761 N-INVAS EAR/PLS OXIMETRY MLT: CPT

## 2023-03-04 PROCEDURE — 63600175 PHARM REV CODE 636 W HCPCS: Performed by: FAMILY MEDICINE

## 2023-03-04 PROCEDURE — 25000003 PHARM REV CODE 250: Performed by: FAMILY MEDICINE

## 2023-03-04 PROCEDURE — 11000001 HC ACUTE MED/SURG PRIVATE ROOM

## 2023-03-04 PROCEDURE — 25000003 PHARM REV CODE 250: Performed by: NURSE PRACTITIONER

## 2023-03-04 PROCEDURE — 99900035 HC TECH TIME PER 15 MIN (STAT)

## 2023-03-04 PROCEDURE — 25000003 PHARM REV CODE 250: Performed by: INTERNAL MEDICINE

## 2023-03-04 PROCEDURE — 85025 COMPLETE CBC W/AUTO DIFF WBC: CPT | Performed by: FAMILY MEDICINE

## 2023-03-04 PROCEDURE — 85610 PROTHROMBIN TIME: CPT | Performed by: FAMILY MEDICINE

## 2023-03-04 PROCEDURE — 36415 COLL VENOUS BLD VENIPUNCTURE: CPT | Performed by: FAMILY MEDICINE

## 2023-03-04 PROCEDURE — 80048 BASIC METABOLIC PNL TOTAL CA: CPT | Performed by: FAMILY MEDICINE

## 2023-03-04 RX ORDER — WARFARIN SODIUM 5 MG/1
10 TABLET ORAL ONCE
Status: COMPLETED | OUTPATIENT
Start: 2023-03-04 | End: 2023-03-04

## 2023-03-04 RX ORDER — WARFARIN SODIUM 5 MG/1
5 TABLET ORAL DAILY
Status: DISCONTINUED | OUTPATIENT
Start: 2023-03-05 | End: 2023-03-05

## 2023-03-04 RX ADMIN — OXYCODONE HYDROCHLORIDE 30 MG: 30 TABLET ORAL at 08:03

## 2023-03-04 RX ADMIN — WARFARIN SODIUM 10 MG: 5 TABLET ORAL at 05:03

## 2023-03-04 RX ADMIN — ENOXAPARIN SODIUM 100 MG: 100 INJECTION SUBCUTANEOUS at 08:03

## 2023-03-04 RX ADMIN — VALSARTAN 160 MG: 160 TABLET, FILM COATED ORAL at 08:03

## 2023-03-04 RX ADMIN — OXYBUTYNIN CHLORIDE 15 MG: 5 TABLET, EXTENDED RELEASE ORAL at 08:03

## 2023-03-04 RX ADMIN — FAMOTIDINE 20 MG: 20 TABLET ORAL at 08:03

## 2023-03-04 NOTE — ASSESSMENT & PLAN NOTE
-acute brachial artery occlusion, on the right side.    -status post emergent thromboembolectomy.  POD3  -found to have large clot burden at the bifurcation of the brachial artery, with some thrombus in the distal ulnar and radial arteries.    -continue Eliquis  -heme/onc consult, f/u recs    3/4:  S/p embolectomy  snf placement pending  Cont lovenox bridge to coumadin  Hypercoagulation workup indicative of   Antiphospholipid syndrome  Will need to follow up with vascular 14 days post op

## 2023-03-04 NOTE — PROGRESS NOTES
Pharmacy Consult Note: Warfarin     Jimena Pierce 's Coumadin will be dosed and monitored by Pharmacy.      Target INR goal is 2.5-3.5    INR   Date Value Ref Range Status   03/04/2023 1.1 0.8 - 1.2 Final     Comment:     Coumadin Therapy:  2.0 - 3.0 for INR for all indicators except mechanical heart valves  and antiphospholipid syndromes which should use 2.5 - 3.5.         Indication: antiphospholipid syndrome    Patient will be given a booster dose of 10 mg today.   Continue lovenox bridge until INR therapeutic.     Dose for Today: 10 mg     PT/INR will be monitored daily. Dose adjustments will be made accordingly.      Thank you for allowing us to participate in this patient's care.     Mary Meeks 3/4/2023 10:15 AM

## 2023-03-04 NOTE — PLAN OF CARE
Plan of care discussed with pt. Pt verbalized understanding. Free from injury. No s/s of distress noted. Vitals stable. IV SL. Meds as ordered. Pain controlled. Diet tolerated. Q2 hour rounding. No complaints. Will continue to monitor pt. 12 hour chart review.

## 2023-03-04 NOTE — SUBJECTIVE & OBJECTIVE
Interval History: No acute events overnight. Awaiting in placement.     Review of Systems   Constitutional: Negative.  Negative for chills and fever.   HENT: Negative.  Negative for congestion, rhinorrhea, sore throat and trouble swallowing.    Eyes: Negative.  Negative for visual disturbance.   Respiratory: Negative.  Negative for cough, shortness of breath and wheezing.    Cardiovascular: Negative.  Negative for chest pain and palpitations.   Gastrointestinal:  Negative for abdominal pain, blood in stool, diarrhea, nausea and vomiting.   Endocrine: Negative.    Genitourinary: Negative.  Negative for dysuria and flank pain.   Musculoskeletal: Negative.  Negative for back pain.   Skin: Negative.  Negative for rash.   Allergic/Immunologic: Negative.    Neurological:  Negative for dizziness, speech difficulty, weakness, light-headedness, numbness and headaches.   Hematological: Negative.    Psychiatric/Behavioral: Negative.  Negative for hallucinations. The patient is not nervous/anxious.    All other systems reviewed and are negative.  Objective:     Vital Signs (Most Recent):  Temp: 98.6 °F (37 °C) (03/04/23 0710)  Pulse: (!) 57 (03/04/23 0710)  Resp: 18 (03/04/23 0822)  BP: 130/60 (03/04/23 0710)  SpO2: 98 % (03/04/23 0710)   Vital Signs (24h Range):  Temp:  [98.2 °F (36.8 °C)-99.5 °F (37.5 °C)] 98.6 °F (37 °C)  Pulse:  [57-74] 57  Resp:  [17-50] 18  SpO2:  [94 %-99 %] 98 %  BP: (111-134)/(56-82) 130/60     Weight: 101.7 kg (224 lb 3.3 oz)  Body mass index is 42.36 kg/m².    Intake/Output Summary (Last 24 hours) at 3/4/2023 1042  Last data filed at 3/4/2023 0311  Gross per 24 hour   Intake 480 ml   Output 1000 ml   Net -520 ml      Physical Exam  Vitals and nursing note reviewed.   Constitutional:       General: She is awake. She is not in acute distress.     Appearance: She is well-developed. She is obese. She is not ill-appearing or diaphoretic.   HENT:      Head: Normocephalic and atraumatic.      Mouth/Throat:       Mouth: Mucous membranes are moist.   Eyes:      General: No scleral icterus.     Conjunctiva/sclera: Conjunctivae normal.      Pupils: Pupils are equal, round, and reactive to light.   Cardiovascular:      Rate and Rhythm: Normal rate and regular rhythm.      Heart sounds: Normal heart sounds. No murmur heard.    No friction rub. No gallop.   Pulmonary:      Effort: Pulmonary effort is normal. No respiratory distress.      Breath sounds: Normal breath sounds. No stridor. No wheezing or rales.   Abdominal:      General: Bowel sounds are normal. There is no distension.      Palpations: Abdomen is soft. There is no mass.      Tenderness: There is no abdominal tenderness. There is no guarding.   Musculoskeletal:         General: No swelling.      Cervical back: Neck supple.   Skin:     General: Skin is warm.      Coloration: Skin is not jaundiced.      Findings: No erythema.   Neurological:      General: No focal deficit present.      Mental Status: She is alert and oriented to person, place, and time. Mental status is at baseline.   Psychiatric:         Attention and Perception: Attention normal.         Speech: Speech normal.         Behavior: Behavior is cooperative.       Significant Labs: All pertinent labs within the past 24 hours have been reviewed.    Significant Imaging: I have reviewed all pertinent imaging results/findings within the past 24 hours.

## 2023-03-04 NOTE — PROGRESS NOTES
[facility] Medicine  Progress Note    Patient Name: Jimena Pierce  MRN: [id number]  Patient Class: IP- Inpatient   Admission Date: 2/22/2023  Length of Stay: 8 days  Attending Physician: Harley Alfaro MD  Primary Care Provider: Baylee Aleman DO        Subjective:     Principal Problem:Brachial artery occlusion, right        HPI:  Ms. Pierce is a 57-year-old  female with PMH significant for cerebral palsy (mild), hypertension, hyperlipidemia, presented to the ED complaining of acute onset of right forearm pain, and abdominal pain that started around the same time since yesterday evening.  She was feeling numbness, tingling of the right fingers.  Right UE arterial ultrasound reveals evidence of acute occlusion at the brachial artery.  CTA of the right upper extremity was unremarkable.  Patient was having numbness, paresthesias, coldness of the right forearm.  Dr. Sun with vascular surgery evaluated the patient, has taken her to the OR emergently for open embolectomy.  She was found to have large clot burden at the bifurcation of the brachial artery on the right, as well as small amount of thrombus in the distal ulnar and radial arteries.  Patient was started on heparin drip prior to the procedure and to be resumed 10:34 p.m. postprocedure.  Patient reports improved pain, paresthesias of the right arm.  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      Admitting diagnosis:  Acute thromboembolism right brachial artery, distal ulnar, radial arteries.  Status post emergent thromboembolectomy.  Splenic artery infarction.      Overview/Hospital Course:  POD3 embolectomy of right brachial artery, transitioned off heparin to Eliquis. Decreasing platelet count, heme/onc consulted. PT/OT eval recs   2/26 AM pt reports she feels dizziness and generalized weakness with movement. Stable for d/c however will monitor platelet  count with repeat CBC in AM given pt reluctance to d/c home. Home amlodipine discontinued given symptoms and hypotensive BP trend. Expect stable for d/c in AM  2/27: SNF placement needed, will cont on eliquis 5mg bid. Outpatient f/u with hematology.   2/28: awaiting snf placement. Cont eliquis. Chest xray concerning for pna vs edema. Pt febrile, sob. Will treat with azithro and rocephin. Lasix given today. Resp status improving. BNP only mildly elevated.   3/1: anticardiolipin antibody positive. Will discuss with hematology on management for anti-phospholipid syndrome. Awaiting snf.   3/2: started on coumadin. Bridge with lovenox. Goal 2.5-3.5 per hematology. Awaiting snf.   3/3: awaiting snf. Cont lovenox bridge to coumadin. Will need f/u with vascular 14 days post op for staples removal.   3/4: awaiting snf.       Interval History: No acute events overnight. Awaiting in placement.     Review of Systems   Constitutional: Negative.  Negative for chills and fever.   HENT: Negative.  Negative for congestion, rhinorrhea, sore throat and trouble swallowing.    Eyes: Negative.  Negative for visual disturbance.   Respiratory: Negative.  Negative for cough, shortness of breath and wheezing.    Cardiovascular: Negative.  Negative for chest pain and palpitations.   Gastrointestinal:  Negative for abdominal pain, blood in stool, diarrhea, nausea and vomiting.   Endocrine: Negative.    Genitourinary: Negative.  Negative for dysuria and flank pain.   Musculoskeletal: Negative.  Negative for back pain.   Skin: Negative.  Negative for rash.   Allergic/Immunologic: Negative.    Neurological:  Negative for dizziness, speech difficulty, weakness, light-headedness, numbness and headaches.   Hematological: Negative.    Psychiatric/Behavioral: Negative.  Negative for hallucinations. The patient is not nervous/anxious.    All other systems reviewed and are negative.  Objective:     Vital Signs (Most Recent):  Temp: 98.6 °F (37 °C)  (03/04/23 0710)  Pulse: (!) 57 (03/04/23 0710)  Resp: 18 (03/04/23 0822)  BP: 130/60 (03/04/23 0710)  SpO2: 98 % (03/04/23 0710)   Vital Signs (24h Range):  Temp:  [98.2 °F (36.8 °C)-99.5 °F (37.5 °C)] 98.6 °F (37 °C)  Pulse:  [57-74] 57  Resp:  [17-50] 18  SpO2:  [94 %-99 %] 98 %  BP: (111-134)/(56-82) 130/60     Weight: 101.7 kg (224 lb 3.3 oz)  Body mass index is 42.36 kg/m².    Intake/Output Summary (Last 24 hours) at 3/4/2023 1042  Last data filed at 3/4/2023 0311  Gross per 24 hour   Intake 480 ml   Output 1000 ml   Net -520 ml      Physical Exam  Vitals and nursing note reviewed.   Constitutional:       General: She is awake. She is not in acute distress.     Appearance: She is well-developed. She is obese. She is not ill-appearing or diaphoretic.   HENT:      Head: Normocephalic and atraumatic.      Mouth/Throat:      Mouth: Mucous membranes are moist.   Eyes:      General: No scleral icterus.     Conjunctiva/sclera: Conjunctivae normal.      Pupils: Pupils are equal, round, and reactive to light.   Cardiovascular:      Rate and Rhythm: Normal rate and regular rhythm.      Heart sounds: Normal heart sounds. No murmur heard.    No friction rub. No gallop.   Pulmonary:      Effort: Pulmonary effort is normal. No respiratory distress.      Breath sounds: Normal breath sounds. No stridor. No wheezing or rales.   Abdominal:      General: Bowel sounds are normal. There is no distension.      Palpations: Abdomen is soft. There is no mass.      Tenderness: There is no abdominal tenderness. There is no guarding.   Musculoskeletal:         General: No swelling.      Cervical back: Neck supple.   Skin:     General: Skin is warm.      Coloration: Skin is not jaundiced.      Findings: No erythema.   Neurological:      General: No focal deficit present.      Mental Status: She is alert and oriented to person, place, and time. Mental status is at baseline.   Psychiatric:         Attention and Perception: Attention normal.          Speech: Speech normal.         Behavior: Behavior is cooperative.       Significant Labs: All pertinent labs within the past 24 hours have been reviewed.    Significant Imaging: I have reviewed all pertinent imaging results/findings within the past 24 hours.      Assessment/Plan:      * Brachial artery occlusion, right  -acute brachial artery occlusion, on the right side.    -status post emergent thromboembolectomy.  POD3  -found to have large clot burden at the bifurcation of the brachial artery, with some thrombus in the distal ulnar and radial arteries.    -continue Eliquis  -heme/onc consult, f/u recs    3/4:  S/p embolectomy  snf placement pending  Cont lovenox bridge to coumadin  Hypercoagulation workup indicative of   Antiphospholipid syndrome  Will need to follow up with vascular 14 days post op    Antiphospholipid syndrome  Anti-cardiolipin antibody positive  Started coumadin  Will need lovenox bridge  Goal inr 2.5-3.5 per hematology       Cerebral palsy  -mild.    -able to ambulate with walker at home.      Splenic infarct  Cont coumadin  No surgical intervention        Obesity, Class III, BMI 40-49.9 (morbid obesity)  Body mass index is 41.19 kg/m². Morbid obesity complicates all aspects of disease management from diagnostic modalities to treatment. Weight loss encouraged and health benefits explained to patient.         Chronic bilateral low back pain with bilateral sciatica  -continue home dose pain medications.      Essential hypertension  -continue amlodipine, valsartan.    -monitor        VTE Risk Mitigation (From admission, onward)         Ordered     warfarin (COUMADIN) tablet 5 mg  Daily         03/04/23 1011     warfarin (COUMADIN) tablet 10 mg  Once         03/04/23 1011     enoxaparin injection 100 mg  Every 12 hours (non-standard times)         03/01/23 1502     IP VTE HIGH RISK PATIENT  Once         02/23/23 0541     Place sequential compression device  Until discontinued          02/23/23 0541     Place sequential compression device  Until discontinued         02/23/23 0533                Discharge Planning   TUCKER:      Code Status: Not on file   Is the patient medically ready for discharge?: Yes    Reason for patient still in hospital (select all that apply): Patient trending condition  Discharge Plan A: Skilled Nursing Facility   Discharge Delays: None known at this time              Harley Alfaro MD  Department of Hospital Medicine   '[facility]

## 2023-03-04 NOTE — PLAN OF CARE
Patient remains free from injury this shift. Safety precautions maintained. No s/s of acute distress noted. Pain controlled per MD order. Cardiac monitoring in place.  Chart and orders review complete. Patient education about care complete.       Problem: Adult Inpatient Plan of Care  Goal: Plan of Care Review  Outcome: Ongoing, Progressing  Goal: Patient-Specific Goal (Individualized)  Outcome: Ongoing, Progressing  Goal: Absence of Hospital-Acquired Illness or Injury  Outcome: Ongoing, Progressing  Goal: Optimal Comfort and Wellbeing  Outcome: Ongoing, Progressing  Goal: Readiness for Transition of Care  Outcome: Ongoing, Progressing     Problem: Bariatric Environmental Safety  Goal: Safety Maintained with Care  Outcome: Ongoing, Progressing     Problem: Skin Injury Risk Increased  Goal: Skin Health and Integrity  Outcome: Ongoing, Progressing

## 2023-03-05 LAB
INR PPP: 1.2 (ref 0.8–1.2)
PROTHROMBIN TIME: 13.2 SEC (ref 9–12.5)

## 2023-03-05 PROCEDURE — 25000003 PHARM REV CODE 250: Performed by: FAMILY MEDICINE

## 2023-03-05 PROCEDURE — 94761 N-INVAS EAR/PLS OXIMETRY MLT: CPT

## 2023-03-05 PROCEDURE — 25000003 PHARM REV CODE 250: Performed by: NURSE PRACTITIONER

## 2023-03-05 PROCEDURE — 85610 PROTHROMBIN TIME: CPT | Performed by: FAMILY MEDICINE

## 2023-03-05 PROCEDURE — 25000003 PHARM REV CODE 250: Performed by: INTERNAL MEDICINE

## 2023-03-05 PROCEDURE — 11000001 HC ACUTE MED/SURG PRIVATE ROOM

## 2023-03-05 PROCEDURE — 63600175 PHARM REV CODE 636 W HCPCS: Performed by: FAMILY MEDICINE

## 2023-03-05 PROCEDURE — 97530 THERAPEUTIC ACTIVITIES: CPT | Mod: CQ

## 2023-03-05 PROCEDURE — 97116 GAIT TRAINING THERAPY: CPT | Mod: CQ

## 2023-03-05 PROCEDURE — 36415 COLL VENOUS BLD VENIPUNCTURE: CPT | Performed by: FAMILY MEDICINE

## 2023-03-05 RX ORDER — WARFARIN SODIUM 5 MG/1
5 TABLET ORAL DAILY
Status: DISCONTINUED | OUTPATIENT
Start: 2023-03-06 | End: 2023-03-06

## 2023-03-05 RX ORDER — WARFARIN SODIUM 5 MG/1
10 TABLET ORAL ONCE
Status: COMPLETED | OUTPATIENT
Start: 2023-03-05 | End: 2023-03-05

## 2023-03-05 RX ADMIN — OXYCODONE HYDROCHLORIDE 30 MG: 30 TABLET ORAL at 03:03

## 2023-03-05 RX ADMIN — WARFARIN SODIUM 10 MG: 5 TABLET ORAL at 05:03

## 2023-03-05 RX ADMIN — OXYCODONE HYDROCHLORIDE 30 MG: 30 TABLET ORAL at 09:03

## 2023-03-05 RX ADMIN — OXYBUTYNIN CHLORIDE 15 MG: 5 TABLET, EXTENDED RELEASE ORAL at 08:03

## 2023-03-05 RX ADMIN — ENOXAPARIN SODIUM 100 MG: 100 INJECTION SUBCUTANEOUS at 09:03

## 2023-03-05 RX ADMIN — FAMOTIDINE 20 MG: 20 TABLET ORAL at 08:03

## 2023-03-05 RX ADMIN — FAMOTIDINE 20 MG: 20 TABLET ORAL at 09:03

## 2023-03-05 RX ADMIN — VALSARTAN 160 MG: 160 TABLET, FILM COATED ORAL at 08:03

## 2023-03-05 NOTE — ASSESSMENT & PLAN NOTE
Anti-cardiolipin antibody positive  Started coumadin  Will need lovenox bridge  Goal inr 2.5-3.5 per hematology   Outpatient f/u in coumadin clinic

## 2023-03-05 NOTE — PROGRESS NOTES
Pharmacy Consult Note: Warfarin      Jimena Pierce 's Coumadin will be dosed and monitored by Pharmacy.        Indication: antiphospholipid syndrome  Target INR goal is 2.5-3.5     Lab Results   Component Value Date    INR 1.2 03/05/2023     Current INR level is below therapeutic range.  Patient will be given a booster dose of 10 mg today.   Continue enoxaparin bridge until INR therapeutic.      Dose for Today: 10 mg      PT/INR will be monitored daily. Dose adjustments will be made accordingly.      Thank you for allowing us to participate in this patient's care.     Amna Jaime, PharmD

## 2023-03-05 NOTE — PROGRESS NOTES
[facility] Medicine  Progress Note    Patient Name: Jimena Pierce  MRN: [id number]  Patient Class: IP- Inpatient   Admission Date: 2/22/2023  Length of Stay: 9 days  Attending Physician: Harley Alfaro MD  Primary Care Provider: Baylee Aleman DO        Subjective:     Principal Problem:Brachial artery occlusion, right        HPI:  Ms. Pierce is a 57-year-old  female with PMH significant for cerebral palsy (mild), hypertension, hyperlipidemia, presented to the ED complaining of acute onset of right forearm pain, and abdominal pain that started around the same time since yesterday evening.  She was feeling numbness, tingling of the right fingers.  Right UE arterial ultrasound reveals evidence of acute occlusion at the brachial artery.  CTA of the right upper extremity was unremarkable.  Patient was having numbness, paresthesias, coldness of the right forearm.  Dr. Sun with vascular surgery evaluated the patient, has taken her to the OR emergently for open embolectomy.  She was found to have large clot burden at the bifurcation of the brachial artery on the right, as well as small amount of thrombus in the distal ulnar and radial arteries.  Patient was started on heparin drip prior to the procedure and to be resumed 10:34 p.m. postprocedure.  Patient reports improved pain, paresthesias of the right arm.  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      Admitting diagnosis:  Acute thromboembolism right brachial artery, distal ulnar, radial arteries.  Status post emergent thromboembolectomy.  Splenic artery infarction.      Overview/Hospital Course:  POD3 embolectomy of right brachial artery, transitioned off heparin to Eliquis. Decreasing platelet count, heme/onc consulted. PT/OT eval recs   2/26 AM pt reports she feels dizziness and generalized weakness with movement. Stable for d/c however will monitor platelet  count with repeat CBC in AM given pt reluctance to d/c home. Home amlodipine discontinued given symptoms and hypotensive BP trend. Expect stable for d/c in AM  2/27: SNF placement needed, will cont on eliquis 5mg bid. Outpatient f/u with hematology.   2/28: awaiting snf placement. Cont eliquis. Chest xray concerning for pna vs edema. Pt febrile, sob. Will treat with azithro and rocephin. Lasix given today. Resp status improving. BNP only mildly elevated.   3/1: anticardiolipin antibody positive. Will discuss with hematology on management for anti-phospholipid syndrome. Awaiting snf.   3/2: started on coumadin. Bridge with lovenox. Goal 2.5-3.5 per hematology. Awaiting snf.   3/3: awaiting snf. Cont lovenox bridge to coumadin. Will need f/u with vascular 14 days post op for staples removal.   3/4: awaiting snf.   3/5: plan for snf however pt stated today she would like to go home with outpatient pt/ot. Requesting to be discharged tomorrow instead of today as to allow sister to gather supplies to go home. Cont coumadin, bridge with lovenox. Outpatient f/u with hematology and vascular. Staples can be removed on 3/9/23. Outpatient f/u with coumadin clinic.       Interval History: No acute issues overnight. Patient requesting to be dc home with outpatient pt.     Review of Systems   Constitutional: Negative.  Negative for chills and fever.   HENT: Negative.  Negative for congestion, rhinorrhea, sore throat and trouble swallowing.    Eyes: Negative.  Negative for visual disturbance.   Respiratory: Negative.  Negative for cough, shortness of breath and wheezing.    Cardiovascular: Negative.  Negative for chest pain and palpitations.   Gastrointestinal:  Negative for abdominal pain, blood in stool, diarrhea, nausea and vomiting.   Endocrine: Negative.    Genitourinary: Negative.  Negative for dysuria and flank pain.   Musculoskeletal: Negative.  Negative for back pain.   Skin: Negative.  Negative for rash.    Allergic/Immunologic: Negative.    Neurological:  Negative for dizziness, speech difficulty, weakness, light-headedness, numbness and headaches.   Hematological: Negative.    Psychiatric/Behavioral: Negative.  Negative for hallucinations. The patient is not nervous/anxious.    All other systems reviewed and are negative.  Objective:     Vital Signs (Most Recent):  Temp: 97.8 °F (36.6 °C) (03/05/23 1138)  Pulse: 78 (03/05/23 1138)  Resp: 18 (03/05/23 1138)  BP: 134/74 (03/05/23 1138)  SpO2: 96 % (03/05/23 1138) Vital Signs (24h Range):  Temp:  [97.8 °F (36.6 °C)-98.4 °F (36.9 °C)] 97.8 °F (36.6 °C)  Pulse:  [60-78] 78  Resp:  [16-18] 18  SpO2:  [94 %-97 %] 96 %  BP: (125-150)/(56-74) 134/74     Weight: 99.3 kg (218 lb 14.7 oz)  Body mass index is 41.36 kg/m².    Intake/Output Summary (Last 24 hours) at 3/5/2023 1210  Last data filed at 3/5/2023 0037  Gross per 24 hour   Intake 720 ml   Output 1750 ml   Net -1030 ml      Physical Exam  Vitals and nursing note reviewed.   Constitutional:       General: She is awake. She is not in acute distress.     Appearance: She is well-developed. She is obese. She is not ill-appearing or diaphoretic.   HENT:      Head: Normocephalic and atraumatic.      Mouth/Throat:      Mouth: Mucous membranes are moist.   Eyes:      General: No scleral icterus.     Conjunctiva/sclera: Conjunctivae normal.      Pupils: Pupils are equal, round, and reactive to light.   Cardiovascular:      Rate and Rhythm: Normal rate and regular rhythm.      Heart sounds: Normal heart sounds. No murmur heard.    No friction rub. No gallop.   Pulmonary:      Effort: Pulmonary effort is normal. No respiratory distress.      Breath sounds: Normal breath sounds. No stridor. No wheezing or rales.   Abdominal:      General: Bowel sounds are normal. There is no distension.      Palpations: Abdomen is soft. There is no mass.      Tenderness: There is no abdominal tenderness. There is no guarding.   Musculoskeletal:          General: No swelling.      Cervical back: Neck supple.   Skin:     General: Skin is warm.      Coloration: Skin is not jaundiced.      Findings: No erythema.   Neurological:      General: No focal deficit present.      Mental Status: She is alert and oriented to person, place, and time. Mental status is at baseline.   Psychiatric:         Attention and Perception: Attention normal.         Speech: Speech normal.         Behavior: Behavior is cooperative.       Significant Labs: All pertinent labs within the past 24 hours have been reviewed.    Significant Imaging: I have reviewed all pertinent imaging results/findings within the past 24 hours.        Assessment/Plan:      * Brachial artery occlusion, right  -acute brachial artery occlusion, on the right side.    -status post emergent thromboembolectomy.  POD3  -found to have large clot burden at the bifurcation of the brachial artery, with some thrombus in the distal ulnar and radial arteries.    -continue Eliquis  -heme/onc consult, f/u recs    3/5:  S/p embolectomy  Cont lovenox bridge to coumadin  Hypercoagulation workup indicative of   Antiphospholipid syndrome  Will need to follow up with vascular 14 days post op  Sutures can be removed on 3/9/23  Pt requesting for outpatient pt as opposed to snf  Ok for dc tomorrow to allow sister to gather supplies for dc home    Antiphospholipid syndrome  Anti-cardiolipin antibody positive  Started coumadin  Will need lovenox bridge  Goal inr 2.5-3.5 per hematology   Outpatient f/u in coumadin clinic      Cerebral palsy  -mild.    -able to ambulate with walker at home.      Splenic infarct  Cont coumadin  No surgical intervention        Obesity, Class III, BMI 40-49.9 (morbid obesity)  Body mass index is 41.19 kg/m². Morbid obesity complicates all aspects of disease management from diagnostic modalities to treatment. Weight loss encouraged and health benefits explained to patient.         Chronic bilateral low back pain  with bilateral sciatica  -continue home dose pain medications.      Essential hypertension  -continue amlodipine, valsartan.    -monitor        VTE Risk Mitigation (From admission, onward)         Ordered     warfarin (COUMADIN) tablet 5 mg  Daily         03/05/23 0835     warfarin (COUMADIN) tablet 10 mg  Once         03/05/23 0836     enoxaparin injection 100 mg  Every 12 hours (non-standard times)         03/01/23 1502     IP VTE HIGH RISK PATIENT  Once         02/23/23 0541     Place sequential compression device  Until discontinued         02/23/23 0541     Place sequential compression device  Until discontinued         02/23/23 0533                Discharge Planning   TUCKER:      Code Status: Not on file   Is the patient medically ready for discharge?: Yes    Reason for patient still in hospital (select all that apply): Patient trending condition  Discharge Plan A: Skilled Nursing Facility   Discharge Delays: None known at this time              Harley Alfaro MD  Department of Hospital Medicine   [facility]

## 2023-03-05 NOTE — PLAN OF CARE
Pt remains free from falls/injuries this shift. Safety precautions maintained. No s/s of acute distress noted. VSS. Pain controlled with pain medication. Continuing with plan of care. Chart check complete and all orders reviewed.

## 2023-03-05 NOTE — PT/OT/SLP PROGRESS
Physical Therapy Treatment    Patient Name:  Jimena Pierce   MRN:  [id number]    Recommendations:     Discharge Recommendations: nursing facility, skilled  Discharge Equipment Recommendations:  (TBD at next level of care)  Barriers to discharge: None    Assessment:     Jimena Pierce is a 57 y.o. female admitted with a medical diagnosis of Brachial artery occlusion, right.  She presents with the following impairments/functional limitations: weakness, impaired endurance, impaired functional mobility, gait instability, impaired balance, decreased ROM, decreased upper extremity function, decreased lower extremity function, impaired self care skills, pain, decreased safety awareness.    Pt demonstrates improved functional independence with improve safety awareness. Pt is progressing towards goals, but has not yet reached prior level of function secondary to pain. Pt will continue to benefit from skilled PT services to address functional limitations and return to PLOF.    Rehab Prognosis: Good; patient would benefit from acute skilled PT services to address these deficits and reach maximum level of function.    Recent Surgery: Procedure(s) (LRB):  ECHOCARDIOGRAM, TRANSESOPHAGEAL (N/A) 10 Days Post-Op    Plan:     During this hospitalization, patient to be seen 3 x/week to address the identified rehab impairments via gait training, therapeutic activities, therapeutic exercises and progress toward the following goals:    Plan of Care Expires:  03/11/23    Subjective     Chief Complaint: R knee pain, generalized soreness  Pain/Comfort:  Location - Side 1: Bilateral  Location - Orientation 1: generalized  Location 1: arm  Pain Addressed 1: Reposition, Distraction  Location - Side 2: Right  Location 2: knee  Pain Addressed 2: Cessation of Activity  Pain Rating Post-Intervention 2: 5/10      Objective:   Patient found supine with telemetry, peripheral IV, PureWick upon PT entry to room.     General  Precautions: Standard, fall  Orthopedic Precautions: N/A  Braces: N/A  Respiratory Status: Room air     Functional Mobility:  Bed Mobility:     Rolling Left:  contact guard assistance  Supine to Sit: contact guard assistance  Transfers:     Sit to Stand:  stand by assistance with standard walker  Toilet Transfer: contact guard assistance with  standard walker and grab bars  using  Step Transfer  Gait: Pt ambulates 32 ft x2 throughout the hallway, and 8 ft x2 in the patient's room to/from the bathroom; both trials requiring CGA with use of the standard walker. Pt demonstrates good AD management and activity pacing throughout.   Balance: Fair      AM-PAC 6 CLICK MOBILITY  Turning over in bed (including adjusting bedclothes, sheets and blankets)?: 4  Sitting down on and standing up from a chair with arms (e.g., wheelchair, bedside commode, etc.): 4  Moving from lying on back to sitting on the side of the bed?: 4  Moving to and from a bed to a chair (including a wheelchair)?: 3  Need to walk in hospital room?: 3  Climbing 3-5 steps with a railing?: 1  Basic Mobility Total Score: 19       Treatment & Education:  Pt educated on the goals of the session, the importance of using the call button to alert nursing staff for assistance with mobility, and PT POC. Pt verbalized understanding.    Pt's brief donned in standing.    Patient left up in chair with all lines intact and call button in reach.    GOALS:   Multidisciplinary Problems       Physical Therapy Goals          Problem: Physical Therapy    Goal Priority Disciplines Outcome Goal Variances Interventions   Physical Therapy Goal     PT, PT/OT Ongoing, Progressing     Description: Pt will perform bed mobility mod I in order to participate in EOB activity.  Pt will perform transfers mod I in order to participate in OOB activity.   Pt will ambulate 75 ft mod I with LRAD in order to participate in daily tasks.                         Time Tracking:     PT Received On:  03/05/23  PT Start Time: 0819     PT Stop Time: 0842  PT Total Time (min): 23 min     Billable Minutes: Gait Training 8 and Therapeutic Activity 15    Treatment Type: Treatment  PT/PTA: PTA     PTA Visit Number: 2     03/05/2023

## 2023-03-05 NOTE — PLAN OF CARE
Patient remains free from injury this shift. Safety precautions maintained. No s/s of acute distress noted. Pain controlled per MD order.  Chart and orders review complete. Patient education about care complete.   Problem: Adult Inpatient Plan of Care  Goal: Plan of Care Review  Outcome: Ongoing, Progressing  Goal: Patient-Specific Goal (Individualized)  Outcome: Ongoing, Progressing  Goal: Absence of Hospital-Acquired Illness or Injury  Outcome: Ongoing, Progressing  Goal: Optimal Comfort and Wellbeing  Outcome: Ongoing, Progressing  Goal: Readiness for Transition of Care  Outcome: Ongoing, Progressing     Problem: Bariatric Environmental Safety  Goal: Safety Maintained with Care  Outcome: Ongoing, Progressing     Problem: Skin Injury Risk Increased  Goal: Skin Health and Integrity  Outcome: Ongoing, Progressing     Problem: Fall Injury Risk  Goal: Absence of Fall and Fall-Related Injury  Outcome: Ongoing, Progressing     Problem: Pain Acute  Goal: Acceptable Pain Control and Functional Ability  Outcome: Ongoing, Progressing

## 2023-03-05 NOTE — SUBJECTIVE & OBJECTIVE
Interval History: No acute issues overnight. Patient requesting to be dc home with outpatient pt.     Review of Systems   Constitutional: Negative.  Negative for chills and fever.   HENT: Negative.  Negative for congestion, rhinorrhea, sore throat and trouble swallowing.    Eyes: Negative.  Negative for visual disturbance.   Respiratory: Negative.  Negative for cough, shortness of breath and wheezing.    Cardiovascular: Negative.  Negative for chest pain and palpitations.   Gastrointestinal:  Negative for abdominal pain, blood in stool, diarrhea, nausea and vomiting.   Endocrine: Negative.    Genitourinary: Negative.  Negative for dysuria and flank pain.   Musculoskeletal: Negative.  Negative for back pain.   Skin: Negative.  Negative for rash.   Allergic/Immunologic: Negative.    Neurological:  Negative for dizziness, speech difficulty, weakness, light-headedness, numbness and headaches.   Hematological: Negative.    Psychiatric/Behavioral: Negative.  Negative for hallucinations. The patient is not nervous/anxious.    All other systems reviewed and are negative.  Objective:     Vital Signs (Most Recent):  Temp: 97.8 °F (36.6 °C) (03/05/23 1138)  Pulse: 78 (03/05/23 1138)  Resp: 18 (03/05/23 1138)  BP: 134/74 (03/05/23 1138)  SpO2: 96 % (03/05/23 1138) Vital Signs (24h Range):  Temp:  [97.8 °F (36.6 °C)-98.4 °F (36.9 °C)] 97.8 °F (36.6 °C)  Pulse:  [60-78] 78  Resp:  [16-18] 18  SpO2:  [94 %-97 %] 96 %  BP: (125-150)/(56-74) 134/74     Weight: 99.3 kg (218 lb 14.7 oz)  Body mass index is 41.36 kg/m².    Intake/Output Summary (Last 24 hours) at 3/5/2023 1210  Last data filed at 3/5/2023 0037  Gross per 24 hour   Intake 720 ml   Output 1750 ml   Net -1030 ml      Physical Exam  Vitals and nursing note reviewed.   Constitutional:       General: She is awake. She is not in acute distress.     Appearance: She is well-developed. She is obese. She is not ill-appearing or diaphoretic.   HENT:      Head: Normocephalic and  atraumatic.      Mouth/Throat:      Mouth: Mucous membranes are moist.   Eyes:      General: No scleral icterus.     Conjunctiva/sclera: Conjunctivae normal.      Pupils: Pupils are equal, round, and reactive to light.   Cardiovascular:      Rate and Rhythm: Normal rate and regular rhythm.      Heart sounds: Normal heart sounds. No murmur heard.    No friction rub. No gallop.   Pulmonary:      Effort: Pulmonary effort is normal. No respiratory distress.      Breath sounds: Normal breath sounds. No stridor. No wheezing or rales.   Abdominal:      General: Bowel sounds are normal. There is no distension.      Palpations: Abdomen is soft. There is no mass.      Tenderness: There is no abdominal tenderness. There is no guarding.   Musculoskeletal:         General: No swelling.      Cervical back: Neck supple.   Skin:     General: Skin is warm.      Coloration: Skin is not jaundiced.      Findings: No erythema.   Neurological:      General: No focal deficit present.      Mental Status: She is alert and oriented to person, place, and time. Mental status is at baseline.   Psychiatric:         Attention and Perception: Attention normal.         Speech: Speech normal.         Behavior: Behavior is cooperative.       Significant Labs: All pertinent labs within the past 24 hours have been reviewed.    Significant Imaging: I have reviewed all pertinent imaging results/findings within the past 24 hours.

## 2023-03-05 NOTE — ASSESSMENT & PLAN NOTE
-acute brachial artery occlusion, on the right side.    -status post emergent thromboembolectomy.  POD3  -found to have large clot burden at the bifurcation of the brachial artery, with some thrombus in the distal ulnar and radial arteries.    -continue Eliquis  -heme/onc consult, f/u recs    3/5:  S/p embolectomy  Cont lovenox bridge to coumadin  Hypercoagulation workup indicative of   Antiphospholipid syndrome  Will need to follow up with vascular 14 days post op  Sutures can be removed on 3/9/23  Pt requesting for outpatient pt as opposed to snf  Ok for dc tomorrow to allow sister to gather supplies for dc home

## 2023-03-06 VITALS
HEIGHT: 61 IN | RESPIRATION RATE: 17 BRPM | SYSTOLIC BLOOD PRESSURE: 118 MMHG | BODY MASS INDEX: 41.75 KG/M2 | WEIGHT: 221.13 LBS | TEMPERATURE: 98 F | DIASTOLIC BLOOD PRESSURE: 58 MMHG | OXYGEN SATURATION: 98 % | HEART RATE: 61 BPM

## 2023-03-06 LAB
INR PPP: 1.4 (ref 0.8–1.2)
PROTHROMBIN TIME: 15.1 SEC (ref 9–12.5)

## 2023-03-06 PROCEDURE — 97116 GAIT TRAINING THERAPY: CPT

## 2023-03-06 PROCEDURE — 63600175 PHARM REV CODE 636 W HCPCS: Performed by: FAMILY MEDICINE

## 2023-03-06 PROCEDURE — 97530 THERAPEUTIC ACTIVITIES: CPT

## 2023-03-06 PROCEDURE — 85610 PROTHROMBIN TIME: CPT | Performed by: FAMILY MEDICINE

## 2023-03-06 PROCEDURE — 97110 THERAPEUTIC EXERCISES: CPT

## 2023-03-06 PROCEDURE — 25000003 PHARM REV CODE 250: Performed by: NURSE PRACTITIONER

## 2023-03-06 PROCEDURE — 25000003 PHARM REV CODE 250: Performed by: INTERNAL MEDICINE

## 2023-03-06 PROCEDURE — 36415 COLL VENOUS BLD VENIPUNCTURE: CPT | Performed by: FAMILY MEDICINE

## 2023-03-06 RX ORDER — WARFARIN SODIUM 5 MG/1
5 TABLET ORAL DAILY
Qty: 30 TABLET | Refills: 0 | Status: SHIPPED | OUTPATIENT
Start: 2023-03-07 | End: 2023-03-23 | Stop reason: SDUPTHER

## 2023-03-06 RX ORDER — ENOXAPARIN SODIUM 100 MG/ML
1 INJECTION SUBCUTANEOUS EVERY 12 HOURS
Qty: 14 ML | Refills: 0 | Status: SHIPPED | OUTPATIENT
Start: 2023-03-06 | End: 2023-03-13 | Stop reason: SDUPTHER

## 2023-03-06 RX ORDER — WARFARIN SODIUM 5 MG/1
5 TABLET ORAL DAILY
Status: DISCONTINUED | OUTPATIENT
Start: 2023-03-07 | End: 2023-03-06 | Stop reason: HOSPADM

## 2023-03-06 RX ORDER — WARFARIN 10 MG/1
10 TABLET ORAL ONCE
Status: DISCONTINUED | OUTPATIENT
Start: 2023-03-06 | End: 2023-03-06 | Stop reason: HOSPADM

## 2023-03-06 RX ADMIN — FAMOTIDINE 20 MG: 20 TABLET ORAL at 09:03

## 2023-03-06 RX ADMIN — ENOXAPARIN SODIUM 100 MG: 100 INJECTION SUBCUTANEOUS at 09:03

## 2023-03-06 RX ADMIN — OXYBUTYNIN CHLORIDE 15 MG: 5 TABLET, EXTENDED RELEASE ORAL at 09:03

## 2023-03-06 RX ADMIN — OXYCODONE HYDROCHLORIDE 30 MG: 30 TABLET ORAL at 05:03

## 2023-03-06 NOTE — PLAN OF CARE
Nutrition Recommendations 3/6:  1. Recommend continue Cardiac, Coumadin restriction diet as medically appropriate   2. Recommend chocolate Boost plus TID to assist filling nutritional gaps   3. Recommend PO and supplement encouragement   4. Recommend weekly weights  5. Collaboration of care with medical providers    Goals:   1. Pt will consume >75% EEN by RD follow up   2. Pt will consume Boost TID prior to RD follow up    Isela Cruz, Registration Eligible, Provisional LDN

## 2023-03-06 NOTE — PROGRESS NOTES
[facility] - Med Surg  Adult Nutrition  Progress Note    SUMMARY       Recommendations    Recommendation/Intervention:   1. Recommend continue Cardiac, Coumadin restriction diet as medically appropriate   2. Recommend chocolate Boost plus TID to assist filling nutritional gaps   3. Recommend PO and supplement encouragement   4. Recommend weekly weights    Goals:   1. Pt will consume >75% EEN by RD follow up   2. Pt will consume Boost TID prior to RD follow up  Nutrition Goal Status: new  Communication of RD Recs: other (comment)    Assessment and Plan    Nutrition Problem  Inadequate energy intake    Related to (etiology):   Decreased ability to consume sufficient energy    Signs and Symptoms (as evidenced by):   Decreased appetite: 25-75% PO intake of meals     Interventions(treatment strategy):  1. Recommend continue Cardiac, Coumadin restriction diet as medically appropriate   2. Recommend chocolate Boost plus TID to assist filling nutritional gaps   3. Recommend PO and supplement encouragement   4. Recommend weekly weights  5. Collaboration of care with medical providers     Nutrition Diagnosis Status:   New        Malnutrition Assessment                                       Reason for Assessment    Reason For Assessment: length of stay  Diagnosis: other (see comments) (Brachial artery occlusion, right)  Relevant Medical History: Antiphospholipid syndrome, Cerebral palsy, Splenic infarct, Morbid obesity, Chronic bilateral low back pain w/ bilateral sciatica, HTN  General Information Comments:   3/6: 57 y.o. Female admitted Brachial artery occlusion, right. Visited pt at bedside, pt sitting up in bed, alert. Pt reported not being very hungry today, confirmed yesterday 75% PO intake of meals, stated today 25%. Pt stated she is not experiencing any N/V/D, no abdominal pain/distention, denies chewing/swallowing difficulties. Discussed protein/calorie benefits of Boost plus, pt receptive to try chocolate flavor, brought  "to pt at bedside. Pt reported normal weight 225 lbs, stated has been weighing that much for the last few months. Pt appeared well nourished, no NFPE warranted at this time. Reviewed chart: LBM 3/5; Skin: dry, bruised; Michael score: 19 (no risk); Edema: None. Labs, meds, weight reviewed. Anion gap (H). Note enoxaparin injection 100 mg q12h, Warfarin 5 mg daily. Weight charted 11/18/22 222 lbs, 3/5/23 221 lbs, wt stable. RD will continue to monitor.  Nutrition Discharge Planning: Cardiac, Coumadin restriction diet    Nutrition Risk Screen    Nutrition Risk Screen: no indicators present    Nutrition/Diet History    Spiritual, Cultural Beliefs, Cheondoism Practices, Values that Affect Care: no  Food Allergies: NKFA  Factors Affecting Nutritional Intake: decreased appetite    Anthropometrics    Temp: 98 °F (36.7 °C)  Height Method: Stated  Height: 5' 1" (154.9 cm)  Height (inches): 61 in  Weight Method: Bed Scale  Weight: 100.3 kg (221 lb 1.9 oz)  Weight (lb): 221.12 lb  Ideal Body Weight (IBW), Female: 105 lb  % Ideal Body Weight, Female (lb): 210.59 %  BMI (Calculated): 41.8  BMI Grade: greater than 40 - morbid obesity     Wt Readings from Last 15 Encounters:   03/06/23 100.3 kg (221 lb 1.9 oz)   11/18/22 101 kg (222 lb 10.6 oz)   09/30/22 102.7 kg (226 lb 6.6 oz)   09/13/22 101.7 kg (224 lb 3.3 oz)   09/22/21 111.6 kg (246 lb 0.5 oz)   06/23/21 113.3 kg (249 lb 12.5 oz)   06/23/21 112 kg (246 lb 14.6 oz)   05/27/21 113.3 kg (249 lb 12.5 oz)   06/15/20 119.9 kg (264 lb 5.3 oz)   04/12/18 114 kg (251 lb 3.4 oz)   05/17/17 108 kg (238 lb)   05/15/17 108.2 kg (238 lb 6.8 oz)   02/14/17 106.6 kg (235 lb)   01/13/17 109 kg (240 lb 4.8 oz)   11/25/16 107.1 kg (236 lb 1.8 oz)     Lab/Procedures/Meds    Pertinent Labs Reviewed: reviewed  Pertinent Labs Comments: Anion gap (H)  Pertinent Medications Reviewed: reviewed  BMP  Lab Results   Component Value Date     03/04/2023    K 4.1 03/04/2023     03/04/2023    CO2 18 " (L) 03/04/2023    BUN 13 03/04/2023    CREATININE 0.7 03/04/2023    CALCIUM 9.1 03/04/2023    ANIONGAP 17 (H) 03/04/2023    EGFRNORACEVR >60 03/04/2023    Scheduled Meds:   enoxaparin  1 mg/kg Subcutaneous Q12H    famotidine  20 mg Oral BID    oxybutynin  15 mg Oral Daily    valsartan  160 mg Oral Daily    warfarin  10 mg Oral Once    [START ON 3/7/2023] warfarin  5 mg Oral Daily     Continuous Infusions:  PRN Meds:.sodium chloride 0.9%, acetaminophen, aluminum-magnesium hydroxide-simethicone, guaiFENesin 100 mg/5 ml, morphine, ondansetron, ondansetron, oxyCODONE, polyethylene glycol, sodium chloride 0.9%   Physical Findings/Assessment         Estimated/Assessed Needs    Weight Used For Calorie Calculations: 100.3 kg (221 lb 1.9 oz)  Energy Calorie Requirements (kcal): 1515 (MSJ (Obese w/out vent, non critical care)  Energy Need Method: [facility]  Protein Requirements: 48-61 (0.8-1.0 g/kg Adj BW (Obese, BMI 41.78)  Weight Used For Protein Calculations: 60.9 kg (134 lb 4.2 oz) (Adj BW)  Fluid Requirements (mL): 1515 mL (1 mL/kcal)  Estimated Fluid Requirement Method: RDA Method  RDA Method (mL): 1515  CHO Requirement: 189 (1515 kcal/8)      Nutrition Prescription Ordered    Current Diet Order: Cardiac, Coumadin restriction diet    Evaluation of Received Nutrient/Fluid Intake  I/O: (Net since admit)  3/6: -4419.2 mL    Energy Calories Required: not meeting needs  Protein Required: not meeting needs  Fluid Required: not meeting needs  Tolerance: tolerating  % Intake of Estimated Energy Needs: 25-75%  % Meal Intake: 25-75%    Nutrition Risk    Level of Risk/Frequency of Follow-up: low (F/u x 1 weekly)     Monitor and Evaluation    Food and Nutrient Intake: energy intake, food and beverage intake  Food and Nutrient Adminstration: diet order  Knowledge/Beliefs/Attitudes: food and nutrition knowledge/skill, beliefs and attitudes  Anthropometric Measurements: weight, weight change, body mass index  Biochemical Data,  Medical Tests and Procedures: electrolyte and renal panel, gastrointestinal profile, glucose/endocrine profile, inflammatory profile, lipid profile     Nutrition Follow-Up    RD Follow-up?: Yes  Isela Cruz, Registration Eligible, Provisional LDN

## 2023-03-06 NOTE — PT/OT/SLP PROGRESS
Physical Therapy Treatment    Patient Name:  Jimena Pierce   MRN:  [id number]    Recommendations:     Discharge Recommendations: nursing facility, skilled  Discharge Equipment Recommendations:  (TBD at next level of care)  Barriers to discharge: Decreased caregiver support    Assessment:     Jimena Pierce is a 57 y.o. female admitted with a medical diagnosis of Brachial artery occlusion, right.  She presents with the following impairments/functional limitations: weakness, impaired endurance, impaired functional mobility, gait instability, impaired balance, edema, pain, impaired cardiopulmonary response to activity, decreased safety awareness, decreased lower extremity function, decreased coordination.    Rehab Prognosis: Good; patient would benefit from acute skilled PT services to address these deficits and reach maximum level of function.    Recent Surgery: Procedure(s) (LRB):  ECHOCARDIOGRAM, TRANSESOPHAGEAL (N/A) 11 Days Post-Op    Plan:     During this hospitalization, patient to be seen 3 x/week to address the identified rehab impairments via gait training, therapeutic activities, therapeutic exercises and progress toward the following goals:    Plan of Care Expires:  03/11/23    Subjective     Chief Complaint: Pt c/o increased R knee pain and swelling since yesterday  Patient/Family Comments/goals:  Pain/Comfort:  Pain Rating 1: 5/10  Location - Side 1: Right  Location - Orientation 1: generalized  Location 1: knee  Pain Addressed 1: Reposition, Distraction  Pain Rating Post-Intervention 1: 0/10      Objective:     Communicated with nurse prior to session.  Patient found supine with peripheral IV, telemetry, bed alarm, PureWick upon PT entry to room.     General Precautions: Standard, fall  Orthopedic Precautions: N/A  Braces: N/A  Respiratory Status: Room air     Functional Mobility:  Bed Mobility:     Rolling Left:  stand by assistance  Scooting: stand by assistance  Supine to Sit:  stand by assistance  Transfers:     Sit to Stand:  stand by assistance with standard walker  Bed to Chair: stand by assistance with  standard walker  using  Step Transfer  Gait: Ambulated 60ft x2 SBA using standard walker, required standing rest, experienced mild SOB and fatigue  Balance: Demonstrated good sitting balance, fair dynamic balance during gait.    AM-PAC 6 CLICK MOBILITY  Turning over in bed (including adjusting bedclothes, sheets and blankets)?: 4  Sitting down on and standing up from a chair with arms (e.g., wheelchair, bedside commode, etc.): 4  Moving from lying on back to sitting on the side of the bed?: 4  Moving to and from a bed to a chair (including a wheelchair)?: 4  Need to walk in hospital room?: 4  Climbing 3-5 steps with a railing?: 1  Basic Mobility Total Score: 21     Treatment & Education:  Pt tolerated interventions well. Reviewed importance of OOB activities and HEP (hip flex, LAQ, ham curls, ankle pumps) in order to maintain/regain strength. Reviewed proper use of standard walker for safety and to reduce risk of falling. Reviewed increased risk of falling due to weakness, instructed to utilize call bell for assistance for all transfers. Pt agreeable to all requests.    Patient left up in chair with all lines intact and call button in reach.    GOALS:   Multidisciplinary Problems       Physical Therapy Goals          Problem: Physical Therapy    Goal Priority Disciplines Outcome Goal Variances Interventions   Physical Therapy Goal     PT, PT/OT Ongoing, Progressing     Description: Pt will perform bed mobility mod I in order to participate in EOB activity.  Pt will perform transfers mod I in order to participate in OOB activity.   Pt will ambulate 75 ft mod I with LRAD in order to participate in daily tasks.                       Time Tracking:     PT Received On: 03/06/23  PT Start Time: 0930     PT Stop Time: 0953  PT Total Time (min): 23 min     Billable Minutes: Gait Training 10min  and Therapeutic Activity 13min    Treatment Type: Treatment  PT/PTA: PT     PTA Visit Number: 0     03/06/2023

## 2023-03-06 NOTE — DISCHARGE SUMMARY
[facility] Medicine  Discharge Summary      Patient Name: Jimena Pierce  MRN: [id number]  KATE: [id number]  Patient Class: IP- Inpatient  Admission Date: 2/22/2023  Hospital Length of Stay: 10 days  Discharge Date and Time:  03/06/2023 10:06 AM  Attending Physician: Annalisa Finney DO   Discharging Provider: Annalisa Finney DO  Primary Care Provider: Baylee Aleman DO    Primary Care Team: Networked reference to record PCT     HPI:   Ms. Pierce is a 57-year-old  female with PMH significant for cerebral palsy (mild), hypertension, hyperlipidemia, presented to the ED complaining of acute onset of right forearm pain, and abdominal pain that started around the same time since yesterday evening.  She was feeling numbness, tingling of the right fingers.  Right UE arterial ultrasound reveals evidence of acute occlusion at the brachial artery.  CTA of the right upper extremity was unremarkable.  Patient was having numbness, paresthesias, coldness of the right forearm.  Dr. Sun with vascular surgery evaluated the patient, has taken her to the OR emergently for open embolectomy.  She was found to have large clot burden at the bifurcation of the brachial artery on the right, as well as small amount of thrombus in the distal ulnar and radial arteries.  Patient was started on heparin drip prior to the procedure and to be resumed 10:34 p.m. postprocedure.  Patient reports improved pain, paresthesias of the right arm.  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      Admitting diagnosis:  Acute thromboembolism right brachial artery, distal ulnar, radial arteries.  Status post emergent thromboembolectomy.  Splenic artery infarction.      Procedure(s) (LRB):  ECHOCARDIOGRAM, TRANSESOPHAGEAL (N/A)      Hospital Course:   Patient initially transitioned to Eliquis for anticoagulation.  However she was noted to develop  thrombocytopenia.  Hematology consulted for further workup.  She was subsequently found to have positive anticardiolipin antibody.  Given new diagnosis of antiphospholipid syndrome which likely is the etiology of splenic infarct, hematology recommended anticoagulation with Coumadin with goal INR between 2.5-3.5.  Patient started on Lovenox bridging in the intervening time.  Hospital course complicated by pneumonia, treated to completion with Rocephin and azithromycin.  PT/OT initially recommended discharge home with , however patient initially did not feel comfortable being discharged home due to lack of support at home.  SNF referral initiated, however patient was subsequently able to make arrangements for sister to stay with her and felt comfortable being discharged home with  service, subsequently requested to cancel plan for SNF discharge.   arranged, patient discharged home to continue Coumadin bridging with close follow-up at [facility]in Clinic.  She had also been made follow-up appointment with Dr. Sun for 03/07/2023 for staples removal.  Seen and examined personally on the day of discharge.  Patient notes RUE embolectomy site to be hard and appears to be bulging out.  Area examined and found to be nonfluctuant with no erythema, excess warmth or drainage.  Patient reports that this is unchanged from prior days.  Advised that she discuss this further at her follow-up appointment with Dr. Sun.  Patient also to follow-up with Hematology outpatient.  She is agreeable with discharge plan.  All questions answered to her satisfaction.  Stable for discharge home at this time.       Goals of Care Treatment Preferences:         Consults:   Consults (From admission, onward)          Status Ordering Provider     Pharmacy to dose Warfarin consult  Once        Provider:  (Not yet assigned)    Acknowledged CICI LIMON     Inpatient consult to Social Work  Once        Provider:  (Not yet assigned)    Completed BRANDAN GANN  B.     Inpatient consult to [facility] Oncology  Once        Provider:  Teri Barney MD    Completed TERI BARNEY     Inpatient consult to Hematology/Oncology  Once        Provider:  (Not yet assigned)    Completed BRANDAN GANN     Inpatient consult to General Surgery  Once        Provider:  Sonu Tran MD    Completed JONATHAN PATE     Inpatient consult to Cardiology  Once        Provider:  Kvng Diaz MD    Completed JONATHAN PATE     Inpatient consult to Vascular Surgery  Once        Provider:  Mario Sun MD    Completed ADOLFO CHARLTON            Neuro  Cerebral palsy  -mild.    -able to ambulate with walker at home.      Pulmonary  PNA (pneumonia)-resolved as of 3/3/2023  pna  Cont rocephin   Completed azithro      Cardiac/Vascular  * Brachial artery occlusion, right  -acute brachial artery occlusion, on the right side.    -status post emergent thromboembolectomy.  POD3  -found to have large clot burden at the bifurcation of the brachial artery, with some thrombus in the distal ulnar and radial arteries.    -continue Eliquis  -heme/onc consult, f/u recs    3/5:  S/p embolectomy  Cont lovenox bridge to coumadin  Hypercoagulation workup indicative of   Antiphospholipid syndrome  Will need to follow up with vascular 14 days post op  Sutures can be removed on 3/9/23  Pt requesting for outpatient pt as opposed to snf  Ok for dc tomorrow to allow sister to gather supplies for dc home    Essential hypertension  -continue amlodipine, valsartan.    -monitor      Oncology  Splenic infarct  Cont coumadin  No surgical intervention        Endocrine  Obesity, Class III, BMI 40-49.9 (morbid obesity)  Body mass index is 41.19 kg/m². Morbid obesity complicates all aspects of disease management from diagnostic modalities to treatment. Weight loss encouraged and health benefits explained to patient.         Orthopedic  Chronic bilateral low back pain with bilateral sciatica  -continue  home dose pain medications.      Other  Antiphospholipid syndrome  Anti-cardiolipin antibody positive  Started coumadin  Will need lovenox bridge  Goal inr 2.5-3.5 per hematology   Outpatient f/u in coumadin clinic      Thrombocytopenia-resolved as of 3/2/2023  Heme/onc consult  Repeat CBC in AM        Final Active Diagnoses:    Diagnosis Date Noted POA    PRINCIPAL PROBLEM:  Brachial artery occlusion, right [I70.208] 02/23/2023 Yes    Antiphospholipid syndrome [D68.61] 03/01/2023 Yes    Splenic infarct [D73.5] 02/23/2023 Yes    Cerebral palsy [G80.9] 02/23/2023 Yes    Obesity, Class III, BMI 40-49.9 (morbid obesity) [E66.01] 09/13/2022 Yes    Chronic bilateral low back pain with bilateral sciatica [M54.42, M54.41, G89.29] 11/25/2016 Yes    Essential hypertension [I10] 08/02/2016 Yes      Problems Resolved During this Admission:    Diagnosis Date Noted Date Resolved POA    PNA (pneumonia) [J18.9] 02/24/2023 03/03/2023 Unknown    Thrombocytopenia [D69.6] 02/24/2023 03/02/2023 No       Discharged Condition: stable    Disposition: Home-Health Care [facility]    Follow Up:   Follow-up Information       Baylee Aleman DO Follow up in 1 week(s).    Specialty: Internal Medicine  Contact information:  [address] [place] [address] [phone]               rBooklyn Barney MD Follow up.    Specialty: Hematology and Oncology  Contact information:  [address]  [place] [address] [phone]               Mario Sun MD Follow up on 3/7/2023.    Specialty: Vascular Surgery  Why: at 1 PM  Contact information:  [address]  [address] [place] [address]  [place] [address] [phone]                           Patient Instructions:      Ambulatory referral/consult to Anticoagulation Monitoring (PHARMACIST MANAGED)   Standing Status: Future   Referral Priority: Routine Referral Type: Consultation   Referral Reason: Specialty Services Required   Requested Specialty: Hematology   Number of Visits Requested: 1     Diet  Adult Regular     Notify your health care provider if you experience any of the following:  temperature >100.4     Notify your health care provider if you experience any of the following:  redness, tenderness, or signs of infection (pain, swelling, redness, odor or green/yellow discharge around incision site)     Notify your health care provider if you experience any of the following:  severe uncontrolled pain     Activity as tolerated       Significant Diagnostic Studies: Labs:   NR   Lab Results   Component Value Date    INR 1.4 (H) 03/06/2023    INR 1.2 03/05/2023    INR 1.1 03/04/2023    and All labs within the past 24 hours have been reviewed    Pending Diagnostic Studies:       None           Medications:  Reconciled Home Medications:      Medication List        START taking these medications      enoxaparin 100 mg/mL Syrg  Commonly known as: LOVENOX  Inject 1 mL (100 mg total) into the skin every 12 (twelve) hours. for 7 days     warfarin 5 MG tablet  Commonly known as: COUMADIN  Take 1 tablet (5 mg total) by mouth Daily.  Start taking on: March 7, 2023            CONTINUE taking these medications      famotidine 40 MG tablet  Commonly known as: PEPCID  Take 1 tablet (40 mg total) by mouth once daily.     fluocinolone 0.01 % external solution  Commonly known as: SYNALAR  AAA of scalp twice a day prn itching. Mild steroid.     ketoconazole 2 % shampoo  Commonly known as: NIZORAL  Shampoo scalp 1-2 times weekly. Lather x 5 minutes then rinse well.     oxybutynin 15 MG Tr24  Commonly known as: DITROPAN XL  Take 1 tablet (15 mg total) by mouth once daily.     oxyCODONE 30 MG Tab  Commonly known as: ROXICODONE  Take 30 mg by mouth as needed. Takes four times a day as needed     promethazine 25 MG tablet  Commonly known as: PHENERGAN  Take 25 mg by mouth every 4 (four) hours as needed for Nausea.     SUTAB 1.479-0.188- 0.225 gram tablet  Generic drug: sod sulf-pot chloride-mag sulf  Take 12 tablets by mouth once  daily. Take according to package instructions with indicated amount of water.     tiZANidine 2 MG tablet  Commonly known as: ZANAFLEX  daily as needed.            STOP taking these medications      amlodipine-valsartan 5-160 mg per tablet  Commonly known as: EXFORGE     NAPROXEN ORAL              Indwelling Lines/Drains at time of discharge:   Lines/Drains/Airways       Drain  Duration             Female External Urinary Catheter 02/28/23 1940 5 days                    Time spent on the discharge of patient: 45 minutes         Annalisa Finney DO  Department of Hospital Medicine  [facility] - Med Surg

## 2023-03-06 NOTE — PT/OT/SLP PROGRESS
Occupational Therapy   Treatment    Name: Jimena Pierce  MRN: [id number]  Admitting Diagnosis:  Brachial artery occlusion, right  11 Days Post-Op    Recommendations:     Discharge Recommendations: nursing facility, skilled  Discharge Equipment Recommendations:   (TBD at next level of care)  Barriers to discharge:  Decreased caregiver support (son works during the day, no additional support)    Assessment:     Jimena Pierce is a 57 y.o. female with a medical diagnosis of Brachial artery occlusion, right.  She presents with the following performance deficits affecting function are weakness, impaired endurance, impaired self care skills, impaired functional mobility, impaired skin, edema, impaired balance, decreased safety awareness, decreased lower extremity function, decreased coordination, pain.     Rehab Prognosis:  Good; patient would benefit from acute skilled OT services to address these deficits and reach maximum level of function.       Plan:     Patient to be seen 2 x/week to address the above listed problems via self-care/home management, therapeutic activities, therapeutic exercises  Plan of Care Expires: 03/11/23  Plan of Care Reviewed with: patient    Subjective     Pain/Comfort:  Pain Rating 1: 5/10  Location - Side 1: Right  Location - Orientation 1: generalized  Location 1: knee  Pain Addressed 1: Reposition, Distraction (activity pacing)  Patient reported the pain in her knee increases with movement.    Objective:     Communicated with: Nurse, Nena, prior to session.  Patient found supine with telemetry, peripheral IV, PureWick upon OT entry to room.    General Precautions: Standard, fall    Orthopedic Precautions:N/A  Braces: N/A  Respiratory Status: Room air, Patient demonstrated shortness of breath during exertion. Patient educated to take standing rest breaks for energy conservation.     Occupational Performance:     Bed Mobility:    Patient completed Supine to Sit with  stand by assistance     Functional Mobility/Transfers:  Patient completed Sit <> Stand Transfer with stand by assistance  with  standard walker   Patient completed Bed <> Chair Transfer using Stand Pivot technique with stand by assistance with standard walker  Functional Mobility: Patient completed functional mobility x 60 ft x 2 with stand by assistance with standard walker to increase activity tolerance for ADL completion.    [facility] 6 Click ADL: 14    Treatment & Education:  Patient tolerated OT treatment well.  Patient completed B UE AROM therex: shoulder flexion x 15 reps and bicep curls x 15 reps to increase activity tolerance for ADL completion.  Patient educated to remain OOB in bedside chair for at least 2 hours and to call for A to return to bed.    Patient left up in chair with all lines intact and call button in reach    GOALS:   Multidisciplinary Problems       Occupational Therapy Goals          Problem: Occupational Therapy    Goal Priority Disciplines Outcome Interventions   Occupational Therapy Goal     OT, PT/OT Ongoing, Progressing    Description: LTG'S TO BE MET IN 14 DAYS (3/11/23)    1)  PATIENT WILL PERFORM UB DRESSING WITH SBA TO DECREASE (D) ON CAREGIVER.    2)  PATIENT WILL PERFORM LB DRESSING WITH MIN (A) TO DECREASE (D) ON CAREGIVER..    3)  PATIENT WILL PERFORM TOILET T/F WITH MIN (A) TO DECREASE (D) ON CAREGIVER..    4)  PATIENT WILL PERFORM BUE HEP WITH (I) FOR PROPER TECHNIQUE TO INCREASE FUNC STRENGTH AND FUNC ENDURANCE TO DECREASE (D) ON CAREGIVER TO SAFELY PERFORM SELF CARE TASKS.                         Time Tracking:     OT Date of Treatment: 03/06/23  OT Start Time: 0940  OT Stop Time: 1005  OT Total Time (min): 25 min    Billable Minutes:Therapeutic Activity 15  Therapeutic Exercise 10           SHO Burks      3/6/2023

## 2023-03-06 NOTE — PLAN OF CARE
Pt tolerated interventions well. Required SBA for bed mobility, STS, and transfer, ambulated 60ft x2 SBA using standard walker. Recommending SNF upon d/c.

## 2023-03-06 NOTE — PLAN OF CARE
Discussed poc with pt, pt verbalized understanding     Purposeful rounding every 2hours     Fall precautions in place, remains injury free  Pt denies c/o nausea  Pain still being managed with PRN meds     Accurate I&Os  Bed locked at lowest position  Call light within reach     Chart check complete  Reviewed active orders  Home health set up. Pending DC

## 2023-03-06 NOTE — PLAN OF CARE
Patient tolerated OT treatment well. Sup>sit with SBA. Sit>stand with SBA with standard walker. Continue recommending SNF at d/c.

## 2023-03-06 NOTE — PROGRESS NOTES
Pharmacy Consult Note: Warfarin     Jimena Pierce 's Coumadin will be dosed and monitored by Pharmacy.      Target INR goal is 2.5-3.5    INR   Date Value Ref Range Status   03/06/2023 1.4 (H) 0.8 - 1.2 Final     Comment:     Coumadin Therapy:  2.0 - 3.0 for INR for all indicators except mechanical heart valves  and antiphospholipid syndromes which should use 2.5 - 3.5.         Indication: antiphospholipid syndrom  Home dose: 5 mg daily  Patient will be given a booster dose of 10 mg today.      PT/INR will be monitored daily. Dose adjustments will be made accordingly.      Thank you for allowing us to participate in this patient's care.     La Woodson, PharmD 3/6/2023 10:07 AM

## 2023-03-06 NOTE — PLAN OF CARE
Pt remained free of injury. Call bell and personal items within reach. VSS. Pain controlled with PRN meds. Chart check complete.     Problem: Adult Inpatient Plan of Care  Goal: Plan of Care Review  Outcome: Ongoing, Progressing     Problem: Adult Inpatient Plan of Care  Goal: Absence of Hospital-Acquired Illness or Injury  Outcome: Ongoing, Progressing     Problem: Adult Inpatient Plan of Care  Goal: Optimal Comfort and Wellbeing  Outcome: Ongoing, Progressing

## 2023-03-06 NOTE — PLAN OF CARE
[facility]  Discharge Final Note    Primary Care Provider: Baylee Aleman DO    Expected Discharge Date: 3/6/2023    Final Discharge Note (most recent)       Final Note - 03/06/23 1051          Final Note    Assessment Type Final Discharge Note     Anticipated Discharge Disposition Home-Health Care Svc        Post-Acute Status    Home Health Status Pending Payor Review     Discharge Delays None known at this time                     Important Message from Medicare             Contact Info       Baylee Aleman DO   Specialty: Internal Medicine   Relationship: PCP - [address] [place] [address]   Phone: [phone]       Next Steps: Follow up in 1 week(s)    Brooklyn Barney MD   Specialty: Hematology and Oncology    [address] [facility]  [place] [address]   Phone: [phone]       Next Steps: Follow up    Mario Sun MD   Specialty: Vascular Surgery    [address]  [place] [address]   Phone: [phone]       Next Steps: Follow up on 3/7/2023    Instructions: at 1 PM          Discharge home with hh that will be arranged by patient's insurance provider.  No dme orders noted.

## 2023-03-07 ENCOUNTER — PATIENT OUTREACH (OUTPATIENT)
Dept: ADMINISTRATIVE | Facility: CLINIC | Age: 58
End: 2023-03-07
Payer: MEDICARE

## 2023-03-07 NOTE — PROGRESS NOTES
C3 nurse spoke with Jimena Pierce for a TCC post hospital discharge follow up call. The patient has a scheduled HOSFU appointment with Baylee Aleman DO on 03/30/23 @ 1040.  Will route PCP for an earlier HOSPFU.

## 2023-03-09 ENCOUNTER — TELEPHONE (OUTPATIENT)
Dept: ADMINISTRATIVE | Facility: HOSPITAL | Age: 58
End: 2023-03-09
Payer: MEDICARE

## 2023-03-10 ENCOUNTER — TELEPHONE (OUTPATIENT)
Dept: INTERNAL MEDICINE | Facility: CLINIC | Age: 58
End: 2023-03-10
Payer: MEDICARE

## 2023-03-10 LAB — INR PPP: 1.4

## 2023-03-10 NOTE — TELEPHONE ENCOUNTER
Spoke with Leslye with [facility] and informed that INR results for patient should be sent to Dr. Baylee Aleman's office and that patient is not being monitored by [facility].  Leslye verbalizes understanding.

## 2023-03-13 ENCOUNTER — TELEPHONE (OUTPATIENT)
Dept: CARDIOLOGY | Facility: CLINIC | Age: 58
End: 2023-03-13
Payer: MEDICARE

## 2023-03-13 ENCOUNTER — ANTI-COAG VISIT (OUTPATIENT)
Dept: CARDIOLOGY | Facility: CLINIC | Age: 58
End: 2023-03-13
Payer: MEDICARE

## 2023-03-13 DIAGNOSIS — I70.208 BRACHIAL ARTERY OCCLUSION, RIGHT: ICD-10-CM

## 2023-03-13 DIAGNOSIS — Z79.01 LONG TERM (CURRENT) USE OF ANTICOAGULANTS: Primary | ICD-10-CM

## 2023-03-13 DIAGNOSIS — D68.61 ANTIPHOSPHOLIPID SYNDROME: ICD-10-CM

## 2023-03-13 PROCEDURE — 93793 PR ANTICOAGULANT MGMT FOR PT TAKING WARFARIN: ICD-10-PCS | Mod: S$GLB,,,

## 2023-03-13 PROCEDURE — 93793 ANTICOAG MGMT PT WARFARIN: CPT | Mod: S$GLB,,,

## 2023-03-13 RX ORDER — ENOXAPARIN SODIUM 100 MG/ML
100 INJECTION SUBCUTANEOUS EVERY 12 HOURS
Qty: 24 ML | Refills: 1 | Status: SHIPPED | OUTPATIENT
Start: 2023-03-13 | End: 2023-12-07

## 2023-03-13 NOTE — PROGRESS NOTES
Ms Pierce is a 56 y/o female referred to coumadin clinic for a new diagnosis of antiphospholipid syndrome. PMHx of HTN, obesity and sciatica.   Recently d/c'ed from [facility] - had splenic infarct w/embolectomy, hematology recommended anticoagulation with Coumadin with goal INR between 2.5-3.5.  Patient started on Lovenox bridging in the intervening time.  Hospital course complicated by pneumonia, treated to completion with Rocephin and azithromycin.  Unfortunately her enrollment was not received until today 3/13 due to an [employer] order mishap.  She was sent home on enoxaparin bridge 100 mg q 12 hours - with warfarin 5 mg QD.  Pt has [facility].  INR cannot be checked today per [facility].  They will check in AM 3/14/23.  Refill sent for enoxaparin syringes as she has one syringe remaining.  We will boost her warfarin dose x 1 today only - recheck INR tomorrow will stop if INR is 2.5 - need goal clarification.  Direct number and coumadin clinic number provided to patient.  She has been avoiding greens for now.  We will look to reincorporate later into diet.  Received warfarin education in the hospital.  Has no questions, but I did review with her OTCs, drug/food interactions and when to call the coumadin clinic.    Patient verbalizes understanding.

## 2023-03-13 NOTE — TELEPHONE ENCOUNTER
Spoke with Leslye with [facility].  Informed that patient's INR  is being monitored by [facility] and can refax INR to [phone].  Leslye verbalizes understanding and gives verbal INR 1.4 done on 3/10/23.

## 2023-03-14 ENCOUNTER — ANTI-COAG VISIT (OUTPATIENT)
Dept: CARDIOLOGY | Facility: CLINIC | Age: 58
End: 2023-03-14
Payer: MEDICARE

## 2023-03-14 ENCOUNTER — TELEPHONE (OUTPATIENT)
Dept: CARDIOLOGY | Facility: CLINIC | Age: 58
End: 2023-03-14
Payer: MEDICARE

## 2023-03-14 DIAGNOSIS — I70.208 BRACHIAL ARTERY OCCLUSION, RIGHT: ICD-10-CM

## 2023-03-14 DIAGNOSIS — D68.61 ANTIPHOSPHOLIPID SYNDROME: Primary | ICD-10-CM

## 2023-03-14 DIAGNOSIS — Z79.01 LONG TERM (CURRENT) USE OF ANTICOAGULANTS: ICD-10-CM

## 2023-03-14 LAB — INR PPP: 1.7

## 2023-03-14 PROCEDURE — 93793 PR ANTICOAGULANT MGMT FOR PT TAKING WARFARIN: ICD-10-PCS | Mod: S$GLB,,,

## 2023-03-14 PROCEDURE — 93793 ANTICOAG MGMT PT WARFARIN: CPT | Mod: S$GLB,,,

## 2023-03-14 NOTE — PROGRESS NOTES
Fax received from [facility]. PT 21.0; INR 1.7--sub-therapeutic.  Test Date: 3/14/23.  Attempted to contact patient with no success.  Left voice message to call our office back.  Sent to PharmD for dosing/instructions.

## 2023-03-14 NOTE — TELEPHONE ENCOUNTER
Patient has been advised of dosing/instructions per encounter 3/14/23.  Patient repeated back correctly and verbalizes understanding.

## 2023-03-14 NOTE — TELEPHONE ENCOUNTER
Warfarin dosing/instructions called into Leslye with [facility].  Leslye repeated back correctly and verbalizes understanding.  Orders has been faxed to [facility]

## 2023-03-14 NOTE — PROGRESS NOTES
Viral Upper Respiratory Illness (Adult)  You have a viral upper respiratory illness (URI), which is another term for the common cold. This illness is contagious during the first few days. It is spread through the air by coughing and sneezing. It may also be spread by direct contact (touching the sick person and then touching your own eyes, nose, or mouth). Frequent handwashing will decrease risk of spread. Most viral illnesses go away within 7 to 10 days with rest and simple home remedies. Sometimes the illness may last for several weeks. Antibiotics will not kill a virus, and they are generally not prescribed for this condition.    Home care  · If symptoms are severe, rest at home for the first 2 to 3 days. When you resume activity, don't let yourself get too tired.  · Avoid being exposed to cigarette smoke (yours or others’).  · You may use acetaminophen or ibuprofen to control pain and fever, unless another medicine was prescribed. (Note: If you have chronic liver or kidney disease, have ever had a stomach ulcer or gastrointestinal bleeding, or are taking blood-thinning medicines, talk with your healthcare provider before using these medicines.) Aspirin should never be given to anyone under 18 years of age who is ill with a viral infection or fever. It may cause severe liver or brain damage.  · Your appetite may be poor, so a light diet is fine. Avoid dehydration by drinking 6 to 8 glasses of fluids per day (water, soft drinks, juices, tea, or soup). Extra fluids will help loosen secretions in the nose and lungs.  · Over-the-counter cold medicines will not shorten the length of time you’re sick, but they may be helpful for the following symptoms: cough, sore throat, and nasal and sinus congestion. (Note: Do not use decongestants if you have high blood pressure.)  Follow-up care  Follow up with your healthcare provider, or as advised.  When to seek medical advice  Call your healthcare provider right away if any  INR not at goal. Medications, chart, and patient findings reviewed. See calendar for adjustments to dose and follow up plan.  INR is slightly improved, we will get to goal of 2.5 before stopping enoxaparin per Hem/Onc/APS recommendations .  Continue Lovenox 100 mg q 12 hours and boost with 7.5 mg x 2, then 5 mg on Thursday - repeat INR on Friday with [facility].     of these occur:  · Cough with lots of colored sputum (mucus)  · Severe headache; face, neck, or ear pain  · Difficulty swallowing due to throat pain  · Fever of 100.4°F (38°C)  Call 911, or get immediate medical care  Call emergency services right away if any of these occur:  · Chest pain, shortness of breath, wheezing, or difficulty breathing  · Coughing up blood  · Inability to swallow due to throat pain  © 5381-6475 [employer]. [address] [place] [address], [place] [address]. All rights reserved. This information is not intended as a substitute for professional medical care. Always follow your healthcare professional's instructions.

## 2023-03-17 ENCOUNTER — ANTI-COAG VISIT (OUTPATIENT)
Dept: CARDIOLOGY | Facility: CLINIC | Age: 58
End: 2023-03-17
Payer: MEDICARE

## 2023-03-17 DIAGNOSIS — D68.61 ANTIPHOSPHOLIPID SYNDROME: Primary | ICD-10-CM

## 2023-03-17 DIAGNOSIS — I70.208 BRACHIAL ARTERY OCCLUSION, RIGHT: ICD-10-CM

## 2023-03-17 LAB — INR PPP: 1.7

## 2023-03-17 PROCEDURE — 93793 ANTICOAG MGMT PT WARFARIN: CPT | Mod: S$GLB,,,

## 2023-03-17 PROCEDURE — 93793 PR ANTICOAGULANT MGMT FOR PT TAKING WARFARIN: ICD-10-PCS | Mod: S$GLB,,,

## 2023-03-17 NOTE — PROGRESS NOTES
Pt/INR received from [facility]-INR has plateaued.  Patient contacted.  Lovenox refill obtained from pharmacy- missed one AM enoxaparin dose the day of .    Patient states that she was only advised to take 7.5 mg on Tuesday last week, only took 5 mg on Wednesday and Thursday.  We will boost with 10 mg today since her INR has not moved and use a 7.5 mg dose daily over the weekend.  She was instructed to 5 mg on Monday if we are not in contact.  I have asked her to C\continue Lovenox 100 mg q 12 hours, very expensive co-pay so we will try to move into as quickly as possible.  ER with any s/s of bleeding.  Repeat INR on Monday.

## 2023-03-20 ENCOUNTER — ANTI-COAG VISIT (OUTPATIENT)
Dept: CARDIOLOGY | Facility: CLINIC | Age: 58
End: 2023-03-20
Payer: MEDICARE

## 2023-03-20 DIAGNOSIS — I70.208 BRACHIAL ARTERY OCCLUSION, RIGHT: ICD-10-CM

## 2023-03-20 DIAGNOSIS — D68.61 ANTIPHOSPHOLIPID SYNDROME: Primary | ICD-10-CM

## 2023-03-20 LAB — INR PPP: 2.8

## 2023-03-20 PROCEDURE — 93793 ANTICOAG MGMT PT WARFARIN: CPT | Mod: S$GLB,,,

## 2023-03-20 PROCEDURE — 93793 PR ANTICOAGULANT MGMT FOR PT TAKING WARFARIN: ICD-10-PCS | Mod: S$GLB,,,

## 2023-03-20 NOTE — PROGRESS NOTES
Fax received from [facility] - 34.1/2.8 - INR is in range - STOP enoxaparin.  Dosing reviewed with patient.  She will stop enoxaparin.  We will gently increase her weekly dose as to not drop INR - 7.5mg on T/R/Sat x 5 on AOD.  Repeat INR on Thursday this week if [facility] is available in order to properly trend.

## 2023-03-23 ENCOUNTER — ANTI-COAG VISIT (OUTPATIENT)
Dept: CARDIOLOGY | Facility: CLINIC | Age: 58
End: 2023-03-23
Payer: MEDICARE

## 2023-03-23 DIAGNOSIS — D68.61 ANTIPHOSPHOLIPID SYNDROME: ICD-10-CM

## 2023-03-23 DIAGNOSIS — I70.208 BRACHIAL ARTERY OCCLUSION, RIGHT: ICD-10-CM

## 2023-03-23 DIAGNOSIS — Z79.01 LONG TERM (CURRENT) USE OF ANTICOAGULANTS: Primary | ICD-10-CM

## 2023-03-23 LAB — INR PPP: 2.2

## 2023-03-23 RX ORDER — WARFARIN SODIUM 5 MG/1
7.5 TABLET ORAL DAILY
Qty: 50 TABLET | Refills: 11 | Status: SHIPPED | OUTPATIENT
Start: 2023-03-23 | End: 2024-05-06

## 2023-03-23 NOTE — PROGRESS NOTES
INR not at goal. Medications, chart, and patient findings reviewed. See calendar for adjustments to dose and follow up plan.  Fax received from [facility] - INR has declined to 2.2, we will boost and increase overall dose.  Repeat INR Monday.  Explained goal of 2.5-3.5, APS, new thrombus and per Hem/Onc d/c note - Given new diagnosis of antiphospholipid syndrome which likely is the etiology of splenic infarct, hematology recommended anticoagulation with Coumadin with goal INR between 2.5-3.5.  We are close to goal, however, if her INR declines further on Monday, we will resume enoxaparin.  This was explained to the patient.  We will send new Rx to pharmacy with new dosing.

## 2023-03-27 ENCOUNTER — ANTI-COAG VISIT (OUTPATIENT)
Dept: CARDIOLOGY | Facility: CLINIC | Age: 58
End: 2023-03-27
Payer: MEDICARE

## 2023-03-27 DIAGNOSIS — I70.208 BRACHIAL ARTERY OCCLUSION, RIGHT: ICD-10-CM

## 2023-03-27 DIAGNOSIS — D68.61 ANTIPHOSPHOLIPID SYNDROME: Primary | ICD-10-CM

## 2023-03-27 LAB — INR PPP: 2.9

## 2023-03-27 PROCEDURE — 93793 PR ANTICOAGULANT MGMT FOR PT TAKING WARFARIN: ICD-10-PCS | Mod: S$GLB,,,

## 2023-03-27 PROCEDURE — 93793 ANTICOAG MGMT PT WARFARIN: CPT | Mod: S$GLB,,,

## 2023-03-27 NOTE — PROGRESS NOTES
INR at goal. Medications and chart reviewed. No changes noted to necessitate adjustment of warfarin or follow-up plan. See calendar.  INR has returned to goal range.  Fax received from [facility] PT/INR-34.8/2.9.  Patient will continue 7.5 mg QD.  Repeat INR in 1 week.

## 2023-03-30 ENCOUNTER — OFFICE VISIT (OUTPATIENT)
Dept: INTERNAL MEDICINE | Facility: CLINIC | Age: 58
End: 2023-03-30
Payer: MEDICARE

## 2023-03-30 ENCOUNTER — LAB VISIT (OUTPATIENT)
Dept: LAB | Facility: HOSPITAL | Age: 58
End: 2023-03-30
Attending: INTERNAL MEDICINE
Payer: MEDICARE

## 2023-03-30 VITALS
SYSTOLIC BLOOD PRESSURE: 124 MMHG | TEMPERATURE: 98 F | HEIGHT: 61 IN | WEIGHT: 218.06 LBS | HEART RATE: 60 BPM | DIASTOLIC BLOOD PRESSURE: 82 MMHG | OXYGEN SATURATION: 97 % | RESPIRATION RATE: 20 BRPM | BODY MASS INDEX: 41.17 KG/M2

## 2023-03-30 DIAGNOSIS — I70.208 BRACHIAL ARTERY OCCLUSION, RIGHT: ICD-10-CM

## 2023-03-30 DIAGNOSIS — R73.09 ELEVATED GLUCOSE: ICD-10-CM

## 2023-03-30 DIAGNOSIS — E66.01 MORBID OBESITY WITH BMI OF 40.0-44.9, ADULT: ICD-10-CM

## 2023-03-30 DIAGNOSIS — Z23 IMMUNIZATION DUE: ICD-10-CM

## 2023-03-30 DIAGNOSIS — I10 ESSENTIAL HYPERTENSION: Primary | ICD-10-CM

## 2023-03-30 DIAGNOSIS — Z98.890 HISTORY OF EMBOLECTOMY: ICD-10-CM

## 2023-03-30 DIAGNOSIS — M62.838 MUSCLE SPASTICITY: ICD-10-CM

## 2023-03-30 DIAGNOSIS — Z79.01 CHRONIC ANTICOAGULATION: ICD-10-CM

## 2023-03-30 PROCEDURE — 3079F PR MOST RECENT DIASTOLIC BLOOD PRESSURE 80-89 MM HG: ICD-10-PCS | Mod: CPTII,S$GLB,, | Performed by: INTERNAL MEDICINE

## 2023-03-30 PROCEDURE — 1111F PR DISCHARGE MEDS RECONCILED W/ CURRENT OUTPATIENT MED LIST: ICD-10-PCS | Mod: CPTII,S$GLB,, | Performed by: INTERNAL MEDICINE

## 2023-03-30 PROCEDURE — 1111F DSCHRG MED/CURRENT MED MERGE: CPT | Mod: CPTII,S$GLB,, | Performed by: INTERNAL MEDICINE

## 2023-03-30 PROCEDURE — 1159F MED LIST DOCD IN RCRD: CPT | Mod: CPTII,S$GLB,, | Performed by: INTERNAL MEDICINE

## 2023-03-30 PROCEDURE — 4010F ACE/ARB THERAPY RXD/TAKEN: CPT | Mod: CPTII,S$GLB,, | Performed by: INTERNAL MEDICINE

## 2023-03-30 PROCEDURE — 3008F PR BODY MASS INDEX (BMI) DOCUMENTED: ICD-10-PCS | Mod: CPTII,S$GLB,, | Performed by: INTERNAL MEDICINE

## 2023-03-30 PROCEDURE — 99999 PR PBB SHADOW E&M-EST. PATIENT-LVL IV: CPT | Mod: PBBFAC,,, | Performed by: INTERNAL MEDICINE

## 2023-03-30 PROCEDURE — 1159F PR MEDICATION LIST DOCUMENTED IN MEDICAL RECORD: ICD-10-PCS | Mod: CPTII,S$GLB,, | Performed by: INTERNAL MEDICINE

## 2023-03-30 PROCEDURE — 36415 COLL VENOUS BLD VENIPUNCTURE: CPT | Performed by: INTERNAL MEDICINE

## 2023-03-30 PROCEDURE — 99214 PR OFFICE/OUTPT VISIT, EST, LEVL IV, 30-39 MIN: ICD-10-PCS | Mod: S$GLB,,, | Performed by: INTERNAL MEDICINE

## 2023-03-30 PROCEDURE — 4010F PR ACE/ARB THEARPY RXD/TAKEN: ICD-10-PCS | Mod: CPTII,S$GLB,, | Performed by: INTERNAL MEDICINE

## 2023-03-30 PROCEDURE — 3074F PR MOST RECENT SYSTOLIC BLOOD PRESSURE < 130 MM HG: ICD-10-PCS | Mod: CPTII,S$GLB,, | Performed by: INTERNAL MEDICINE

## 2023-03-30 PROCEDURE — 99999 PR PBB SHADOW E&M-EST. PATIENT-LVL IV: ICD-10-PCS | Mod: PBBFAC,,, | Performed by: INTERNAL MEDICINE

## 2023-03-30 PROCEDURE — 1160F PR REVIEW ALL MEDS BY PRESCRIBER/CLIN PHARMACIST DOCUMENTED: ICD-10-PCS | Mod: CPTII,S$GLB,, | Performed by: INTERNAL MEDICINE

## 2023-03-30 PROCEDURE — 3008F BODY MASS INDEX DOCD: CPT | Mod: CPTII,S$GLB,, | Performed by: INTERNAL MEDICINE

## 2023-03-30 PROCEDURE — 3079F DIAST BP 80-89 MM HG: CPT | Mod: CPTII,S$GLB,, | Performed by: INTERNAL MEDICINE

## 2023-03-30 PROCEDURE — 1160F RVW MEDS BY RX/DR IN RCRD: CPT | Mod: CPTII,S$GLB,, | Performed by: INTERNAL MEDICINE

## 2023-03-30 PROCEDURE — 3074F SYST BP LT 130 MM HG: CPT | Mod: CPTII,S$GLB,, | Performed by: INTERNAL MEDICINE

## 2023-03-30 PROCEDURE — 99214 OFFICE O/P EST MOD 30 MIN: CPT | Mod: S$GLB,,, | Performed by: INTERNAL MEDICINE

## 2023-03-30 PROCEDURE — 83036 HEMOGLOBIN GLYCOSYLATED A1C: CPT | Performed by: INTERNAL MEDICINE

## 2023-03-30 RX ORDER — BACLOFEN 10 MG/1
10 TABLET ORAL 2 TIMES DAILY
Qty: 60 TABLET | Refills: 5 | Status: SHIPPED | OUTPATIENT
Start: 2023-03-30 | End: 2023-10-09 | Stop reason: SDUPTHER

## 2023-03-30 NOTE — PROGRESS NOTES
Jimena Pierce  57 y.o. White female  [id number]    Chief Complaint:  Chief Complaint   Patient presents with    Follow-up       History of Present Illness:  HTN--recently discontinued due to low readings while hospitalized.   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 She is on warfarin.   She would like to be put back on baclofen for muscle spasticity. She was on it in the past and it worked well.   Review of hospital note indicates mildly elevated blood glucose values. She does not have a history of diabetes.     Laboratory:  Lab Results   Component Value Date    WBC 7.69 03/04/2023    HGB 11.7 (L) 03/04/2023    HCT 35.8 (L) 03/04/2023     03/04/2023    CHOL 212 (H) 09/30/2022    TRIG 90 09/30/2022    HDL 65 09/30/2022    ALT 14 02/24/2023    AST 34 02/24/2023     03/04/2023    K 4.1 03/04/2023     03/04/2023    CREATININE 0.7 03/04/2023    BUN 13 03/04/2023    CO2 18 (L) 03/04/2023    TSH 0.474 09/30/2022    INR 2.9 03/27/2023       History:  Past Medical History:   Diagnosis Date    Chronic low back pain     Congenital cerebral palsy     GERD (gastroesophageal reflux disease)     Hyperlipidemia     Hypertension     Lymphedema     right foot       Medications:  Current Outpatient Medications on File Prior to Visit   Medication Sig Dispense Refill    enoxaparin (LOVENOX) 100 mg/mL Syrg Inject 1 mL (100 mg total) into the skin every 12 (twelve) hours. 24 mL 1    famotidine (PEPCID) 40 MG tablet Take 1 tablet (40 mg total) by mouth once daily. 90 tablet 1    fluocinolone (SYNALAR) 0.01 % external solution AAA of scalp twice a day prn itching. Mild steroid. 60 mL 1    ketoconazole (NIZORAL) 2 % shampoo Shampoo scalp 1-2 times weekly. Lather x 5 minutes then rinse well. 240 mL 5    oxybutynin (DITROPAN XL) 15 MG TR24 Take 1 tablet (15 mg total) by mouth once daily. 90 tablet 4    oxycodone (ROXICODONE) 30 MG Tab Take 30 mg by mouth as needed. Takes four times a day as  needed      promethazine (PHENERGAN) 25 MG tablet Take 25 mg by mouth every 4 (four) hours as needed for Nausea.      sod sulf-pot chloride-mag sulf (SUTAB) 1.479-0.188- 0.225 gram tablet Take 12 tablets by mouth once daily. Take according to package instructions with indicated amount of water. 24 tablet 0    warfarin (COUMADIN) 5 MG tablet Take 1.5 tablets (7.5 mg total) by mouth Daily. OR as directed by Coumadin clinic 50 tablet 11     Allergies:  Review of patient's allergies indicates:  No Known Allergies    Review of Systems   Constitutional:  Negative for fever.   HENT:  Negative for nosebleeds.    Respiratory:  Negative for shortness of breath.    Cardiovascular:  Negative for chest pain.   Gastrointestinal:  Negative for abdominal pain and blood in stool.   Genitourinary:  Negative for hematuria.   Musculoskeletal:  Positive for myalgias.   Neurological:  Negative for dizziness and headaches.     Exam:  Vitals:    03/30/23 1054   BP: 124/82   Pulse: 60   Resp: 20   Temp: 97.8 °F (36.6 °C)     Weight: 98.9 kg (218 lb 0.6 oz)   Body mass index is 41.2 kg/m².      Physical Exam  Vitals reviewed.   Constitutional:       General: She is not in acute distress.     Appearance: She is well-developed. She is not ill-appearing.   Eyes:      General: No scleral icterus.  Cardiovascular:      Rate and Rhythm: Normal rate and regular rhythm.      Heart sounds: Normal heart sounds.   Pulmonary:      Effort: Pulmonary effort is normal. No respiratory distress.      Breath sounds: Normal breath sounds.   Skin:     General: Skin is warm and dry.   Neurological:      Mental Status: She is alert and oriented to person, place, and time.   Psychiatric:         Behavior: Behavior normal.       Assessment:  The primary encounter diagnosis was Essential hypertension. Diagnoses of Brachial artery occlusion, right, History of embolectomy, Chronic anticoagulation, Muscle spasticity, Elevated glucose, Morbid obesity with BMI of  40.0-44.9, adult, and Immunization due were also pertinent to this visit.    Plan:  Essential hypertension  -     stable  -     monitor off medication    Brachial artery occlusion, right  -     f/u with vascular surgery  -     continue anticoagulation     History of embolectomy    Chronic anticoagulation    Muscle spasticity  -     restart baclofen (LIORESAL) 10 MG tablet; Take 1 tablet (10 mg total) by mouth 2 (two) times daily.  Dispense: 60 tablet; Refill: 5    Elevated glucose  -     Hemoglobin A1C; Future; Expected date: 03/30/2023    Morbid obesity with BMI of 40.0-44.9, adult  -     Lifestyle modifications     Immunization due  -     (In Office Administered) Pneumococcal Conjugate Vaccine (20 Valent) (IM)    Labs today.

## 2023-03-31 LAB
ESTIMATED AVG GLUCOSE: 94 MG/DL (ref 68–131)
HBA1C MFR BLD: 4.9 % (ref 4–5.6)

## 2023-04-03 ENCOUNTER — ANTI-COAG VISIT (OUTPATIENT)
Dept: CARDIOLOGY | Facility: CLINIC | Age: 58
End: 2023-04-03
Payer: MEDICARE

## 2023-04-03 DIAGNOSIS — I70.208 BRACHIAL ARTERY OCCLUSION, RIGHT: ICD-10-CM

## 2023-04-03 DIAGNOSIS — D68.61 ANTIPHOSPHOLIPID SYNDROME: Primary | ICD-10-CM

## 2023-04-03 LAB — INR PPP: 3.3

## 2023-04-03 PROCEDURE — 93793 ANTICOAG MGMT PT WARFARIN: CPT | Mod: S$GLB,,,

## 2023-04-03 PROCEDURE — 93793 PR ANTICOAGULANT MGMT FOR PT TAKING WARFARIN: ICD-10-PCS | Mod: S$GLB,,,

## 2023-04-03 NOTE — PROGRESS NOTES
Fax received from [facility] - INR is in range - 39.1/3.3.  We will continue 7.5 mg QD and repeat INR in 2 weeks.  Patient contacted, confirms dose.  We will move next INR  to lab to verify results.  She wants to obtain a home meter.  Dr Aleman will assist.  We will continue lab checks until meter is approved.  [facility] lab is preferred for her.

## 2023-04-13 ENCOUNTER — PES CALL (OUTPATIENT)
Dept: ADMINISTRATIVE | Facility: CLINIC | Age: 58
End: 2023-04-13
Payer: MEDICARE

## 2023-04-17 ENCOUNTER — DOCUMENT SCAN (OUTPATIENT)
Dept: HOME HEALTH SERVICES | Facility: HOSPITAL | Age: 58
End: 2023-04-17
Payer: MEDICARE

## 2023-04-18 ENCOUNTER — ANTI-COAG VISIT (OUTPATIENT)
Dept: CARDIOLOGY | Facility: CLINIC | Age: 58
End: 2023-04-18
Payer: MEDICARE

## 2023-04-18 DIAGNOSIS — D68.61 ANTIPHOSPHOLIPID SYNDROME: Primary | ICD-10-CM

## 2023-04-18 DIAGNOSIS — I70.208 BRACHIAL ARTERY OCCLUSION, RIGHT: ICD-10-CM

## 2023-04-18 LAB — INR PPP: 3.4

## 2023-04-18 PROCEDURE — 93793 ANTICOAG MGMT PT WARFARIN: CPT | Mod: S$GLB,,,

## 2023-04-18 PROCEDURE — 93793 PR ANTICOAGULANT MGMT FOR PT TAKING WARFARIN: ICD-10-PCS | Mod: S$GLB,,,

## 2023-04-18 NOTE — PROGRESS NOTES
Fax received from [facility] - test date 4/18/23 - PT/INR-40.8/3.4.  INR remains in range.  We will continue to space INR checks.  Patient contacted.  Reports no changes or concerns.  We will repeat INR 3 weeks with her appointment at the [facility].  Patient confirms understanding.

## 2023-04-20 ENCOUNTER — DOCUMENT SCAN (OUTPATIENT)
Dept: HOME HEALTH SERVICES | Facility: HOSPITAL | Age: 58
End: 2023-04-20
Payer: MEDICARE

## 2023-04-25 ENCOUNTER — DOCUMENT SCAN (OUTPATIENT)
Dept: HOME HEALTH SERVICES | Facility: HOSPITAL | Age: 58
End: 2023-04-25
Payer: MEDICARE

## 2023-04-26 ENCOUNTER — OFFICE VISIT (OUTPATIENT)
Dept: HEMATOLOGY/ONCOLOGY | Facility: CLINIC | Age: 58
End: 2023-04-26
Payer: MEDICARE

## 2023-04-26 VITALS
BODY MASS INDEX: 41.66 KG/M2 | WEIGHT: 220.69 LBS | TEMPERATURE: 99 F | SYSTOLIC BLOOD PRESSURE: 138 MMHG | HEIGHT: 61 IN | RESPIRATION RATE: 18 BRPM | HEART RATE: 67 BPM | DIASTOLIC BLOOD PRESSURE: 85 MMHG | OXYGEN SATURATION: 95 %

## 2023-04-26 DIAGNOSIS — D68.61 ANTIPHOSPHOLIPID SYNDROME: Primary | ICD-10-CM

## 2023-04-26 DIAGNOSIS — D69.6 THROMBOCYTOPENIA: ICD-10-CM

## 2023-04-26 DIAGNOSIS — D73.5 SPLENIC INFARCT: ICD-10-CM

## 2023-04-26 DIAGNOSIS — I70.208 BRACHIAL ARTERY OCCLUSION, RIGHT: ICD-10-CM

## 2023-04-26 PROCEDURE — 3079F DIAST BP 80-89 MM HG: CPT | Mod: CPTII,S$GLB,, | Performed by: INTERNAL MEDICINE

## 2023-04-26 PROCEDURE — 3044F PR MOST RECENT HEMOGLOBIN A1C LEVEL <7.0%: ICD-10-PCS | Mod: CPTII,S$GLB,, | Performed by: INTERNAL MEDICINE

## 2023-04-26 PROCEDURE — 1159F MED LIST DOCD IN RCRD: CPT | Mod: CPTII,S$GLB,, | Performed by: INTERNAL MEDICINE

## 2023-04-26 PROCEDURE — 3079F PR MOST RECENT DIASTOLIC BLOOD PRESSURE 80-89 MM HG: ICD-10-PCS | Mod: CPTII,S$GLB,, | Performed by: INTERNAL MEDICINE

## 2023-04-26 PROCEDURE — 99215 PR OFFICE/OUTPT VISIT, EST, LEVL V, 40-54 MIN: ICD-10-PCS | Mod: S$GLB,,, | Performed by: INTERNAL MEDICINE

## 2023-04-26 PROCEDURE — 3008F PR BODY MASS INDEX (BMI) DOCUMENTED: ICD-10-PCS | Mod: CPTII,S$GLB,, | Performed by: INTERNAL MEDICINE

## 2023-04-26 PROCEDURE — 99999 PR PBB SHADOW E&M-EST. PATIENT-LVL IV: CPT | Mod: PBBFAC,,, | Performed by: INTERNAL MEDICINE

## 2023-04-26 PROCEDURE — 3075F SYST BP GE 130 - 139MM HG: CPT | Mod: CPTII,S$GLB,, | Performed by: INTERNAL MEDICINE

## 2023-04-26 PROCEDURE — 1160F PR REVIEW ALL MEDS BY PRESCRIBER/CLIN PHARMACIST DOCUMENTED: ICD-10-PCS | Mod: CPTII,S$GLB,, | Performed by: INTERNAL MEDICINE

## 2023-04-26 PROCEDURE — 99215 OFFICE O/P EST HI 40 MIN: CPT | Mod: S$GLB,,, | Performed by: INTERNAL MEDICINE

## 2023-04-26 PROCEDURE — 3075F PR MOST RECENT SYSTOLIC BLOOD PRESS GE 130-139MM HG: ICD-10-PCS | Mod: CPTII,S$GLB,, | Performed by: INTERNAL MEDICINE

## 2023-04-26 PROCEDURE — 99999 PR PBB SHADOW E&M-EST. PATIENT-LVL IV: ICD-10-PCS | Mod: PBBFAC,,, | Performed by: INTERNAL MEDICINE

## 2023-04-26 PROCEDURE — 3044F HG A1C LEVEL LT 7.0%: CPT | Mod: CPTII,S$GLB,, | Performed by: INTERNAL MEDICINE

## 2023-04-26 PROCEDURE — 3008F BODY MASS INDEX DOCD: CPT | Mod: CPTII,S$GLB,, | Performed by: INTERNAL MEDICINE

## 2023-04-26 PROCEDURE — 4010F ACE/ARB THERAPY RXD/TAKEN: CPT | Mod: CPTII,S$GLB,, | Performed by: INTERNAL MEDICINE

## 2023-04-26 PROCEDURE — 1160F RVW MEDS BY RX/DR IN RCRD: CPT | Mod: CPTII,S$GLB,, | Performed by: INTERNAL MEDICINE

## 2023-04-26 PROCEDURE — 1159F PR MEDICATION LIST DOCUMENTED IN MEDICAL RECORD: ICD-10-PCS | Mod: CPTII,S$GLB,, | Performed by: INTERNAL MEDICINE

## 2023-04-26 PROCEDURE — 4010F PR ACE/ARB THEARPY RXD/TAKEN: ICD-10-PCS | Mod: CPTII,S$GLB,, | Performed by: INTERNAL MEDICINE

## 2023-04-26 NOTE — PROGRESS NOTES
Subjective:      DATE OF VISIT: 4/26/23     ?  Patient ID:?Jimena Pierce is a 57 y.o. female.?? MR#: [id number]   ?   REFERRING PROVIDER: Annalisa Finney DO  [id number] [place] [address] [place] [id number]     ? Primary Care Providers:  Baylee Aleman DO, DO (General)     CHIEF COMPLAINT: ?antiphospholipid syndrome, right brachial artery occlusion, splenic infarction, on coumadin INR goal 2.5-3.5  thrombocytopenia   ?   HPI     Ms. Pierce is a 57-year-old woman with cerebral palsy hypertension hyperlipidemia who presented to hospital with acute arm pain and abdominal pain imaging showing acute right brachial artery occlusion and splenic infarction status post vascular surgery embolectomy, heparin transition to Eliquis.  Hematology consulted with finding of  anticardiolipin IgM antibody elevation concerning for antiphospholipid syndrome and recommendation to transition to Coumadin with Lovenox bridge INR goal  2.5-3.5.  She is continued on Coumadin achieving this goal following with Coumadin clinic and denies any bleeding complication.  She has had outpatient follow-up with vascular surgery.    Review of Systems    ?   A comprehensive 14-point review of systems was reviewed with patient and was negative other than as specified above.   ?   PAST MEDICAL HISTORY:   Past Medical History:   Diagnosis Date    Chronic low back pain     Congenital cerebral palsy     GERD (gastroesophageal reflux disease)     Hyperlipidemia     Hypertension     Lymphedema     right foot    ?     PAST SURGICAL HISTORY:   Past Surgical History:   Procedure Laterality Date    COLONOSCOPY N/A 10/05/2016    Polyp x 1 repeat 5 yrs. Procedure: COLONOSCOPY;  Surgeon: Aicha Ravi MD;  Location: [facility] ENDO;  Service: Endoscopy;  Laterality: N/A;    EMBOLECTOMY Right 2/23/2023    Procedure: EMBOLECTOMY;  Surgeon: Mario Sun MD;  Location: [facility] OR;  Service: Cardiovascular;  Laterality: Right;    HIP SURGERY Bilateral     as child     LEG SURGERY      TENDON RELEASE Bilateral     as child to knees and hip adductors    TONSILLECTOMY      TRANSESOPHAGEAL ECHOCARDIOGRAPHY N/A 2/23/2023    Procedure: ECHOCARDIOGRAM, TRANSESOPHAGEAL;  Surgeon: Roula Squires MD;  Location: [facility] CATH LAB;  Service: Cardiology;  Laterality: N/A;      ?   ALLERGIES:   Allergies as of 04/26/2023    (No Known Allergies)      ?   MEDICATIONS:?   Outpatient Medications Marked as Taking for the 4/26/23 encounter (Office Visit) with Brooklyn Barney MD   Medication Sig Dispense Refill    baclofen (LIORESAL) 10 MG tablet Take 1 tablet (10 mg total) by mouth 2 (two) times daily. 60 tablet 5    enoxaparin (LOVENOX) 100 mg/mL Syrg Inject 1 mL (100 mg total) into the skin every 12 (twelve) hours. 24 mL 1    famotidine (PEPCID) 40 MG tablet Take 1 tablet (40 mg total) by mouth once daily. 90 tablet 1    fluocinolone (SYNALAR) 0.01 % external solution Apply to affected area of scalp twice a day as needed for itching. Mild steroid. 60 mL 1    ketoconazole (NIZORAL) 2 % shampoo Shampoo scalp 1-2 times weekly. Lather for 5 minutes then rinse well. 240 mL 5    meloxicam (MOBIC) 15 MG tablet Take 1 tablet (15 mg total) by mouth daily as needed for pain 30 tablet 4    naloxone (NARCAN) 4 mg/actuation Spry use 1 spray in one nostril as directed as needed. Repeat another spray every 2-3 minutes until patient responds or until medical assistance becomes available 2 each 0    oxybutynin (DITROPAN XL) 15 MG TR24 Take 1 tablet (15 mg total) by mouth once daily. 90 tablet 4    oxycodone (ROXICODONE) 30 MG Tab Take 30 mg by mouth as needed. Takes four times a day as needed      oxyCODONE (ROXICODONE) 30 MG Tab Take 1 tablet (30 mg total) by mouth 2 (two) times daily as needed for pain. 60 tablet 0    promethazine (PHENERGAN) 25 MG tablet Take 25 mg by mouth every 4 (four) hours as needed for Nausea.      sod sulf-pot chloride-mag sulf (SUTAB) 1.479-0.188- 0.225 gram tablet Take 12 tablets  by mouth once daily. Take according to package instructions with indicated amount of water. 24 tablet 0    warfarin (COUMADIN) 5 MG tablet Take 1.5 tablets (7.5 mg total) by mouth Daily. OR as directed by Coumadin clinic 50 tablet 11      ?   SOCIAL HISTORY:?   Social History     Tobacco Use    Smoking status: Former     Packs/day: 0.25     Years: 3.00     Pack years: 0.75     Types: Cigarettes     Quit date:      Years since quittin.3    Smokeless tobacco: Never    Tobacco comments:     quit smoking in    Substance Use Topics    Alcohol use: Not Currently     Comment: occasionally      ?      ?   FAMILY HISTORY:   family history includes Arthritis in her mother; Cataracts in her maternal grandfather and maternal grandmother; Hypertension in her mother.   ?        Objective:      Physical Exam      ?   Vitals:    23 1531   BP: 138/85   Pulse: 67   Resp: 18   Temp: 98.5 °F (36.9 °C)      ?   ECOG:?1  General appearance: Generally well appearing, in no acute distress.   Head, eyes, ears, nose, and throat: moist mucous membranes.   Respiratory:  Normal work of breathing  Abdomen: nontender, nondistended.   Extremities: Warm, without edema  Including right upper extremity 2+ pulses.   Neurologic: Alert and oriented.   Using walker for assistance.  Skin: No rashes, ecchymoses or petechial lesion.   Psychiatric:  Normal mood and affect.    ?   Laboratory:    Lab Results   Component Value Date    WBC 7.69 2023    RBC 4.04 2023    HGB 11.7 (L) 2023    HCT 35.8 (L) 2023    MCV 89 2023    MCH 29.0 2023    MCHC 32.7 2023    RDW 12.4 2023     2023    MPV 10.9 2023    GRAN 4.8 2023    GRAN 62.4 2023    LYMPH 1.8 2023    LYMPH 23.7 2023    MONO 0.7 2023    MONO 9.1 2023    EOS 0.2 2023    BASO 0.08 2023    EOSINOPHIL 2.9 2023    BASOPHIL 1.0 2023       Sodium   Date Value Ref Range  Status   03/04/2023 143 136 - 145 mmol/L Final     Potassium   Date Value Ref Range Status   03/04/2023 4.1 3.5 - 5.1 mmol/L Final     Chloride   Date Value Ref Range Status   03/04/2023 108 95 - 110 mmol/L Final     CO2   Date Value Ref Range Status   03/04/2023 18 (L) 23 - 29 mmol/L Final     Glucose   Date Value Ref Range Status   03/04/2023 94 70 - 110 mg/dL Final     BUN   Date Value Ref Range Status   03/04/2023 13 6 - 20 mg/dL Final     Creatinine   Date Value Ref Range Status   03/04/2023 0.7 0.5 - 1.4 mg/dL Final     Calcium   Date Value Ref Range Status   03/04/2023 9.1 8.7 - 10.5 mg/dL Final     Total Protein   Date Value Ref Range Status   02/24/2023 6.3 6.0 - 8.4 g/dL Final     Albumin   Date Value Ref Range Status   02/24/2023 3.3 (L) 3.5 - 5.2 g/dL Final     Total Bilirubin   Date Value Ref Range Status   02/24/2023 1.3 (H) 0.1 - 1.0 mg/dL Final     Comment:     For infants and newborns, interpretation of results should be based  on gestational age, weight and in agreement with clinical  observations.    Premature Infant recommended reference ranges:  Up to 24 hours.............<8.0 mg/dL  Up to 48 hours............<12.0 mg/dL  3-5 days..................<15.0 mg/dL  6-29 days.................<15.0 mg/dL       Alkaline Phosphatase   Date Value Ref Range Status   02/24/2023 71 55 - 135 U/L Final     AST   Date Value Ref Range Status   02/24/2023 34 10 - 40 U/L Final     ALT   Date Value Ref Range Status   02/24/2023 14 10 - 44 U/L Final     Anion Gap   Date Value Ref Range Status   03/04/2023 17 (H) 8 - 16 mmol/L Final     eGFR if    Date Value Ref Range Status   05/27/2021 >60.0 >60 mL/min/1.73 m^2 Final     eGFR if non    Date Value Ref Range Status   05/27/2021 >60.0 >60 mL/min/1.73 m^2 Final     Comment:     Calculation used to obtain the estimated glomerular filtration  rate (eGFR) is the CKD-EPI equation.          Iron   Date Value Ref Range Status   01/18/2023 117  30 - 160 ug/dL Final     TIBC   Date Value Ref Range Status   01/18/2023 306 250 - 450 ug/dL Final     Saturated Iron   Date Value Ref Range Status   01/18/2023 38 20 - 50 % Final     Ferritin   Date Value Ref Range Status   01/18/2023 143 20.0 - 300.0 ng/mL Final     TSH   Date Value Ref Range Status   09/30/2022 0.474 0.400 - 4.000 uIU/mL Final           ?   Assessment/Plan:   Brachial artery occlusion, right  -     Ambulatory referral/consult to Anticoagulation Monitoring (PHARMACIST MANAGED)  -     ANTIPHOSPHOLIPID AB (ANTICARDIOLIPIN); Future; Expected date: 04/26/2023    Antiphospholipid syndrome  -     Ambulatory referral/consult to Anticoagulation Monitoring (PHARMACIST MANAGED)  -     ANTIPHOSPHOLIPID AB (ANTICARDIOLIPIN); Future; Expected date: 04/26/2023       1. Brachial artery occlusion, right    2. Antiphospholipid syndrome          Plan:     Problem List Items Addressed This Visit          Cardiac/Vascular    Brachial artery occlusion, right       Hematology    Antiphospholipid syndrome      Antiphospholipid syndrome:  Inherited thrombophilia workup performed in setting of acute brachial artery occlusion and splenic infarction positive for elevated AP IgM will repeat for confirmatory testing.  Other workup including prothrombin gene mutation factor 5 Leiden, protein C/ S/antithrombin 3 within normal limits.  Labs within normal limits aside from acute thrombocytopenia in setting of hospitalization acute infarction on repeat had improved and will repeat as well.  No lab or clinical concerns for myeloproliferative neoplasm.  Recommend age-appropriate cancer screening.  Recommend follow-up with vascular surgery and primary care.    Follow-Up:   Route Chart for Scheduling    Med Onc Chart Routing      Follow up with physician    Follow up with BRANDI 6 months.   Infusion scheduling note    Injection scheduling note    Labs   Scheduling:  Preferred lab:  Lab interval:  CBC and [facility] lab : same time as 5/8 labs  please   Imaging    Pharmacy appointment    Other referrals

## 2023-04-26 NOTE — PROGRESS NOTES
Subjective:      DATE OF VISIT: 4/26/23     ?  Patient ID:?Jimena Pierce is a 57 y.o. female.?? MR#: [id number]   ?   REFERRING PROVIDER: Annalisa Finney DO  [id number] [place] [address] [place] [address]     ? Primary Care Providers:  Baylee Aleman DO, DO (General)     CHIEF COMPLAINT: ? antiphospholipid syndrome, right brachial artery occlusion, splenic infarction??   ?   HPI     Ms. Pierce is a 57-year-old woman outpatient follow-up for  after inpatient admission February 2023 for acute right brachial artery occlusion imaging also with finding of splenic infarction status post vascular surgery embolectomy, heparin transition to Eliquis.  Hematology was consulted with finding positive for antiphospholipid IgM elevation DRVVT abnormality due to concern for antiphospholipid syndrome recommendation for transition to Coumadin with Lovenox bridge.  Course complicated by pneumonia which she is recovered well from.  She continues on Coumadin followed by Coumadin clinic with INR arranging 2.5-3.5.  She is not had issues with bleeding.  No new edema or abdominal pain.          Review of Systems    ?   A comprehensive 14-point review of systems was reviewed with patient and was negative other than as specified above.   ?   PAST MEDICAL HISTORY:   Past Medical History:   Diagnosis Date    Chronic low back pain     Congenital cerebral palsy     GERD (gastroesophageal reflux disease)     Hyperlipidemia     Hypertension     Lymphedema     right foot    ?     PAST SURGICAL HISTORY:   Past Surgical History:   Procedure Laterality Date    COLONOSCOPY N/A 10/05/2016    Polyp x 1 repeat 5 yrs. Procedure: COLONOSCOPY;  Surgeon: Aicha Ravi MD;  Location: [facility] ENDO;  Service: Endoscopy;  Laterality: N/A;    EMBOLECTOMY Right 2/23/2023    Procedure: EMBOLECTOMY;  Surgeon: Mario Sun MD;  Location: [facility] OR;  Service: Cardiovascular;  Laterality: Right;    HIP SURGERY Bilateral     as child    LEG  SURGERY      TENDON RELEASE Bilateral     as child to knees and hip adductors    TONSILLECTOMY      TRANSESOPHAGEAL ECHOCARDIOGRAPHY N/A 2/23/2023    Procedure: ECHOCARDIOGRAM, TRANSESOPHAGEAL;  Surgeon: Rolua Squires MD;  Location: [facility] CATH LAB;  Service: Cardiology;  Laterality: N/A;      ?   ALLERGIES:   Allergies as of 04/26/2023    (No Known Allergies)      ?   MEDICATIONS:?   Outpatient Medications Marked as Taking for the 4/26/23 encounter (Office Visit) with Brooklyn Barney MD   Medication Sig Dispense Refill    baclofen (LIORESAL) 10 MG tablet Take 1 tablet (10 mg total) by mouth 2 (two) times daily. 60 tablet 5    enoxaparin (LOVENOX) 100 mg/mL Syrg Inject 1 mL (100 mg total) into the skin every 12 (twelve) hours. 24 mL 1    famotidine (PEPCID) 40 MG tablet Take 1 tablet (40 mg total) by mouth once daily. 90 tablet 1    fluocinolone (SYNALAR) 0.01 % external solution Apply to affected area of scalp twice a day as needed for itching. Mild steroid. 60 mL 1    ketoconazole (NIZORAL) 2 % shampoo Shampoo scalp 1-2 times weekly. Lather for 5 minutes then rinse well. 240 mL 5    meloxicam (MOBIC) 15 MG tablet Take 1 tablet (15 mg total) by mouth daily as needed for pain 30 tablet 4    naloxone (NARCAN) 4 mg/actuation Spry use 1 spray in one nostril as directed as needed. Repeat another spray every 2-3 minutes until patient responds or until medical assistance becomes available 2 each 0    oxybutynin (DITROPAN XL) 15 MG TR24 Take 1 tablet (15 mg total) by mouth once daily. 90 tablet 4    oxycodone (ROXICODONE) 30 MG Tab Take 30 mg by mouth as needed. Takes four times a day as needed      oxyCODONE (ROXICODONE) 30 MG Tab Take 1 tablet (30 mg total) by mouth 2 (two) times daily as needed for pain. 60 tablet 0    promethazine (PHENERGAN) 25 MG tablet Take 25 mg by mouth every 4 (four) hours as needed for Nausea.      sod sulf-pot chloride-mag sulf (SUTAB) 1.479-0.188- 0.225 gram tablet Take  12 tablets by mouth once daily. Take according to package instructions with indicated amount of water. 24 tablet 0    warfarin (COUMADIN) 5 MG tablet Take 1.5 tablets (7.5 mg total) by mouth Daily. OR as directed by Coumadin clinic 50 tablet 11      ?   SOCIAL HISTORY:?   Social History     Tobacco Use    Smoking status: Former     Packs/day: 0.25     Years: 3.00     Pack years: 0.75     Types: Cigarettes     Quit date:      Years since quittin.3    Smokeless tobacco: Never    Tobacco comments:     quit smoking in    Substance Use Topics    Alcohol use: Not Currently     Comment: occasionally      ?      ?   FAMILY HISTORY:   family history includes Arthritis in her mother; Cataracts in her maternal grandfather and maternal grandmother; Hypertension in her mother.   ?        Objective:      Physical Exam      ?   Vitals:    23 1531   BP: 138/85   Pulse: 67   Resp: 18   Temp: 98.5 °F (36.9 °C)      ?   ECOG:?1  General appearance: Generally well appearing, in no acute distress.   Head, eyes, ears, nose, and throat: moist mucous membranes.   Respiratory:  Normal work of breathing  Abdomen: nontender, nondistended.   Extremities: Warm, without edema.   Neurologic: Alert and oriented. Using walker for assistance  Skin: No rashes, ecchymoses or petechial lesion.   Psychiatric:  Normal mood and affect.    ?   Laboratory:    Lab Results   Component Value Date    WBC 7.69 2023    RBC 4.04 2023    HGB 11.7 (L) 2023    HCT 35.8 (L) 2023    MCV 89 2023    MCH 29.0 2023    MCHC 32.7 2023    RDW 12.4 2023     2023    MPV 10.9 2023    GRAN 4.8 2023    GRAN 62.4 2023    LYMPH 1.8 2023    LYMPH 23.7 2023    MONO 0.7 2023    MONO 9.1 2023    EOS 0.2 2023    BASO 0.08 2023    EOSINOPHIL 2.9 2023    BASOPHIL 1.0 2023       Sodium   Date Value Ref Range Status   2023 143 782 - 758  mmol/L Final     Potassium   Date Value Ref Range Status   03/04/2023 4.1 3.5 - 5.1 mmol/L Final     Chloride   Date Value Ref Range Status   03/04/2023 108 95 - 110 mmol/L Final     CO2   Date Value Ref Range Status   03/04/2023 18 (L) 23 - 29 mmol/L Final     Glucose   Date Value Ref Range Status   03/04/2023 94 70 - 110 mg/dL Final     BUN   Date Value Ref Range Status   03/04/2023 13 6 - 20 mg/dL Final     Creatinine   Date Value Ref Range Status   03/04/2023 0.7 0.5 - 1.4 mg/dL Final     Calcium   Date Value Ref Range Status   03/04/2023 9.1 8.7 - 10.5 mg/dL Final     Total Protein   Date Value Ref Range Status   02/24/2023 6.3 6.0 - 8.4 g/dL Final     Albumin   Date Value Ref Range Status   02/24/2023 3.3 (L) 3.5 - 5.2 g/dL Final     Total Bilirubin   Date Value Ref Range Status   02/24/2023 1.3 (H) 0.1 - 1.0 mg/dL Final     Comment:     For infants and newborns, interpretation of results should be based  on gestational age, weight and in agreement with clinical  observations.    Premature Infant recommended reference ranges:  Up to 24 hours.............<8.0 mg/dL  Up to 48 hours............<12.0 mg/dL  3-5 days..................<15.0 mg/dL  6-29 days.................<15.0 mg/dL       Alkaline Phosphatase   Date Value Ref Range Status   02/24/2023 71 55 - 135 U/L Final     AST   Date Value Ref Range Status   02/24/2023 34 10 - 40 U/L Final     ALT   Date Value Ref Range Status   02/24/2023 14 10 - 44 U/L Final     Anion Gap   Date Value Ref Range Status   03/04/2023 17 (H) 8 - 16 mmol/L Final     eGFR if    Date Value Ref Range Status   05/27/2021 >60.0 >60 mL/min/1.73 m^2 Final     eGFR if non    Date Value Ref Range Status   05/27/2021 >60.0 >60 mL/min/1.73 m^2 Final     Comment:     Calculation used to obtain the estimated glomerular filtration  rate (eGFR) is the CKD-EPI equation.          Iron   Date Value Ref Range Status   01/18/2023 117 30 - 160 ug/dL Final     TIBC    Date Value Ref Range Status   01/18/2023 306 250 - 450 ug/dL Final     Saturated Iron   Date Value Ref Range Status   01/18/2023 38 20 - 50 % Final     Ferritin   Date Value Ref Range Status   01/18/2023 143 20.0 - 300.0 ng/mL Final     TSH   Date Value Ref Range Status   09/30/2022 0.474 0.400 - 4.000 uIU/mL Final           ?   Assessment/Plan:   Antiphospholipid syndrome  -     Ambulatory referral/consult to Anticoagulation Monitoring (PHARMACIST MANAGED)  -     ANTIPHOSPHOLIPID AB (ANTICARDIOLIPIN); Future; Expected date: 04/26/2023    Brachial artery occlusion, right  -     Ambulatory referral/consult to Anticoagulation Monitoring (PHARMACIST MANAGED)  -     ANTIPHOSPHOLIPID AB (ANTICARDIOLIPIN); Future; Expected date: 04/26/2023    Splenic infarct    Thrombocytopenia  -     CBC W/ AUTO DIFFERENTIAL; Future; Expected date: 04/26/2023       1. Antiphospholipid syndrome    2. Brachial artery occlusion, right    3. Splenic infarct    4. Thrombocytopenia          Plan:     Problem List Items Addressed This Visit          Cardiac/Vascular    Brachial artery occlusion, right       Hematology    Antiphospholipid syndrome - Primary       Oncology    Splenic infarct     Other Visit Diagnoses       Thrombocytopenia                Follow-Up:   Route Chart for Scheduling    Med Onc Chart Routing      Follow up with physician 6 months.   Follow up with BRANDI    Infusion scheduling note    Injection scheduling note    Labs Other   Scheduling:  Preferred lab:  Lab interval:  cbc and APA on 5/8 with other lab please   Imaging    Pharmacy appointment    Other referrals

## 2023-04-27 ENCOUNTER — DOCUMENT SCAN (OUTPATIENT)
Dept: HOME HEALTH SERVICES | Facility: HOSPITAL | Age: 58
End: 2023-04-27
Payer: MEDICARE

## 2023-05-02 ENCOUNTER — DOCUMENT SCAN (OUTPATIENT)
Dept: HOME HEALTH SERVICES | Facility: HOSPITAL | Age: 58
End: 2023-05-02
Payer: MEDICARE

## 2023-05-02 ENCOUNTER — PATIENT MESSAGE (OUTPATIENT)
Dept: PHARMACY | Facility: CLINIC | Age: 58
End: 2023-05-02
Payer: MEDICARE

## 2023-05-08 ENCOUNTER — LAB VISIT (OUTPATIENT)
Dept: LAB | Facility: HOSPITAL | Age: 58
End: 2023-05-08
Attending: INTERNAL MEDICINE
Payer: MEDICARE

## 2023-05-08 ENCOUNTER — OFFICE VISIT (OUTPATIENT)
Dept: FAMILY MEDICINE | Facility: CLINIC | Age: 58
End: 2023-05-08
Payer: MEDICARE

## 2023-05-08 VITALS
WEIGHT: 216.06 LBS | OXYGEN SATURATION: 98 % | SYSTOLIC BLOOD PRESSURE: 130 MMHG | BODY MASS INDEX: 40.79 KG/M2 | HEIGHT: 61 IN | HEART RATE: 73 BPM | DIASTOLIC BLOOD PRESSURE: 82 MMHG

## 2023-05-08 DIAGNOSIS — I10 ESSENTIAL HYPERTENSION: ICD-10-CM

## 2023-05-08 DIAGNOSIS — D68.61 ANTIPHOSPHOLIPID SYNDROME: ICD-10-CM

## 2023-05-08 DIAGNOSIS — G80.9 CEREBRAL PALSY, UNSPECIFIED TYPE: ICD-10-CM

## 2023-05-08 DIAGNOSIS — Z12.31 SCREENING MAMMOGRAM FOR BREAST CANCER: ICD-10-CM

## 2023-05-08 DIAGNOSIS — Z59.82 TRANSPORTATION INSECURITY: ICD-10-CM

## 2023-05-08 DIAGNOSIS — D69.6 THROMBOCYTOPENIA: ICD-10-CM

## 2023-05-08 DIAGNOSIS — I70.208 BRACHIAL ARTERY OCCLUSION, RIGHT: ICD-10-CM

## 2023-05-08 DIAGNOSIS — F11.20 UNCOMPLICATED OPIOID DEPENDENCE: ICD-10-CM

## 2023-05-08 DIAGNOSIS — D73.5 SPLENIC INFARCT: ICD-10-CM

## 2023-05-08 DIAGNOSIS — G31.9 DEGENERATIVE DISEASE OF NERVOUS SYSTEM, UNSPECIFIED: ICD-10-CM

## 2023-05-08 DIAGNOSIS — Z79.01 LONG TERM (CURRENT) USE OF ANTICOAGULANTS: ICD-10-CM

## 2023-05-08 DIAGNOSIS — Z00.00 ENCOUNTER FOR PREVENTIVE HEALTH EXAMINATION: Primary | ICD-10-CM

## 2023-05-08 DIAGNOSIS — M79.671 RIGHT FOOT PAIN: ICD-10-CM

## 2023-05-08 DIAGNOSIS — E66.01 OBESITY, CLASS III, BMI 40-49.9 (MORBID OBESITY): ICD-10-CM

## 2023-05-08 DIAGNOSIS — H61.23 BILATERAL IMPACTED CERUMEN: ICD-10-CM

## 2023-05-08 DIAGNOSIS — Z99.89 DEPENDENCE ON OTHER ENABLING MACHINES AND DEVICES: ICD-10-CM

## 2023-05-08 DIAGNOSIS — M54.41 CHRONIC BILATERAL LOW BACK PAIN WITH BILATERAL SCIATICA: ICD-10-CM

## 2023-05-08 DIAGNOSIS — R26.9 ABNORMALITY OF GAIT AND MOBILITY: ICD-10-CM

## 2023-05-08 DIAGNOSIS — G89.29 CHRONIC BILATERAL LOW BACK PAIN WITH BILATERAL SCIATICA: ICD-10-CM

## 2023-05-08 DIAGNOSIS — M54.42 CHRONIC BILATERAL LOW BACK PAIN WITH BILATERAL SCIATICA: ICD-10-CM

## 2023-05-08 LAB
BASOPHILS # BLD AUTO: 0.05 K/UL (ref 0–0.2)
BASOPHILS NFR BLD: 0.8 % (ref 0–1.9)
DIFFERENTIAL METHOD: NORMAL
EOSINOPHIL # BLD AUTO: 0.1 K/UL (ref 0–0.5)
EOSINOPHIL NFR BLD: 1.6 % (ref 0–8)
ERYTHROCYTE [DISTWIDTH] IN BLOOD BY AUTOMATED COUNT: 12.9 % (ref 11.5–14.5)
HCT VFR BLD AUTO: 41.4 % (ref 37–48.5)
HGB BLD-MCNC: 13.5 G/DL (ref 12–16)
IMM GRANULOCYTES # BLD AUTO: 0.02 K/UL (ref 0–0.04)
IMM GRANULOCYTES NFR BLD AUTO: 0.3 % (ref 0–0.5)
INR PPP: 3.3 (ref 0.8–1.2)
LYMPHOCYTES # BLD AUTO: 1.7 K/UL (ref 1–4.8)
LYMPHOCYTES NFR BLD: 26.4 % (ref 18–48)
MCH RBC QN AUTO: 28.2 PG (ref 27–31)
MCHC RBC AUTO-ENTMCNC: 32.6 G/DL (ref 32–36)
MCV RBC AUTO: 87 FL (ref 82–98)
MONOCYTES # BLD AUTO: 0.5 K/UL (ref 0.3–1)
MONOCYTES NFR BLD: 8.3 % (ref 4–15)
NEUTROPHILS # BLD AUTO: 3.9 K/UL (ref 1.8–7.7)
NEUTROPHILS NFR BLD: 62.6 % (ref 38–73)
NRBC BLD-RTO: 0 /100 WBC
PLATELET # BLD AUTO: 252 K/UL (ref 150–450)
PMV BLD AUTO: 11.4 FL (ref 9.2–12.9)
PROTHROMBIN TIME: 32.1 SEC (ref 9–12.5)
RBC # BLD AUTO: 4.78 M/UL (ref 4–5.4)
WBC # BLD AUTO: 6.29 K/UL (ref 3.9–12.7)

## 2023-05-08 PROCEDURE — 4010F ACE/ARB THERAPY RXD/TAKEN: CPT | Mod: CPTII,S$GLB,, | Performed by: NURSE PRACTITIONER

## 2023-05-08 PROCEDURE — 85025 COMPLETE CBC W/AUTO DIFF WBC: CPT | Performed by: INTERNAL MEDICINE

## 2023-05-08 PROCEDURE — 1160F RVW MEDS BY RX/DR IN RCRD: CPT | Mod: CPTII,S$GLB,, | Performed by: NURSE PRACTITIONER

## 2023-05-08 PROCEDURE — 3075F SYST BP GE 130 - 139MM HG: CPT | Mod: CPTII,S$GLB,, | Performed by: NURSE PRACTITIONER

## 2023-05-08 PROCEDURE — 3044F HG A1C LEVEL LT 7.0%: CPT | Mod: CPTII,S$GLB,, | Performed by: NURSE PRACTITIONER

## 2023-05-08 PROCEDURE — 3008F PR BODY MASS INDEX (BMI) DOCUMENTED: ICD-10-PCS | Mod: CPTII,S$GLB,, | Performed by: NURSE PRACTITIONER

## 2023-05-08 PROCEDURE — G0439 PPPS, SUBSEQ VISIT: HCPCS | Mod: S$GLB,,, | Performed by: NURSE PRACTITIONER

## 2023-05-08 PROCEDURE — 1160F PR REVIEW ALL MEDS BY PRESCRIBER/CLIN PHARMACIST DOCUMENTED: ICD-10-PCS | Mod: CPTII,S$GLB,, | Performed by: NURSE PRACTITIONER

## 2023-05-08 PROCEDURE — 4010F PR ACE/ARB THEARPY RXD/TAKEN: ICD-10-PCS | Mod: CPTII,S$GLB,, | Performed by: NURSE PRACTITIONER

## 2023-05-08 PROCEDURE — 3008F BODY MASS INDEX DOCD: CPT | Mod: CPTII,S$GLB,, | Performed by: NURSE PRACTITIONER

## 2023-05-08 PROCEDURE — 3044F PR MOST RECENT HEMOGLOBIN A1C LEVEL <7.0%: ICD-10-PCS | Mod: CPTII,S$GLB,, | Performed by: NURSE PRACTITIONER

## 2023-05-08 PROCEDURE — 3079F DIAST BP 80-89 MM HG: CPT | Mod: CPTII,S$GLB,, | Performed by: NURSE PRACTITIONER

## 2023-05-08 PROCEDURE — 36415 COLL VENOUS BLD VENIPUNCTURE: CPT | Mod: PO | Performed by: INTERNAL MEDICINE

## 2023-05-08 PROCEDURE — 1159F MED LIST DOCD IN RCRD: CPT | Mod: CPTII,S$GLB,, | Performed by: NURSE PRACTITIONER

## 2023-05-08 PROCEDURE — 85610 PROTHROMBIN TIME: CPT | Performed by: INTERNAL MEDICINE

## 2023-05-08 PROCEDURE — 86147 CARDIOLIPIN ANTIBODY EA IG: CPT | Mod: 59 | Performed by: INTERNAL MEDICINE

## 2023-05-08 PROCEDURE — 99999 PR PBB SHADOW E&M-EST. PATIENT-LVL V: CPT | Mod: PBBFAC,,, | Performed by: NURSE PRACTITIONER

## 2023-05-08 PROCEDURE — 3079F PR MOST RECENT DIASTOLIC BLOOD PRESSURE 80-89 MM HG: ICD-10-PCS | Mod: CPTII,S$GLB,, | Performed by: NURSE PRACTITIONER

## 2023-05-08 PROCEDURE — 3075F PR MOST RECENT SYSTOLIC BLOOD PRESS GE 130-139MM HG: ICD-10-PCS | Mod: CPTII,S$GLB,, | Performed by: NURSE PRACTITIONER

## 2023-05-08 PROCEDURE — 1159F PR MEDICATION LIST DOCUMENTED IN MEDICAL RECORD: ICD-10-PCS | Mod: CPTII,S$GLB,, | Performed by: NURSE PRACTITIONER

## 2023-05-08 PROCEDURE — 99999 PR PBB SHADOW E&M-EST. PATIENT-LVL V: ICD-10-PCS | Mod: PBBFAC,,, | Performed by: NURSE PRACTITIONER

## 2023-05-08 PROCEDURE — G0439 PR MEDICARE ANNUAL WELLNESS SUBSEQUENT VISIT: ICD-10-PCS | Mod: S$GLB,,, | Performed by: NURSE PRACTITIONER

## 2023-05-08 SDOH — SOCIAL DETERMINANTS OF HEALTH (SDOH): TRANSPORTATION INSECURITY: Z59.82

## 2023-05-08 NOTE — PATIENT INSTRUCTIONS
Counseling and Referral of Other Preventative  (Italic type indicates deductible and co-insurance are waived)    Patient Name: Jimena Kari  Today's Date: 5/8/2023    Health Maintenance       Date Due Completion Date    Pneumococcal Vaccines (Age 0-64) (1 - PCV) Never done ---    TETANUS VACCINE Never done ---    High Dose Statin Never done ---    Shingles Vaccine (1 of 2) Never done ---    Colorectal Cancer Screening 10/05/2021 10/5/2016    COVID-19 Vaccine (3 - Booster for Alley series) 03/14/2022 1/17/2022    Influenza Vaccine (Season Ended) 09/01/2023 11/5/2020    Lipid Panel 09/30/2023 9/30/2022    Mammogram 10/13/2023 10/13/2022    Cervical Cancer Screening 06/23/2024 6/23/2021    Hemoglobin A1c (Diabetic Prevention Screening) 03/30/2026 3/30/2023        Orders Placed This Encounter   Procedures    Mammo Digital Screening Bilat w/ Mino     The following information is provided to all patients.  This information is to help you find resources for any of the problems found today that may be affecting your health:                Living healthy guide: www.[facility].[place].gov      Understanding Diabetes: www.diabetes.org      Eating healthy: www.cdc.gov/healthyweight      CDC home safety checklist: www.cdc.gov/steadi/patient.html      Agency on Aging: www.goea.[place].[facility]      Alcoholics anonymous (AA): www.aa.org      Physical Activity: www.charlene.nih.gov/oe6atwl      Tobacco use: www.quitwithusla.org

## 2023-05-08 NOTE — PROGRESS NOTES
"  Jimena [place] presented for a  Medicare AWV and comprehensive Health Risk Assessment today. The following components were reviewed and updated:    Medical history  Family History  Social history  Allergies and Current Medications  Health Risk Assessment  Health Maintenance  Care Team         ** See Completed Assessments for Annual Wellness Visit within the encounter summary.**         The following assessments were completed:  Living Situation  CAGE  Depression Screening  Timed Get Up and Go  Whisper Test  Cognitive Function Screening    Nutrition Screening  ADL Screening  PAQ Screening        Vitals:    05/08/23 1408   BP: 130/82   Pulse: 73   SpO2: 98%   Weight: 98 kg (216 lb 0.8 oz)   Height: 5' 1" (1.549 m)     Body mass index is 40.82 kg/m².  Physical Exam  Constitutional:       Appearance: Normal appearance. She is well-developed. She is obese. She is not ill-appearing, toxic-appearing or diaphoretic.   HENT:      Head: Normocephalic and atraumatic.      Right Ear: External ear normal. There is impacted cerumen.      Left Ear: External ear normal. There is impacted cerumen.      Nose: Nose normal.   Eyes:      General: No scleral icterus.        Right eye: No discharge.         Left eye: No discharge.      Conjunctiva/sclera: Conjunctivae normal.   Neck:      Thyroid: No thyromegaly.      Vascular: No carotid bruit.      Trachea: No tracheal deviation.   Cardiovascular:      Rate and Rhythm: Normal rate and regular rhythm.      Pulses: Normal pulses.      Heart sounds: Normal heart sounds. No murmur heard.    No friction rub. No gallop.   Pulmonary:      Effort: Pulmonary effort is normal. No respiratory distress.      Breath sounds: Normal breath sounds. No stridor. No wheezing, rhonchi or rales.   Chest:      Chest wall: No tenderness.   Abdominal:      General: Bowel sounds are normal. There is no distension.      Palpations: Abdomen is soft. There is no mass.      Tenderness: There is no abdominal " tenderness. There is no guarding or rebound.      Hernia: No hernia is present.   Musculoskeletal:         General: No tenderness. Normal range of motion.      Cervical back: Normal range of motion and neck supple. No edema or tenderness. Normal range of motion.      Right lower leg: Edema present.      Comments: Uses walker always, top of her right foot is red, puffy, has a hx of lymphadema   Lymphadenopathy:      Head:      Right side of head: No tonsillar adenopathy.      Left side of head: No tonsillar adenopathy.      Cervical: No cervical adenopathy.      Upper Body:      Right upper body: No supraclavicular adenopathy.      Left upper body: No supraclavicular adenopathy.   Skin:     General: Skin is warm and dry.      Findings: No lesion or rash.   Neurological:      Mental Status: She is alert and oriented to person, place, and time.      Deep Tendon Reflexes: Reflexes are normal and symmetric.   Psychiatric:         Mood and Affect: Mood normal.         Behavior: Behavior normal.             Diagnoses and health risks identified today and associated recommendations/orders:    1. Encounter for preventive health examination  Screenings performed, as noted above.  Personal preventative testing needs reviewed.      2. Antiphospholipid syndrome  Monitored and treated by hem onc, taking coumadin, lovenox, follow up with them    3. Splenic infarct  Monitored and treated by hem onc, taking coumadin, lovenox, follow up with them    4. Cerebral palsy, unspecified type  Monitored, stable, uses walker to get around, lives with son, ADL becoming more difficult, consulted social servies    5. Brachial artery occlusion, right  Monitored and treated by hem onc, taking coumadin, lovenox, follow up with them    6. Screening mammogram for breast cancer  Due in October, will schedule  - Mammo Digital Screening Bilat w/ Mino; Future    7. Dependence on other enabling machines and devices  Monitored, stable, uses walker to get  around, lives with son, ADL becoming more difficult, consulted social servies    8. Abnormality of gait and mobility  Monitored, stable, uses walker to get around, lives with son, ADL becoming more difficult, consulted social servies  - Ambulatory referral/consult to Social Work; Future    9. Essential hypertension  Monitored, stable, normal BP today, follow up with pcp    10. Chronic bilateral low back pain with bilateral sciatica  Treated with Oxycodone, muscle relaxer, stable, cont tx    11. Uncomplicated opioid dependence  Treated with Oxycodone, stable, follow up with pcp    12. Obesity, Class III, BMI 40-49.9 (morbid obesity)  Monitored, stable, exercise to tolerance, heart healthy diet, follow up with pcp    13. Transportation insecurity  Monitored, stable, uses walker to get around, lives with son, ADL becoming more difficult, consulted social servies  - Ambulatory referral/consult to Social Work; Future    14. Degenerative disease of nervous system, unspecified  Monitored, stable, uses walker to get around, lives with son, ADL becoming more difficult, consulted social servies  - Ambulatory referral/consult to Social Work; Future    15. Bilateral impacted cerumen  Will see ENT for a cleaning  - Ambulatory referral/consult to ENT; Future    16. Right foot pain  Assessed, right foot reddened area, not improving, hx of lymphadema, will see podiatry for assessment  - Ambulatory referral/consult to Podiatry; Future    Review for Substance Use Disorders: patient does not use substances per chart    Patient on chronic  (specify) opioids-. Oxycodone  Followed by Dr Benson  Risk factors reviewed. Risk factors may include Sleep-disordered breathing, renal or hepatic insufficiency, increased risk for falls and fractures, risk of opioid misuse/overdose/opioid use disorder.  Pain evaluated during visit, documented under vitals.  Discussed nonpharmacologic treatments options, will follow up with prescribing provider to  discuss further       Provided Jimena with a 5-10 year written screening schedule and personal prevention plan. Recommendations were developed using the USPSTF age appropriate recommendations. Education, counseling, and referrals were provided as needed. After Visit Summary printed and given to patient which includes a list of additional screenings\tests needed.    No follow-ups on file.    Ramirez Tapia, NP    I offered to discuss advanced care planning, including how to pick a person who would make decisions for you if you were unable to make them for yourself, called a health care power of , and what kind of decisions you might make such as use of life sustaining treatments such as ventilators and tube feeding when faced with a life limiting illness recorded on a living will that they will need to know. (How you want to be cared for as you near the end of your natural life)     X Patient is interested in learning more about how to make advanced directives.  I provided them paperwork and offered to discuss this with them.

## 2023-05-09 ENCOUNTER — ANTI-COAG VISIT (OUTPATIENT)
Dept: CARDIOLOGY | Facility: CLINIC | Age: 58
End: 2023-05-09
Payer: MEDICARE

## 2023-05-09 DIAGNOSIS — I70.208 BRACHIAL ARTERY OCCLUSION, RIGHT: ICD-10-CM

## 2023-05-09 DIAGNOSIS — D68.61 ANTIPHOSPHOLIPID SYNDROME: Primary | ICD-10-CM

## 2023-05-09 PROCEDURE — 93793 PR ANTICOAGULANT MGMT FOR PT TAKING WARFARIN: ICD-10-PCS | Mod: S$GLB,,,

## 2023-05-09 PROCEDURE — 93793 ANTICOAG MGMT PT WARFARIN: CPT | Mod: S$GLB,,,

## 2023-05-09 NOTE — PROGRESS NOTES
INR at goal. Medications and chart reviewed. No changes noted to necessitate adjustment of warfarin or follow-up plan. See calendar.  Patient contacted, INR scheduled for 1 month.  She will continue 7.5 mg QD.

## 2023-05-11 ENCOUNTER — OFFICE VISIT (OUTPATIENT)
Dept: INTERNAL MEDICINE | Facility: CLINIC | Age: 58
End: 2023-05-11
Payer: MEDICARE

## 2023-05-11 VITALS
HEIGHT: 61 IN | DIASTOLIC BLOOD PRESSURE: 82 MMHG | HEART RATE: 82 BPM | SYSTOLIC BLOOD PRESSURE: 128 MMHG | OXYGEN SATURATION: 98 % | RESPIRATION RATE: 16 BRPM | TEMPERATURE: 99 F | BODY MASS INDEX: 40.82 KG/M2

## 2023-05-11 DIAGNOSIS — L03.115 CELLULITIS OF FOOT, RIGHT: Primary | ICD-10-CM

## 2023-05-11 PROCEDURE — 3079F PR MOST RECENT DIASTOLIC BLOOD PRESSURE 80-89 MM HG: ICD-10-PCS | Mod: CPTII,S$GLB,, | Performed by: INTERNAL MEDICINE

## 2023-05-11 PROCEDURE — 1159F MED LIST DOCD IN RCRD: CPT | Mod: CPTII,S$GLB,, | Performed by: INTERNAL MEDICINE

## 2023-05-11 PROCEDURE — 99999 PR PBB SHADOW E&M-EST. PATIENT-LVL IV: CPT | Mod: PBBFAC,,, | Performed by: INTERNAL MEDICINE

## 2023-05-11 PROCEDURE — 99214 PR OFFICE/OUTPT VISIT, EST, LEVL IV, 30-39 MIN: ICD-10-PCS | Mod: S$GLB,,, | Performed by: INTERNAL MEDICINE

## 2023-05-11 PROCEDURE — 3008F BODY MASS INDEX DOCD: CPT | Mod: CPTII,S$GLB,, | Performed by: INTERNAL MEDICINE

## 2023-05-11 PROCEDURE — 3044F PR MOST RECENT HEMOGLOBIN A1C LEVEL <7.0%: ICD-10-PCS | Mod: CPTII,S$GLB,, | Performed by: INTERNAL MEDICINE

## 2023-05-11 PROCEDURE — 1159F PR MEDICATION LIST DOCUMENTED IN MEDICAL RECORD: ICD-10-PCS | Mod: CPTII,S$GLB,, | Performed by: INTERNAL MEDICINE

## 2023-05-11 PROCEDURE — 3074F SYST BP LT 130 MM HG: CPT | Mod: CPTII,S$GLB,, | Performed by: INTERNAL MEDICINE

## 2023-05-11 PROCEDURE — 3044F HG A1C LEVEL LT 7.0%: CPT | Mod: CPTII,S$GLB,, | Performed by: INTERNAL MEDICINE

## 2023-05-11 PROCEDURE — 3008F PR BODY MASS INDEX (BMI) DOCUMENTED: ICD-10-PCS | Mod: CPTII,S$GLB,, | Performed by: INTERNAL MEDICINE

## 2023-05-11 PROCEDURE — 3079F DIAST BP 80-89 MM HG: CPT | Mod: CPTII,S$GLB,, | Performed by: INTERNAL MEDICINE

## 2023-05-11 PROCEDURE — 1160F RVW MEDS BY RX/DR IN RCRD: CPT | Mod: CPTII,S$GLB,, | Performed by: INTERNAL MEDICINE

## 2023-05-11 PROCEDURE — 99999 PR PBB SHADOW E&M-EST. PATIENT-LVL IV: ICD-10-PCS | Mod: PBBFAC,,, | Performed by: INTERNAL MEDICINE

## 2023-05-11 PROCEDURE — 3074F PR MOST RECENT SYSTOLIC BLOOD PRESSURE < 130 MM HG: ICD-10-PCS | Mod: CPTII,S$GLB,, | Performed by: INTERNAL MEDICINE

## 2023-05-11 PROCEDURE — 4010F ACE/ARB THERAPY RXD/TAKEN: CPT | Mod: CPTII,S$GLB,, | Performed by: INTERNAL MEDICINE

## 2023-05-11 PROCEDURE — 1160F PR REVIEW ALL MEDS BY PRESCRIBER/CLIN PHARMACIST DOCUMENTED: ICD-10-PCS | Mod: CPTII,S$GLB,, | Performed by: INTERNAL MEDICINE

## 2023-05-11 PROCEDURE — 4010F PR ACE/ARB THEARPY RXD/TAKEN: ICD-10-PCS | Mod: CPTII,S$GLB,, | Performed by: INTERNAL MEDICINE

## 2023-05-11 PROCEDURE — 99214 OFFICE O/P EST MOD 30 MIN: CPT | Mod: S$GLB,,, | Performed by: INTERNAL MEDICINE

## 2023-05-11 RX ORDER — DOXYCYCLINE 100 MG/1
100 CAPSULE ORAL 2 TIMES DAILY
Qty: 20 CAPSULE | Refills: 0 | Status: SHIPPED | OUTPATIENT
Start: 2023-05-11 | End: 2023-05-21

## 2023-05-11 NOTE — PROGRESS NOTES
"Jimena Pierce  57 y.o.  White female    Chief Complaint   Patient presents with    Foot Problem       HPI: Presents to the clinic with complaint of right foot redness and swelling x 10 days. She states a plastic bottle fell on her foot prior to onset. The swelling started and her foot became "moist" in appearance.   She has a history of lymphedema.   She denies having a fever.       Past Medical History:   Diagnosis Date    Brachial artery occlusion, right 2/23/2023    Chronic low back pain     Congenital cerebral palsy     GERD (gastroesophageal reflux disease)     Hyperlipidemia     Hypertension     Lymphedema     right foot     MEDS: Reviewed med card    ALLERGIES: Reviewed allergy card    PE: Reviewed vitals  GENERAL: Alert and oriented, no acute distress  HEART: Regular rate  LUNGS: Unlabored respirations  EXTREMITY: Right dorsal foot erythema with swelling; no open lesions      ASSESSMENT/PLAN:    Jimena was seen today for foot problem.    Diagnoses and all orders for this visit:    Cellulitis of foot, right  -     doxycycline (VIBRAMYCIN) 100 MG Cap; Take 1 capsule (100 mg total) by mouth 2 (two) times daily. for 10 days  -     handout provided     RTC if symptoms worsen or fail to resolve with treatment.         "

## 2023-05-16 ENCOUNTER — OFFICE VISIT (OUTPATIENT)
Dept: OTOLARYNGOLOGY | Facility: CLINIC | Age: 58
End: 2023-05-16
Payer: MEDICARE

## 2023-05-16 VITALS — BODY MASS INDEX: 40.81 KG/M2 | WEIGHT: 216 LBS | TEMPERATURE: 99 F

## 2023-05-16 DIAGNOSIS — H61.23 BILATERAL IMPACTED CERUMEN: ICD-10-CM

## 2023-05-16 DIAGNOSIS — D68.61 ANTIPHOSPHOLIPID SYNDROME: Primary | ICD-10-CM

## 2023-05-16 LAB
CARDIOLIPIN IGG SER IA-ACNC: <9.4 GPL (ref 0–14.99)
CARDIOLIPIN IGM SER IA-ACNC: <9.4 MPL (ref 0–12.49)

## 2023-05-16 PROCEDURE — 69210 PR REMOVAL IMPACTED CERUMEN REQUIRING INSTRUMENTATION, UNILATERAL: ICD-10-PCS | Mod: ,,, | Performed by: PHYSICIAN ASSISTANT

## 2023-05-16 PROCEDURE — 99999 PR PBB SHADOW E&M-EST. PATIENT-LVL III: CPT | Mod: PBBFAC,,, | Performed by: PHYSICIAN ASSISTANT

## 2023-05-16 PROCEDURE — 99499 NO LOS: ICD-10-PCS | Mod: ,,, | Performed by: PHYSICIAN ASSISTANT

## 2023-05-16 PROCEDURE — 99499 UNLISTED E&M SERVICE: CPT | Mod: ,,, | Performed by: PHYSICIAN ASSISTANT

## 2023-05-16 PROCEDURE — 99999 PR PBB SHADOW E&M-EST. PATIENT-LVL III: ICD-10-PCS | Mod: PBBFAC,,, | Performed by: PHYSICIAN ASSISTANT

## 2023-05-16 PROCEDURE — 69210 REMOVE IMPACTED EAR WAX UNI: CPT | Mod: ,,, | Performed by: PHYSICIAN ASSISTANT

## 2023-05-16 NOTE — PROGRESS NOTES
Subjective:   Cerumen impactions     Patient ID: Jimena Pierce is a 57 y.o. female.    Chief Complaint:  Excessive ear wax     Jimena Pierce is a 57 y.o. female here to see me today for evaluation of a wax impaction in bilateral ears.  She has complaints of hearing loss in the affected ears, but denies pain or drainage.  This has been an issue in the past.  The patient has not been using any sort of ear drop to soften the wax.  Last ear cleaning was about 6 years ago.  She denies previous otologic surgery.    HPI  Review of Systems   HENT: Positive for hearing loss. Negative for ear discharge, ear pain and tinnitus.        Objective:     Physical Exam   HENT:   Right Ear: External ear and ear canal normal. Decreased hearing is noted.   Left Ear: External ear and ear canal normal. Decreased hearing is noted.   Bilateral complete cerumen impactions, removal described below       Procedure Note    CHIEF COMPLAINT:  Cerumen Impaction    Description:  The patient was seated in an exam chair.  An ear speculum was placed in the right EAC and was examined under the microscope.  Suction and/or loop curettes were used to remove a large cerumen impaction.  The tympanic membrane was visualized and was normal in appearance.  The procedure was repeated on the left side in a similar fashion.  The TM was intact and normal on this side as well.  The patient tolerated the procedure well.           Assessment:     1. Bilateral impacted cerumen        Plan:     1.  Cerumen impaction:  Removed today without difficulty.  I would recommend the use of a wax softening drop, either over the counter Debrox or mineral oil, on a weekly basis.  I also instructed the patient to avoid Qtips.  She notes improvement in her hearing once cerumen removed today.  Recommend scheduling an audiogram when motivated.  RTC as needed for ear cleaning.

## 2023-05-17 ENCOUNTER — DOCUMENT SCAN (OUTPATIENT)
Dept: HOME HEALTH SERVICES | Facility: HOSPITAL | Age: 58
End: 2023-05-17
Payer: MEDICARE

## 2023-05-20 ENCOUNTER — EXTERNAL HOME HEALTH (OUTPATIENT)
Dept: HOME HEALTH SERVICES | Facility: HOSPITAL | Age: 58
End: 2023-05-20
Payer: MEDICARE

## 2023-05-23 ENCOUNTER — PATIENT MESSAGE (OUTPATIENT)
Dept: INTERNAL MEDICINE | Facility: CLINIC | Age: 58
End: 2023-05-23
Payer: MEDICARE

## 2023-05-23 DIAGNOSIS — L03.115 CELLULITIS OF RIGHT FOOT: Primary | ICD-10-CM

## 2023-05-26 RX ORDER — DOXYCYCLINE 100 MG/1
100 CAPSULE ORAL 2 TIMES DAILY
Qty: 14 CAPSULE | Refills: 0 | Status: SHIPPED | OUTPATIENT
Start: 2023-05-26 | End: 2023-06-02

## 2023-06-07 ENCOUNTER — ANTI-COAG VISIT (OUTPATIENT)
Dept: CARDIOLOGY | Facility: CLINIC | Age: 58
End: 2023-06-07
Payer: MEDICARE

## 2023-06-07 ENCOUNTER — LAB VISIT (OUTPATIENT)
Dept: LAB | Facility: HOSPITAL | Age: 58
End: 2023-06-07
Attending: INTERNAL MEDICINE
Payer: MEDICARE

## 2023-06-07 DIAGNOSIS — I70.208 BRACHIAL ARTERY OCCLUSION, RIGHT: ICD-10-CM

## 2023-06-07 DIAGNOSIS — D68.61 ANTIPHOSPHOLIPID SYNDROME: ICD-10-CM

## 2023-06-07 DIAGNOSIS — Z79.01 LONG TERM (CURRENT) USE OF ANTICOAGULANTS: Primary | ICD-10-CM

## 2023-06-07 DIAGNOSIS — Z79.01 LONG TERM (CURRENT) USE OF ANTICOAGULANTS: ICD-10-CM

## 2023-06-07 LAB
INR PPP: 3.3 (ref 0.8–1.2)
PROTHROMBIN TIME: 32.5 SEC (ref 9–12.5)

## 2023-06-07 PROCEDURE — 93793 PR ANTICOAGULANT MGMT FOR PT TAKING WARFARIN: ICD-10-PCS | Mod: S$GLB,,,

## 2023-06-07 PROCEDURE — 93793 ANTICOAG MGMT PT WARFARIN: CPT | Mod: S$GLB,,,

## 2023-06-07 PROCEDURE — 85610 PROTHROMBIN TIME: CPT | Performed by: INTERNAL MEDICINE

## 2023-06-07 NOTE — PROGRESS NOTES
INR at goal. Medications and chart reviewed. No changes noted to necessitate adjustment of warfarin or follow-up plan. See calendar.  Patient contacted, confirms warfarin dose.  Repeat INR in 1 month.

## 2023-07-05 ENCOUNTER — LAB VISIT (OUTPATIENT)
Dept: LAB | Facility: HOSPITAL | Age: 58
End: 2023-07-05
Payer: MEDICARE

## 2023-07-05 DIAGNOSIS — Z79.01 LONG TERM (CURRENT) USE OF ANTICOAGULANTS: ICD-10-CM

## 2023-07-05 DIAGNOSIS — I70.208 BRACHIAL ARTERY OCCLUSION, RIGHT: ICD-10-CM

## 2023-07-05 DIAGNOSIS — D68.61 ANTIPHOSPHOLIPID SYNDROME: ICD-10-CM

## 2023-07-05 LAB
INR PPP: 3.1 (ref 0.8–1.2)
PROTHROMBIN TIME: 31.2 SEC (ref 9–12.5)

## 2023-07-05 PROCEDURE — 36415 COLL VENOUS BLD VENIPUNCTURE: CPT | Mod: PO | Performed by: INTERNAL MEDICINE

## 2023-07-05 PROCEDURE — 85610 PROTHROMBIN TIME: CPT | Performed by: INTERNAL MEDICINE

## 2023-07-06 ENCOUNTER — ANTI-COAG VISIT (OUTPATIENT)
Dept: CARDIOLOGY | Facility: CLINIC | Age: 58
End: 2023-07-06
Payer: MEDICARE

## 2023-07-06 DIAGNOSIS — I70.208 BRACHIAL ARTERY OCCLUSION, RIGHT: ICD-10-CM

## 2023-07-06 DIAGNOSIS — Z79.01 LONG TERM (CURRENT) USE OF ANTICOAGULANTS: ICD-10-CM

## 2023-07-06 DIAGNOSIS — D68.61 ANTIPHOSPHOLIPID SYNDROME: Primary | ICD-10-CM

## 2023-07-06 PROCEDURE — 93793 ANTICOAG MGMT PT WARFARIN: CPT | Mod: S$GLB,,,

## 2023-07-06 PROCEDURE — 93793 PR ANTICOAGULANT MGMT FOR PT TAKING WARFARIN: ICD-10-PCS | Mod: S$GLB,,,

## 2023-07-06 NOTE — PROGRESS NOTES
Patient contacted - left V/M:  INR is in range at 3.1.  Lab collected on yesterday.  Continue same dose of warfarin 7.5 mg daily.  Follow up labs has been scheduled for 8/02/2023 ([facility]).  Any questions or concerns, contact the CC at [phone] or [phone].

## 2023-08-02 ENCOUNTER — ANTI-COAG VISIT (OUTPATIENT)
Dept: CARDIOLOGY | Facility: CLINIC | Age: 58
End: 2023-08-02
Payer: MEDICARE

## 2023-08-02 ENCOUNTER — LAB VISIT (OUTPATIENT)
Dept: LAB | Facility: HOSPITAL | Age: 58
End: 2023-08-02
Attending: INTERNAL MEDICINE
Payer: MEDICARE

## 2023-08-02 DIAGNOSIS — Z79.01 LONG TERM (CURRENT) USE OF ANTICOAGULANTS: ICD-10-CM

## 2023-08-02 DIAGNOSIS — I70.208 BRACHIAL ARTERY OCCLUSION, RIGHT: ICD-10-CM

## 2023-08-02 DIAGNOSIS — D68.61 ANTIPHOSPHOLIPID SYNDROME: Primary | ICD-10-CM

## 2023-08-02 DIAGNOSIS — D68.61 ANTIPHOSPHOLIPID SYNDROME: ICD-10-CM

## 2023-08-02 LAB
INR PPP: 3.8 (ref 0.8–1.2)
PROTHROMBIN TIME: 37.3 SEC (ref 9–12.5)

## 2023-08-02 PROCEDURE — 93793 ANTICOAG MGMT PT WARFARIN: CPT | Mod: S$GLB,,,

## 2023-08-02 PROCEDURE — 93793 PR ANTICOAGULANT MGMT FOR PT TAKING WARFARIN: ICD-10-PCS | Mod: S$GLB,,,

## 2023-08-02 PROCEDURE — 36415 COLL VENOUS BLD VENIPUNCTURE: CPT | Mod: PO | Performed by: INTERNAL MEDICINE

## 2023-08-02 PROCEDURE — 85610 PROTHROMBIN TIME: CPT | Performed by: INTERNAL MEDICINE

## 2023-08-03 NOTE — PROGRESS NOTES
INR not at goal-3.8. Medications, chart, and patient findings reviewed. See calendar for adjustments to dose and follow up plan.  INR is slightly above goa.  Pt report sno chagne in health or medications.  No cranberry/grapefruit, ETOH, Pepto, other OTCs.  We will lower dose to 5 mg today only then resume 7.5 mg QD as she has been very stable on this regimen.  Repeat INR in 2 weeks.

## 2023-08-16 ENCOUNTER — ANTI-COAG VISIT (OUTPATIENT)
Dept: CARDIOLOGY | Facility: CLINIC | Age: 58
End: 2023-08-16
Payer: MEDICARE

## 2023-08-16 ENCOUNTER — LAB VISIT (OUTPATIENT)
Dept: LAB | Facility: HOSPITAL | Age: 58
End: 2023-08-16
Attending: INTERNAL MEDICINE
Payer: MEDICARE

## 2023-08-16 DIAGNOSIS — I70.208 BRACHIAL ARTERY OCCLUSION, RIGHT: ICD-10-CM

## 2023-08-16 DIAGNOSIS — D68.61 ANTIPHOSPHOLIPID SYNDROME: Primary | ICD-10-CM

## 2023-08-16 DIAGNOSIS — D68.61 ANTIPHOSPHOLIPID SYNDROME: ICD-10-CM

## 2023-08-16 DIAGNOSIS — Z79.01 LONG TERM (CURRENT) USE OF ANTICOAGULANTS: ICD-10-CM

## 2023-08-16 LAB
INR PPP: 3.9 (ref 0.8–1.2)
PROTHROMBIN TIME: 37.8 SEC (ref 9–12.5)

## 2023-08-16 PROCEDURE — 93793 ANTICOAG MGMT PT WARFARIN: CPT | Mod: S$GLB,,,

## 2023-08-16 PROCEDURE — 93793 PR ANTICOAGULANT MGMT FOR PT TAKING WARFARIN: ICD-10-PCS | Mod: S$GLB,,,

## 2023-08-16 PROCEDURE — 36415 COLL VENOUS BLD VENIPUNCTURE: CPT | Mod: PO | Performed by: INTERNAL MEDICINE

## 2023-08-16 PROCEDURE — 85610 PROTHROMBIN TIME: CPT | Performed by: INTERNAL MEDICINE

## 2023-08-16 NOTE — PROGRESS NOTES
INR not at goal-3.9. Medications, chart, and patient findings reviewed. See calendar for adjustments to dose and follow up plan.  INR remains elevated.  She denies any changes in diet, reports more promethazine use, which should not effect INR.  We will lower her weekly dose and repeat INR In 1.5 weeks.

## 2023-08-28 ENCOUNTER — ANTI-COAG VISIT (OUTPATIENT)
Dept: CARDIOLOGY | Facility: CLINIC | Age: 58
End: 2023-08-28
Payer: MEDICARE

## 2023-08-28 ENCOUNTER — LAB VISIT (OUTPATIENT)
Dept: LAB | Facility: HOSPITAL | Age: 58
End: 2023-08-28
Attending: INTERNAL MEDICINE
Payer: MEDICARE

## 2023-08-28 DIAGNOSIS — D68.61 ANTIPHOSPHOLIPID SYNDROME: ICD-10-CM

## 2023-08-28 DIAGNOSIS — I70.208 BRACHIAL ARTERY OCCLUSION, RIGHT: ICD-10-CM

## 2023-08-28 DIAGNOSIS — Z79.01 LONG TERM (CURRENT) USE OF ANTICOAGULANTS: ICD-10-CM

## 2023-08-28 DIAGNOSIS — D68.61 ANTIPHOSPHOLIPID SYNDROME: Primary | ICD-10-CM

## 2023-08-28 LAB
INR PPP: 3.9 (ref 0.8–1.2)
PROTHROMBIN TIME: 38 SEC (ref 9–12.5)

## 2023-08-28 PROCEDURE — 36415 COLL VENOUS BLD VENIPUNCTURE: CPT | Mod: PO | Performed by: INTERNAL MEDICINE

## 2023-08-28 PROCEDURE — 85610 PROTHROMBIN TIME: CPT | Performed by: INTERNAL MEDICINE

## 2023-08-28 PROCEDURE — 93793 PR ANTICOAGULANT MGMT FOR PT TAKING WARFARIN: ICD-10-PCS | Mod: S$GLB,,,

## 2023-08-28 PROCEDURE — 93793 ANTICOAG MGMT PT WARFARIN: CPT | Mod: S$GLB,,,

## 2023-08-28 NOTE — PROGRESS NOTES
INR not at goal-3.9. Medications, chart, and patient findings reviewed. See calendar for adjustments to dose and follow up plan.  INR remains elevated despite lowering dose. We will try to lower her dose once more - new dose 7.5 mg QD x 5 mg on Mon/Wed.  Pt repeats correct dose back.  Denies any change to diet or medications.  Repeat INR in 2 weeks.

## 2023-09-05 DIAGNOSIS — K21.9 CHRONIC GERD: ICD-10-CM

## 2023-09-06 RX ORDER — FAMOTIDINE 40 MG/1
40 TABLET, FILM COATED ORAL DAILY
Qty: 90 TABLET | Refills: 1 | Status: SHIPPED | OUTPATIENT
Start: 2023-09-06 | End: 2024-02-29 | Stop reason: SDUPTHER

## 2023-09-11 ENCOUNTER — LAB VISIT (OUTPATIENT)
Dept: LAB | Facility: HOSPITAL | Age: 58
End: 2023-09-11
Attending: INTERNAL MEDICINE
Payer: MEDICARE

## 2023-09-11 DIAGNOSIS — I70.208 BRACHIAL ARTERY OCCLUSION, RIGHT: ICD-10-CM

## 2023-09-11 DIAGNOSIS — D68.61 ANTIPHOSPHOLIPID SYNDROME: ICD-10-CM

## 2023-09-11 DIAGNOSIS — Z79.01 LONG TERM (CURRENT) USE OF ANTICOAGULANTS: ICD-10-CM

## 2023-09-11 LAB
INR PPP: 2.5 (ref 0.8–1.2)
PROTHROMBIN TIME: 25.2 SEC (ref 9–12.5)

## 2023-09-11 PROCEDURE — 36415 COLL VENOUS BLD VENIPUNCTURE: CPT | Mod: PO | Performed by: INTERNAL MEDICINE

## 2023-09-11 PROCEDURE — 85610 PROTHROMBIN TIME: CPT | Performed by: INTERNAL MEDICINE

## 2023-09-12 ENCOUNTER — ANTI-COAG VISIT (OUTPATIENT)
Dept: CARDIOLOGY | Facility: CLINIC | Age: 58
End: 2023-09-12
Payer: MEDICARE

## 2023-09-12 ENCOUNTER — PATIENT MESSAGE (OUTPATIENT)
Dept: CARDIOLOGY | Facility: CLINIC | Age: 58
End: 2023-09-12

## 2023-09-12 DIAGNOSIS — Z79.01 LONG TERM (CURRENT) USE OF ANTICOAGULANTS: ICD-10-CM

## 2023-09-12 DIAGNOSIS — I70.208 BRACHIAL ARTERY OCCLUSION, RIGHT: ICD-10-CM

## 2023-09-12 DIAGNOSIS — D68.61 ANTIPHOSPHOLIPID SYNDROME: Primary | ICD-10-CM

## 2023-09-12 PROCEDURE — 93793 PR ANTICOAGULANT MGMT FOR PT TAKING WARFARIN: ICD-10-PCS | Mod: S$GLB,,,

## 2023-09-12 PROCEDURE — 93793 ANTICOAG MGMT PT WARFARIN: CPT | Mod: S$GLB,,,

## 2023-09-20 ENCOUNTER — OFFICE VISIT (OUTPATIENT)
Dept: HEMATOLOGY/ONCOLOGY | Facility: CLINIC | Age: 58
End: 2023-09-20
Payer: MEDICARE

## 2023-09-20 VITALS
DIASTOLIC BLOOD PRESSURE: 92 MMHG | WEIGHT: 231.94 LBS | SYSTOLIC BLOOD PRESSURE: 137 MMHG | OXYGEN SATURATION: 97 % | HEIGHT: 62 IN | BODY MASS INDEX: 42.68 KG/M2 | HEART RATE: 85 BPM | TEMPERATURE: 98 F

## 2023-09-20 DIAGNOSIS — D68.61 ANTIPHOSPHOLIPID SYNDROME: Primary | ICD-10-CM

## 2023-09-20 DIAGNOSIS — I70.208 BRACHIAL ARTERY OCCLUSION, RIGHT: ICD-10-CM

## 2023-09-20 DIAGNOSIS — G80.2 SPASTIC HEMIPLEGIC CEREBRAL PALSY: ICD-10-CM

## 2023-09-20 DIAGNOSIS — D73.5 SPLENIC INFARCT: ICD-10-CM

## 2023-09-20 PROCEDURE — 3044F HG A1C LEVEL LT 7.0%: CPT | Mod: CPTII,S$GLB,, | Performed by: INTERNAL MEDICINE

## 2023-09-20 PROCEDURE — 1159F MED LIST DOCD IN RCRD: CPT | Mod: CPTII,S$GLB,, | Performed by: INTERNAL MEDICINE

## 2023-09-20 PROCEDURE — 1160F PR REVIEW ALL MEDS BY PRESCRIBER/CLIN PHARMACIST DOCUMENTED: ICD-10-PCS | Mod: CPTII,S$GLB,, | Performed by: INTERNAL MEDICINE

## 2023-09-20 PROCEDURE — 4010F PR ACE/ARB THEARPY RXD/TAKEN: ICD-10-PCS | Mod: CPTII,S$GLB,, | Performed by: INTERNAL MEDICINE

## 2023-09-20 PROCEDURE — 3075F PR MOST RECENT SYSTOLIC BLOOD PRESS GE 130-139MM HG: ICD-10-PCS | Mod: CPTII,S$GLB,, | Performed by: INTERNAL MEDICINE

## 2023-09-20 PROCEDURE — 99999 PR PBB SHADOW E&M-EST. PATIENT-LVL III: CPT | Mod: PBBFAC,,, | Performed by: INTERNAL MEDICINE

## 2023-09-20 PROCEDURE — 3080F DIAST BP >= 90 MM HG: CPT | Mod: CPTII,S$GLB,, | Performed by: INTERNAL MEDICINE

## 2023-09-20 PROCEDURE — 1160F RVW MEDS BY RX/DR IN RCRD: CPT | Mod: CPTII,S$GLB,, | Performed by: INTERNAL MEDICINE

## 2023-09-20 PROCEDURE — 4010F ACE/ARB THERAPY RXD/TAKEN: CPT | Mod: CPTII,S$GLB,, | Performed by: INTERNAL MEDICINE

## 2023-09-20 PROCEDURE — 1159F PR MEDICATION LIST DOCUMENTED IN MEDICAL RECORD: ICD-10-PCS | Mod: CPTII,S$GLB,, | Performed by: INTERNAL MEDICINE

## 2023-09-20 PROCEDURE — 3044F PR MOST RECENT HEMOGLOBIN A1C LEVEL <7.0%: ICD-10-PCS | Mod: CPTII,S$GLB,, | Performed by: INTERNAL MEDICINE

## 2023-09-20 PROCEDURE — 3075F SYST BP GE 130 - 139MM HG: CPT | Mod: CPTII,S$GLB,, | Performed by: INTERNAL MEDICINE

## 2023-09-20 PROCEDURE — 3008F BODY MASS INDEX DOCD: CPT | Mod: CPTII,S$GLB,, | Performed by: INTERNAL MEDICINE

## 2023-09-20 PROCEDURE — 99999 PR PBB SHADOW E&M-EST. PATIENT-LVL III: ICD-10-PCS | Mod: PBBFAC,,, | Performed by: INTERNAL MEDICINE

## 2023-09-20 PROCEDURE — 3080F PR MOST RECENT DIASTOLIC BLOOD PRESSURE >= 90 MM HG: ICD-10-PCS | Mod: CPTII,S$GLB,, | Performed by: INTERNAL MEDICINE

## 2023-09-20 PROCEDURE — 99214 OFFICE O/P EST MOD 30 MIN: CPT | Mod: S$GLB,,, | Performed by: INTERNAL MEDICINE

## 2023-09-20 PROCEDURE — 3008F PR BODY MASS INDEX (BMI) DOCUMENTED: ICD-10-PCS | Mod: CPTII,S$GLB,, | Performed by: INTERNAL MEDICINE

## 2023-09-20 PROCEDURE — 99214 PR OFFICE/OUTPT VISIT, EST, LEVL IV, 30-39 MIN: ICD-10-PCS | Mod: S$GLB,,, | Performed by: INTERNAL MEDICINE

## 2023-09-20 NOTE — PROGRESS NOTES
Subjective:       Patient ID: Jimena Pierce is a 58 y.o. female.    Chief Complaint: Results and Coagulation Disorder    HPI:  58-year-old female history of antiphospholipid syndrome.  And splenic infarct and brachial artery occlusion.  Patient has been maintained on warfarin she has cerebral palsy is difficulty in terms of travel for antiphospholipid lipid maintenance with Coumadin.  At this time request change to other anticoagulants ECOG status 2    Past Medical History:   Diagnosis Date    Antiphospholipid syndrome 3/1/2023    Brachial artery occlusion, right 2023    Chronic low back pain     Congenital cerebral palsy     GERD (gastroesophageal reflux disease)     Hyperlipidemia     Hypertension     Lymphedema     right foot     Family History   Problem Relation Age of Onset    Hypertension Mother     Arthritis Mother     Cataracts Maternal Grandmother     Cataracts Maternal Grandfather     Breast cancer Neg Hx     Ovarian cancer Neg Hx     Colon cancer Neg Hx      Social History     Socioeconomic History    Marital status:     Number of children: 2   Occupational History    Occupation: disability since  on basis CP, LBP   Tobacco Use    Smoking status: Former     Current packs/day: 0.00     Average packs/day: 0.3 packs/day for 3.0 years (0.8 ttl pk-yrs)     Types: Cigarettes     Start date:      Quit date: 2003     Years since quittin.7    Smokeless tobacco: Never    Tobacco comments:     quit smoking in    Substance and Sexual Activity    Alcohol use: Not Currently     Comment: occasionally    Drug use: No    Sexual activity: Not Currently     Social Determinants of Health     Financial Resource Strain: Low Risk  (2023)    Overall Financial Resource Strain (CARDIA)     Difficulty of Paying Living Expenses: Not very hard   Food Insecurity: No Food Insecurity (2023)    Hunger Vital Sign     Worried About Running Out of Food in the Last Year: Never true     Ran  Out of Food in the Last Year: Never true   Transportation Needs: No Transportation Needs (5/8/2023)    PRAPARE - Transportation     Lack of Transportation (Medical): No     Lack of Transportation (Non-Medical): No   Physical Activity: Inactive (5/8/2023)    Exercise Vital Sign     Days of Exercise per Week: 0 days     Minutes of Exercise per Session: 0 min   Stress: No Stress Concern Present (5/8/2023)    [employer] - Occupational Stress Questionnaire     Feeling of Stress : Only a little   Social Connections: Moderately Isolated (5/8/2023)    Social Connection and Isolation Panel [NHANES]     Frequency of Communication with Friends and Family: More than three times a week     Frequency of Social Gatherings with Friends and Family: More than three times a week     Attends [employer] Services: Never     Active Member of Clubs or Organizations: Yes     Attends Club or Organization Meetings: Never     Marital Status:    Housing Stability: Low Risk  (5/8/2023)    Housing Stability Vital Sign     Unable to Pay for Housing in the Last Year: No     Number of Places Lived in the Last Year: 2     Unstable Housing in the Last Year: No     Past Surgical History:   Procedure Laterality Date    COLONOSCOPY N/A 10/05/2016    Polyp x 1 repeat 5 yrs. Procedure: COLONOSCOPY;  Surgeon: Aicha Ravi MD;  Location: [facility];  Service: Endoscopy;  Laterality: N/A;    EMBOLECTOMY Right 2/23/2023    Procedure: EMBOLECTOMY;  Surgeon: Mario Sun MD;  Location: [facility] OR;  Service: Cardiovascular;  Laterality: Right;    HIP SURGERY Bilateral     as child    LEG SURGERY      TENDON RELEASE Bilateral     as child to knees and hip adductors    TONSILLECTOMY      TRANSESOPHAGEAL ECHOCARDIOGRAPHY N/A 2/23/2023    Procedure: ECHOCARDIOGRAM, TRANSESOPHAGEAL;  Surgeon: Roula Squires MD;  Location: [facility] CATH LAB;  Service: Cardiology;  Laterality: N/A;       Labs:  Lab Results   Component Value Date  "   WBC 6.29 05/08/2023    HGB 13.5 05/08/2023    HCT 41.4 05/08/2023    MCV 87 05/08/2023     05/08/2023     BMP  Lab Results   Component Value Date     03/04/2023    K 4.1 03/04/2023     03/04/2023    CO2 18 (L) 03/04/2023    BUN 13 03/04/2023    CREATININE 0.7 03/04/2023    CALCIUM 9.1 03/04/2023    ANIONGAP 17 (H) 03/04/2023    ESTGFRAFRICA >60.0 05/27/2021    EGFRNONAA >60.0 05/27/2021     Lab Results   Component Value Date    ALT 14 02/24/2023    AST 34 02/24/2023    ALKPHOS 71 02/24/2023    BILITOT 1.3 (H) 02/24/2023       Lab Results   Component Value Date    IRON 117 01/18/2023    TIBC 306 01/18/2023    FERRITIN 143 01/18/2023     No results found for: "KPEAZGDK02"  No results found for: "FOLATE"  Lab Results   Component Value Date    TSH 0.474 09/30/2022         Review of Systems   Constitutional:  Negative for activity change, appetite change, chills, diaphoresis, fatigue, fever and unexpected weight change.   HENT:  Negative for congestion, dental problem, drooling, ear discharge, ear pain, facial swelling, hearing loss, mouth sores, nosebleeds, postnasal drip, rhinorrhea, sinus pressure, sneezing, sore throat, tinnitus, trouble swallowing and voice change.    Eyes:  Negative for photophobia, pain, discharge, redness, itching and visual disturbance.   Respiratory:  Negative for cough, choking, chest tightness, shortness of breath, wheezing and stridor.    Cardiovascular:  Negative for chest pain, palpitations and leg swelling.   Gastrointestinal:  Negative for abdominal distention, abdominal pain, anal bleeding, blood in stool, constipation, diarrhea, nausea, rectal pain and vomiting.   Endocrine: Negative for cold intolerance, heat intolerance, polydipsia, polyphagia and polyuria.   Genitourinary:  Negative for decreased urine volume, difficulty urinating, dyspareunia, dysuria, enuresis, flank pain, frequency, genital sores, hematuria, menstrual problem, pelvic pain, urgency, vaginal " bleeding, vaginal discharge and vaginal pain.   Musculoskeletal:  Positive for gait problem. Negative for arthralgias, back pain, joint swelling, myalgias, neck pain and neck stiffness.   Skin:  Negative for color change, pallor and rash.   Allergic/Immunologic: Negative for environmental allergies, food allergies and immunocompromised state.   Neurological:  Negative for dizziness, tremors, seizures, syncope, facial asymmetry, speech difficulty, weakness, light-headedness, numbness and headaches.   Hematological:  Negative for adenopathy. Does not bruise/bleed easily.   Psychiatric/Behavioral:  Positive for dysphoric mood. Negative for agitation, behavioral problems, confusion, decreased concentration, hallucinations, self-injury, sleep disturbance and suicidal ideas. The patient is nervous/anxious. The patient is not hyperactive.        Objective:      Physical Exam  Vitals reviewed.   Constitutional:       General: She is not in acute distress.     Appearance: She is well-developed. She is not diaphoretic.   HENT:      Head: Normocephalic and atraumatic.      Right Ear: External ear normal.      Left Ear: External ear normal.      Nose: Nose normal.      Right Sinus: No maxillary sinus tenderness or frontal sinus tenderness.      Left Sinus: No maxillary sinus tenderness or frontal sinus tenderness.      Mouth/Throat:      Pharynx: No oropharyngeal exudate.   Eyes:      General: Lids are normal. No scleral icterus.        Right eye: No discharge.         Left eye: No discharge.      Conjunctiva/sclera: Conjunctivae normal.      Right eye: Right conjunctiva is not injected. No hemorrhage.     Left eye: Left conjunctiva is not injected. No hemorrhage.     Pupils: Pupils are equal, round, and reactive to light.   Neck:      Thyroid: No thyromegaly.      Vascular: No JVD.      Trachea: No tracheal deviation.   Cardiovascular:      Rate and Rhythm: Normal rate.   Pulmonary:      Effort: Pulmonary effort is normal. No  respiratory distress.      Breath sounds: No stridor.   Chest:      Chest wall: No tenderness.   Abdominal:      General: Bowel sounds are normal. There is no distension.      Palpations: Abdomen is soft. There is no hepatomegaly, splenomegaly or mass.      Tenderness: There is no abdominal tenderness. There is no rebound.   Musculoskeletal:         General: No tenderness. Normal range of motion.      Cervical back: Normal range of motion and neck supple.   Lymphadenopathy:      Cervical: No cervical adenopathy.      Upper Body:      Right upper body: No supraclavicular adenopathy.      Left upper body: No supraclavicular adenopathy.   Skin:     General: Skin is dry.      Findings: No erythema or rash.   Neurological:      Mental Status: She is alert and oriented to person, place, and time.      Cranial Nerves: No cranial nerve deficit.      Motor: Weakness present.      Coordination: Coordination abnormal.      Gait: Gait abnormal.   Psychiatric:         Behavior: Behavior normal.         Thought Content: Thought content normal.         Judgment: Judgment normal.             Assessment:      1. Antiphospholipid syndrome    2. Splenic infarct    3. Brachial artery occlusion, right    4. Spastic hemiplegic cerebral palsy           Med Onc Chart Routing      Follow up with physician 1 year. Return in 1 year can be seen either by myself or AP[facility]   Follow up with BRANDI    Infusion scheduling note    Injection scheduling note    Labs    Imaging    Pharmacy appointment    Other referrals                   Plan:     Reviewed information with her article from up-to-date reviewed as well at this time will start on Eliquis 5 mg p.o. b.i.d. continue on indefinitely return in 1 year understands warfarin is preferable but warfarin is too difficult to manage for.  At this time would recommend direct oral anticoagulant Eliquis 5 mg p.o. b.i.d. prescription sent [facility] pharmacy coordination article e-mail to her from  up-to-date        Dylan Hobson Jr, MD FACP

## 2023-09-25 ENCOUNTER — PATIENT MESSAGE (OUTPATIENT)
Dept: CARDIOLOGY | Facility: CLINIC | Age: 58
End: 2023-09-25
Payer: MEDICARE

## 2023-09-26 ENCOUNTER — LAB VISIT (OUTPATIENT)
Dept: LAB | Facility: HOSPITAL | Age: 58
End: 2023-09-26
Attending: INTERNAL MEDICINE
Payer: MEDICARE

## 2023-09-26 DIAGNOSIS — D68.61 ANTIPHOSPHOLIPID SYNDROME: ICD-10-CM

## 2023-09-26 DIAGNOSIS — I70.208 BRACHIAL ARTERY OCCLUSION, RIGHT: ICD-10-CM

## 2023-09-26 DIAGNOSIS — Z79.01 LONG TERM (CURRENT) USE OF ANTICOAGULANTS: ICD-10-CM

## 2023-09-26 LAB
INR PPP: 2.7 (ref 0.8–1.2)
PROTHROMBIN TIME: 27 SEC (ref 9–12.5)

## 2023-09-26 PROCEDURE — 36415 COLL VENOUS BLD VENIPUNCTURE: CPT | Mod: PO | Performed by: INTERNAL MEDICINE

## 2023-09-26 PROCEDURE — 85610 PROTHROMBIN TIME: CPT | Performed by: INTERNAL MEDICINE

## 2023-09-27 ENCOUNTER — ANTI-COAG VISIT (OUTPATIENT)
Dept: CARDIOLOGY | Facility: CLINIC | Age: 58
End: 2023-09-27
Payer: MEDICARE

## 2023-09-27 ENCOUNTER — PATIENT MESSAGE (OUTPATIENT)
Dept: CARDIOLOGY | Facility: CLINIC | Age: 58
End: 2023-09-27

## 2023-09-27 DIAGNOSIS — D68.61 ANTIPHOSPHOLIPID SYNDROME: Primary | ICD-10-CM

## 2023-09-27 DIAGNOSIS — I70.208 BRACHIAL ARTERY OCCLUSION, RIGHT: ICD-10-CM

## 2023-09-27 DIAGNOSIS — Z79.01 LONG TERM (CURRENT) USE OF ANTICOAGULANTS: ICD-10-CM

## 2023-09-27 PROCEDURE — 93793 ANTICOAG MGMT PT WARFARIN: CPT | Mod: S$GLB,,,

## 2023-09-27 PROCEDURE — 93793 PR ANTICOAGULANT MGMT FOR PT TAKING WARFARIN: ICD-10-PCS | Mod: S$GLB,,,

## 2023-09-27 NOTE — PROGRESS NOTES
"Patient returned call:  INR is at goal - 2.7.  Lab collected on yesterday.  Patient reports "she will not be taking Eliquis, will continue on warfarin".  Patient is awaiting approval for home meter.  Current warfarin dose confirmed.  Will continue 5 mg every Monday, Wednesday; and 7.5 mg on all other days.  Patient requested a lab appointment on 10/16/2023 (pending meter), along with another appointment at [facility].  Appt scheduled.  Patient confirms understanding.    "

## 2023-10-09 DIAGNOSIS — M62.838 MUSCLE SPASTICITY: ICD-10-CM

## 2023-10-13 RX ORDER — BACLOFEN 10 MG/1
10 TABLET ORAL 2 TIMES DAILY
Qty: 60 TABLET | Refills: 5 | Status: SHIPPED | OUTPATIENT
Start: 2023-10-13

## 2023-10-18 ENCOUNTER — HOSPITAL ENCOUNTER (OUTPATIENT)
Dept: RADIOLOGY | Facility: HOSPITAL | Age: 58
Discharge: HOME | End: 2023-10-18
Attending: NURSE PRACTITIONER
Payer: MEDICARE

## 2023-10-18 DIAGNOSIS — Z12.31 SCREENING MAMMOGRAM FOR BREAST CANCER: ICD-10-CM

## 2023-10-18 PROCEDURE — 77067 MAMMO DIGITAL SCREENING BILAT: ICD-10-PCS | Mod: 26,,, | Performed by: RADIOLOGY

## 2023-10-18 PROCEDURE — 77067 SCR MAMMO BI INCL CAD: CPT | Mod: 26,,, | Performed by: RADIOLOGY

## 2023-10-18 PROCEDURE — 77067 SCR MAMMO BI INCL CAD: CPT | Mod: TC,PO

## 2023-10-19 ENCOUNTER — ANTI-COAG VISIT (OUTPATIENT)
Dept: CARDIOLOGY | Facility: CLINIC | Age: 58
End: 2023-10-19
Payer: MEDICARE

## 2023-10-19 DIAGNOSIS — D68.61 ANTIPHOSPHOLIPID SYNDROME: Primary | ICD-10-CM

## 2023-10-19 DIAGNOSIS — Z79.01 LONG TERM (CURRENT) USE OF ANTICOAGULANTS: ICD-10-CM

## 2023-10-19 DIAGNOSIS — I70.208 BRACHIAL ARTERY OCCLUSION, RIGHT: ICD-10-CM

## 2023-10-19 PROCEDURE — 93793 PR ANTICOAGULANT MGMT FOR PT TAKING WARFARIN: ICD-10-PCS | Mod: S$GLB,,,

## 2023-10-19 PROCEDURE — 93793 ANTICOAG MGMT PT WARFARIN: CPT | Mod: S$GLB,,,

## 2023-10-19 NOTE — PROGRESS NOTES
Patient contacted - LVM:  INR is at goal - 2.9.  Lab collected on yesterday.  Continue warfarin 5 mg every Monday, Wednesday; and 7.5 mg on all other days.  Will recheck INR on 11/15/2023 ([facility] lab).  Any concerns, contact the CC at [phone].

## 2023-10-27 ENCOUNTER — TELEPHONE (OUTPATIENT)
Dept: INTERNAL MEDICINE | Facility: CLINIC | Age: 58
End: 2023-10-27
Payer: MEDICARE

## 2023-10-27 NOTE — TELEPHONE ENCOUNTER
"----- Message from Ronny Patel MA sent at 10/26/2023 10:39 AM CDT -----  Regarding: FW: [employer]    ----- Message -----  From: Tristian Agudelo LPN  Sent: 10/26/2023  10:30 AM CDT  To: [place] Staff  Subject: [facility] [employer]                          Good morning,    This patient's insurance is requesting a letter of "Medical Necessity" to be submitted in order to approve patient for an INR home testing meter.  Dr. Aleman has already signed the order.  The request letter from [facility] as well as the response letter has been scanned into patient's chart.  Please print response letter from the  "Letter" tab in patient's chart and ask Dr. Aleman to sign it.   Once the letter is signed, it can be scanned back into chart and I will print it from there and fax to [facility] along with records.         Thanks,     Tristian Agudelo LPN      "

## 2023-11-15 ENCOUNTER — LAB VISIT (OUTPATIENT)
Dept: LAB | Facility: HOSPITAL | Age: 58
End: 2023-11-15
Attending: INTERNAL MEDICINE
Payer: MEDICARE

## 2023-11-15 DIAGNOSIS — Z79.01 LONG TERM (CURRENT) USE OF ANTICOAGULANTS: ICD-10-CM

## 2023-11-15 DIAGNOSIS — D68.61 ANTIPHOSPHOLIPID SYNDROME: ICD-10-CM

## 2023-11-15 DIAGNOSIS — I70.208 BRACHIAL ARTERY OCCLUSION, RIGHT: ICD-10-CM

## 2023-11-15 LAB
INR PPP: 2.7 (ref 0.8–1.2)
PROTHROMBIN TIME: 27.3 SEC (ref 9–12.5)

## 2023-11-15 PROCEDURE — 36415 COLL VENOUS BLD VENIPUNCTURE: CPT | Mod: PO | Performed by: INTERNAL MEDICINE

## 2023-11-15 PROCEDURE — 85610 PROTHROMBIN TIME: CPT | Performed by: INTERNAL MEDICINE

## 2023-11-16 ENCOUNTER — ANTI-COAG VISIT (OUTPATIENT)
Dept: CARDIOLOGY | Facility: CLINIC | Age: 58
End: 2023-11-16
Payer: MEDICARE

## 2023-11-16 ENCOUNTER — PATIENT MESSAGE (OUTPATIENT)
Dept: CARDIOLOGY | Facility: CLINIC | Age: 58
End: 2023-11-16

## 2023-11-16 DIAGNOSIS — D68.61 ANTIPHOSPHOLIPID SYNDROME: Primary | ICD-10-CM

## 2023-11-16 DIAGNOSIS — Z79.01 LONG TERM (CURRENT) USE OF ANTICOAGULANTS: ICD-10-CM

## 2023-11-16 DIAGNOSIS — I70.208 BRACHIAL ARTERY OCCLUSION, RIGHT: ICD-10-CM

## 2023-11-16 PROCEDURE — 93793 PR ANTICOAGULANT MGMT FOR PT TAKING WARFARIN: ICD-10-PCS | Mod: S$GLB,,,

## 2023-11-16 PROCEDURE — 93793 ANTICOAG MGMT PT WARFARIN: CPT | Mod: S$GLB,,,

## 2023-11-16 NOTE — PROGRESS NOTES
INR is therapeutic at 2.7.  Lab collected on yesterday.  No change in warfarin dose - continue 5 mg every Monday, Wednesday; and 7.5 mg on all other days.  Will recheck INR on 12/20/2023 ([facility] lab).  Portal message communication sent.

## 2023-11-20 ENCOUNTER — ANTI-COAG VISIT (OUTPATIENT)
Dept: CARDIOLOGY | Facility: CLINIC | Age: 58
End: 2023-11-20
Payer: MEDICARE

## 2023-11-20 DIAGNOSIS — D68.61 ANTIPHOSPHOLIPID SYNDROME: Primary | ICD-10-CM

## 2023-11-20 DIAGNOSIS — I70.208 BRACHIAL ARTERY OCCLUSION, RIGHT: ICD-10-CM

## 2023-11-20 LAB — INR PPP: 3.9

## 2023-11-20 PROCEDURE — 93793 PR ANTICOAGULANT MGMT FOR PT TAKING WARFARIN: ICD-10-PCS | Mod: S$GLB,,,

## 2023-11-20 PROCEDURE — 93793 ANTICOAG MGMT PT WARFARIN: CPT | Mod: S$GLB,,,

## 2023-11-20 NOTE — PROGRESS NOTES
INR not at goal-3.9. Medications, chart, and patient findings reviewed. See calendar for adjustments to dose and follow up plan.  Pt contacted, first meter test submitted today.  She will eat Vit K today to hep adjust INR that is slightly above goal.  Monday will be her testing days.  I have explained that we will call her with any out orange or missing results.  She will need to callus with any procedure or medications changes.  Patient confirms understanding.  She will remain on her warfarin dose of 5 mg every Mon, Wed; 7.5 mg all other days + greens today only.  Repeat INR next Monday.

## 2023-11-27 ENCOUNTER — ANTI-COAG VISIT (OUTPATIENT)
Dept: CARDIOLOGY | Facility: CLINIC | Age: 58
End: 2023-11-27
Payer: MEDICARE

## 2023-11-27 ENCOUNTER — PATIENT OUTREACH (OUTPATIENT)
Dept: ADMINISTRATIVE | Facility: HOSPITAL | Age: 58
End: 2023-11-27
Payer: MEDICARE

## 2023-11-27 DIAGNOSIS — D68.61 ANTIPHOSPHOLIPID SYNDROME: Primary | ICD-10-CM

## 2023-11-27 DIAGNOSIS — I70.208 BRACHIAL ARTERY OCCLUSION, RIGHT: ICD-10-CM

## 2023-11-27 LAB — INR PPP: 4.8

## 2023-11-27 PROCEDURE — 93793 ANTICOAG MGMT PT WARFARIN: CPT | Mod: S$GLB,,,

## 2023-11-27 PROCEDURE — 93793 PR ANTICOAGULANT MGMT FOR PT TAKING WARFARIN: ICD-10-PCS | Mod: S$GLB,,,

## 2023-11-27 NOTE — PROGRESS NOTES
HTN Report: Attempted to contact the patient to obtain a home BP reading or schedule office visit to have BP checked, no answer, left voicemail.

## 2023-11-27 NOTE — PROGRESS NOTES
[facility] faxed result.  INR is still increasing-4.8.  Patient reports no changes.  No ETOH cranberry/grapefruit, etc.  Hold dose today and then lower weekly dose for INR trending upward.  Repeat INR in 1 week.  If INR is still out of range, will consider lab confirmation of result as she was previously stable on this dose.  Bleeding precautions in place.  ER with any s/s of bleeding.

## 2023-12-04 ENCOUNTER — ANTI-COAG VISIT (OUTPATIENT)
Dept: CARDIOLOGY | Facility: CLINIC | Age: 58
End: 2023-12-04
Payer: MEDICARE

## 2023-12-04 DIAGNOSIS — I70.208 BRACHIAL ARTERY OCCLUSION, RIGHT: ICD-10-CM

## 2023-12-04 DIAGNOSIS — Z79.01 LONG TERM (CURRENT) USE OF ANTICOAGULANTS: ICD-10-CM

## 2023-12-04 DIAGNOSIS — D68.61 ANTIPHOSPHOLIPID SYNDROME: Primary | ICD-10-CM

## 2023-12-04 LAB — INR PPP: 2.4

## 2023-12-04 PROCEDURE — G0250 MD INR TEST REVIE INTER MGMT: HCPCS | Mod: S$GLB,,, | Performed by: INTERNAL MEDICINE

## 2023-12-04 PROCEDURE — G0250 PR MD REVIEW INTERPRET OF TEST: ICD-10-PCS | Mod: S$GLB,,, | Performed by: INTERNAL MEDICINE

## 2023-12-04 NOTE — PROGRESS NOTES
Fax received from [employer].  INR is sub-therapeutic at 2.4.  Patient contacted.  Patient reports she followed previous dosing/instructions.  Patient reports no changes.  Advised of increased risk of clotting; signs/symptoms of clotting and need to go to ED if she experiences any.  Patient reports she is not having any signs/symptoms of clotting.  Instructions given: take boosted dose of warfarin 7.5 mg today-12/4/23; then resume warfarin 5 mg on Wednesdays, Fridays and Mondays; and 7.5 mg all other days.  Recheck on 12/11/23.  Patient repeated back correctly and verbalizes understanding.

## 2023-12-06 ENCOUNTER — TELEPHONE (OUTPATIENT)
Dept: INTERNAL MEDICINE | Facility: CLINIC | Age: 58
End: 2023-12-06
Payer: MEDICARE

## 2023-12-06 DIAGNOSIS — N39.41 URGE INCONTINENCE: ICD-10-CM

## 2023-12-06 NOTE — TELEPHONE ENCOUNTER
Returned pt call concerning her appointment that she scheduled through my chart. Appointment confirmed. Pt verbalized understanding.

## 2023-12-06 NOTE — TELEPHONE ENCOUNTER
----- Message from Clarisa Alvarado sent at 12/6/2023  1:37 PM CST -----  Contact: LUCILA CAM [[id number]]  .Type:  Sooner Apoointment Request    Caller is requesting a sooner appointment.  Caller declined first available appointment listed below.  Caller will not accept being placed on the waitlist and is requesting a message be sent to doctor.  Name of Caller: LUCILA CAM [[id number]]  When is the first available appointment? 12/7/23, can't make appt due to transportation. Next appt 01/02/24  Symptoms: medication refills  Would the patient rather a call back or a response via [facility]?  call  Best Call Back Number: .[phone] (home)   Additional Information:

## 2023-12-07 ENCOUNTER — OFFICE VISIT (OUTPATIENT)
Dept: INTERNAL MEDICINE | Facility: CLINIC | Age: 58
End: 2023-12-07
Payer: MEDICARE

## 2023-12-07 DIAGNOSIS — N39.41 URGE INCONTINENCE: ICD-10-CM

## 2023-12-07 DIAGNOSIS — I10 ESSENTIAL HYPERTENSION: Primary | ICD-10-CM

## 2023-12-07 PROCEDURE — 3044F HG A1C LEVEL LT 7.0%: CPT | Mod: CPTII,95,, | Performed by: PHYSICIAN ASSISTANT

## 2023-12-07 PROCEDURE — 1159F PR MEDICATION LIST DOCUMENTED IN MEDICAL RECORD: ICD-10-PCS | Mod: CPTII,95,, | Performed by: PHYSICIAN ASSISTANT

## 2023-12-07 PROCEDURE — 1159F MED LIST DOCD IN RCRD: CPT | Mod: CPTII,95,, | Performed by: PHYSICIAN ASSISTANT

## 2023-12-07 PROCEDURE — 1160F PR REVIEW ALL MEDS BY PRESCRIBER/CLIN PHARMACIST DOCUMENTED: ICD-10-PCS | Mod: CPTII,95,, | Performed by: PHYSICIAN ASSISTANT

## 2023-12-07 PROCEDURE — 99214 OFFICE O/P EST MOD 30 MIN: CPT | Mod: 95,,, | Performed by: PHYSICIAN ASSISTANT

## 2023-12-07 PROCEDURE — 1160F RVW MEDS BY RX/DR IN RCRD: CPT | Mod: CPTII,95,, | Performed by: PHYSICIAN ASSISTANT

## 2023-12-07 PROCEDURE — 4010F PR ACE/ARB THEARPY RXD/TAKEN: ICD-10-PCS | Mod: CPTII,95,, | Performed by: PHYSICIAN ASSISTANT

## 2023-12-07 PROCEDURE — 99214 PR OFFICE/OUTPT VISIT, EST, LEVL IV, 30-39 MIN: ICD-10-PCS | Mod: 95,,, | Performed by: PHYSICIAN ASSISTANT

## 2023-12-07 PROCEDURE — 3044F PR MOST RECENT HEMOGLOBIN A1C LEVEL <7.0%: ICD-10-PCS | Mod: CPTII,95,, | Performed by: PHYSICIAN ASSISTANT

## 2023-12-07 PROCEDURE — 4010F ACE/ARB THERAPY RXD/TAKEN: CPT | Mod: CPTII,95,, | Performed by: PHYSICIAN ASSISTANT

## 2023-12-07 RX ORDER — OXYBUTYNIN CHLORIDE 15 MG/1
15 TABLET, EXTENDED RELEASE ORAL DAILY
Qty: 90 TABLET | Refills: 0 | Status: SHIPPED | OUTPATIENT
Start: 2023-12-07 | End: 2023-12-07 | Stop reason: SDUPTHER

## 2023-12-07 RX ORDER — OXYBUTYNIN CHLORIDE 15 MG/1
15 TABLET, EXTENDED RELEASE ORAL DAILY
Qty: 90 TABLET | Refills: 1 | Status: SHIPPED | OUTPATIENT
Start: 2023-12-07 | End: 2024-12-06

## 2023-12-07 NOTE — PROGRESS NOTES
Subjective:      Patient ID: Jimena Pierce is a 58 y.o. female.    Chief Complaint: No chief complaint on file.    The patient location is: [place]   The chief complaint leading to consultation is: med refill    Visit type: audiovisual    Face to Face time with patient: 9:22-9:33  15 minutes of total time spent on the encounter, which includes face to face time and non-face to face time preparing to see the patient (eg, review of tests), Obtaining and/or reviewing separately obtained history, Documenting clinical information in the electronic or other health record, Independently interpreting results (not separately reported) and communicating results to the patient/family/caregiver, or Care coordination (not separately reported).     Each patient to whom he or she provides medical services by telemedicine is:  (1) informed of the relationship between the physician and patient and the respective role of any other health care provider with respect to management of the patient; and (2) notified that he or she may decline to receive medical services by telemedicine and may withdraw from such care at any time.    Notes: Patient is known to me, being seen today for med refill of oxybutynin     HTN- controlled without medication per patient but admits does not check often     Due for annual/labs     Last visit May 2023 with PCP.       Review of Systems   Constitutional:  Negative for activity change, chills, diaphoresis, fever and unexpected weight change.   HENT:  Negative for congestion, hearing loss, rhinorrhea, sore throat and trouble swallowing.    Eyes:  Negative for discharge and visual disturbance.   Respiratory:  Negative for cough, chest tightness, shortness of breath and wheezing.    Cardiovascular:  Negative for chest pain and palpitations.   Gastrointestinal:  Negative for abdominal pain, blood in stool, constipation, diarrhea, nausea and vomiting.   Endocrine: Negative for polydipsia and  polyuria.   Genitourinary:  Negative for difficulty urinating, dysuria, hematuria and menstrual problem.   Musculoskeletal:  Positive for arthralgias. Negative for joint swelling and neck pain.   Skin:  Negative for rash.   Neurological:  Negative for dizziness, weakness, light-headedness and headaches.   Psychiatric/Behavioral:  Negative for confusion and dysphoric mood.        Objective:   LMP 02/23/2016   Physical Exam  Constitutional:       General: She is not in acute distress.     Appearance: She is well-developed. She is not ill-appearing or diaphoretic.   HENT:      Head: Normocephalic and atraumatic.      Right Ear: External ear normal.      Left Ear: External ear normal.   Eyes:      General: Lids are normal.         Right eye: No discharge.         Left eye: No discharge.      Conjunctiva/sclera: Conjunctivae normal.      Right eye: Right conjunctiva is not injected.      Left eye: Left conjunctiva is not injected.   Pulmonary:      Effort: Pulmonary effort is normal. No respiratory distress.   Skin:     General: Skin is warm and dry.      Findings: No rash.   Neurological:      Mental Status: She is alert and oriented to person, place, and time.   Psychiatric:         Speech: Speech normal.         Behavior: Behavior normal.         Thought Content: Thought content normal.         Judgment: Judgment normal.       Assessment:      1. Essential hypertension    2. Urge incontinence       Plan:   Essential hypertension  -     CBC Auto Differential; Future; Expected date: 12/07/2023  -     Comprehensive Metabolic Panel; Future; Expected date: 12/07/2023  -     Lipid Panel; Future; Expected date: 12/07/2023    Urge incontinence  -     oxybutynin (DITROPAN XL) 15 MG TR24; Take 1 tablet (15 mg total) by mouth once daily.  Dispense: 90 tablet; Refill: 1      Fasting labs     Patient to message recent BP readings, if elevated will need in office visit     6mth f/u PCP

## 2023-12-11 ENCOUNTER — ANTI-COAG VISIT (OUTPATIENT)
Dept: CARDIOLOGY | Facility: CLINIC | Age: 58
End: 2023-12-11
Payer: MEDICARE

## 2023-12-11 DIAGNOSIS — D68.61 ANTIPHOSPHOLIPID SYNDROME: Primary | ICD-10-CM

## 2023-12-11 DIAGNOSIS — Z79.01 LONG TERM (CURRENT) USE OF ANTICOAGULANTS: ICD-10-CM

## 2023-12-11 DIAGNOSIS — I70.208 BRACHIAL ARTERY OCCLUSION, RIGHT: ICD-10-CM

## 2023-12-11 LAB — INR PPP: 4.4

## 2023-12-11 NOTE — PROGRESS NOTES
Re-dose - try 1/2 dose today so INR does not bottom out, patient contacted and dosing reviewed.

## 2023-12-11 NOTE — PROGRESS NOTES
Fax received from [facility].  INR supra-therapeutic at 4.4.  Patient contacted.  Patient reports she followed previous instructions.  Patient reports no changes.  Advised patient of increased risk of bleeding; signs/symptoms of bleeding and need to go to ED if she experiences any.  Patient reports she is not having any signs/symptoms of bleeding.  Instructions given: Hold warfarin dose today-12/11/23; then re-challenge warfarin 5 mg on Wednesdays, Fridays and Mondays; and 7.5 mg all other days.  Recheck on 12/18/23.  Patient repeated back correctly and verbalizes understanding.

## 2023-12-18 ENCOUNTER — ANTI-COAG VISIT (OUTPATIENT)
Dept: CARDIOLOGY | Facility: CLINIC | Age: 58
End: 2023-12-18
Payer: MEDICARE

## 2023-12-18 DIAGNOSIS — D68.61 ANTIPHOSPHOLIPID SYNDROME: Primary | ICD-10-CM

## 2023-12-18 DIAGNOSIS — I70.208 BRACHIAL ARTERY OCCLUSION, RIGHT: ICD-10-CM

## 2023-12-18 LAB — INR PPP: 2.8

## 2023-12-18 PROCEDURE — 93793 ANTICOAG MGMT PT WARFARIN: CPT | Mod: S$GLB,,,

## 2023-12-18 PROCEDURE — 93793 PR ANTICOAGULANT MGMT FOR PT TAKING WARFARIN: ICD-10-PCS | Mod: S$GLB,,,

## 2023-12-18 NOTE — PROGRESS NOTES
[facility].  INR at goal-2.8. Medications and chart reviewed. No changes noted to necessitate adjustment of warfarin or follow-up plan. See calendar.  We will resume newer dose of 5 mg on MWF and 7.5 mg on all other days.  Patient contacted, confirms understanding.  She will retest on 12/26 due to holiday.

## 2023-12-26 LAB — INR PPP: 2.7

## 2023-12-27 ENCOUNTER — ANTI-COAG VISIT (OUTPATIENT)
Dept: CARDIOLOGY | Facility: CLINIC | Age: 58
End: 2023-12-27
Payer: MEDICARE

## 2023-12-27 DIAGNOSIS — D68.61 ANTIPHOSPHOLIPID SYNDROME: Primary | ICD-10-CM

## 2023-12-27 DIAGNOSIS — I70.208 BRACHIAL ARTERY OCCLUSION, RIGHT: ICD-10-CM

## 2023-12-27 DIAGNOSIS — Z79.01 LONG TERM (CURRENT) USE OF ANTICOAGULANTS: ICD-10-CM

## 2023-12-27 PROCEDURE — 93793 PR ANTICOAGULANT MGMT FOR PT TAKING WARFARIN: ICD-10-PCS | Mod: S$GLB,,,

## 2023-12-27 PROCEDURE — 93793 ANTICOAG MGMT PT WARFARIN: CPT | Mod: S$GLB,,,

## 2023-12-27 NOTE — PROGRESS NOTES
Fax result received from [employer].  INR: 2.7 - remains therapeutic.  Test date: 12/26/2023.  No change in dose - continue warfarin 5 mg every Mon, Wed, Fri; and 7.5 mg on all other days.  Retest INR on Tuesday, 1/02/2024.

## 2024-01-02 ENCOUNTER — ANTI-COAG VISIT (OUTPATIENT)
Dept: CARDIOLOGY | Facility: CLINIC | Age: 59
End: 2024-01-02
Payer: MEDICARE

## 2024-01-02 DIAGNOSIS — D68.61 ANTIPHOSPHOLIPID SYNDROME: Primary | ICD-10-CM

## 2024-01-02 DIAGNOSIS — Z79.01 LONG TERM (CURRENT) USE OF ANTICOAGULANTS: ICD-10-CM

## 2024-01-02 DIAGNOSIS — I70.208 BRACHIAL ARTERY OCCLUSION, RIGHT: ICD-10-CM

## 2024-01-02 LAB — INR PPP: 2.9

## 2024-01-02 PROCEDURE — 93793 ANTICOAG MGMT PT WARFARIN: CPT | Mod: ,,,

## 2024-01-02 NOTE — PROGRESS NOTES
Fax result received from [employer].  INR: 2.9 - remains therapeutic.  No change in dose - continue warfarin 5 mg every Mon, Wed, Fri; and 7.5 mg on all other days.  Retest INR weekly.

## 2024-01-09 ENCOUNTER — ANTI-COAG VISIT (OUTPATIENT)
Dept: CARDIOLOGY | Facility: CLINIC | Age: 59
End: 2024-01-09
Payer: MEDICARE

## 2024-01-09 DIAGNOSIS — D68.61 ANTIPHOSPHOLIPID SYNDROME: Primary | ICD-10-CM

## 2024-01-09 DIAGNOSIS — Z79.01 LONG TERM (CURRENT) USE OF ANTICOAGULANTS: ICD-10-CM

## 2024-01-09 DIAGNOSIS — I70.208 BRACHIAL ARTERY OCCLUSION, RIGHT: ICD-10-CM

## 2024-01-09 LAB — INR PPP: 2.3

## 2024-01-09 PROCEDURE — 93793 ANTICOAG MGMT PT WARFARIN: CPT | Mod: ,,,

## 2024-01-09 NOTE — PROGRESS NOTES
Fax result received from [employer].  INR: 2.3 - below goal.  Patient contacted.  Reports a recent consumption of broccoli.  Current warfarin dose verified and followed.  No s/s reported.  Instructions given:  Will boost today's dose to 10 mg, then resume current dose of 5 mg every Mon, Wed, Fri; and 7.5 mg on all other days.  Vitamin K education - given.  Retest INR next Tuesday, 1/16/2024.  Patient confirms understanding.

## 2024-01-16 ENCOUNTER — ANTI-COAG VISIT (OUTPATIENT)
Dept: CARDIOLOGY | Facility: CLINIC | Age: 59
End: 2024-01-16
Payer: MEDICARE

## 2024-01-16 DIAGNOSIS — I70.208 BRACHIAL ARTERY OCCLUSION, RIGHT: ICD-10-CM

## 2024-01-16 DIAGNOSIS — D68.61 ANTIPHOSPHOLIPID SYNDROME: Primary | ICD-10-CM

## 2024-01-16 LAB — INR PPP: 3.8

## 2024-01-16 PROCEDURE — 93793 ANTICOAG MGMT PT WARFARIN: CPT | Mod: S$GLB,,,

## 2024-01-16 NOTE — PROGRESS NOTES
[facility].  INR not at goal-3.8. Medications, chart, and patient findings reviewed. See calendar for adjustments to dose and follow up plan.  Pt contacted.  INR is above goal a touch.  Likely still effects from boost  We will re-challenge dose, no changes and repeat INR in 1 week to allow time for INR to level off.  Pt voiced understanding.

## 2024-01-23 ENCOUNTER — ANTI-COAG VISIT (OUTPATIENT)
Dept: CARDIOLOGY | Facility: CLINIC | Age: 59
End: 2024-01-23
Payer: MEDICARE

## 2024-01-23 DIAGNOSIS — Z79.01 LONG TERM (CURRENT) USE OF ANTICOAGULANTS: ICD-10-CM

## 2024-01-23 DIAGNOSIS — D68.61 ANTIPHOSPHOLIPID SYNDROME: Primary | ICD-10-CM

## 2024-01-23 DIAGNOSIS — I70.208 BRACHIAL ARTERY OCCLUSION, RIGHT: ICD-10-CM

## 2024-01-23 LAB — INR PPP: 3.2

## 2024-01-23 PROCEDURE — 93793 ANTICOAG MGMT PT WARFARIN: CPT | Mod: S$GLB,,,

## 2024-01-23 NOTE — PROGRESS NOTES
Fax received from [employer].  INR therapeutic at 3.2.  Patient reports no changes.  Instructions: continue warfarin 5 mg on Wednesdays, Fridays and Mondays; and 7.5 mg all other days.  Recheck in one week.

## 2024-01-30 ENCOUNTER — ANTI-COAG VISIT (OUTPATIENT)
Dept: CARDIOLOGY | Facility: CLINIC | Age: 59
End: 2024-01-30
Payer: MEDICARE

## 2024-01-30 DIAGNOSIS — Z79.01 LONG TERM (CURRENT) USE OF ANTICOAGULANTS: ICD-10-CM

## 2024-01-30 DIAGNOSIS — D68.61 ANTIPHOSPHOLIPID SYNDROME: Primary | ICD-10-CM

## 2024-01-30 DIAGNOSIS — I70.208 BRACHIAL ARTERY OCCLUSION, RIGHT: ICD-10-CM

## 2024-01-30 LAB — INR PPP: 3.2

## 2024-01-30 PROCEDURE — 93793 ANTICOAG MGMT PT WARFARIN: CPT | Mod: S$GLB,,,

## 2024-01-30 NOTE — PROGRESS NOTES
Fax result received from [employer] [facility].  INR remains therapeutic at 3.2.  No change in dose - continue warfarin 5 mg every Mon, Wed, Fri; and 7.5 mg on all other days.  Retest INR next Tuesday, 2/06/2024.

## 2024-02-06 ENCOUNTER — ANTI-COAG VISIT (OUTPATIENT)
Dept: CARDIOLOGY | Facility: CLINIC | Age: 59
End: 2024-02-06
Payer: MEDICARE

## 2024-02-06 DIAGNOSIS — Z79.01 LONG TERM (CURRENT) USE OF ANTICOAGULANTS: ICD-10-CM

## 2024-02-06 DIAGNOSIS — D68.61 ANTIPHOSPHOLIPID SYNDROME: Primary | ICD-10-CM

## 2024-02-06 DIAGNOSIS — I70.208 BRACHIAL ARTERY OCCLUSION, RIGHT: ICD-10-CM

## 2024-02-06 LAB — INR PPP: 2.3

## 2024-02-06 PROCEDURE — 93793 ANTICOAG MGMT PT WARFARIN: CPT | Mod: S$GLB,,,

## 2024-02-06 NOTE — PROGRESS NOTES
Fax result received from [employer].  INR: 2.3 - slightly below goal.  Patient contacted.  No missed doses or s/s reported.  Reports a recent intake of a peanut butter sandwich.  Instructions given:  No change in dose, continue 5 mg every Mon, Wed, Fri; and 7.5 mg on all other days.  Retest INR next Tuesday, 2/13/2024.  Patient confirms understanding.

## 2024-02-13 ENCOUNTER — ANTI-COAG VISIT (OUTPATIENT)
Dept: CARDIOLOGY | Facility: CLINIC | Age: 59
End: 2024-02-13
Payer: MEDICARE

## 2024-02-13 DIAGNOSIS — D68.61 ANTIPHOSPHOLIPID SYNDROME: Primary | ICD-10-CM

## 2024-02-13 DIAGNOSIS — I70.208 BRACHIAL ARTERY OCCLUSION, RIGHT: ICD-10-CM

## 2024-02-13 LAB — INR PPP: 3.3

## 2024-02-13 PROCEDURE — 93793 ANTICOAG MGMT PT WARFARIN: CPT | Mod: S$GLB,,,

## 2024-02-13 NOTE — PROGRESS NOTES
[facility].  INR at goal-3.3. Medications and chart reviewed. No changes noted to necessitate adjustment of warfarin or follow-up plan. See calendar.  Pt will remain on 7.5 mg QD x 5 mg on MWF.  Repeat INR in 1 week with meter.

## 2024-02-20 ENCOUNTER — ANTI-COAG VISIT (OUTPATIENT)
Dept: CARDIOLOGY | Facility: CLINIC | Age: 59
End: 2024-02-20
Payer: MEDICARE

## 2024-02-20 DIAGNOSIS — D68.61 ANTIPHOSPHOLIPID SYNDROME: Primary | ICD-10-CM

## 2024-02-20 DIAGNOSIS — Z79.01 LONG TERM (CURRENT) USE OF ANTICOAGULANTS: ICD-10-CM

## 2024-02-20 DIAGNOSIS — I70.208 BRACHIAL ARTERY OCCLUSION, RIGHT: ICD-10-CM

## 2024-02-20 LAB — INR PPP: 2.8

## 2024-02-20 PROCEDURE — 93793 ANTICOAG MGMT PT WARFARIN: CPT | Mod: S$GLB,,,

## 2024-02-20 NOTE — PROGRESS NOTES
Fax result received from [employer].  INR; 2.8 - remains therapeutic.  No change in warfarin dose - continue 5 mg every Mon, Wed, Fri; and 7.5 mg on all other days.  Retest INR next Tuesday, 2/27/2024.

## 2024-02-27 ENCOUNTER — ANTI-COAG VISIT (OUTPATIENT)
Dept: CARDIOLOGY | Facility: CLINIC | Age: 59
End: 2024-02-27
Payer: MEDICARE

## 2024-02-27 DIAGNOSIS — I70.208 BRACHIAL ARTERY OCCLUSION, RIGHT: ICD-10-CM

## 2024-02-27 DIAGNOSIS — D68.61 ANTIPHOSPHOLIPID SYNDROME: Primary | ICD-10-CM

## 2024-02-27 LAB — INR PPP: 3

## 2024-02-27 PROCEDURE — 93793 ANTICOAG MGMT PT WARFARIN: CPT | Mod: S$GLB,,,

## 2024-02-27 NOTE — PROGRESS NOTES
Fax result received from [employer]. INR; 3.0 - remains therapeutic. No change in warfarin dose - continue 5 mg every Mon, Wed, Fri; and 7.5 mg on all other days. Retest INR next Tuesday.

## 2024-02-29 ENCOUNTER — OFFICE VISIT (OUTPATIENT)
Dept: FAMILY MEDICINE | Facility: CLINIC | Age: 59
End: 2024-02-29
Payer: MEDICARE

## 2024-02-29 VITALS
HEIGHT: 61 IN | HEART RATE: 103 BPM | OXYGEN SATURATION: 97 % | SYSTOLIC BLOOD PRESSURE: 130 MMHG | DIASTOLIC BLOOD PRESSURE: 87 MMHG | BODY MASS INDEX: 43.82 KG/M2 | TEMPERATURE: 98 F

## 2024-02-29 DIAGNOSIS — R05.9 COUGH, UNSPECIFIED TYPE: Primary | ICD-10-CM

## 2024-02-29 DIAGNOSIS — K21.9 CHRONIC GERD: ICD-10-CM

## 2024-02-29 LAB
CTP QC/QA: YES
SARS-COV-2 RDRP RESP QL NAA+PROBE: NEGATIVE

## 2024-02-29 PROCEDURE — 99213 OFFICE O/P EST LOW 20 MIN: CPT | Mod: S$GLB,,, | Performed by: STUDENT IN AN ORGANIZED HEALTH CARE EDUCATION/TRAINING PROGRAM

## 2024-02-29 PROCEDURE — 99999 PR PBB SHADOW E&M-EST. PATIENT-LVL III: CPT | Mod: PBBFAC,,, | Performed by: STUDENT IN AN ORGANIZED HEALTH CARE EDUCATION/TRAINING PROGRAM

## 2024-02-29 PROCEDURE — 87635 SARS-COV-2 COVID-19 AMP PRB: CPT | Mod: QW,S$GLB,, | Performed by: STUDENT IN AN ORGANIZED HEALTH CARE EDUCATION/TRAINING PROGRAM

## 2024-02-29 RX ORDER — FAMOTIDINE 40 MG/1
40 TABLET, FILM COATED ORAL DAILY
Qty: 90 TABLET | Refills: 1 | Status: SHIPPED | OUTPATIENT
Start: 2024-02-29 | End: 2025-02-28

## 2024-03-04 NOTE — PROGRESS NOTES
"Same day clinic    Jimena Pierce is a 58 y.o. year old White female  has a past medical history of Antiphospholipid syndrome, Brachial artery occlusion, right, Chronic low back pain, Congenital cerebral palsy, GERD (gastroesophageal reflux disease), Hyperlipidemia, Hypertension, and Lymphedema.. Pt presented to the clinic for cough, scratchy throat since today.  Her son is diagnosed with COVID so she wants to get checked for COVID.  No fever, chills, chest pain, shortness on breath, wheezing      ROS: Negative except above  .      Vitals:    02/29/24 1552   BP: 130/87   BP Location: Right arm   Patient Position: Sitting   BP Method: Large (Manual)   Pulse: 103   Temp: 98.2 °F (36.8 °C)   TempSrc: Tympanic   SpO2: 97%   Height: 5' 1" (1.549 m)       Body mass index is 43.82 kg/m².     PE:  General - Well developed, alert and oriented in NAD  HEENT - normocephalic, no evidence of trauma, sclera white, EOMI  Neck - full range of motion  COR - regular rate and rhythm without murmurs or gallops  Lungs - Clear  Abdomen - soft, non-tender  Ext - no cyanosis or edema    Lab Results   Component Value Date    WBC 6.29 05/08/2023    HGB 13.5 05/08/2023    HCT 41.4 05/08/2023    MCV 87 05/08/2023    MCH 28.2 05/08/2023    MCHC 32.6 05/08/2023    RDW 12.9 05/08/2023     05/08/2023    MPV 11.4 05/08/2023    PLTEST Appears normal 03/04/2023       Lab Results   Component Value Date     03/04/2023    K 4.1 03/04/2023     03/04/2023    CO2 18 (L) 03/04/2023    GLU 94 03/04/2023    BUN 13 03/04/2023    CALCIUM 9.1 03/04/2023    PROT 6.3 02/24/2023    ALBUMIN 3.3 (L) 02/24/2023    BILITOT 1.3 (H) 02/24/2023    AST 34 02/24/2023    ALKPHOS 71 02/24/2023    ALT 14 02/24/2023       Lab Results   Component Value Date    TRIG 90 09/30/2022    CHOL 212 (H) 09/30/2022    HDL 65 09/30/2022    LDLCALC 129.0 09/30/2022    CHOLHDL 30.7 09/30/2022           Lab Results   Component Value Date    HGBA1C 4.9 03/30/2023 " "      No results found for: "MICROALBUR", "[id number]"      Impression:  1. Cough, unspecified type  POCT COVID-19 Rapid Screening      2. Chronic GERD  famotidine (PEPCID) 40 MG tablet           Plan:  POCT COVID is negative  Patient most likely has upper respiratory infection which is caused by a virus, explained to patient that most viral infection Will resolve uneventfully, and virus they do not respond to antibiotics.  If however the symptoms persist beyond 10 days and or If they get worse or she develops sinus pain on one side of the head, and green discharge, fever or trouble breathing that would be an indication for the use of antibiotics and the patient need to contact us at that time should that happen.  At this time it's okay to treat the symptoms.  Patient understood and acknowledged.    Cough, unspecified type  -     POCT COVID-19 Rapid Screening    Chronic GERD  -     famotidine (PEPCID) 40 MG tablet; Take 1 tablet (40 mg total) by mouth once daily.  Dispense: 90 tablet; Refill: 1          Orders Placed This Encounter   Procedures    POCT COVID-19 Rapid Screening       No follow-ups on file.     Mai Roberts MD           "

## 2024-03-05 ENCOUNTER — ANTI-COAG VISIT (OUTPATIENT)
Dept: CARDIOLOGY | Facility: CLINIC | Age: 59
End: 2024-03-05
Payer: MEDICARE

## 2024-03-05 DIAGNOSIS — D68.61 ANTIPHOSPHOLIPID SYNDROME: Primary | ICD-10-CM

## 2024-03-05 DIAGNOSIS — I70.208 BRACHIAL ARTERY OCCLUSION, RIGHT: ICD-10-CM

## 2024-03-05 LAB — INR PPP: 3.3

## 2024-03-05 PROCEDURE — 93793 ANTICOAG MGMT PT WARFARIN: CPT | Mod: S$GLB,,,

## 2024-03-05 NOTE — PROGRESS NOTES
Fax result received from [facility]. INR; 3.3 - remains therapeutic. No change in warfarin dose - continue 5 mg every Mon, Wed, Fri; and 7.5 mg on all other days. Retest INR next Tuesday.

## 2024-03-12 ENCOUNTER — ANTI-COAG VISIT (OUTPATIENT)
Dept: CARDIOLOGY | Facility: CLINIC | Age: 59
End: 2024-03-12
Payer: MEDICARE

## 2024-03-12 DIAGNOSIS — Z79.01 LONG TERM (CURRENT) USE OF ANTICOAGULANTS: ICD-10-CM

## 2024-03-12 DIAGNOSIS — I70.208 BRACHIAL ARTERY OCCLUSION, RIGHT: ICD-10-CM

## 2024-03-12 DIAGNOSIS — D68.61 ANTIPHOSPHOLIPID SYNDROME: Primary | ICD-10-CM

## 2024-03-12 LAB — INR PPP: 3.3

## 2024-03-12 PROCEDURE — 93793 ANTICOAG MGMT PT WARFARIN: CPT | Mod: S$GLB,,,

## 2024-03-12 NOTE — PROGRESS NOTES
Result received from [facility].  INR remains therapeutic at 3.3.  No change in current dose - continue warfarin 5 mg every Mon, Wed, Fri; and 7.5 mg on all other days.  Retest INR next Tuesday, 3/19/2024.

## 2024-03-19 ENCOUNTER — ANTI-COAG VISIT (OUTPATIENT)
Dept: CARDIOLOGY | Facility: CLINIC | Age: 59
End: 2024-03-19
Payer: MEDICARE

## 2024-03-19 DIAGNOSIS — I70.208 BRACHIAL ARTERY OCCLUSION, RIGHT: ICD-10-CM

## 2024-03-19 DIAGNOSIS — D68.61 ANTIPHOSPHOLIPID SYNDROME: Primary | ICD-10-CM

## 2024-03-19 LAB — INR PPP: 2.6

## 2024-03-19 PROCEDURE — 93793 ANTICOAG MGMT PT WARFARIN: CPT | Mod: S$GLB,,,

## 2024-03-19 NOTE — PROGRESS NOTES
[facility].  INR at goal-2.6. Medications and chart reviewed. No changes noted to necessitate adjustment of warfarin or follow-up plan. See calendar.  Pt remain on current warfarin dose.  Repeat INR in 1 week.

## 2024-03-26 ENCOUNTER — ANTI-COAG VISIT (OUTPATIENT)
Dept: CARDIOLOGY | Facility: CLINIC | Age: 59
End: 2024-03-26
Payer: MEDICARE

## 2024-03-26 DIAGNOSIS — D68.61 ANTIPHOSPHOLIPID SYNDROME: Primary | ICD-10-CM

## 2024-03-26 DIAGNOSIS — I70.208 BRACHIAL ARTERY OCCLUSION, RIGHT: ICD-10-CM

## 2024-03-26 LAB — INR PPP: 3

## 2024-03-26 PROCEDURE — 93793 ANTICOAG MGMT PT WARFARIN: CPT | Mod: S$GLB,,,

## 2024-03-26 NOTE — PROGRESS NOTES
[facility].  INR at goal-3.0. Medications and chart reviewed. No changes noted to necessitate adjustment of warfarin or follow-up plan. See calendar.  Will remain on warfarin 5 mg on MWF and 7.5 mg on all other days.  Repeat INR in 1 week.

## 2024-04-02 ENCOUNTER — ANTI-COAG VISIT (OUTPATIENT)
Dept: CARDIOLOGY | Facility: CLINIC | Age: 59
End: 2024-04-02
Payer: MEDICARE

## 2024-04-02 DIAGNOSIS — D68.61 ANTIPHOSPHOLIPID SYNDROME: Primary | ICD-10-CM

## 2024-04-02 DIAGNOSIS — I70.208 BRACHIAL ARTERY OCCLUSION, RIGHT: ICD-10-CM

## 2024-04-02 DIAGNOSIS — Z79.01 LONG TERM (CURRENT) USE OF ANTICOAGULANTS: ICD-10-CM

## 2024-04-02 LAB — INR PPP: 3.6

## 2024-04-02 PROCEDURE — 93793 ANTICOAG MGMT PT WARFARIN: CPT | Mod: S$GLB,,,

## 2024-04-02 NOTE — PROGRESS NOTES
Fax result received from [facility].  INR: 3.6 - just above goal.  Patient contacted.  Reports no recent changes or bleeding issues.  Confirm current warfarin dose and followed - will continue same dose of 5 mg every Mon, Wed, Fri; and 7.5 mg on all other days.  Retest INR next Tuesday, 4/09/2024.  Patient confirms understanding.

## 2024-04-09 ENCOUNTER — ANTI-COAG VISIT (OUTPATIENT)
Dept: CARDIOLOGY | Facility: CLINIC | Age: 59
End: 2024-04-09
Payer: MEDICARE

## 2024-04-09 DIAGNOSIS — I70.208 BRACHIAL ARTERY OCCLUSION, RIGHT: ICD-10-CM

## 2024-04-09 DIAGNOSIS — Z79.01 LONG TERM (CURRENT) USE OF ANTICOAGULANTS: ICD-10-CM

## 2024-04-09 DIAGNOSIS — D68.61 ANTIPHOSPHOLIPID SYNDROME: Primary | ICD-10-CM

## 2024-04-09 LAB — INR PPP: 3.4

## 2024-04-09 PROCEDURE — 93793 ANTICOAG MGMT PT WARFARIN: CPT | Mod: S$GLB,,,

## 2024-04-09 NOTE — PROGRESS NOTES
[facility] fax received.  INR: 3.4 - therapeutic.  No change in warfarin dose - continue 5 mg every Mon, Wed, Fri; and 7.5 mg all other days as directed.  Retest INR weekly.

## 2024-04-12 ENCOUNTER — PATIENT MESSAGE (OUTPATIENT)
Dept: INTERNAL MEDICINE | Facility: CLINIC | Age: 59
End: 2024-04-12
Payer: MEDICARE

## 2024-04-16 ENCOUNTER — ANTI-COAG VISIT (OUTPATIENT)
Dept: CARDIOLOGY | Facility: CLINIC | Age: 59
End: 2024-04-16
Payer: MEDICARE

## 2024-04-16 DIAGNOSIS — I70.208 BRACHIAL ARTERY OCCLUSION, RIGHT: ICD-10-CM

## 2024-04-16 DIAGNOSIS — D68.61 ANTIPHOSPHOLIPID SYNDROME: Primary | ICD-10-CM

## 2024-04-16 LAB — INR PPP: 3.6

## 2024-04-16 PROCEDURE — 93793 ANTICOAG MGMT PT WARFARIN: CPT | Mod: S$GLB,,,

## 2024-04-16 NOTE — PROGRESS NOTES
[facility].  INR not at goal-3.6. Medications, chart, and patient findings reviewed. See calendar for adjustments to dose and follow up plan.  Will ask patient to eat a small portion of greens today and continue warfarin dose.  Patient contacted, reports no changes.  Patient was advised and will retest next week.

## 2024-04-23 ENCOUNTER — ANTI-COAG VISIT (OUTPATIENT)
Dept: CARDIOLOGY | Facility: CLINIC | Age: 59
End: 2024-04-23
Payer: MEDICARE

## 2024-04-23 DIAGNOSIS — I70.208 BRACHIAL ARTERY OCCLUSION, RIGHT: ICD-10-CM

## 2024-04-23 DIAGNOSIS — D68.61 ANTIPHOSPHOLIPID SYNDROME: Primary | ICD-10-CM

## 2024-04-23 LAB — INR PPP: 2

## 2024-04-23 PROCEDURE — 93793 ANTICOAG MGMT PT WARFARIN: CPT | Mod: S$GLB,,,

## 2024-04-23 NOTE — PROGRESS NOTES
[facility].  INR not at goal-2.0. Medications, chart, and patient findings reviewed. See calendar for adjustments to dose and follow up plan.  LVM for pt to check portal system.  Left dosing for her to boost 10 mg today then resume 5 mg on MWF and 7.5 mg on all other days.  Recheck in 1 week.

## 2024-04-30 ENCOUNTER — ANTI-COAG VISIT (OUTPATIENT)
Dept: CARDIOLOGY | Facility: CLINIC | Age: 59
End: 2024-04-30
Payer: MEDICARE

## 2024-04-30 DIAGNOSIS — D68.61 ANTIPHOSPHOLIPID SYNDROME: Primary | ICD-10-CM

## 2024-04-30 DIAGNOSIS — I70.208 BRACHIAL ARTERY OCCLUSION, RIGHT: ICD-10-CM

## 2024-04-30 LAB — INR PPP: 2.4

## 2024-04-30 PROCEDURE — 93793 ANTICOAG MGMT PT WARFARIN: CPT | Mod: S$GLB,,,

## 2024-04-30 NOTE — PROGRESS NOTES
[facility].  INR not at goal-2.4, just under and much improved. Medications, chart, and patient findings reviewed. See calendar for adjustments to dose and follow up plan.  Pt was sent portal message to continue dose of 5 mg every Mon, Wed, Fri; 7.5 mg all other days.  Repeat INR in 1 week.

## 2024-05-07 ENCOUNTER — ANTI-COAG VISIT (OUTPATIENT)
Dept: CARDIOLOGY | Facility: CLINIC | Age: 59
End: 2024-05-07
Payer: MEDICARE

## 2024-05-07 DIAGNOSIS — I70.208 BRACHIAL ARTERY OCCLUSION, RIGHT: ICD-10-CM

## 2024-05-07 DIAGNOSIS — D68.61 ANTIPHOSPHOLIPID SYNDROME: Primary | ICD-10-CM

## 2024-05-07 LAB — INR PPP: 2.7

## 2024-05-07 PROCEDURE — 93793 ANTICOAG MGMT PT WARFARIN: CPT | Mod: S$GLB,,,

## 2024-05-07 NOTE — PROGRESS NOTES
[facility].  INR at goal-2.7, INR has corrected. Medications and chart reviewed. No changes noted to necessitate adjustment of warfarin or follow-up plan. See calendar.  Pt will remain on 5 mg on MWF and 7.5 mg on all other days.  Repeat INR in 1 week.

## 2024-05-14 ENCOUNTER — ANTI-COAG VISIT (OUTPATIENT)
Dept: CARDIOLOGY | Facility: CLINIC | Age: 59
End: 2024-05-14
Payer: MEDICARE

## 2024-05-14 DIAGNOSIS — D68.61 ANTIPHOSPHOLIPID SYNDROME: Primary | ICD-10-CM

## 2024-05-14 DIAGNOSIS — I70.208 BRACHIAL ARTERY OCCLUSION, RIGHT: ICD-10-CM

## 2024-05-14 LAB — INR PPP: 3

## 2024-05-14 PROCEDURE — 93793 ANTICOAG MGMT PT WARFARIN: CPT | Mod: S$GLB,,,

## 2024-05-14 NOTE — PROGRESS NOTES
[facility].  INR at goal-3.0.  Medications and chart reviewed. No changes noted to necessitate adjustment of warfarin or follow-up plan. See calendar.  Pt will remain on 5 mg on MWF and 7.5 mg on all other days.  Repeat INR in 1 week.     Clindamycin Pregnancy And Lactation Text: This medication can be used in pregnancy if certain situations. Clindamycin is also present in breast milk.

## 2024-05-21 ENCOUNTER — ANTI-COAG VISIT (OUTPATIENT)
Dept: CARDIOLOGY | Facility: CLINIC | Age: 59
End: 2024-05-21
Payer: MEDICARE

## 2024-05-21 DIAGNOSIS — D68.61 ANTIPHOSPHOLIPID SYNDROME: Primary | ICD-10-CM

## 2024-05-21 DIAGNOSIS — Z79.01 LONG TERM (CURRENT) USE OF ANTICOAGULANTS: ICD-10-CM

## 2024-05-21 DIAGNOSIS — I70.208 BRACHIAL ARTERY OCCLUSION, RIGHT: ICD-10-CM

## 2024-05-21 LAB — INR PPP: 2.6

## 2024-05-21 PROCEDURE — 93793 ANTICOAG MGMT PT WARFARIN: CPT | Mod: S$GLB,,,

## 2024-05-21 NOTE — PROGRESS NOTES
[facility] fax result received.  INR: 2.6 - therapeutic.  No change in dose - continue warfarin 5 mg every Mon, Wed, Fri; and 7.5 mg all other days.  Retest INR weekly.

## 2024-05-28 ENCOUNTER — ANTI-COAG VISIT (OUTPATIENT)
Dept: CARDIOLOGY | Facility: CLINIC | Age: 59
End: 2024-05-28
Payer: MEDICARE

## 2024-05-28 DIAGNOSIS — D68.61 ANTIPHOSPHOLIPID SYNDROME: Primary | ICD-10-CM

## 2024-05-28 DIAGNOSIS — I70.208 BRACHIAL ARTERY OCCLUSION, RIGHT: ICD-10-CM

## 2024-05-28 DIAGNOSIS — Z79.01 LONG TERM (CURRENT) USE OF ANTICOAGULANTS: ICD-10-CM

## 2024-05-28 LAB — INR PPP: 2.9

## 2024-05-28 PROCEDURE — 93793 ANTICOAG MGMT PT WARFARIN: CPT | Mod: S$GLB,,,

## 2024-05-28 NOTE — PROGRESS NOTES
[facility] fax result received.  INR: 2.9 - remains therapeutic.  No change in dose - continue warfarin 5 mg every Mon, Wed, Fri; and 7.5 mg all other days.  Retest INR weekly.

## 2024-06-04 ENCOUNTER — ANTI-COAG VISIT (OUTPATIENT)
Dept: CARDIOLOGY | Facility: CLINIC | Age: 59
End: 2024-06-04
Payer: MEDICARE

## 2024-06-04 DIAGNOSIS — Z79.01 LONG TERM (CURRENT) USE OF ANTICOAGULANTS: ICD-10-CM

## 2024-06-04 DIAGNOSIS — D68.61 ANTIPHOSPHOLIPID SYNDROME: Primary | ICD-10-CM

## 2024-06-04 DIAGNOSIS — I70.208 BRACHIAL ARTERY OCCLUSION, RIGHT: ICD-10-CM

## 2024-06-04 DIAGNOSIS — D68.61 ANTIPHOSPHOLIPID SYNDROME: ICD-10-CM

## 2024-06-04 DIAGNOSIS — N39.41 URGE INCONTINENCE: ICD-10-CM

## 2024-06-04 LAB — INR PPP: 2.9

## 2024-06-04 PROCEDURE — 93793 ANTICOAG MGMT PT WARFARIN: CPT | Mod: S$GLB,,,

## 2024-06-04 RX ORDER — WARFARIN SODIUM 5 MG/1
7.5 TABLET ORAL DAILY
Qty: 50 TABLET | Refills: 5 | Status: SHIPPED | OUTPATIENT
Start: 2024-06-04

## 2024-06-04 RX ORDER — OXYBUTYNIN CHLORIDE 15 MG/1
15 TABLET, EXTENDED RELEASE ORAL DAILY
Qty: 90 TABLET | Refills: 1 | Status: SHIPPED | OUTPATIENT
Start: 2024-06-04 | End: 2025-06-04

## 2024-06-04 NOTE — PROGRESS NOTES
[facility] fax result received.  INR remains therapeutic at 2.9.  No change in dose - continue warfarin 5 mg every Mon, Wed, Fri; and 7.5 mg all other days.  Retest INR weekly.

## 2024-06-11 ENCOUNTER — ANTI-COAG VISIT (OUTPATIENT)
Dept: CARDIOLOGY | Facility: CLINIC | Age: 59
End: 2024-06-11
Payer: MEDICARE

## 2024-06-11 DIAGNOSIS — Z79.01 LONG TERM (CURRENT) USE OF ANTICOAGULANTS: ICD-10-CM

## 2024-06-11 DIAGNOSIS — D68.61 ANTIPHOSPHOLIPID SYNDROME: Primary | ICD-10-CM

## 2024-06-11 DIAGNOSIS — I70.208 BRACHIAL ARTERY OCCLUSION, RIGHT: ICD-10-CM

## 2024-06-11 LAB — INR PPP: 2.2

## 2024-06-11 PROCEDURE — 93793 ANTICOAG MGMT PT WARFARIN: CPT | Mod: S$GLB,,,

## 2024-06-11 NOTE — PROGRESS NOTES
Fax result received from [facility] [employer] [facility].  INR: 2.2 - below goal.  Patient contacted - reports a possible missed dose on Thursday, 6/06.  No dietary changes or s/s reported.  Instructions given:  Will boost today's dose to 10 mg, then resume current dose of 5 mg every Mon, Wed, Fri; and 7.5 mg all other days.  ED for any s/s of clotting/stroke.  Retest INR next Tuesday, 6/18/2024.  Patient voices understanding of instructions given.

## 2024-06-18 ENCOUNTER — ANTI-COAG VISIT (OUTPATIENT)
Dept: CARDIOLOGY | Facility: CLINIC | Age: 59
End: 2024-06-18
Payer: MEDICARE

## 2024-06-18 DIAGNOSIS — I70.208 BRACHIAL ARTERY OCCLUSION, RIGHT: ICD-10-CM

## 2024-06-18 DIAGNOSIS — D68.61 ANTIPHOSPHOLIPID SYNDROME: Primary | ICD-10-CM

## 2024-06-18 LAB — INR PPP: 2.5

## 2024-06-18 PROCEDURE — 93793 ANTICOAG MGMT PT WARFARIN: CPT | Mod: S$GLB,,,

## 2024-06-18 NOTE — PROGRESS NOTES
[facility].  INR at goal-2.5, back in goal range. Medications and chart reviewed. No changes noted to necessitate adjustment of warfarin or follow-up plan. See calendar.  Pt will remain on 5 mg every Mon, Wed, Fri; 7.5 mg all other days.  Repeat INR in 1 week.

## 2024-06-25 ENCOUNTER — ANTI-COAG VISIT (OUTPATIENT)
Dept: CARDIOLOGY | Facility: CLINIC | Age: 59
End: 2024-06-25
Payer: MEDICARE

## 2024-06-25 DIAGNOSIS — I70.208 BRACHIAL ARTERY OCCLUSION, RIGHT: ICD-10-CM

## 2024-06-25 DIAGNOSIS — D68.61 ANTIPHOSPHOLIPID SYNDROME: Primary | ICD-10-CM

## 2024-06-25 LAB — INR PPP: 2.6

## 2024-06-25 PROCEDURE — 93793 ANTICOAG MGMT PT WARFARIN: CPT | Mod: S$GLB,,,

## 2024-06-25 NOTE — PROGRESS NOTES
[facility].  INR at goal-2.6. Medications and chart reviewed. No changes noted to necessitate adjustment of warfarin or follow-up plan. See calendar.  Pt will remain on 5 mg on MWF and 7.5 mg on all other days.  Repeat INR In 1 week.

## 2024-07-02 ENCOUNTER — ANTI-COAG VISIT (OUTPATIENT)
Dept: CARDIOLOGY | Facility: CLINIC | Age: 59
End: 2024-07-02
Payer: MEDICARE

## 2024-07-02 DIAGNOSIS — D68.61 ANTIPHOSPHOLIPID SYNDROME: Primary | ICD-10-CM

## 2024-07-02 DIAGNOSIS — I70.208 BRACHIAL ARTERY OCCLUSION, RIGHT: ICD-10-CM

## 2024-07-02 LAB — INR PPP: 2.3

## 2024-07-02 PROCEDURE — 93793 ANTICOAG MGMT PT WARFARIN: CPT | Mod: S$GLB,,,

## 2024-07-02 NOTE — PROGRESS NOTES
[facility].  INR not at goal-2.3. Medications, chart, and patient findings reviewed. See calendar for adjustments to dose and follow up plan.  No changes, no new medications, INR has been trending trav without missed doses.  We will boost with 10 mg today then consider dose increase next week if INR is <3.0.  Pt voices understanding.

## 2024-07-09 ENCOUNTER — ANTI-COAG VISIT (OUTPATIENT)
Dept: CARDIOLOGY | Facility: CLINIC | Age: 59
End: 2024-07-09
Payer: MEDICARE

## 2024-07-09 DIAGNOSIS — Z79.01 LONG TERM (CURRENT) USE OF ANTICOAGULANTS: ICD-10-CM

## 2024-07-09 DIAGNOSIS — I70.208 BRACHIAL ARTERY OCCLUSION, RIGHT: ICD-10-CM

## 2024-07-09 DIAGNOSIS — D68.61 ANTIPHOSPHOLIPID SYNDROME: Primary | ICD-10-CM

## 2024-07-09 LAB — INR PPP: 3

## 2024-07-09 PROCEDURE — 93793 ANTICOAG MGMT PT WARFARIN: CPT | Mod: S$GLB,,,

## 2024-07-09 NOTE — PROGRESS NOTES
Fax result received from [facility].  INR: 3.0 - at goal.  No change in dose - continue warfarin 5 mg every Mon, Wed, Fri; and 7.5 mg all other days as directed.  Retest INR weekly.

## 2024-07-12 ENCOUNTER — LAB VISIT (OUTPATIENT)
Dept: LAB | Facility: HOSPITAL | Age: 59
End: 2024-07-12
Attending: PHYSICIAN ASSISTANT
Payer: MEDICARE

## 2024-07-12 ENCOUNTER — OFFICE VISIT (OUTPATIENT)
Dept: INTERNAL MEDICINE | Facility: CLINIC | Age: 59
End: 2024-07-12
Payer: MEDICARE

## 2024-07-12 VITALS
WEIGHT: 240.5 LBS | HEIGHT: 61 IN | DIASTOLIC BLOOD PRESSURE: 84 MMHG | HEART RATE: 101 BPM | OXYGEN SATURATION: 97 % | BODY MASS INDEX: 45.41 KG/M2 | SYSTOLIC BLOOD PRESSURE: 136 MMHG | TEMPERATURE: 99 F

## 2024-07-12 DIAGNOSIS — Z79.899 MEDICATION MANAGEMENT: ICD-10-CM

## 2024-07-12 DIAGNOSIS — I10 ESSENTIAL HYPERTENSION: ICD-10-CM

## 2024-07-12 DIAGNOSIS — I10 ESSENTIAL HYPERTENSION: Primary | ICD-10-CM

## 2024-07-12 DIAGNOSIS — Z12.11 SCREEN FOR COLON CANCER: ICD-10-CM

## 2024-07-12 PROCEDURE — 99999 PR PBB SHADOW E&M-EST. PATIENT-LVL V: CPT | Mod: PBBFAC,,, | Performed by: PHYSICIAN ASSISTANT

## 2024-07-12 PROCEDURE — 83036 HEMOGLOBIN GLYCOSYLATED A1C: CPT | Performed by: PHYSICIAN ASSISTANT

## 2024-07-12 PROCEDURE — 80061 LIPID PANEL: CPT | Performed by: PHYSICIAN ASSISTANT

## 2024-07-12 PROCEDURE — 85025 COMPLETE CBC W/AUTO DIFF WBC: CPT | Performed by: PHYSICIAN ASSISTANT

## 2024-07-12 PROCEDURE — 36415 COLL VENOUS BLD VENIPUNCTURE: CPT | Performed by: PHYSICIAN ASSISTANT

## 2024-07-12 PROCEDURE — 80053 COMPREHEN METABOLIC PANEL: CPT | Performed by: PHYSICIAN ASSISTANT

## 2024-07-12 NOTE — PROGRESS NOTES
"Subjective:      Patient ID: Jimena Pierce is a 58 y.o. female.    Chief Complaint: Follow-up    Patient is known to me, being seen today for follow up.     HTN- controlled without medication per patient but admits does not check often     Due for routine labs     Last visit Dec 2023 w myself.       Review of Systems   Constitutional:  Negative for chills, diaphoresis and fever.   HENT:  Negative for congestion, rhinorrhea and sore throat.    Respiratory:  Negative for cough, shortness of breath and wheezing.    Cardiovascular:  Positive for leg swelling (R lymphedema, chronic).   Gastrointestinal:  Negative for abdominal pain, constipation, diarrhea, nausea and vomiting.   Skin:  Negative for rash.   Neurological:  Negative for dizziness, light-headedness and headaches.       Objective:   /84   Pulse 101   Temp 98.8 °F (37.1 °C) (Temporal)   Ht 5' 1" (1.549 m)   Wt 109.1 kg (240 lb 8.4 oz)   LMP 02/23/2016   SpO2 97%   BMI 45.45 kg/m²   Physical Exam  Constitutional:       General: She is not in acute distress.     Appearance: Normal appearance. She is well-developed. She is not ill-appearing.   HENT:      Head: Normocephalic and atraumatic.   Cardiovascular:      Rate and Rhythm: Normal rate and regular rhythm.      Heart sounds: Normal heart sounds. No murmur heard.  Pulmonary:      Effort: Pulmonary effort is normal. No respiratory distress.      Breath sounds: Normal breath sounds. No decreased breath sounds.   Musculoskeletal:      Right lower leg: Swelling present. No edema.      Left lower leg: No swelling. No edema.   Skin:     General: Skin is warm and dry.      Findings: No rash.   Psychiatric:         Speech: Speech normal.         Behavior: Behavior normal.         Thought Content: Thought content normal.       Assessment:      1. Essential hypertension    2. Medication management    3. Screen for colon cancer       Plan:   Essential hypertension  -     CBC Auto Differential; " Future; Expected date: 07/12/2024  -     Comprehensive Metabolic Panel; Future; Expected date: 07/12/2024  -     Lipid Panel; Future; Expected date: 07/12/2024    Medication management  -     Hemoglobin A1C; Future; Expected date: 07/12/2024    Screen for colon cancer  -     Ambulatory referral/consult to Endo Procedure ; Future; Expected date: 07/13/2024      Discussed needed vaccines     Patient to schedule GYN appt     Waiting on mgmt of anti-coagulation before scheduling colonoscopy     Patient reports she had tiny sip of soft drink and is hard to get back for labs     Fasting labs     6mth f/u PCP

## 2024-07-13 LAB
ALBUMIN SERPL BCP-MCNC: 4.1 G/DL (ref 3.5–5.2)
ALP SERPL-CCNC: 86 U/L (ref 55–135)
ALT SERPL W/O P-5'-P-CCNC: 27 U/L (ref 10–44)
ANION GAP SERPL CALC-SCNC: 12 MMOL/L (ref 8–16)
AST SERPL-CCNC: 18 U/L (ref 10–40)
BASOPHILS # BLD AUTO: 0.05 K/UL (ref 0–0.2)
BASOPHILS NFR BLD: 0.5 % (ref 0–1.9)
BILIRUB SERPL-MCNC: 0.5 MG/DL (ref 0.1–1)
BUN SERPL-MCNC: 13 MG/DL (ref 6–20)
CALCIUM SERPL-MCNC: 9.7 MG/DL (ref 8.7–10.5)
CHLORIDE SERPL-SCNC: 108 MMOL/L (ref 95–110)
CHOLEST SERPL-MCNC: 223 MG/DL (ref 120–199)
CHOLEST/HDLC SERPL: 3.7 {RATIO} (ref 2–5)
CO2 SERPL-SCNC: 21 MMOL/L (ref 23–29)
CREAT SERPL-MCNC: 0.8 MG/DL (ref 0.5–1.4)
DIFFERENTIAL METHOD BLD: ABNORMAL
EOSINOPHIL # BLD AUTO: 0.1 K/UL (ref 0–0.5)
EOSINOPHIL NFR BLD: 0.5 % (ref 0–8)
ERYTHROCYTE [DISTWIDTH] IN BLOOD BY AUTOMATED COUNT: 12.8 % (ref 11.5–14.5)
EST. GFR  (NO RACE VARIABLE): >60 ML/MIN/1.73 M^2
ESTIMATED AVG GLUCOSE: 103 MG/DL (ref 68–131)
GLUCOSE SERPL-MCNC: 85 MG/DL (ref 70–110)
HBA1C MFR BLD: 5.2 % (ref 4–5.6)
HCT VFR BLD AUTO: 49 % (ref 37–48.5)
HDLC SERPL-MCNC: 61 MG/DL (ref 40–75)
HDLC SERPL: 27.4 % (ref 20–50)
HGB BLD-MCNC: 15.3 G/DL (ref 12–16)
IMM GRANULOCYTES # BLD AUTO: 0.02 K/UL (ref 0–0.04)
IMM GRANULOCYTES NFR BLD AUTO: 0.2 % (ref 0–0.5)
LDLC SERPL CALC-MCNC: 135 MG/DL (ref 63–159)
LYMPHOCYTES # BLD AUTO: 1.8 K/UL (ref 1–4.8)
LYMPHOCYTES NFR BLD: 18.8 % (ref 18–48)
MCH RBC QN AUTO: 29 PG (ref 27–31)
MCHC RBC AUTO-ENTMCNC: 31.2 G/DL (ref 32–36)
MCV RBC AUTO: 93 FL (ref 82–98)
MONOCYTES # BLD AUTO: 0.6 K/UL (ref 0.3–1)
MONOCYTES NFR BLD: 6.2 % (ref 4–15)
NEUTROPHILS # BLD AUTO: 7.1 K/UL (ref 1.8–7.7)
NEUTROPHILS NFR BLD: 73.8 % (ref 38–73)
NONHDLC SERPL-MCNC: 162 MG/DL
NRBC BLD-RTO: 0 /100 WBC
PLATELET # BLD AUTO: 233 K/UL (ref 150–450)
PMV BLD AUTO: 12.9 FL (ref 9.2–12.9)
POTASSIUM SERPL-SCNC: 4 MMOL/L (ref 3.5–5.1)
PROT SERPL-MCNC: 7.8 G/DL (ref 6–8.4)
RBC # BLD AUTO: 5.27 M/UL (ref 4–5.4)
SODIUM SERPL-SCNC: 141 MMOL/L (ref 136–145)
TRIGL SERPL-MCNC: 135 MG/DL (ref 30–150)
WBC # BLD AUTO: 9.66 K/UL (ref 3.9–12.7)

## 2024-07-16 ENCOUNTER — ANTI-COAG VISIT (OUTPATIENT)
Dept: CARDIOLOGY | Facility: CLINIC | Age: 59
End: 2024-07-16
Payer: MEDICARE

## 2024-07-16 DIAGNOSIS — I70.208 BRACHIAL ARTERY OCCLUSION, RIGHT: ICD-10-CM

## 2024-07-16 DIAGNOSIS — D68.61 ANTIPHOSPHOLIPID SYNDROME: Primary | ICD-10-CM

## 2024-07-16 LAB — INR PPP: 3

## 2024-07-16 PROCEDURE — 93793 ANTICOAG MGMT PT WARFARIN: CPT | Mod: S$GLB,,,

## 2024-07-16 NOTE — PROGRESS NOTES
[facility].  INR at goal-3.0. Medications and chart reviewed. No changes noted to necessitate adjustment of warfarin or follow-up plan. See calendar.

## 2024-07-23 ENCOUNTER — ANTI-COAG VISIT (OUTPATIENT)
Dept: CARDIOLOGY | Facility: CLINIC | Age: 59
End: 2024-07-23
Payer: MEDICARE

## 2024-07-23 DIAGNOSIS — I70.208 BRACHIAL ARTERY OCCLUSION, RIGHT: ICD-10-CM

## 2024-07-23 DIAGNOSIS — Z79.01 LONG TERM (CURRENT) USE OF ANTICOAGULANTS: ICD-10-CM

## 2024-07-23 DIAGNOSIS — D68.61 ANTIPHOSPHOLIPID SYNDROME: Primary | ICD-10-CM

## 2024-07-23 LAB — INR PPP: 2.9

## 2024-07-23 PROCEDURE — 93793 ANTICOAG MGMT PT WARFARIN: CPT | Mod: S$GLB,,,

## 2024-07-31 ENCOUNTER — TELEPHONE (OUTPATIENT)
Dept: CARDIOLOGY | Facility: CLINIC | Age: 59
End: 2024-07-31
Payer: MEDICARE

## 2024-07-31 NOTE — TELEPHONE ENCOUNTER
Patient contacted and reminded to test INR that was due 7/30/24. Patient reports she is out of test strips and expects to receive shipment by 8/1/24.

## 2024-08-05 ENCOUNTER — LAB VISIT (OUTPATIENT)
Dept: LAB | Facility: HOSPITAL | Age: 59
End: 2024-08-05
Attending: INTERNAL MEDICINE
Payer: MEDICARE

## 2024-08-05 DIAGNOSIS — Z79.01 LONG TERM (CURRENT) USE OF ANTICOAGULANTS: ICD-10-CM

## 2024-08-05 DIAGNOSIS — Z79.01 LONG TERM (CURRENT) USE OF ANTICOAGULANTS: Primary | ICD-10-CM

## 2024-08-05 LAB
INR PPP: 2 (ref 0.8–1.2)
PROTHROMBIN TIME: 21.2 SEC (ref 9–12.5)

## 2024-08-05 PROCEDURE — 85610 PROTHROMBIN TIME: CPT | Performed by: INTERNAL MEDICINE

## 2024-08-05 PROCEDURE — 36415 COLL VENOUS BLD VENIPUNCTURE: CPT | Mod: PO | Performed by: INTERNAL MEDICINE

## 2024-08-06 ENCOUNTER — ANTI-COAG VISIT (OUTPATIENT)
Dept: CARDIOLOGY | Facility: CLINIC | Age: 59
End: 2024-08-06
Payer: MEDICARE

## 2024-08-06 DIAGNOSIS — Z79.01 LONG TERM (CURRENT) USE OF ANTICOAGULANTS: ICD-10-CM

## 2024-08-06 DIAGNOSIS — I70.208 BRACHIAL ARTERY OCCLUSION, RIGHT: ICD-10-CM

## 2024-08-06 DIAGNOSIS — D68.61 ANTIPHOSPHOLIPID SYNDROME: Primary | ICD-10-CM

## 2024-08-06 PROCEDURE — 93793 ANTICOAG MGMT PT WARFARIN: CPT | Mod: S$GLB,,,

## 2024-08-16 ENCOUNTER — PATIENT MESSAGE (OUTPATIENT)
Dept: CARDIOLOGY | Facility: CLINIC | Age: 59
End: 2024-08-16
Payer: MEDICARE

## 2024-08-21 ENCOUNTER — TELEPHONE (OUTPATIENT)
Dept: CARDIOLOGY | Facility: CLINIC | Age: 59
End: 2024-08-21
Payer: MEDICARE

## 2024-08-22 LAB — INR PPP: 3.5

## 2024-08-23 ENCOUNTER — ANTI-COAG VISIT (OUTPATIENT)
Dept: CARDIOLOGY | Facility: CLINIC | Age: 59
End: 2024-08-23
Payer: MEDICARE

## 2024-08-23 DIAGNOSIS — D68.61 ANTIPHOSPHOLIPID SYNDROME: Primary | ICD-10-CM

## 2024-08-23 DIAGNOSIS — I70.208 BRACHIAL ARTERY OCCLUSION, RIGHT: ICD-10-CM

## 2024-08-23 DIAGNOSIS — Z79.01 LONG TERM (CURRENT) USE OF ANTICOAGULANTS: ICD-10-CM

## 2024-08-23 NOTE — PROGRESS NOTES
Fax result received from [facility].  INR: 3.5 - therapeutic.  Test date: 8/22/2024.  Continue 5 mg every Mon, Wed, Fri; and 7.5 mg all other days.  Retest INR weekly.

## 2024-08-29 ENCOUNTER — ANTI-COAG VISIT (OUTPATIENT)
Dept: CARDIOLOGY | Facility: CLINIC | Age: 59
End: 2024-08-29
Payer: MEDICARE

## 2024-08-29 DIAGNOSIS — I70.208 BRACHIAL ARTERY OCCLUSION, RIGHT: ICD-10-CM

## 2024-08-29 DIAGNOSIS — K21.9 CHRONIC GERD: ICD-10-CM

## 2024-08-29 DIAGNOSIS — D68.61 ANTIPHOSPHOLIPID SYNDROME: Primary | ICD-10-CM

## 2024-08-29 LAB — INR PPP: 2.3

## 2024-08-29 PROCEDURE — 93793 ANTICOAG MGMT PT WARFARIN: CPT | Mod: S$GLB,,,

## 2024-08-29 NOTE — PROGRESS NOTES
[facility].  INR not at goal-2.3. Medications, chart, and patient findings reviewed. See calendar for adjustments to dose and follow up plan.  LVM and sent portal message.  Boost with 10 mg today then resume 5 mg on MWF and 7.5 mg AODs.  Repeat INR in 1 week.

## 2024-08-30 RX ORDER — FAMOTIDINE 40 MG/1
40 TABLET, FILM COATED ORAL DAILY
Qty: 90 TABLET | Refills: 0 | OUTPATIENT
Start: 2024-08-30

## 2024-08-30 NOTE — TELEPHONE ENCOUNTER
Care Due:                  Date            Visit Type   Department     Provider  --------------------------------------------------------------------------------                                EP -                              PRIMARY      [facility] INTERNAL  Last Visit: 05-      CARE ([facility])   AKHIL Aleman                              EP -                              PRIMARY      [facility] INTERNAL  Next Visit: 01-      CARE ([facility])   AKHIL Aleman                                                            Last  Test          Frequency    Reason                     Performed    Due Date  --------------------------------------------------------------------------------    Office Visit  15 months..  famotidine...............  05- 08-    [facility] Embedded Care Due Messages. Reference number: [id number].   8/29/2024 11:16:44 PM CDT

## 2024-08-30 NOTE — TELEPHONE ENCOUNTER
Refill Routing Note   Medication(s) are not appropriate for processing by [facility] for the following reason(s):        Responsible provider unclear    [facility] action(s):  Defer      Medication Therapy Plan: FOVS      Appointments  past 12m or future 3m with PCP    Date Provider   Last Visit   5/11/2023 Baylee Aleman DO   Next Visit   1/15/2025 Baylee Aleman DO   ED visits in past 90 days: 0        Note composed:10:15 AM 08/30/2024

## 2024-09-04 ENCOUNTER — PATIENT MESSAGE (OUTPATIENT)
Dept: INTERNAL MEDICINE | Facility: CLINIC | Age: 59
End: 2024-09-04
Payer: MEDICARE

## 2024-09-04 DIAGNOSIS — K21.9 CHRONIC GERD: ICD-10-CM

## 2024-09-05 ENCOUNTER — ANTI-COAG VISIT (OUTPATIENT)
Dept: CARDIOLOGY | Facility: CLINIC | Age: 59
End: 2024-09-05
Payer: MEDICARE

## 2024-09-05 DIAGNOSIS — Z79.01 LONG TERM (CURRENT) USE OF ANTICOAGULANTS: ICD-10-CM

## 2024-09-05 DIAGNOSIS — D68.61 ANTIPHOSPHOLIPID SYNDROME: Primary | ICD-10-CM

## 2024-09-05 DIAGNOSIS — I70.208 BRACHIAL ARTERY OCCLUSION, RIGHT: ICD-10-CM

## 2024-09-05 LAB — INR PPP: 3.2

## 2024-09-05 PROCEDURE — 93793 ANTICOAG MGMT PT WARFARIN: CPT | Mod: S$GLB,,,

## 2024-09-05 RX ORDER — FAMOTIDINE 40 MG/1
40 TABLET, FILM COATED ORAL DAILY
Qty: 90 TABLET | Refills: 1 | Status: SHIPPED | OUTPATIENT
Start: 2024-09-05 | End: 2025-09-05

## 2024-09-05 NOTE — TELEPHONE ENCOUNTER
No care due was identified.  [facility] Embedded Care Due Messages. Reference number: [id number].   9/05/2024 8:14:51 AM CDT

## 2024-09-05 NOTE — PROGRESS NOTES
Fax result received from [facility].  INR: 3.2 - in therapeutic goal range.  No change in dose - continue warfarin 5 mg every Mon, Wed, Fri; and 7.5 mg all other days.  Retest INR weekly.

## 2024-09-12 ENCOUNTER — ANTI-COAG VISIT (OUTPATIENT)
Dept: CARDIOLOGY | Facility: CLINIC | Age: 59
End: 2024-09-12
Payer: MEDICARE

## 2024-09-12 DIAGNOSIS — I70.208 BRACHIAL ARTERY OCCLUSION, RIGHT: ICD-10-CM

## 2024-09-12 DIAGNOSIS — D68.61 ANTIPHOSPHOLIPID SYNDROME: Primary | ICD-10-CM

## 2024-09-12 LAB — INR PPP: 3.1

## 2024-09-12 PROCEDURE — 93793 ANTICOAG MGMT PT WARFARIN: CPT | Mod: S$GLB,,,

## 2024-09-12 NOTE — PROGRESS NOTES
[facility].  INR at goal-3.1. Medications and chart reviewed. No changes noted to necessitate adjustment of warfarin or follow-up plan. See calendar.  Pt will remain on current dose.  Repeat INR in 1 week.

## 2024-09-19 ENCOUNTER — ANTI-COAG VISIT (OUTPATIENT)
Dept: CARDIOLOGY | Facility: CLINIC | Age: 59
End: 2024-09-19
Payer: MEDICARE

## 2024-09-19 DIAGNOSIS — D68.61 ANTIPHOSPHOLIPID SYNDROME: Primary | ICD-10-CM

## 2024-09-19 DIAGNOSIS — Z79.01 LONG TERM (CURRENT) USE OF ANTICOAGULANTS: ICD-10-CM

## 2024-09-19 DIAGNOSIS — I70.208 BRACHIAL ARTERY OCCLUSION, RIGHT: ICD-10-CM

## 2024-09-19 LAB — INR PPP: 3

## 2024-09-19 PROCEDURE — 93793 ANTICOAG MGMT PT WARFARIN: CPT | Mod: S$GLB,,,

## 2024-09-19 NOTE — PROGRESS NOTES
[facility] fax result received.  INR: 3.0 - at goal.  Continue warfarin 5 mg every Mon, Wed, Fri; and 7.5 mg all other days.  Retest INR weekly.

## 2024-09-26 ENCOUNTER — ANTI-COAG VISIT (OUTPATIENT)
Dept: CARDIOLOGY | Facility: CLINIC | Age: 59
End: 2024-09-26
Payer: MEDICARE

## 2024-09-26 DIAGNOSIS — I70.208 BRACHIAL ARTERY OCCLUSION, RIGHT: ICD-10-CM

## 2024-09-26 DIAGNOSIS — Z79.01 LONG TERM (CURRENT) USE OF ANTICOAGULANTS: ICD-10-CM

## 2024-09-26 DIAGNOSIS — D68.61 ANTIPHOSPHOLIPID SYNDROME: Primary | ICD-10-CM

## 2024-09-26 LAB — INR PPP: 3

## 2024-09-26 PROCEDURE — 93793 ANTICOAG MGMT PT WARFARIN: CPT | Mod: S$GLB,,,

## 2024-09-26 NOTE — PROGRESS NOTES
[facility] fax result received.  INR remains therapeutic at 3.0.  No change in dose - continue warfarin 5 mg every Mon, Wed, Fri; and 7.5 mg all other days.  Retest INR weekly.

## 2024-10-04 ENCOUNTER — PATIENT MESSAGE (OUTPATIENT)
Dept: CARDIOLOGY | Facility: CLINIC | Age: 59
End: 2024-10-04
Payer: MEDICARE

## 2024-10-10 ENCOUNTER — ANTI-COAG VISIT (OUTPATIENT)
Dept: CARDIOLOGY | Facility: CLINIC | Age: 59
End: 2024-10-10
Payer: MEDICARE

## 2024-10-10 DIAGNOSIS — D68.61 ANTIPHOSPHOLIPID SYNDROME: Primary | ICD-10-CM

## 2024-10-10 DIAGNOSIS — I70.208 BRACHIAL ARTERY OCCLUSION, RIGHT: ICD-10-CM

## 2024-10-10 LAB — INR PPP: 3.8

## 2024-10-10 PROCEDURE — 93793 ANTICOAG MGMT PT WARFARIN: CPT | Mod: S$GLB,,,

## 2024-10-10 NOTE — PROGRESS NOTES
[facility].INR not at goal-3.8. Medications, chart, and patient findings reviewed. See calendar for adjustments to dose and follow up plan.  Test strips are in.  Pt reports no changes in diet or medications.  Lower dose to 5 mg today then resume per calendar.  Repeat INR in 1 week.

## 2024-10-17 ENCOUNTER — ANTI-COAG VISIT (OUTPATIENT)
Dept: CARDIOLOGY | Facility: CLINIC | Age: 59
End: 2024-10-17
Payer: MEDICARE

## 2024-10-17 DIAGNOSIS — D68.61 ANTIPHOSPHOLIPID SYNDROME: Primary | ICD-10-CM

## 2024-10-17 DIAGNOSIS — I70.208 BRACHIAL ARTERY OCCLUSION, RIGHT: ICD-10-CM

## 2024-10-17 LAB — INR PPP: 2.6

## 2024-10-17 PROCEDURE — 93793 ANTICOAG MGMT PT WARFARIN: CPT | Mod: S$GLB,,,

## 2024-10-17 NOTE — PROGRESS NOTES
[facility].  INR at goal-2.6. Medications and chart reviewed. No changes noted to necessitate adjustment of warfarin or follow-up plan. See calendar.  Pt will remain owarfarin 5 mg every Mon, Wed, Fri; 7.5 mg all other days .  Repeat INR in 1 week.  Portal message sent.

## 2024-10-24 ENCOUNTER — PATIENT OUTREACH (OUTPATIENT)
Dept: ADMINISTRATIVE | Facility: HOSPITAL | Age: 59
End: 2024-10-24
Payer: MEDICARE

## 2024-10-24 ENCOUNTER — ANTI-COAG VISIT (OUTPATIENT)
Dept: CARDIOLOGY | Facility: CLINIC | Age: 59
End: 2024-10-24
Payer: MEDICARE

## 2024-10-24 DIAGNOSIS — I70.208 BRACHIAL ARTERY OCCLUSION, RIGHT: ICD-10-CM

## 2024-10-24 DIAGNOSIS — D68.61 ANTIPHOSPHOLIPID SYNDROME: Primary | ICD-10-CM

## 2024-10-24 LAB — INR PPP: 2.6

## 2024-10-24 NOTE — PROGRESS NOTES
Working colonoscopy report; I called pt to verify if interested in scheduling colonoscopy; LVM for patient to call and schedule procedure. Active referral order in chart. Portal message sent.

## 2024-10-24 NOTE — PROGRESS NOTES
[facility].  INR at goal-2.6. Medications and chart reviewed. No changes noted to necessitate adjustment of warfarin or follow-up plan. See calendar.  Pt will remain on current dose.  Repeat INR in 1 week.

## 2024-10-31 ENCOUNTER — ANTI-COAG VISIT (OUTPATIENT)
Dept: CARDIOLOGY | Facility: CLINIC | Age: 59
End: 2024-10-31
Payer: MEDICARE

## 2024-10-31 DIAGNOSIS — Z79.01 LONG TERM (CURRENT) USE OF ANTICOAGULANTS: ICD-10-CM

## 2024-10-31 DIAGNOSIS — I70.208 BRACHIAL ARTERY OCCLUSION, RIGHT: ICD-10-CM

## 2024-10-31 DIAGNOSIS — D68.61 ANTIPHOSPHOLIPID SYNDROME: Primary | ICD-10-CM

## 2024-10-31 LAB — INR PPP: 2.7

## 2024-10-31 PROCEDURE — 93793 ANTICOAG MGMT PT WARFARIN: CPT | Mod: S$GLB,,,

## 2024-11-07 ENCOUNTER — ANTI-COAG VISIT (OUTPATIENT)
Dept: CARDIOLOGY | Facility: CLINIC | Age: 59
End: 2024-11-07
Payer: MEDICARE

## 2024-11-07 DIAGNOSIS — D68.61 ANTIPHOSPHOLIPID SYNDROME: Primary | ICD-10-CM

## 2024-11-07 DIAGNOSIS — I70.208 BRACHIAL ARTERY OCCLUSION, RIGHT: ICD-10-CM

## 2024-11-07 LAB — INR PPP: 2.7

## 2024-11-07 NOTE — PROGRESS NOTES
Fax received from [facility]. INR is therapeutic at 2.7. No changes reported. Instructions: Continue warfarin 5 mg on Fridays, Mondays and Wednesdays; and 7.5 mg all other days. Recheck in one week.

## 2024-11-14 ENCOUNTER — ANTI-COAG VISIT (OUTPATIENT)
Dept: CARDIOLOGY | Facility: CLINIC | Age: 59
End: 2024-11-14
Payer: MEDICARE

## 2024-11-14 DIAGNOSIS — D68.61 ANTIPHOSPHOLIPID SYNDROME: Primary | ICD-10-CM

## 2024-11-14 DIAGNOSIS — I70.208 BRACHIAL ARTERY OCCLUSION, RIGHT: ICD-10-CM

## 2024-11-14 LAB — INR PPP: 3.2

## 2024-11-22 ENCOUNTER — PATIENT MESSAGE (OUTPATIENT)
Dept: CARDIOLOGY | Facility: CLINIC | Age: 59
End: 2024-11-22
Payer: MEDICARE

## 2024-11-22 DIAGNOSIS — N39.41 URGE INCONTINENCE: ICD-10-CM

## 2024-11-25 ENCOUNTER — HOSPITAL ENCOUNTER (OUTPATIENT)
Dept: PREADMISSION TESTING | Facility: HOSPITAL | Age: 59
Discharge: HOME | End: 2024-11-25
Attending: COLON & RECTAL SURGERY
Payer: MEDICARE

## 2024-11-25 ENCOUNTER — ANTI-COAG VISIT (OUTPATIENT)
Dept: CARDIOLOGY | Facility: CLINIC | Age: 59
End: 2024-11-25
Payer: MEDICARE

## 2024-11-25 VITALS — WEIGHT: 240.5 LBS | HEIGHT: 61 IN | BODY MASS INDEX: 45.41 KG/M2

## 2024-11-25 DIAGNOSIS — I70.208 BRACHIAL ARTERY OCCLUSION, RIGHT: ICD-10-CM

## 2024-11-25 DIAGNOSIS — D68.61 ANTIPHOSPHOLIPID SYNDROME: Primary | ICD-10-CM

## 2024-11-25 DIAGNOSIS — Z12.11 SCREEN FOR COLON CANCER: ICD-10-CM

## 2024-11-25 LAB — INR PPP: 2.5

## 2024-11-25 PROCEDURE — 93793 ANTICOAG MGMT PT WARFARIN: CPT | Mod: S$GLB,,,

## 2024-11-25 RX ORDER — OXYBUTYNIN CHLORIDE 15 MG/1
15 TABLET, EXTENDED RELEASE ORAL DAILY
Qty: 90 TABLET | Refills: 3 | Status: SHIPPED | OUTPATIENT
Start: 2024-11-25 | End: 2025-11-25

## 2024-11-25 NOTE — PROGRESS NOTES
[facility].  INR at goal-2.5. Medications and chart reviewed. No changes noted to necessitate adjustment of warfarin or follow-up plan. See calendar.  t tested late as she had run out of strips.  Pt will have upcoming c-scope.  We will need her date of procedure to assist with Lovenox bridging.  Pt contacted. Asked her to contact the [facility] as soon as she has a date.  We will return to regular Thursday testing schedule for now.

## 2024-11-27 ENCOUNTER — HOSPITAL ENCOUNTER (OUTPATIENT)
Dept: PREADMISSION TESTING | Facility: HOSPITAL | Age: 59
Discharge: HOME | End: 2024-11-27
Attending: COLON & RECTAL SURGERY
Payer: MEDICARE

## 2024-11-27 DIAGNOSIS — Z12.11 SCREEN FOR COLON CANCER: Primary | ICD-10-CM

## 2024-11-27 NOTE — PROGRESS NOTES
[facility].  INR at goal-3.2. Medications and chart reviewed. No changes noted to necessitate adjustment of warfarin or follow-up plan. See calendar.  Pt will remain on current warfarin dose.  Repeat INR in 1 week.     Chief complaint:   Chief Complaint   Patient presents with    fatigue       Vitals:  Visit Vitals  /88   Pulse 81   Temp 97.9 °F (36.6 °C) (Temporal)   Resp 18   Wt 97.5 kg (215 lb)   SpO2 98%   BMI 30.85 kg/m²       HISTORY OF PRESENT ILLNESS     History of bronchitis, typically prescribed zpak per patient.      URI   This is a new problem. The current episode started in the past 7 days. Associated symptoms include congestion and coughing. Pertinent negatives include no ear pain, sore throat or wheezing. Treatments tried: mucinex dm. The treatment provided mild relief.       Other significant problems:  Patient Active Problem List    Diagnosis Date Noted    Mild nonproliferative diabetic retinopathy of both eyes without macular edema associated with type 2 diabetes mellitus  (CMD) 09/08/2023     Priority: Low    Myopia of both eyes with astigmatism and presbyopia 06/08/2020     Priority: Low    Posterior vitreous detachment of left eye 06/08/2020     Priority: Low    Dyslipidemia 06/08/2018     Priority: Low    Essential hypertension, benign 06/08/2018     Priority: Low    Osteoarthritis left knee, tricompartmental w/mjsn, moderate 05/17/2018     Priority: Low    Strain common flexor muscles & tendons, right elbow/foream - ind doi 1/23/18 01/23/2018     Priority: Low    Hypotestosteronemia 09/02/2014     Priority: Low    S/P arthroscopic partial medial meniscectomy, left knee 7/15/11 07/15/2011     Priority: Low    Erectile dysfunction 07/06/2011     Priority: Low    Family history of amyotrophic lateral sclerosis 06/21/2010     Priority: Low     One brother and two paternal uncles affected. (2 of 5 brothers)         PAST MEDICAL, FAMILY AND SOCIAL HISTORY     Medications:  Current Outpatient Medications   Medication Sig Dispense Refill    glyBURIDE (DIABETA) 5 MG tablet TAKE 2 TABLETS TWICE A DAY WITH MEALS 360 tablet 3    omeprazole (PriLOSEC) 40 MG capsule TAKE 1 CAPSULE BY MOUTH DAILY 90 capsule 1     sildenafil (VIAGRA) 100 MG tablet Take 1 tablet by mouth as needed for Erectile Dysfunction. 30 tablet 0    Semaglutide (Rybelsus) 7 MG Tab TAKE 1 TABLET BY MOUTH DAILY 90 tablet 1    metFORMIN (GLUCOPHAGE-XR) 500 MG 24 hr tablet TAKE 2 TABLETS TWICE A  tablet 3    amLODIPine (NORVASC) 10 MG tablet TAKE 1 TABLET DAILY 90 tablet 3    albuterol 108 (90 Base) MCG/ACT inhaler Inhale 2 puffs into the lungs every 4 hours as needed for Wheezing. 1 each 0    testosterone cypionate (DEPO-TESTOSTERONE) 200 MG/ML injectable solution Inject 0.5 mLs into the muscle every 14 days. 10 mL 3    Syringe 22G X 1\" 3 ML Misc Use one syringe two times per month 24 each 0    testosterone cypionate (DEPO-TESTOSTERONE) 100 MG/ML injectable solution INJECT 1 ML IN THE MUSCLE EVERY 14 DAYS 1 mL 5    BD Plastipak Syringe 21G X 1\" 3 ML Misc USE 1 DAY A WEEK 10 each 1    electrolyte/PEG 3350 (NULYTELY) 420 g solution Take as directed with the written prep instructions from your MD's office. 4000 mL 0    DISPENSE Diabetic glucose meter with strips and lancets.  Brand as per insurance preference. To check blood glucose BiD 1 each 0    Glucos-Chondroit-Hyaluron-MSM (GLUCOSAMINE CHONDROITIN JOINT PO) Take 1.5 g/day by mouth daily. Indications: Pain in Many Joints Osteo Biflex triple strength 2 tablets/day      Chromium-Cinnamon (Cinnamon Plus Chromium) 100-500 MCG-MG Cap Take 1 tablet by mouth daily. Indications: Diabetes [id number], chonium 400mcg, D3 500IU      simvastatin (ZOCOR) 20 MG tablet TAKE 1 TABLET BY MOUTH EVERY NIGHT 30 tablet 0    blood glucose (ONE TOUCH ULTRA TEST) test strip Use one daily for testing 100 strip 1    Omega-3 Fatty Acids (FISH OIL) 1000 MG capsule Take 2 g by mouth daily.      Zinc 100 MG Tab Take by mouth daily.      VITAMIN E PO Take 400 Units by mouth daily.       B Complex Vitamins (B-COMPLEX/B-12 PO) Take 2 tablets by mouth daily.       Ascorbic Acid (VITAMIN C) 100 MG tablet Take 100 mg by mouth daily.       LANCETS ULTRA FINE MISC 1 Container daily. 100 each 0     No current facility-administered medications for this visit.       Allergies:  ALLERGIES:   Allergen Reactions    Hydrocodone-Acetaminophen Other (See Comments)     Night terrors, hallucinations     Birch  RASH    Cat Dander RASH    Dust Mite Extract RASH    Ibuprofen Other (See Comments)     cough    Lisinopril SWELLING       Past Medical  History/Surgeries:  Past Medical History:   Diagnosis Date    Arthritis     Chronic back pain     Diabetes mellitus  (CMD)     Erectile dysfunction 2011    Hypertension     Impaired fasting glucose 10/29/2008    Low testosterone     Mild nonproliferative diabetic retinopathy of both eyes without macular edema associated with type 2 diabetes mellitus  (CMD) 2023    Other and unspecified hyperlipidemia     Polyp of colon 2015    tubular adenoma Dr Aguilera 5 yr reacll     STRAIN SHLDR, RT -IND DOI 2011    Traumatic rupture right distal biceps tendon - ind doi 2018    Tubular adenoma 2022       Past Surgical History:   Procedure Laterality Date    Appendectomy      Arthroscopy knee medial or lat  07/15/2011    left knee    Colonoscopy remove lesions by snare  9/25/15 recall due 2020    Dr Aguilera    Colonoscopy w biopsy  2022    Dr Mitchell, tubular adenoma, 5 year recall    Lipoma resection Right 2018    Excision of right chest wall lipoma (Dr. Ayon)    Mastectomy partial      right Breast Lumpectomy    Past surgical history  1989    pilonidal cystectomy       Family History:  Family History   Problem Relation Age of Onset    Hypertension Mother     Diabetes Mother         type 2    Other Father         hyperlipidemia, ALS    Diabetes Father         type 2    Heart Maternal Grandmother         sudden death    Alcohol Abuse Maternal Grandfather     Diabetes Paternal Grandmother     Neurological Disorder Paternal Uncle          ALS at 34    Neurological  Disorder Paternal Uncle          ALS at 59    Neurological Disorder Brother          ALS at 49    Hypertension Brother         DM       Social History:  Social History     Tobacco Use    Smoking status: Never    Smokeless tobacco: Never   Substance Use Topics    Alcohol use: Yes     Comment: 2 drinks /mo       REVIEW OF SYSTEMS     Review of Systems   Constitutional:  Negative for fever.   HENT:  Positive for congestion and postnasal drip. Negative for ear discharge, ear pain and sore throat.    Eyes:  Negative for discharge and redness.   Respiratory:  Positive for cough. Negative for shortness of breath and wheezing.        PHYSICAL EXAM     Physical Exam  Vitals and nursing note reviewed.   Constitutional:       General: He is not in acute distress.     Appearance: He is well-developed. He is not diaphoretic.   HENT:      Head: Normocephalic and atraumatic.      Right Ear: Tympanic membrane, ear canal and external ear normal. No drainage, swelling or tenderness. Tympanic membrane is not perforated or erythematous.      Left Ear: Tympanic membrane, ear canal and external ear normal. No drainage, swelling or tenderness. Tympanic membrane is not perforated or erythematous.      Nose: Rhinorrhea present.      Right Sinus: No maxillary sinus tenderness or frontal sinus tenderness.      Left Sinus: No maxillary sinus tenderness or frontal sinus tenderness.      Mouth/Throat:      Pharynx: Uvula midline. No pharyngeal swelling, oropharyngeal exudate or posterior oropharyngeal erythema.      Tonsils: No tonsillar exudate.      Neck: Normal range of motion and neck supple.   Eyes:      General:         Right eye: No discharge.         Left eye: No discharge.      Extraocular Movements:      Right eye: Normal extraocular motion.      Left eye: Normal extraocular motion.      Conjunctiva/sclera: Conjunctivae normal.      Right eye: Right conjunctiva is not injected. No exudate.     Left eye: Left conjunctiva is not  injected. No exudate.     Pupils: Pupils are equal, round, and reactive to light. Pupils are equal.      Right eye: Pupil is round, reactive and not sluggish.      Left eye: Pupil is round, reactive and not sluggish.   Cardiovascular:      Rate and Rhythm: Normal rate and regular rhythm.   Pulmonary:      Effort: Pulmonary effort is normal. No respiratory distress.      Breath sounds: Normal breath sounds. No decreased breath sounds, wheezing, rhonchi or rales.   Lymphadenopathy:      Cervical: No cervical adenopathy.   Skin:     General: Skin is warm and dry.      Findings: No rash.   Neurological:      Mental Status: He is alert and oriented to person, place, and time.      GCS: GCS eye subscore is 4. GCS verbal subscore is 5. GCS motor subscore is 6.   Psychiatric:         Behavior: Behavior normal.         Thought Content: Thought content normal.         Judgment: Judgment normal.         ASSESSMENT/PLAN     Tonny was seen today for fatigue.    Diagnoses and all orders for this visit:    Acute URI    Other orders  -     albuterol 108 (90 Base) MCG/ACT inhaler; Inhale 2 puffs into the lungs every 4 hours as needed for Wheezing or Shortness of Breath (chest tightness, persistent cough.).  -     azithromycin (ZITHROMAX) 250 MG tablet; Take 2 tablets by mouth daily for 1 day, THEN 1 tablet daily for 4 days.      How to use inhaler reviewed.  To do wait and see approach.  When to start and how to take reviewed. Normal course of illness reviewed. Comfort care measures, over the counter treatments, when to follow up and seek emergency care; all reviewed with patient.   After visit summary reviewed with patient.  Tammy L Schladweiler, NP

## 2024-12-02 RX ORDER — SODIUM, POTASSIUM,MAG SULFATES 17.5-3.13G
1 SOLUTION, RECONSTITUTED, ORAL ORAL DAILY
Qty: 354 ML | Refills: 0 | Status: SHIPPED | OUTPATIENT
Start: 2024-12-02 | End: 2024-12-08

## 2024-12-04 ENCOUNTER — TELEPHONE (OUTPATIENT)
Dept: PREADMISSION TESTING | Facility: HOSPITAL | Age: 59
End: 2024-12-04
Payer: MEDICARE

## 2024-12-04 NOTE — TELEPHONE ENCOUNTER
----- Message from Baylee Aleman DO sent at 12/4/2024  1:42 PM CST -----  Regarding: RE: Coumadin hold clearance for 12/12 Colonoscopy  Okay.  ----- Message -----  From: Serenity Rdz RN  Sent: 12/4/2024   8:49 AM CST  To: Baylee Aleman DO; Romana Alvarado PharmD  Subject: Coumadin hold clearance for 12/12 Colonoscopy    Dr. Aleman,  This patient is scheduled for a Colonoscopy 12/12/2024. We need clearance from her prescribing provide that it is ok to hold her Coumadin for her procedure. We normally require to hold 5 days. However, I see that she is monitored by Roamna Alvarado, PharmD who is aware of upcoming procedure for Coumadin monitoring and said she will arrange Lovenox bridging.   Please advise.  Thank you,   Serenity Rdz RN  Endoscopy Scheduling/Pre-admit Department

## 2024-12-04 NOTE — TELEPHONE ENCOUNTER
Dr. Aleman,  This patient is scheduled for a Colonoscopy 12/12/2024. We need clearance from her prescribing provide that it is ok to hold her Coumadin for her procedure. We normally require to hold 5 days. However, I see that she is monitored by Romana Alvarado, [place] who is aware of upcoming procedure for Coumadin monitoring and said she will arrange Lovenox bridging.   Please advise.  Thank you,   Serenity Rdz RN  Endoscopy Scheduling/Pre-admit Department

## 2024-12-05 ENCOUNTER — ANTI-COAG VISIT (OUTPATIENT)
Dept: CARDIOLOGY | Facility: CLINIC | Age: 59
End: 2024-12-05
Payer: MEDICARE

## 2024-12-05 DIAGNOSIS — I70.208 BRACHIAL ARTERY OCCLUSION, RIGHT: ICD-10-CM

## 2024-12-05 DIAGNOSIS — D68.61 ANTIPHOSPHOLIPID SYNDROME: ICD-10-CM

## 2024-12-05 DIAGNOSIS — Z79.01 LONG TERM (CURRENT) USE OF ANTICOAGULANTS: Primary | ICD-10-CM

## 2024-12-05 LAB — INR PPP: 2.5

## 2024-12-05 RX ORDER — ENOXAPARIN SODIUM 100 MG/ML
100 INJECTION SUBCUTANEOUS EVERY 12 HOURS
Qty: 20 ML | Refills: 0 | Status: SHIPPED | OUTPATIENT
Start: 2024-12-05

## 2024-12-05 NOTE — PROGRESS NOTES
[facility].  INR at goal-2.5. Medications and chart reviewed. No changes noted to necessitate adjustment of warfarin or follow-up plan. See calendar.  C-scope planned on 12/12/24.  Pt confirms that c-scope will take place 12/12/24.  She will continue warfarin as prescribed until Saturday.  Pt contacted and advised, will send portal directions.  Lovenox Rx sent to [facility] [place] [place].  Cr and CBC reviewed and are stable.   kg - rounded down to 100 mg syringe.  Pt reports polyps removed last c-scope, we will not resume warfarin until the following day.  If procedural MD advised another anticoagulation plan, please resume per MD and contact us at the [facility].    Lovenox Dose= 100 mg every 12 hours  Colonoscopy 12/12/24  Hold coumadin per calendar; resume dose per calendar UNLESS performing MD advises otherwise.     Pre-Procedure Instructions:  Start enoxaparin injections the evening of 12/8/24  Enoxaparin dose is: 100 mg every 12 hours (Twice Daily)  Last dose of enoxaparin will be the morning of  12/11/24 (AM ONLY   *24 hours prior to the procedure)                         Post-Procedure Instructions:     Restart warfarin PM    12/13/24          At a dose of    Per calendar                          Unless directed otherwise by your physician     Restart lovenox injections on the morning of   12/13/24   Unless directed otherwise by your physician     *Call the coumadin clinic your physician has different recommendations post procedure

## 2024-12-06 ENCOUNTER — TELEPHONE (OUTPATIENT)
Dept: PREADMISSION TESTING | Facility: HOSPITAL | Age: 59
End: 2024-12-06
Payer: MEDICARE

## 2024-12-06 NOTE — TELEPHONE ENCOUNTER
----- Message from Pharmacist Romana sent at 12/5/2024  1:32 PM CST -----  Regarding: RE: Coumadin hold clearance for 12/12 Colonoscopy  Pt has been advised on holding with Lovenox dosing.Rx sent.  Thank you  Romana Alvarado PharmD  Coumadin clinic  ----- Message -----  From: Serenity Rdz RN  Sent: 12/4/2024   8:49 AM CST  To: Baylee Aleman DO; Romana Alvarado PharmD  Subject: Coumadin hold clearance for 12/12 Colonoscopy    Dr. Aleman,  This patient is scheduled for a Colonoscopy 12/12/2024. We need clearance from her prescribing provide that it is ok to hold her Coumadin for her procedure. We normally require to hold 5 days. However, I see that she is monitored by Romana Alvarado, [place] who is aware of upcoming procedure for Coumadin monitoring and said she will arrange Lovenox bridging.   Please advise.  Thank you,   Serenity Rdz RN  Endoscopy Scheduling/Pre-admit Department

## 2024-12-12 ENCOUNTER — ANESTHESIA EVENT (OUTPATIENT)
Dept: ENDOSCOPY | Facility: HOSPITAL | Age: 59
End: 2024-12-12
Payer: MEDICARE

## 2024-12-12 ENCOUNTER — HOSPITAL ENCOUNTER (OUTPATIENT)
Facility: HOSPITAL | Age: 59
Discharge: HOME | End: 2024-12-12
Attending: INTERNAL MEDICINE | Admitting: INTERNAL MEDICINE
Payer: MEDICARE

## 2024-12-12 ENCOUNTER — ANESTHESIA (OUTPATIENT)
Dept: ENDOSCOPY | Facility: HOSPITAL | Age: 59
End: 2024-12-12
Payer: MEDICARE

## 2024-12-12 VITALS
OXYGEN SATURATION: 94 % | SYSTOLIC BLOOD PRESSURE: 142 MMHG | TEMPERATURE: 98 F | DIASTOLIC BLOOD PRESSURE: 78 MMHG | WEIGHT: 250 LBS | BODY MASS INDEX: 46.01 KG/M2 | RESPIRATION RATE: 16 BRPM | HEART RATE: 76 BPM | HEIGHT: 62 IN

## 2024-12-12 DIAGNOSIS — Z86.0100 HISTORY OF COLONIC POLYPS: Primary | ICD-10-CM

## 2024-12-12 LAB
INR PPP: 1 (ref 0.8–1.2)
PROTHROMBIN TIME: 11.4 SEC (ref 9–12.5)

## 2024-12-12 PROCEDURE — 85610 PROTHROMBIN TIME: CPT | Performed by: INTERNAL MEDICINE

## 2024-12-12 PROCEDURE — 37000009 HC ANESTHESIA EA ADD 15 MINS: Performed by: INTERNAL MEDICINE

## 2024-12-12 PROCEDURE — 88305 TISSUE EXAM BY PATHOLOGIST: CPT | Performed by: PATHOLOGY

## 2024-12-12 PROCEDURE — 45380 COLONOSCOPY AND BIOPSY: CPT | Mod: PT | Performed by: INTERNAL MEDICINE

## 2024-12-12 PROCEDURE — 63600175 PHARM REV CODE 636 W HCPCS: Performed by: NURSE ANESTHETIST, CERTIFIED REGISTERED

## 2024-12-12 PROCEDURE — 88305 TISSUE EXAM BY PATHOLOGIST: CPT | Mod: 26,,, | Performed by: PATHOLOGY

## 2024-12-12 PROCEDURE — 45380 COLONOSCOPY AND BIOPSY: CPT | Mod: PT,,, | Performed by: INTERNAL MEDICINE

## 2024-12-12 PROCEDURE — 27201012 HC FORCEPS, HOT/COLD, DISP: Performed by: INTERNAL MEDICINE

## 2024-12-12 PROCEDURE — 37000008 HC ANESTHESIA 1ST 15 MINUTES: Performed by: INTERNAL MEDICINE

## 2024-12-12 PROCEDURE — 25000003 PHARM REV CODE 250: Performed by: INTERNAL MEDICINE

## 2024-12-12 PROCEDURE — A4216 STERILE WATER/SALINE, 10 ML: HCPCS | Performed by: NURSE ANESTHETIST, CERTIFIED REGISTERED

## 2024-12-12 PROCEDURE — 25000003 PHARM REV CODE 250: Performed by: NURSE ANESTHETIST, CERTIFIED REGISTERED

## 2024-12-12 RX ORDER — SODIUM CHLORIDE 0.9 % (FLUSH) 0.9 %
SYRINGE (ML) INJECTION
Status: DISCONTINUED | OUTPATIENT
Start: 2024-12-12 | End: 2024-12-12

## 2024-12-12 RX ORDER — PROPOFOL 10 MG/ML
VIAL (ML) INTRAVENOUS
Status: DISCONTINUED | OUTPATIENT
Start: 2024-12-12 | End: 2024-12-12

## 2024-12-12 RX ORDER — DEXTROMETHORPHAN/PSEUDOEPHED 2.5-7.5/.8
DROPS ORAL
Status: COMPLETED | OUTPATIENT
Start: 2024-12-12 | End: 2024-12-12

## 2024-12-12 RX ORDER — LIDOCAINE HYDROCHLORIDE 10 MG/ML
INJECTION, SOLUTION EPIDURAL; INFILTRATION; INTRACAUDAL; PERINEURAL
Status: DISCONTINUED | OUTPATIENT
Start: 2024-12-12 | End: 2024-12-12

## 2024-12-12 RX ADMIN — PROPOFOL 40 MG: 10 INJECTION, EMULSION INTRAVENOUS at 01:12

## 2024-12-12 RX ADMIN — SODIUM CHLORIDE 10 ML: 9 INJECTION INTRAMUSCULAR; INTRAVENOUS; SUBCUTANEOUS at 01:12

## 2024-12-12 RX ADMIN — LIDOCAINE HYDROCHLORIDE 50 MG: 10 SOLUTION INTRAVENOUS at 01:12

## 2024-12-12 RX ADMIN — PROPOFOL 50 MG: 10 INJECTION, EMULSION INTRAVENOUS at 01:12

## 2024-12-12 NOTE — ANESTHESIA POSTPROCEDURE EVALUATION
Anesthesia Post Evaluation    Patient: Jimena Pierce    Procedure(s) Performed: Procedure(s) (LRB):  COLONOSCOPY, SCREENING, LOW RISK PATIENT (N/A)    Final Anesthesia Type: MAC      Patient location during evaluation: GI PACU  Patient participation: Yes- Able to Participate  Level of consciousness: awake and alert  Post-procedure vital signs: reviewed and stable  Pain management: adequate  Airway patency: patent    PONV status at discharge: No PONV  Anesthetic complications: no      Cardiovascular status: blood pressure returned to baseline, hemodynamically stable and stable  Respiratory status: unassisted, room air and spontaneous ventilation  Hydration status: euvolemic  Follow-up not needed.              Vitals Value Taken Time   /61 12/12/24 1405   Temp 36.4 °C (97.5 °F) 12/12/24 1405   Pulse 84 12/12/24 1405   Resp 16 12/12/24 1405   SpO2 96 % 12/12/24 1405         No case tracking events are documented in the log.      Pain/Messi Score: Messi Score: 10 (12/12/2024  2:05 PM)

## 2024-12-12 NOTE — [employer] PATIENT INSTRUCTIONS
Discharge Summary/Instructions after an Endoscopic Procedure  Patient Name: Jimena Pierce  Patient MRN: [id number]  Patient YOB: 1965 Thursday, December 12, 2024 Jocelyn Vásquez MD  Dear patient,  As a result of recent federal legislation (The Federal Cures Act), you may   receive lab or pathology results from your procedure in your [facility]   account before your physician is able to contact you. Your physician or   their representative will relay the results to you with their   recommendations at their soonest availability.  Thank you,  RESTRICTIONS:  During your procedure today, you received medications for sedation.  These   medications may affect your judgment, balance and coordination.  Therefore,   for 24 hours, you have the following restrictions:   - DO NOT drive a car, operate machinery, make legal/financial decisions,   sign important papers or drink alcohol.    ACTIVITY:  Today: no heavy lifting, straining or running due to procedural   sedation/anesthesia.  The following day: return to full activity including work.  DIET:  Eat and drink normally unless instructed otherwise.     TREATMENT FOR COMMON SIDE EFFECTS:  - Mild abdominal pain, nausea, belching, bloating or excessive gas:  rest,   eat lightly and use a heating pad.  - Sore Throat: treat with throat lozenges and/or gargle with warm salt   water.  - Because air was used during the procedure, expelling large amounts of air   from your rectum or belching is normal.  - If a bowel prep was taken, you may not have a bowel movement for 1-3 days.    This is normal.  SYMPTOMS TO WATCH FOR AND REPORT TO YOUR PHYSICIAN:  1. Abdominal pain or bloating, other than gas cramps.  2. Chest pain.  3. Back pain.  4. Signs of infection such as: chills or fever occurring within 24 hours   after the procedure.  5. Rectal bleeding, which would show as bright red, maroon, or black stools.   (A tablespoon of blood from the rectum is not serious,  especially if   hemorrhoids are present.)  6. Vomiting.  7. Weakness or dizziness.  GO DIRECTLY TO THE NEAREST EMERGENCY ROOM IF YOU HAVE ANY OF THE FOLLOWING:      Difficulty breathing              Chills and/or fever over 101 F   Persistent vomiting and/or vomiting blood   Severe abdominal pain   Severe chest pain   Black, tarry stools   Bleeding- more than one tablespoon   Any other symptom or condition that you feel may need urgent attention  Your doctor recommends these additional instructions:  If any biopsies were taken, your doctors clinic will contact you in 1 to 2   weeks with any results.  - Discharge patient to home (via wheelchair).   - High fiber diet.   - Continue present medications.   - Await pathology results.   - Repeat colonoscopy in 5 years for surveillance.   - Patient has a contact number available for emergencies.  The signs and   symptoms of potential delayed complications were discussed with the   patient.  Return to normal activities tomorrow.  Written discharge   instructions were provided to the patient.  For questions, problems or results please call your physician Jocelyn Vásquez MD at Work:  [phone]  If you have any questions about the above instructions, call the GI   department at [phone] or call the endoscopy unit at [phone]   from 7am until 3 pm.  [facility] [place], EMERGENCY ROOM PHONE NUMBER:   [phone]  IF A COMPLICATION OR EMERGENCY SITUATION ARISES AND YOU ARE UNABLE TO REACH   YOUR PHYSICIAN - GO DIRECTLY TO THE EMERGENCY ROOM.  I have read or have had read to me these discharge instructions for my   procedure and have received a written copy.  I understand these   instructions and will follow-up with my physician if I have any questions.     __________________________________       _____________________________________  Nurse Signature                                          Patient/Designated   Responsible Party  Signature  MD Jocelyn Garcia MD  12/12/2024 2:02:10 PM  [employer]

## 2024-12-12 NOTE — DISCHARGE SUMMARY
[facility] - Endoscopy (Hospital)  Discharge Note  Short Stay    Procedure(s) (LRB):  COLONOSCOPY, SCREENING, LOW RISK PATIENT (N/A)      OUTCOME: Patient tolerated treatment/procedure well without complication and is now ready for discharge.    DISPOSITION: Home or Self Care    FINAL DIAGNOSIS:  History of colonic polyps    FOLLOWUP: With primary care provider    DISCHARGE INSTRUCTIONS:  No discharge procedures on file.      Clinical Reference Documents Added to Patient Instructions         Document    COLON POLYPECTOMY (ENGLISH)    DIVERTICULOSIS (ENGLISH)

## 2024-12-12 NOTE — TRANSFER OF CARE
"Anesthesia Transfer of Care Note    Patient: Jimena Pierce    Procedure(s) Performed: Procedure(s) (LRB):  COLONOSCOPY, SCREENING, LOW RISK PATIENT (N/A)    Patient location: GI    Anesthesia Type: MAC    Transport from OR: Transported from OR on room air with adequate spontaneous ventilation    Post pain: adequate analgesia    Post assessment: no apparent anesthetic complications and tolerated procedure well    Post vital signs: stable    Level of consciousness: responds to stimulation    Nausea/Vomiting: no nausea/vomiting    Complications: none    Transfer of care protocol was followed      Last vitals: Visit Vitals  /61 (BP Location: Left arm, Patient Position: Lying)   Pulse 84   Temp 36.4 °C (97.5 °F) (Temporal)   Resp 16   Ht 5' 2" (1.575 m)   Wt 113.4 kg (250 lb)   LMP 02/23/2016   SpO2 96%   Breastfeeding No   BMI 45.73 kg/m²     "

## 2024-12-12 NOTE — H&P
Short Stay Endoscopy History and Physical    PCP - Baylee Aleman DO    Procedure - Colonoscopy  ASA - per anesthesia  Mallampati - per anesthesia  History of Anesthesia problems - no  Family history Anesthesia problems -  no     HPI:  This is a 59 y.o. female here for evaluation of :   Active Hospital Problems    Diagnosis  POA    *History of colonic polyps [Z86.0100]  Not Applicable      Resolved Hospital Problems   No resolved problems to display.         Health Maintenance         Date Due Completion Date    TETANUS VACCINE Never done ---    Sign Pain Contract Never done ---    High Dose Statin Never done ---    Shingles Vaccine (1 of 2) Never done ---    Colorectal Cancer Screening 10/05/2021 10/5/2016    RSV Vaccine (Age 60+ and Pregnant patients) (1 - Risk 60-74 years 1-dose series) Never done ---    Cervical Cancer Screening 06/23/2024 6/23/2021    Influenza Vaccine (1) 09/01/2024 11/5/2020    COVID-19 Vaccine (3 - 2024-25 season) 09/01/2024 1/17/2022    Mammogram 10/18/2024 10/18/2023    Lipid Panel 07/12/2025 7/12/2024    Hemoglobin A1c (Diabetic Prevention Screening) 07/12/2027 7/12/2024              ROS:  CONSTITUTIONAL: Denies weight change,  fatigue, fevers, chills, night sweats.  CARDIOVASCULAR: Denies chest pain, shortness of breath, orthopnea and edema.  RESPIRATORY: Denies cough, hemoptysis, dyspnea, and wheezing.  GI: See HPI.    Medical History:   Past Medical History:   Diagnosis Date    Antiphospholipid syndrome 3/1/2023    Brachial artery occlusion, right 2/23/2023    Chronic low back pain     Congenital cerebral palsy     GERD (gastroesophageal reflux disease)     Hyperlipidemia     Hypertension     Lymphedema     right foot       Surgical History:   Past Surgical History:   Procedure Laterality Date    COLONOSCOPY N/A 10/05/2016    Polyp x 1 repeat 5 yrs. Procedure: COLONOSCOPY;  Surgeon: Aicha Ravi MD;  Location: [facility];  Service: Endoscopy;  Laterality: N/A;    EMBOLECTOMY  Right 2023    Procedure: EMBOLECTOMY;  Surgeon: Mario Sun MD;  Location: [facility] OR;  Service: Cardiovascular;  Laterality: Right;    HIP SURGERY Bilateral     as child    LEG SURGERY      TENDON RELEASE Bilateral     as child to knees and hip adductors    TONSILLECTOMY      TRANSESOPHAGEAL ECHOCARDIOGRAPHY N/A 2023    Procedure: ECHOCARDIOGRAM, TRANSESOPHAGEAL;  Surgeon: Roula Squires MD;  Location: [facility] CATH LAB;  Service: Cardiology;  Laterality: N/A;       Family History:   Family History   Problem Relation Name Age of Onset    Hypertension Mother      Arthritis Mother      Ovarian cancer Sister      Cataracts Maternal Grandmother      Cataracts Maternal Grandfather      Breast cancer Neg Hx      Colon cancer Neg Hx         Social History:   Social History     Tobacco Use    Smoking status: Former     Current packs/day: 0.00     Average packs/day: 0.3 packs/day for 3.0 years (0.8 ttl pk-yrs)     Types: Cigarettes     Start date:      Quit date:      Years since quittin.9    Smokeless tobacco: Never    Tobacco comments:     quit smoking in    Substance Use Topics    Alcohol use: Not Currently     Comment: occasionally    Drug use: No       Allergies:   Review of patient's allergies indicates:  No Known Allergies    Medications:   No current facility-administered medications on file prior to encounter.     Current Outpatient Medications on File Prior to Encounter   Medication Sig Dispense Refill    famotidine (PEPCID) 40 MG tablet Take 1 tablet (40 mg total) by mouth once daily. 90 tablet 1    oxybutynin (DITROPAN XL) 15 MG TR24 Take 1 tablet (15 mg total) by mouth once daily. 90 tablet 3    oxyCODONE (ROXICODONE) 30 MG Tab Take 1 tablet (30 mg total) by mouth 2 (two) times daily as needed for pain. 60 tablet 0    oxyCODONE (ROXICODONE) 30 MG Tab Take 1 tablet by mouth twice daily as needed for pain 60 tablet 0    oxyCODONE (ROXICODONE) 30 MG Tab Take 1 tablet by mouth twice a day as  needed for pain 60 tablet 0    oxyCODONE (ROXICODONE) 30 MG Tab as needed; 1 tablet by mouth every 12 hours 60 tablet 0    oxyCODONE (ROXICODONE) 30 MG Tab Take 1 tablet by mouth every 12 hours as needed 60 tablet 0    oxyCODONE (ROXICODONE) 30 MG Tab Take 1 tablet by mouth every 12 hours as needed 60 tablet 0    baclofen (LIORESAL) 10 MG tablet Take 1 tablet (10 mg total) by mouth 2 (two) times daily. 60 tablet 5    ketoconazole (NIZORAL) 2 % shampoo Shampoo scalp 1-2 times weekly. Lather for 5 minutes then rinse well. 240 mL 5    promethazine (PHENERGAN) 25 MG tablet Take 1 tablet once a day as needed /May cause drowsiness, use caution/ for nausea only 30 tablet 5    promethazine (PHENERGAN) 25 MG tablet Take 1 tablet by mouth once a day as needed /May cause drowsiness, use caution/ for nausea only 30 tablet 5    promethazine (PHENERGAN) 25 MG tablet Take 1 tablet by mouth daily as needed 30 tablet 5    warfarin (COUMADIN) 5 MG tablet Take 1½ tablets (7.5 mg total) by mouth Daily. OR as directed by Coumadin clinic 50 tablet 5       Physical Exam:  Vital Signs:   Vitals:    12/12/24 1304   BP: (!) 155/90   Pulse: 80   Resp: 20   Temp: 98.4 °F (36.9 °C)     General Appearance: Well appearing in no acute distress  ENT: OP clear  Chest: CTA B  CV: RRR, no m/r/g  Abd: s/nt/nd/nabs  Ext: no edema    Labs:Reviewed    IMP:  Active Hospital Problems    Diagnosis  POA    *History of colonic polyps [Z86.0100]  Not Applicable      Resolved Hospital Problems   No resolved problems to display.         Plan:   I have explained the risks and benefits of colonoscopy to the patient including but not limited to bleeding, perforation, infection, and death. The patient wishes to proceed.

## 2024-12-12 NOTE — ANESTHESIA PREPROCEDURE EVALUATION
12/12/2024  Jimena Pierce is a 59 y.o., female.  Patient Active Problem List   Diagnosis    Essential hypertension    Chronic bilateral low back pain with bilateral sciatica    Obesity, Class III, BMI 40-49.9 (morbid obesity)    Personal history of colonic polyps    Brachial artery occlusion, right    Splenic infarct    Cerebral palsy    Antiphospholipid syndrome    Uncomplicated opioid dependence     Past Surgical History:   Procedure Laterality Date    COLONOSCOPY N/A 10/05/2016    Polyp x 1 repeat 5 yrs. Procedure: COLONOSCOPY;  Surgeon: Aicha Ravi MD;  Location: [facility] ENDO;  Service: Endoscopy;  Laterality: N/A;    EMBOLECTOMY Right 2/23/2023    Procedure: EMBOLECTOMY;  Surgeon: Mario Sun MD;  Location: [facility] OR;  Service: Cardiovascular;  Laterality: Right;    HIP SURGERY Bilateral     as child    LEG SURGERY      TENDON RELEASE Bilateral     as child to knees and hip adductors    TONSILLECTOMY      TRANSESOPHAGEAL ECHOCARDIOGRAPHY N/A 2/23/2023    Procedure: ECHOCARDIOGRAM, TRANSESOPHAGEAL;  Surgeon: Roula Squires MD;  Location: [facility] CATH LAB;  Service: Cardiology;  Laterality: N/A;         Pre-op Assessment    I have reviewed the Patient Summary Reports.     I have reviewed the Nursing Notes. I have reviewed the NPO Status.   I have reviewed the Medications.     Review of Systems  Anesthesia Hx:             Denies Family Hx of Anesthesia complications.    Denies Personal Hx of Anesthesia complications.                    Cardiovascular:     Hypertension                                    Hypertension         Hepatic/GI:  Bowel Prep.   GERD         Gerd          Neurological:    Neuromuscular Disease,                                 Neuromuscular Disease       Physical Exam  General: Well nourished, Cooperative, Alert and Oriented    Airway:  Mallampati: II / II  Mouth  Opening: Normal  TM Distance: Normal  Tongue: Normal  Neck ROM: Normal ROM    Dental:  Intact    Chest/Lungs:  Normal Respiratory Rate    Heart:  Rate: Normal  Rhythm: Regular Rhythm        Anesthesia Plan  Type of Anesthesia, risks & benefits discussed:    Anesthesia Type: MAC  Intra-op Monitoring Plan: Standard ASA Monitors  Post Op Pain Control Plan: multimodal analgesia  Induction:  IV  Informed Consent: Informed consent signed with the Patient and all parties understand the risks and agree with anesthesia plan.  All questions answered.   ASA Score: 2  Day of Surgery Review of History & Physical: H&P Update referred to the surgeon/provider.    Ready For Surgery From Anesthesia Perspective.     .

## 2024-12-16 LAB
FINAL PATHOLOGIC DIAGNOSIS: NORMAL
GROSS: NORMAL
Lab: NORMAL

## 2024-12-17 ENCOUNTER — ANTI-COAG VISIT (OUTPATIENT)
Dept: CARDIOLOGY | Facility: CLINIC | Age: 59
End: 2024-12-17
Payer: MEDICARE

## 2024-12-17 DIAGNOSIS — D68.61 ANTIPHOSPHOLIPID SYNDROME: Primary | ICD-10-CM

## 2024-12-17 DIAGNOSIS — I70.208 BRACHIAL ARTERY OCCLUSION, RIGHT: ICD-10-CM

## 2024-12-17 LAB — INR PPP: 1.5

## 2024-12-17 PROCEDURE — 93793 ANTICOAG MGMT PT WARFARIN: CPT | Mod: S$GLB,,,

## 2024-12-17 RX ORDER — WARFARIN SODIUM 5 MG/1
TABLET ORAL
Qty: 120 TABLET | Refills: 3 | Status: SHIPPED | OUTPATIENT
Start: 2024-12-17

## 2024-12-17 NOTE — PROGRESS NOTES
[facility].  INR not at goal-1.5, pt is post procedure from c-scope. Medications, chart, and patient findings reviewed. See calendar for adjustments to dose and follow up plan.  Polyps were taken per GI report, so no booster dose was given right after procedure.  We will boost warfarin with 10 mg today and also continue Lovenox 100 mg q 12 hours for bridge therapy.  Repeat INR on Thursday this week.  Pt contacted, dosing reviewed.  Has 6 syringes remaining.  She will continue q 12 thru PM dose on Wed and retest Thursday.  Also sending in new Rx for warfarin.

## 2024-12-19 ENCOUNTER — ANTI-COAG VISIT (OUTPATIENT)
Dept: CARDIOLOGY | Facility: CLINIC | Age: 59
End: 2024-12-19
Payer: MEDICARE

## 2024-12-19 ENCOUNTER — TELEPHONE (OUTPATIENT)
Dept: CARDIOLOGY | Facility: CLINIC | Age: 59
End: 2024-12-19

## 2024-12-19 DIAGNOSIS — D68.61 ANTIPHOSPHOLIPID SYNDROME: Primary | ICD-10-CM

## 2024-12-19 DIAGNOSIS — I70.208 BRACHIAL ARTERY OCCLUSION, RIGHT: ICD-10-CM

## 2024-12-19 DIAGNOSIS — Z79.01 LONG TERM (CURRENT) USE OF ANTICOAGULANTS: ICD-10-CM

## 2024-12-19 LAB — INR PPP: 1.8

## 2024-12-19 NOTE — TELEPHONE ENCOUNTER
Patient had been advised of warfarin/Lovenox dosing/instructions per encounter 12/19/24. Patient repeated back correctly and verbalizes understanding.

## 2024-12-19 NOTE — PROGRESS NOTES
INR not at goal. Medications, chart, and patient findings reviewed. See calendar for adjustments to dose and follow up plan.  INR is moving.  We will boost once more today.  She has only 2 syringes remaining, so she should use Lovenox 100 mg tonight and tomorrow AM.  If there are more syringes, use thru Saturday PM dose.  Boost tonight with 10 mg and tomorrow with 7.5 mg then resume normal dose.  Test again on Monday to verify that INR is in goal range.  If INR drops, we will use more enoxaparin at that time.

## 2024-12-19 NOTE — PROGRESS NOTES
Fax received from [facility]. INR is sub-therapeutic at 1.8. Patient contacted. Patient confirms she followed previous dosing/instructions. Patient reports no changes. Advised of increased risk of clotting; signs/symptoms of clotting and need to go to ED if she experiences any. Patient reports she is not having any signs/symptoms of clotting. Sent to PharmD for dosing/instructions.

## 2024-12-23 ENCOUNTER — ANTI-COAG VISIT (OUTPATIENT)
Dept: CARDIOLOGY | Facility: CLINIC | Age: 59
End: 2024-12-23
Payer: MEDICARE

## 2024-12-23 DIAGNOSIS — D68.61 ANTIPHOSPHOLIPID SYNDROME: Primary | ICD-10-CM

## 2024-12-23 DIAGNOSIS — I70.208 BRACHIAL ARTERY OCCLUSION, RIGHT: ICD-10-CM

## 2024-12-23 LAB — INR PPP: 3.4

## 2024-12-23 PROCEDURE — 93793 ANTICOAG MGMT PT WARFARIN: CPT | Mod: S$GLB,,,

## 2025-01-02 ENCOUNTER — ANTI-COAG VISIT (OUTPATIENT)
Dept: CARDIOLOGY | Facility: CLINIC | Age: 60
End: 2025-01-02
Payer: MEDICARE

## 2025-01-02 DIAGNOSIS — I70.208 BRACHIAL ARTERY OCCLUSION, RIGHT: ICD-10-CM

## 2025-01-02 DIAGNOSIS — D68.61 ANTIPHOSPHOLIPID SYNDROME: Primary | ICD-10-CM

## 2025-01-02 DIAGNOSIS — Z79.01 LONG TERM (CURRENT) USE OF ANTICOAGULANTS: ICD-10-CM

## 2025-01-02 LAB — INR PPP: 2.7

## 2025-01-02 PROCEDURE — 93793 ANTICOAG MGMT PT WARFARIN: CPT | Mod: S$GLB,,,

## 2025-01-02 NOTE — PROGRESS NOTES
Chart reviewed, agree with LPN plan.      
[facility] fax report received.  INR: 2.7 - therapeutic.  Continue warfarin 5 mg every MWF; and 7.5 mg all other days.  Retest INR weekly.    
Statement Selected

## 2025-01-09 ENCOUNTER — ANTI-COAG VISIT (OUTPATIENT)
Dept: CARDIOLOGY | Facility: CLINIC | Age: 60
End: 2025-01-09
Payer: MEDICARE

## 2025-01-09 DIAGNOSIS — D68.61 ANTIPHOSPHOLIPID SYNDROME: Primary | ICD-10-CM

## 2025-01-09 DIAGNOSIS — I70.208 BRACHIAL ARTERY OCCLUSION, RIGHT: ICD-10-CM

## 2025-01-09 LAB — INR PPP: 2.9

## 2025-01-09 PROCEDURE — 93793 ANTICOAG MGMT PT WARFARIN: CPT | Mod: S$GLB,,,

## 2025-01-09 NOTE — PROGRESS NOTES
INR at goal-2.9. Medications and chart reviewed. No changes noted to necessitate adjustment of warfarin or follow-up plan. See calendar.  Pt will remain on current warfarin dose.  Repeat INR in 1 week with meter.

## 2025-01-15 NOTE — PROGRESS NOTES
Subjective:       Patient ID:  Jimena Pierce is a 57 y.o. female who presents for   Chief Complaint   Patient presents with    Hair Loss     Hair loss ; started 5 months ago     History of Present Illness: The patient presents with chief complaint of hair loss.  Location: general scalp  Duration: 5 months  Signs/Symptoms: increased shedding and thinning, endorses some focal patches of hair loss. Denies itching, tingling, or burning. +Dandruff.   Denies surgery, weight loss > 20 lbs, dietary exclusions, significant stress, Covid infection, or illness.  Denies change in meds. +H/o anemia requiring iron supplementation in the past (none currently). Denies loss of facial or body hair.    Prior treatments: none    TSH wnl, CBC wnl, CMP wnl on 9/30/22.    PMHX: Cerebral Palsy      Review of Systems   Constitutional:  Negative for fever and chills.   Cardiovascular:  Negative for chest pain, palpitations and pedal edema.   Gastrointestinal:  Negative for nausea and vomiting.   Skin:  Positive for activity-related sunscreen use. Negative for itching, rash, dry skin, sun sensitivity, daily sunscreen use, recent sunburn, dry lips and abscesses.   Endocrine: Negative for cold intolerance and heat intolerance.   Hematologic/Lymphatic: Does not bruise/bleed easily.      Objective:    Physical Exam   Constitutional: She appears well-developed and well-nourished. No distress.   Neurological: She is alert and oriented to person, place, and time. She is not disoriented.   Psychiatric: She has a normal mood and affect.   Skin:   Areas Examined (abnormalities noted in diagram):   Scalp / Hair Palpated and Inspected  Head / Face Inspection Performed  Neck Inspection Performed  Chest / Axilla Inspection Performed  RUE Inspected  LUE Inspection Performed  Nails and Digits Inspection Performed            Diagram Legend     Erythematous scaling macule/papule c/w actinic keratosis       Vascular papule c/w angioma       Pigmented verrucoid papule/plaque c/w seborrheic keratosis      Yellow umbilicated papule c/w sebaceous hyperplasia      Irregularly shaped tan macule c/w lentigo     1-2 mm smooth white papules consistent with Milia      Movable subcutaneous cyst with punctum c/w epidermal inclusion cyst      Subcutaneous movable cyst c/w pilar cyst      Firm pink to brown papule c/w dermatofibroma      Pedunculated fleshy papule(s) c/w skin tag(s)      Evenly pigmented macule c/w junctional nevus     Mildly variegated pigmented, slightly irregular-bordered macule c/w mildly atypical nevus      Flesh colored to evenly pigmented papule c/w intradermal nevus       Pink pearly papule/plaque c/w basal cell carcinoma      Erythematous hyperkeratotic cursted plaque c/w SCC      Surgical scar with no sign of skin cancer recurrence      Open and closed comedones      Inflammatory papules and pustules      Verrucoid papule consistent consistent with wart     Erythematous eczematous patches and plaques     Dystrophic onycholytic nail with subungual debris c/w onychomycosis     Umbilicated papule    Erythematous-base heme-crusted tan verrucoid plaque consistent with inflamed seborrheic keratosis     Erythematous Silvery Scaling Plaque c/w Psoriasis     See annotation      Assessment / Plan:        Telogen effluvium  -     T4, FREE; Future; Expected date: 01/18/2023  -     Iron and TIBC; Future; Expected date: 01/18/2023  -     Ferritin; Future; Expected date: 01/18/2023  -     ketoconazole (NIZORAL) 2 % shampoo; Shampoo scalp 1-2 times weekly. Lather x 5 minutes then rinse well.  Dispense: 240 mL; Refill: 5  -     fluocinolone (SYNALAR) 0.01 % external solution; AAA of scalp twice a day prn itching. Mild steroid.  Dispense: 60 mL; Refill: 1  -Check above labs.  -Consider otc rogaine 5% qd. Warned of unwanted hair, the need potentially to continue indefinitely for pattern types of alopecia, and the need to use for 3-6 months.  -trial of above  rx meds.    Hair loss  -     Ambulatory referral/consult to Dermatology    Scalp pruritus  -     ketoconazole (NIZORAL) 2 % shampoo; Shampoo scalp 1-2 times weekly. Lather x 5 minutes then rinse well.  Dispense: 240 mL; Refill: 5  -     fluocinolone (SYNALAR) 0.01 % external solution; AAA of scalp twice a day prn itching. Mild steroid.  Dispense: 60 mL; Refill: 1    Seborrheic dermatitis of scalp  Mild, trial of above rx meds. Encouraged more frequent shampoos.            Follow up in about 2 months (around 3/18/2023) for alopecia areata.   No

## 2025-01-16 ENCOUNTER — ANTI-COAG VISIT (OUTPATIENT)
Dept: CARDIOLOGY | Facility: CLINIC | Age: 60
End: 2025-01-16
Payer: MEDICARE

## 2025-01-16 DIAGNOSIS — I70.208 BRACHIAL ARTERY OCCLUSION, RIGHT: ICD-10-CM

## 2025-01-16 DIAGNOSIS — D68.61 ANTIPHOSPHOLIPID SYNDROME: Primary | ICD-10-CM

## 2025-01-16 LAB — INR PPP: 3.3

## 2025-01-16 PROCEDURE — 93793 ANTICOAG MGMT PT WARFARIN: CPT | Mod: S$GLB,,,

## 2025-01-16 NOTE — PROGRESS NOTES
[facility].  INR at goal-3.3. Medications and chart reviewed. No changes noted to necessitate adjustment of warfarin or follow-up plan. See calendar.  PT will remain on current warfarin dose-5 mg every Mon, Wed, Fri; 7.5 mg all other days .  Repeat INR in 1 week.

## 2025-01-23 ENCOUNTER — ANTI-COAG VISIT (OUTPATIENT)
Dept: CARDIOLOGY | Facility: CLINIC | Age: 60
End: 2025-01-23
Payer: MEDICARE

## 2025-01-23 DIAGNOSIS — D68.61 ANTIPHOSPHOLIPID SYNDROME: Primary | ICD-10-CM

## 2025-01-23 DIAGNOSIS — I70.208 BRACHIAL ARTERY OCCLUSION, RIGHT: ICD-10-CM

## 2025-01-23 LAB — INR PPP: 2.9

## 2025-01-23 PROCEDURE — 93793 ANTICOAG MGMT PT WARFARIN: CPT | Mod: S$GLB,,,

## 2025-01-23 NOTE — PROGRESS NOTES
[facility].  INR at goal-2.9. Medications and chart reviewed. No changes noted to necessitate adjustment of warfarin or follow-up plan. See calendar.  Pt will remain on current warfarin dose.  Repeat INR in week.

## 2025-01-30 ENCOUNTER — ANTI-COAG VISIT (OUTPATIENT)
Dept: CARDIOLOGY | Facility: CLINIC | Age: 60
End: 2025-01-30
Payer: MEDICARE

## 2025-01-30 DIAGNOSIS — D68.61 ANTIPHOSPHOLIPID SYNDROME: Primary | ICD-10-CM

## 2025-01-30 DIAGNOSIS — I70.208 BRACHIAL ARTERY OCCLUSION, RIGHT: ICD-10-CM

## 2025-01-30 LAB — INR PPP: 3

## 2025-01-30 PROCEDURE — 93793 ANTICOAG MGMT PT WARFARIN: CPT | Mod: S$GLB,,,

## 2025-01-30 NOTE — PROGRESS NOTES
[facility].  INR at goal-3.0. Medications and chart reviewed. No changes noted to necessitate adjustment of warfarin or follow-up plan. See calendar.  Pt will remain on current warfarin dose.  Repeat INR in 1 week.

## 2025-02-06 ENCOUNTER — ANTI-COAG VISIT (OUTPATIENT)
Dept: CARDIOLOGY | Facility: CLINIC | Age: 60
End: 2025-02-06
Payer: MEDICARE

## 2025-02-06 DIAGNOSIS — I70.208 BRACHIAL ARTERY OCCLUSION, RIGHT: ICD-10-CM

## 2025-02-06 DIAGNOSIS — Z79.01 LONG TERM (CURRENT) USE OF ANTICOAGULANTS: ICD-10-CM

## 2025-02-06 DIAGNOSIS — D68.61 ANTIPHOSPHOLIPID SYNDROME: Primary | ICD-10-CM

## 2025-02-06 LAB — INR PPP: 3.6

## 2025-02-06 PROCEDURE — 93793 ANTICOAG MGMT PT WARFARIN: CPT | Mod: S$GLB,,,

## 2025-02-06 NOTE — PROGRESS NOTES
Fax report received from [facility].  INR: 3.6 - just above goal.  Patient contacted.  No bleeding issues or other changes reported.  Confirms current warfarin dose.  Instructions given to eat a very small serving of greens today.  Continue current dose of warfarin 5 mg every MWF and 7.5 mg all other days.  Retest INR next Thursday.  Patient voices understanding.

## 2025-02-13 ENCOUNTER — ANTI-COAG VISIT (OUTPATIENT)
Dept: CARDIOLOGY | Facility: CLINIC | Age: 60
End: 2025-02-13
Payer: MEDICARE

## 2025-02-13 DIAGNOSIS — I70.208 BRACHIAL ARTERY OCCLUSION, RIGHT: ICD-10-CM

## 2025-02-13 DIAGNOSIS — D68.61 ANTIPHOSPHOLIPID SYNDROME: Primary | ICD-10-CM

## 2025-02-13 LAB — INR PPP: 2.6

## 2025-02-13 PROCEDURE — 93793 ANTICOAG MGMT PT WARFARIN: CPT | Mod: S$GLB,,,

## 2025-02-13 NOTE — PROGRESS NOTES
[facility].  INR at goal-2.6. Medications and chart reviewed. No changes noted to necessitate adjustment of warfarin or follow-up plan. See calendar.  INR has corrected.  Pt will remain on warfarin 5 mg every Mon, Wed, Fri; 7.5 mg all other days.  Repeat INR in 1 week.   5

## 2025-02-20 ENCOUNTER — ANTI-COAG VISIT (OUTPATIENT)
Dept: CARDIOLOGY | Facility: CLINIC | Age: 60
End: 2025-02-20
Payer: MEDICARE

## 2025-02-20 DIAGNOSIS — I70.208 BRACHIAL ARTERY OCCLUSION, RIGHT: ICD-10-CM

## 2025-02-20 DIAGNOSIS — D68.61 ANTIPHOSPHOLIPID SYNDROME: Primary | ICD-10-CM

## 2025-02-20 DIAGNOSIS — Z79.01 LONG TERM (CURRENT) USE OF ANTICOAGULANTS: ICD-10-CM

## 2025-02-20 LAB — INR PPP: 2.6

## 2025-02-20 NOTE — PROGRESS NOTES
Fax received from [facility]. INR is therapeutic at 2.6. No changes reported. Patient will continue warfarin 5 mg on Fridays, Mondays and Wednesdays; and 7.5 mg all other days. Recheck in one week.

## 2025-02-28 ENCOUNTER — ANTI-COAG VISIT (OUTPATIENT)
Dept: CARDIOLOGY | Facility: CLINIC | Age: 60
End: 2025-02-28
Payer: MEDICARE

## 2025-02-28 DIAGNOSIS — I70.208 BRACHIAL ARTERY OCCLUSION, RIGHT: ICD-10-CM

## 2025-02-28 DIAGNOSIS — D68.61 ANTIPHOSPHOLIPID SYNDROME: Primary | ICD-10-CM

## 2025-02-28 DIAGNOSIS — Z79.01 LONG TERM (CURRENT) USE OF ANTICOAGULANTS: ICD-10-CM

## 2025-02-28 LAB — INR PPP: 2.7

## 2025-02-28 NOTE — PROGRESS NOTES
[facility] fax result received.  INR: 2.7 - therapeutic.  No change in dose - continue warfarin 5 mg every MWF and 7.5 mg all other days.  Retest INR weekly.

## 2025-03-05 DIAGNOSIS — K21.9 CHRONIC GERD: ICD-10-CM

## 2025-03-06 NOTE — TELEPHONE ENCOUNTER
LOV 7/12/24 NIKI WU  NOV 5/12/25 DR ALTAF DE JESUS  
No care due was identified.  [facility] Embedded Care Due Messages. Reference number: [id number].   3/05/2025 8:34:14 PM CST  
Refill Routing Note   Medication(s) are not appropriate for processing by [facility] for the following reason(s):      Patient not seen by provider within 15 months    [facility] action(s):  Defer Care Due:  None identified            Appointments  past 12m or future 3m with PCP    Date Provider   Last Visit   5/11/2023 Baylee Aleman DO   Next Visit   5/12/2025 Baylee Aleman DO   ED visits in past 90 days: 0        Note composed:8:33 AM 03/06/2025          
84.36

## 2025-03-07 ENCOUNTER — ANTI-COAG VISIT (OUTPATIENT)
Dept: CARDIOLOGY | Facility: CLINIC | Age: 60
End: 2025-03-07
Payer: MEDICARE

## 2025-03-07 DIAGNOSIS — Z79.01 LONG TERM (CURRENT) USE OF ANTICOAGULANTS: ICD-10-CM

## 2025-03-07 DIAGNOSIS — I70.208 BRACHIAL ARTERY OCCLUSION, RIGHT: ICD-10-CM

## 2025-03-07 DIAGNOSIS — D68.61 ANTIPHOSPHOLIPID SYNDROME: Primary | ICD-10-CM

## 2025-03-07 LAB — INR PPP: 2.8

## 2025-03-07 NOTE — PROGRESS NOTES
[facility] fax report received.  INR: 2.8 - therapeutic.  No change in dose - continue warfarin 5 mg every Mon, Wed, Fri; and 7.5 mg all other days.  Retest INR weekly.

## 2025-03-08 RX ORDER — FAMOTIDINE 40 MG/1
40 TABLET, FILM COATED ORAL DAILY
Qty: 90 TABLET | Refills: 0 | Status: SHIPPED | OUTPATIENT
Start: 2025-03-08

## 2025-03-14 ENCOUNTER — ANTI-COAG VISIT (OUTPATIENT)
Dept: CARDIOLOGY | Facility: CLINIC | Age: 60
End: 2025-03-14
Payer: MEDICARE

## 2025-03-14 DIAGNOSIS — I70.208 BRACHIAL ARTERY OCCLUSION, RIGHT: ICD-10-CM

## 2025-03-14 DIAGNOSIS — D68.61 ANTIPHOSPHOLIPID SYNDROME: Primary | ICD-10-CM

## 2025-03-14 LAB — INR PPP: 2.8

## 2025-03-14 NOTE — PROGRESS NOTES
[facility].  INR at goal-2.8. Medications and chart reviewed. No changes noted to necessitate adjustment of warfarin or follow-up plan. See calendar.  Pt will maintain current warfarin dose, retest INR in 1 week.

## 2025-03-21 ENCOUNTER — ANTI-COAG VISIT (OUTPATIENT)
Dept: CARDIOLOGY | Facility: CLINIC | Age: 60
End: 2025-03-21
Payer: MEDICARE

## 2025-03-21 DIAGNOSIS — I70.208 BRACHIAL ARTERY OCCLUSION, RIGHT: ICD-10-CM

## 2025-03-21 DIAGNOSIS — D68.61 ANTIPHOSPHOLIPID SYNDROME: Primary | ICD-10-CM

## 2025-03-21 DIAGNOSIS — Z79.01 LONG TERM (CURRENT) USE OF ANTICOAGULANTS: ICD-10-CM

## 2025-03-21 LAB — INR PPP: 3.5

## 2025-03-21 NOTE — PROGRESS NOTES
[facility] fax report received.  INR: 3.5 - therapeutic.  Continue warfarin 5 mg every MWF and 7.5 mg all other days.  Retest INR weekly - 3/28.

## 2025-03-24 DIAGNOSIS — Z00.00 ENCOUNTER FOR MEDICARE ANNUAL WELLNESS EXAM: ICD-10-CM

## 2025-03-28 ENCOUNTER — ANTI-COAG VISIT (OUTPATIENT)
Dept: CARDIOLOGY | Facility: CLINIC | Age: 60
End: 2025-03-28
Payer: MEDICARE

## 2025-03-28 DIAGNOSIS — D68.61 ANTIPHOSPHOLIPID SYNDROME: Primary | ICD-10-CM

## 2025-03-28 DIAGNOSIS — Z79.01 LONG TERM (CURRENT) USE OF ANTICOAGULANTS: ICD-10-CM

## 2025-03-28 DIAGNOSIS — I70.208 BRACHIAL ARTERY OCCLUSION, RIGHT: ICD-10-CM

## 2025-03-28 LAB — INR PPP: 3.1

## 2025-03-28 NOTE — PROGRESS NOTES
[facility] fax report received.  INR: 3.1 - therapeutic.  Continue warfarin 5 mg every Mon, Wed, Fri; and 7.5 mg all other days.  Retest INR weekly - 4/04.

## 2025-04-07 ENCOUNTER — ANTI-COAG VISIT (OUTPATIENT)
Dept: CARDIOLOGY | Facility: CLINIC | Age: 60
End: 2025-04-07
Payer: MEDICARE

## 2025-04-07 DIAGNOSIS — D68.61 ANTIPHOSPHOLIPID SYNDROME: Primary | ICD-10-CM

## 2025-04-07 DIAGNOSIS — I70.208 BRACHIAL ARTERY OCCLUSION, RIGHT: ICD-10-CM

## 2025-04-07 DIAGNOSIS — Z79.01 LONG TERM (CURRENT) USE OF ANTICOAGULANTS: ICD-10-CM

## 2025-04-07 LAB — INR PPP: 3

## 2025-04-07 PROCEDURE — 93793 ANTICOAG MGMT PT WARFARIN: CPT | Mod: S$GLB,,,

## 2025-04-07 NOTE — PROGRESS NOTES
[facility] fax report received.  INR: 3.0 - therapeutic.  Continue warfarin 5 mg every MWF and 7.5 mg all other days as directed.  Retest INR weekly - 4/14.

## 2025-04-14 ENCOUNTER — ANTI-COAG VISIT (OUTPATIENT)
Dept: CARDIOLOGY | Facility: CLINIC | Age: 60
End: 2025-04-14
Payer: MEDICARE

## 2025-04-14 DIAGNOSIS — D68.61 ANTIPHOSPHOLIPID SYNDROME: Primary | ICD-10-CM

## 2025-04-14 DIAGNOSIS — I70.208 BRACHIAL ARTERY OCCLUSION, RIGHT: ICD-10-CM

## 2025-04-14 LAB — INR PPP: 2.9

## 2025-04-14 PROCEDURE — 93793 ANTICOAG MGMT PT WARFARIN: CPT | Mod: S$GLB,,,

## 2025-04-14 NOTE — PROGRESS NOTES
[facility].  INR at goal-2.9. Medications and chart reviewed. No changes noted to necessitate adjustment of warfarin or follow-up plan. See calendar.  Pt will remain on current warfarin dose.  Repeat INR In 1 week.

## 2025-04-21 ENCOUNTER — ANTI-COAG VISIT (OUTPATIENT)
Dept: CARDIOLOGY | Facility: CLINIC | Age: 60
End: 2025-04-21
Payer: MEDICARE

## 2025-04-21 DIAGNOSIS — D68.61 ANTIPHOSPHOLIPID SYNDROME: Primary | ICD-10-CM

## 2025-04-21 DIAGNOSIS — I70.208 BRACHIAL ARTERY OCCLUSION, RIGHT: ICD-10-CM

## 2025-04-21 LAB — INR PPP: 2.7

## 2025-04-21 PROCEDURE — 93793 ANTICOAG MGMT PT WARFARIN: CPT | Mod: S$GLB,,,

## 2025-04-21 NOTE — PROGRESS NOTES
[facility].  INR at goal-2.7. Medications and chart reviewed. No changes noted to necessitate adjustment of warfarin or follow-up plan. See calendar.  Repeat INR in 1 week.

## 2025-04-29 ENCOUNTER — ANTI-COAG VISIT (OUTPATIENT)
Dept: CARDIOLOGY | Facility: CLINIC | Age: 60
End: 2025-04-29
Payer: MEDICARE

## 2025-04-29 DIAGNOSIS — Z79.01 LONG TERM (CURRENT) USE OF ANTICOAGULANTS: ICD-10-CM

## 2025-04-29 DIAGNOSIS — I70.208 BRACHIAL ARTERY OCCLUSION, RIGHT: ICD-10-CM

## 2025-04-29 DIAGNOSIS — D68.61 ANTIPHOSPHOLIPID SYNDROME: Primary | ICD-10-CM

## 2025-04-29 LAB — INR PPP: 2.4

## 2025-04-29 NOTE — PROGRESS NOTES
Fax received from [facility]. INR is sub-therapeutic at 2.4. attempted to reach patient twice. Left voice message to contact [facility] at [phone]. Instructions: Will give boosted dose of warfarin 10 mg today 4/29/25; then resume warfarin 5 mg on Wednesdays, Fridays and Mondays; and 7.5 mg all other days. Recheck in one week.

## 2025-05-06 ENCOUNTER — ANTI-COAG VISIT (OUTPATIENT)
Dept: CARDIOLOGY | Facility: CLINIC | Age: 60
End: 2025-05-06
Payer: MEDICARE

## 2025-05-06 ENCOUNTER — PATIENT OUTREACH (OUTPATIENT)
Dept: ADMINISTRATIVE | Facility: HOSPITAL | Age: 60
End: 2025-05-06
Payer: MEDICARE

## 2025-05-06 DIAGNOSIS — I70.208 BRACHIAL ARTERY OCCLUSION, RIGHT: ICD-10-CM

## 2025-05-06 DIAGNOSIS — D68.61 ANTIPHOSPHOLIPID SYNDROME: Primary | ICD-10-CM

## 2025-05-06 DIAGNOSIS — Z79.01 LONG TERM (CURRENT) USE OF ANTICOAGULANTS: ICD-10-CM

## 2025-05-06 LAB — INR PPP: 2.9

## 2025-05-06 PROCEDURE — 93793 ANTICOAG MGMT PT WARFARIN: CPT | Mod: S$GLB,,,

## 2025-05-06 NOTE — PROGRESS NOTES
[facility] fax report received.  INR: 2.9 - therapeutic.  Continue warfarin 5 mg every Mon, Wed, Fri; and 7.5 mg all other days.  Retest INR weekly - 5/13.

## 2025-05-12 ENCOUNTER — LAB VISIT (OUTPATIENT)
Dept: LAB | Facility: HOSPITAL | Age: 60
End: 2025-05-12
Payer: MEDICARE

## 2025-05-12 ENCOUNTER — OFFICE VISIT (OUTPATIENT)
Dept: INTERNAL MEDICINE | Facility: CLINIC | Age: 60
End: 2025-05-12
Payer: MEDICARE

## 2025-05-12 VITALS
HEART RATE: 100 BPM | BODY MASS INDEX: 44.67 KG/M2 | HEIGHT: 62 IN | OXYGEN SATURATION: 97 % | WEIGHT: 242.75 LBS | DIASTOLIC BLOOD PRESSURE: 88 MMHG | TEMPERATURE: 98 F | RESPIRATION RATE: 20 BRPM | SYSTOLIC BLOOD PRESSURE: 138 MMHG

## 2025-05-12 DIAGNOSIS — M25.50 ARTHRALGIA OF MULTIPLE JOINTS: ICD-10-CM

## 2025-05-12 DIAGNOSIS — F11.20 UNCOMPLICATED OPIOID DEPENDENCE: ICD-10-CM

## 2025-05-12 DIAGNOSIS — Z13.6 SCREENING FOR ISCHEMIC HEART DISEASE: ICD-10-CM

## 2025-05-12 DIAGNOSIS — Z00.00 ANNUAL PHYSICAL EXAM: Primary | ICD-10-CM

## 2025-05-12 DIAGNOSIS — Z00.00 ANNUAL PHYSICAL EXAM: ICD-10-CM

## 2025-05-12 DIAGNOSIS — Z12.31 ENCOUNTER FOR SCREENING MAMMOGRAM FOR MALIGNANT NEOPLASM OF BREAST: ICD-10-CM

## 2025-05-12 DIAGNOSIS — R53.83 FATIGUE, UNSPECIFIED TYPE: ICD-10-CM

## 2025-05-12 DIAGNOSIS — E66.01 MORBID OBESITY WITH BMI OF 40.0-44.9, ADULT: ICD-10-CM

## 2025-05-12 DIAGNOSIS — G80.2 SPASTIC HEMIPLEGIC CEREBRAL PALSY: ICD-10-CM

## 2025-05-12 DIAGNOSIS — Z23 NEED FOR TDAP VACCINATION: ICD-10-CM

## 2025-05-12 DIAGNOSIS — Z79.01 LONG TERM (CURRENT) USE OF ANTICOAGULANTS: ICD-10-CM

## 2025-05-12 LAB
ABSOLUTE EOSINOPHIL (OHS): 0.08 K/UL
ABSOLUTE MONOCYTE (OHS): 0.54 K/UL (ref 0.3–1)
ABSOLUTE NEUTROPHIL COUNT (OHS): 5.01 K/UL (ref 1.8–7.7)
ALBUMIN SERPL BCP-MCNC: 3.9 G/DL (ref 3.5–5.2)
ALP SERPL-CCNC: 103 UNIT/L (ref 40–150)
ALT SERPL W/O P-5'-P-CCNC: 23 UNIT/L (ref 10–44)
ANION GAP (OHS): 10 MMOL/L (ref 8–16)
AST SERPL-CCNC: 16 UNIT/L (ref 11–45)
BASOPHILS # BLD AUTO: 0.04 K/UL
BASOPHILS NFR BLD AUTO: 0.5 %
BILIRUB SERPL-MCNC: 0.5 MG/DL (ref 0.1–1)
BUN SERPL-MCNC: 16 MG/DL (ref 8–23)
CALCIUM SERPL-MCNC: 9.3 MG/DL (ref 8.7–10.5)
CHLORIDE SERPL-SCNC: 108 MMOL/L (ref 95–110)
CHOLEST SERPL-MCNC: 233 MG/DL (ref 120–199)
CHOLEST/HDLC SERPL: 3.6 {RATIO} (ref 2–5)
CO2 SERPL-SCNC: 24 MMOL/L (ref 23–29)
CREAT SERPL-MCNC: 0.8 MG/DL (ref 0.5–1.4)
ERYTHROCYTE [DISTWIDTH] IN BLOOD BY AUTOMATED COUNT: 13.5 % (ref 11.5–14.5)
GFR SERPLBLD CREATININE-BSD FMLA CKD-EPI: >60 ML/MIN/1.73/M2
GLUCOSE SERPL-MCNC: 90 MG/DL (ref 70–110)
HCT VFR BLD AUTO: 45.9 % (ref 37–48.5)
HDLC SERPL-MCNC: 64 MG/DL (ref 40–75)
HDLC SERPL: 27.5 % (ref 20–50)
HGB BLD-MCNC: 14.4 GM/DL (ref 12–16)
IMM GRANULOCYTES # BLD AUTO: 0.02 K/UL (ref 0–0.04)
IMM GRANULOCYTES NFR BLD AUTO: 0.3 % (ref 0–0.5)
LDLC SERPL CALC-MCNC: 143.4 MG/DL (ref 63–159)
LYMPHOCYTES # BLD AUTO: 1.59 K/UL (ref 1–4.8)
MCH RBC QN AUTO: 28.3 PG (ref 27–31)
MCHC RBC AUTO-ENTMCNC: 31.4 G/DL (ref 32–36)
MCV RBC AUTO: 90 FL (ref 82–98)
NONHDLC SERPL-MCNC: 169 MG/DL
NUCLEATED RBC (/100WBC) (OHS): 0 /100 WBC
PLATELET # BLD AUTO: 239 K/UL (ref 150–450)
PMV BLD AUTO: 11.7 FL (ref 9.2–12.9)
POTASSIUM SERPL-SCNC: 4.4 MMOL/L (ref 3.5–5.1)
PROT SERPL-MCNC: 8.1 GM/DL (ref 6–8.4)
RBC # BLD AUTO: 5.08 M/UL (ref 4–5.4)
RELATIVE EOSINOPHIL (OHS): 1.1 %
RELATIVE LYMPHOCYTE (OHS): 21.8 % (ref 18–48)
RELATIVE MONOCYTE (OHS): 7.4 % (ref 4–15)
RELATIVE NEUTROPHIL (OHS): 68.9 % (ref 38–73)
SODIUM SERPL-SCNC: 142 MMOL/L (ref 136–145)
TRIGL SERPL-MCNC: 128 MG/DL (ref 30–150)
TSH SERPL-ACNC: 0.96 UIU/ML (ref 0.4–4)
WBC # BLD AUTO: 7.28 K/UL (ref 3.9–12.7)

## 2025-05-12 PROCEDURE — 1160F RVW MEDS BY RX/DR IN RCRD: CPT | Mod: CPTII,S$GLB,, | Performed by: INTERNAL MEDICINE

## 2025-05-12 PROCEDURE — 85025 COMPLETE CBC W/AUTO DIFF WBC: CPT

## 2025-05-12 PROCEDURE — 80061 LIPID PANEL: CPT

## 2025-05-12 PROCEDURE — 84443 ASSAY THYROID STIM HORMONE: CPT

## 2025-05-12 PROCEDURE — 3075F SYST BP GE 130 - 139MM HG: CPT | Mod: CPTII,S$GLB,, | Performed by: INTERNAL MEDICINE

## 2025-05-12 PROCEDURE — 80053 COMPREHEN METABOLIC PANEL: CPT

## 2025-05-12 PROCEDURE — 3008F BODY MASS INDEX DOCD: CPT | Mod: CPTII,S$GLB,, | Performed by: INTERNAL MEDICINE

## 2025-05-12 PROCEDURE — 36415 COLL VENOUS BLD VENIPUNCTURE: CPT

## 2025-05-12 PROCEDURE — 3079F DIAST BP 80-89 MM HG: CPT | Mod: CPTII,S$GLB,, | Performed by: INTERNAL MEDICINE

## 2025-05-12 PROCEDURE — 99396 PREV VISIT EST AGE 40-64: CPT | Mod: S$GLB,,, | Performed by: INTERNAL MEDICINE

## 2025-05-12 PROCEDURE — 1159F MED LIST DOCD IN RCRD: CPT | Mod: CPTII,S$GLB,, | Performed by: INTERNAL MEDICINE

## 2025-05-12 PROCEDURE — 99999 PR PBB SHADOW E&M-EST. PATIENT-LVL V: CPT | Mod: PBBFAC,,, | Performed by: INTERNAL MEDICINE

## 2025-05-12 NOTE — PROGRESS NOTES
Jimena Pierce  59 y.o. White female  [id number]    Chief Complaint:  Chief Complaint   Patient presents with    Annual Exam    Fatigue       History of Present Illness:  History of Present Illness    CHIEF COMPLAINT:  Ms. Pierce presents today for an annual visit    SLEEP AND FATIGUE:  She reports difficulty with both sleep initiation and maintenance, typically sleeping for approximately 3 hours before waking. She experiences multiple episodes of daytime somnolence. She takes OTC sleep medication at half the recommended dose intermittently for management.    CHRONIC PAIN:  She experiences multiple sites of chronic pain including back pain, bilateral knee pain, and right shoulder pain. She sustained a severe ankle sprain in December, which she believes contributed to her right shoulder pain through compensatory movements. She is currently under pain management care. The pain limits her mobility, which she reports worsens her condition.    CARDIOVASCULAR:  She performs weekly home testing for anticoagulant monitoring with mostly stable results, noting only one slightly abnormal reading in the past 3-4 months. She denies any bleeding. She reports becoming winded with physical activity, which she attributes to deconditioning, and experiences occasional ankle swelling with prolonged standing.    PAST MEDICAL HISTORY:  She reports possible COVID infection in spring with loss of taste as the only symptom, though testing was not performed to confirm diagnosis.         Review of Systems   Constitutional:  Positive for malaise/fatigue.   Respiratory:  Negative for shortness of breath.    Cardiovascular:  Negative for chest pain.   Gastrointestinal:  Negative for abdominal pain, blood in stool, constipation and diarrhea.   Genitourinary:  Negative for dysuria.   Musculoskeletal:  Positive for back pain and joint pain.   Neurological:  Negative for dizziness and headaches.   Psychiatric/Behavioral:  The patient has  insomnia.        Laboratory  Lab Results   Component Value Date    WBC 9.66 07/12/2024    HGB 15.3 07/12/2024    HCT 49.0 (H) 07/12/2024     07/12/2024    CHOL 223 (H) 07/12/2024    TRIG 135 07/12/2024    HDL 61 07/12/2024    ALT 27 07/12/2024    AST 18 07/12/2024     07/12/2024    K 4.0 07/12/2024     07/12/2024    CREATININE 0.8 07/12/2024    BUN 13 07/12/2024    CO2 21 (L) 07/12/2024    TSH 0.474 09/30/2022    INR 2.9 05/06/2025    HGBA1C 5.2 07/12/2024     Lab Results   Component Value Date    LDLCALC 135.0 07/12/2024       The following were reviewed: Active problem list, medication list, allergies, family history, social history, and Health Maintenance.     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 Procedure: COLONOSCOPY;  Surgeon: Aicha Ravi MD;  Location: [facility] ENDO;  Service: Endoscopy;  Laterality: N/A;    COLONOSCOPY, SCREENING, LOW RISK PATIENT N/A 12/12/2024    Procedure: COLONOSCOPY, SCREENING, LOW RISK PATIENT;  Surgeon: Jocelyn Vásquez MD;  Location: [facility] ENDO;  Service: Endoscopy;  Laterality: N/A;    EMBOLECTOMY Right 2/23/2023    Procedure: EMBOLECTOMY;  Surgeon: Mario Sun MD;  Location: [facility] OR;  Service: Cardiovascular;  Laterality: Right;    HIP SURGERY Bilateral     as child    LEG SURGERY      TENDON RELEASE Bilateral     as child to knees and hip adductors    TONSILLECTOMY      TRANSESOPHAGEAL ECHOCARDIOGRAPHY N/A 2/23/2023    Procedure: ECHOCARDIOGRAM, TRANSESOPHAGEAL;  Surgeon: Roula Squires MD;  Location: [facility] CATH LAB;  Service: Cardiology;  Laterality: N/A;     Family History   Problem  Relation Name Age of Onset    Hypertension Mother      Arthritis Mother      Ovarian cancer Sister      Cataracts Maternal Grandmother      Cataracts Maternal Grandfather      Breast cancer Neg Hx      Colon cancer Neg Hx       Social History[1]  Problem List[2]  Review of patient's allergies indicates:  No Known Allergies    Health Maintenance  Health Maintenance Topics with due status: Not Due       Topic Last Completion Date    Influenza Vaccine 11/05/2020    Hemoglobin A1c (Diabetic Prevention Screening) 07/12/2024    Lipid Panel 07/12/2024    Colorectal Cancer Screening 12/12/2024     Health Maintenance Due   Topic Date Due    TETANUS VACCINE  Never done    High Dose Statin  Never done    Shingles Vaccine (1 of 2) Never done    Pneumococcal Vaccines (Age 50+) (1 of 1 - PCV) Never done    RSV Vaccine (Age 60+ and Pregnant patients) (1 - Risk 60-74 years 1-dose series) Never done    Cervical Cancer Screening  06/23/2024    COVID-19 Vaccine (3 - 2024-25 season) 09/01/2024    Mammogram  10/18/2024       Medications:  Medications Ordered Prior to Encounter[3]    Medications have been reviewed and reconciled with patient at visit today.    Exam:  Vitals:    05/12/25 1321   BP: 138/88   Pulse: 100   Resp: 20   Temp: 97.7 °F (36.5 °C)     Weight: 110.1 kg (242 lb 11.6 oz)   Body mass index is 44.4 kg/m².      Physical Exam    Vitals: Reviewed.  Constitutional: No acute distress. Well-developed. Not ill-appearing.  Eyes: No scleral icterus.  Cardiovascular: Normal rate and regular rhythm. Normal heart sounds.  Pulmonary: Pulmonary effort is normal. No respiratory distress. Normal breath sounds.  Abdomen: Soft. Nontender. Nondistended. Normoactive bowel sounds.  Extremities: No edema.  Skin: Warm. Dry.  Neurological: Alert and oriented to person, place, and time.  Psychiatric: Behavior normal.         Assessment:  The primary encounter diagnosis was Annual physical exam. Diagnoses of Fatigue, unspecified type, Arthralgia  of multiple joints, Uncomplicated opioid dependence, Spastic hemiplegic cerebral palsy, Morbid obesity with BMI of 40.0-44.9, adult, Screening for ischemic heart disease, Encounter for screening mammogram for malignant neoplasm of breast, and Need for Tdap vaccination were also pertinent to this visit.    Assessment & Plan    ANNUAL PHYSICAL EXAM:  - Counseled regarding age appropriate screenings and immunizations  - Counseled regarding lifestyle modifications    FATIGUE:  - Ruled out hemorrhage and other obvious sources.  - Ms. Pierce instructed to proceed to laboratory down the hallway after today's visit.    CHRONIC PAIN:  - Ms. Pierce experiences ongoing pain creating a challenging cycle where pain limits movement, which in turn worsens the condition.  - Pain management remains a significant concern affecting mobility.    UNCOMPLICATED OPIOID DEPENDENCE:  - Ms. Pierce is on pain management medication for pain primarily in the back, knees, and right shoulder.  - Ms. Pierce acknowledges the need to increase physical activity but is limited by pain.    SPASTIC HEMIPLEGIC CEREBRAL PALSY:  - Ms. Pierce reports difficulty accessing medical facilities due to disability.    LONG-TERM ANTICOAGULANT USE:  - Results mostly within therapeutic range, noting only one slight deviation in the past 3-4 months.  - Discussed potential adjustment to testing schedule based on upcoming lab results.    OBESITY, CLASS III, BMI 40-49.9 (MORBID OBESITY):  - Ms. Pierce reports extreme fatigue, zero energy, and difficulty falling and staying asleep.  - Ordered labs to check thyroid function and complete blood count to evaluate fatigue.    IMMUNIZATION:  - Ms. Pierce was due for tetanus and COVID-19 vaccines.  - After discussion, patient agreed to receive tetanus vaccine, which was administered in office today.                      [1]   Social History  Socioeconomic History    Marital status:     Number of children: 2    Occupational History    Occupation: disability since  on basis CP, [employer]   Tobacco Use    Smoking status: Former     Current packs/day: 0.00     Average packs/day: 0.3 packs/day for 3.0 years (0.8 ttl pk-yrs)     Types: Cigarettes     Start date:      Quit date: 2003     Years since quittin.3    Smokeless tobacco: Never    Tobacco comments:     quit smoking in    Substance and Sexual Activity    Alcohol use: Not Currently     Comment: occasionally    Drug use: No    Sexual activity: Not Currently     Social Drivers of Health     Financial Resource Strain: Medium Risk (2023)    Overall Financial Resource Strain (CARDIA)     Difficulty of Paying Living Expenses: Somewhat hard   Food Insecurity: No Food Insecurity (2023)    Hunger Vital Sign     Worried About Running Out of Food in the Last Year: Never true     Ran Out of Food in the Last Year: Never true   Transportation Needs: Unmet Transportation Needs (2023)    PRAPARE - Transportation     Lack of Transportation (Medical): Yes     Lack of Transportation (Non-Medical): Yes   Physical Activity: Unknown (2023)    Exercise Vital Sign     Days of Exercise per Week: 0 days   Recent Concern: Physical Activity - Inactive (2023)    Exercise Vital Sign     Days of Exercise per Week: 0 days     Minutes of Exercise per Session: 0 min   Stress: No Stress Concern Present (2023)    [employer] [place] [employer] - Occupational Stress Questionnaire     Feeling of Stress : Only a little   Housing Stability: Low Risk  (2023)    Housing Stability Vital Sign     Unable to Pay for Housing in the Last Year: No     Number of Places Lived in the Last Year: 2     Unstable Housing in the Last Year: No   [2]   Patient Active Problem List  Diagnosis    Essential hypertension    Chronic bilateral low back pain with bilateral sciatica    Obesity, Class III, BMI 40-49.9 (morbid obesity)    History of colonic polyps    Brachial artery  occlusion, right    Splenic infarct    Cerebral palsy    Antiphospholipid syndrome    Uncomplicated opioid dependence   [3]   Current Outpatient Medications on File Prior to Visit   Medication Sig Dispense Refill    baclofen (LIORESAL) 10 MG tablet Take 1 tablet (10 mg total) by mouth 2 (two) times daily. 60 tablet 5    enoxaparin (LOVENOX) 100 mg/mL Syrg Inject 1 mL (100 mg total) into the skin every 12 (twelve) hours. 20 mL 0    famotidine (PEPCID) 40 MG tablet Take 1 tablet (40 mg total) by mouth once daily. 90 tablet 0    ketoconazole (NIZORAL) 2 % shampoo Shampoo scalp 1-2 times weekly. Lather for 5 minutes then rinse well. 240 mL 5    oxybutynin (DITROPAN XL) 15 MG TR24 Take 1 tablet (15 mg total) by mouth once daily. 90 tablet 3    oxyCODONE (ROXICODONE) 20 mg Tab immediate release tablet Take 1 tablet by mouth every 12 hours as needed 60 tablet 0    oxyCODONE (ROXICODONE) 30 MG Tab Take 1 tablet (30 mg total) by mouth 2 (two) times daily as needed for pain. 60 tablet 0    oxyCODONE (ROXICODONE) 30 MG Tab Take 1 tablet by mouth twice a day as needed for pain 60 tablet 0    oxyCODONE (ROXICODONE) 30 MG Tab as needed; 1 tablet by mouth every 12 hours 60 tablet 0    oxyCODONE (ROXICODONE) 30 MG Tab Take 1 tablet by mouth every 12 hours as needed 60 tablet 0    oxyCODONE (ROXICODONE) 30 MG Tab Take 1 tablet by mouth every 12 hours as needed 60 tablet 0    promethazine (PHENERGAN) 25 MG tablet Take 1 tablet once a day as needed /May cause drowsiness, use caution/ for nausea only 30 tablet 5    promethazine (PHENERGAN) 25 MG tablet Take 1 tablet by mouth once a day as needed /May cause drowsiness, use caution/ for nausea only 30 tablet 5    promethazine (PHENERGAN) 25 MG tablet Take 1 tablet by mouth daily as needed 30 tablet 5    warfarin (COUMADIN) 5 MG tablet Take 1½ tablets (7.5 mg total) by mouth every Tuesday, Thursday, Saturday, Sunday AND 1 tablet (5 mg total) every Mon, Wed, Fri. OR as directed by Coumadin  clinic. 120 tablet 3    [DISCONTINUED] oxyCODONE (ROXICODONE) 30 MG Tab Take 1 tablet by mouth twice daily as needed for pain 60 tablet 0    [DISCONTINUED] oxyCODONE (ROXICODONE) 30 MG Tab Take 1 tablet by mouth every 12 hours as needed 60 tablet 0     No current facility-administered medications on file prior to visit.

## 2025-05-14 ENCOUNTER — ANTI-COAG VISIT (OUTPATIENT)
Dept: CARDIOLOGY | Facility: CLINIC | Age: 60
End: 2025-05-14
Payer: MEDICARE

## 2025-05-14 ENCOUNTER — PATIENT MESSAGE (OUTPATIENT)
Dept: CARDIOLOGY | Facility: CLINIC | Age: 60
End: 2025-05-14

## 2025-05-14 DIAGNOSIS — D68.61 ANTIPHOSPHOLIPID SYNDROME: Primary | ICD-10-CM

## 2025-05-14 DIAGNOSIS — I70.208 BRACHIAL ARTERY OCCLUSION, RIGHT: ICD-10-CM

## 2025-05-14 LAB — INR PPP: 3

## 2025-05-14 PROCEDURE — 93793 ANTICOAG MGMT PT WARFARIN: CPT | Mod: S$GLB,,,

## 2025-05-14 NOTE — PROGRESS NOTES
[facility].  INR at goal-3.0. Medications and chart reviewed. No changes noted to necessitate adjustment of warfarin or follow-up plan. See calendar.  Pt will continue on warfarin 7.5 mg QD x 5 mg MWF.

## 2025-05-15 ENCOUNTER — RESULTS FOLLOW-UP (OUTPATIENT)
Dept: INTERNAL MEDICINE | Facility: CLINIC | Age: 60
End: 2025-05-15

## 2025-05-21 ENCOUNTER — PATIENT MESSAGE (OUTPATIENT)
Dept: CARDIOLOGY | Facility: CLINIC | Age: 60
End: 2025-05-21

## 2025-05-21 ENCOUNTER — ANTI-COAG VISIT (OUTPATIENT)
Dept: CARDIOLOGY | Facility: CLINIC | Age: 60
End: 2025-05-21
Payer: MEDICARE

## 2025-05-21 DIAGNOSIS — D68.61 ANTIPHOSPHOLIPID SYNDROME: Primary | ICD-10-CM

## 2025-05-21 DIAGNOSIS — I70.208 BRACHIAL ARTERY OCCLUSION, RIGHT: ICD-10-CM

## 2025-05-21 LAB — INR PPP: 2.9

## 2025-05-21 PROCEDURE — 93793 ANTICOAG MGMT PT WARFARIN: CPT | Mod: S$GLB,,,

## 2025-05-22 ENCOUNTER — TELEPHONE (OUTPATIENT)
Dept: OBSTETRICS AND GYNECOLOGY | Facility: CLINIC | Age: 60
End: 2025-05-22
Payer: MEDICARE

## 2025-05-22 NOTE — TELEPHONE ENCOUNTER
----- Message from Clarisa sent at 5/22/2025  2:00 PM CDT -----  Type:  Needs Medical AdviceWho Called: pt Would the patient rather a call back or a response via [employer]? Call Best Call Back Number: [phone] Information: pt requesting a call back to discuss what location can accomodates disabled patients to get a pap pt has cerebral palsy.

## 2025-05-28 ENCOUNTER — ANTI-COAG VISIT (OUTPATIENT)
Dept: CARDIOLOGY | Facility: CLINIC | Age: 60
End: 2025-05-28
Payer: MEDICARE

## 2025-05-28 DIAGNOSIS — D68.61 ANTIPHOSPHOLIPID SYNDROME: Primary | ICD-10-CM

## 2025-05-28 DIAGNOSIS — Z79.01 LONG TERM (CURRENT) USE OF ANTICOAGULANTS: ICD-10-CM

## 2025-05-28 DIAGNOSIS — I70.208 BRACHIAL ARTERY OCCLUSION, RIGHT: ICD-10-CM

## 2025-05-28 LAB — INR PPP: 3

## 2025-05-28 PROCEDURE — 93793 ANTICOAG MGMT PT WARFARIN: CPT | Mod: S$GLB,,,

## 2025-05-28 NOTE — PROGRESS NOTES
Fax report received from [email or web address].  INR: 3.0 - therapeutic.  Continue warfarin 5 mg every MWF and 7.5 mg all other days.  Retest INR weekly - 6/04/2025.

## 2025-05-30 DIAGNOSIS — K21.9 CHRONIC GERD: ICD-10-CM

## 2025-06-03 RX ORDER — FAMOTIDINE 40 MG/1
40 TABLET, FILM COATED ORAL DAILY
Qty: 90 TABLET | Refills: 3 | Status: SHIPPED | OUTPATIENT
Start: 2025-06-03

## 2025-06-04 ENCOUNTER — TELEPHONE (OUTPATIENT)
Dept: OBSTETRICS AND GYNECOLOGY | Facility: CLINIC | Age: 60
End: 2025-06-04
Payer: MEDICARE

## 2025-06-04 LAB — INR PPP: 3.1

## 2025-06-05 ENCOUNTER — ANTI-COAG VISIT (OUTPATIENT)
Dept: CARDIOLOGY | Facility: CLINIC | Age: 60
End: 2025-06-05
Payer: MEDICARE

## 2025-06-05 DIAGNOSIS — Z79.01 LONG TERM (CURRENT) USE OF ANTICOAGULANTS: ICD-10-CM

## 2025-06-05 DIAGNOSIS — D68.61 ANTIPHOSPHOLIPID SYNDROME: Primary | ICD-10-CM

## 2025-06-05 DIAGNOSIS — I70.208 BRACHIAL ARTERY OCCLUSION, RIGHT: ICD-10-CM

## 2025-06-11 ENCOUNTER — ANTI-COAG VISIT (OUTPATIENT)
Dept: CARDIOLOGY | Facility: CLINIC | Age: 60
End: 2025-06-11
Payer: MEDICARE

## 2025-06-11 DIAGNOSIS — I70.208 BRACHIAL ARTERY OCCLUSION, RIGHT: ICD-10-CM

## 2025-06-11 DIAGNOSIS — D68.61 ANTIPHOSPHOLIPID SYNDROME: Primary | ICD-10-CM

## 2025-06-11 LAB — INR PPP: 2.6

## 2025-06-11 PROCEDURE — 93793 ANTICOAG MGMT PT WARFARIN: CPT | Mod: S$GLB,,,

## 2025-06-11 NOTE — PROGRESS NOTES
[facility].  INR at goal-2.6. Medications and chart reviewed. No changes noted to necessitate adjustment of warfarin or follow-up plan. See calendar.  Repeat INR in 1 week.

## 2025-06-18 ENCOUNTER — ANTI-COAG VISIT (OUTPATIENT)
Dept: CARDIOLOGY | Facility: CLINIC | Age: 60
End: 2025-06-18
Payer: MEDICARE

## 2025-06-18 DIAGNOSIS — I70.208 BRACHIAL ARTERY OCCLUSION, RIGHT: ICD-10-CM

## 2025-06-18 DIAGNOSIS — Z79.01 LONG TERM (CURRENT) USE OF ANTICOAGULANTS: ICD-10-CM

## 2025-06-18 DIAGNOSIS — D68.61 ANTIPHOSPHOLIPID SYNDROME: Primary | ICD-10-CM

## 2025-06-18 LAB — INR PPP: 2.9

## 2025-06-18 PROCEDURE — 93793 ANTICOAG MGMT PT WARFARIN: CPT | Mod: S$GLB,,,

## 2025-06-18 NOTE — PROGRESS NOTES
[facility] fax report received.  INR: 2.9 - therapeutic.  Continue warfarin 5 mg every MWF and 7.5 mg all other days.  Retest INR weekly - 6/25/2025.

## 2025-06-25 ENCOUNTER — ANTI-COAG VISIT (OUTPATIENT)
Dept: CARDIOLOGY | Facility: CLINIC | Age: 60
End: 2025-06-25
Payer: MEDICARE

## 2025-06-25 DIAGNOSIS — D68.61 ANTIPHOSPHOLIPID SYNDROME: Primary | ICD-10-CM

## 2025-06-25 DIAGNOSIS — I70.208 BRACHIAL ARTERY OCCLUSION, RIGHT: ICD-10-CM

## 2025-06-25 DIAGNOSIS — Z79.01 LONG TERM (CURRENT) USE OF ANTICOAGULANTS: ICD-10-CM

## 2025-06-25 LAB — INR PPP: 2.9

## 2025-06-25 PROCEDURE — 93793 ANTICOAG MGMT PT WARFARIN: CPT | Mod: S$GLB,,,

## 2025-06-25 NOTE — PROGRESS NOTES
Fax report received from [facility].  INR remains therapeutic at 2.9.  Continue warfarin 5 mg every MWF and 7.5 mg all other days.  Retest INR weekly - 7/02/2025.

## 2025-07-02 LAB — INR PPP: 2.9

## 2025-07-03 ENCOUNTER — ANTI-COAG VISIT (OUTPATIENT)
Dept: CARDIOLOGY | Facility: CLINIC | Age: 60
End: 2025-07-03
Payer: MEDICARE

## 2025-07-03 DIAGNOSIS — Z79.01 LONG TERM (CURRENT) USE OF ANTICOAGULANTS: ICD-10-CM

## 2025-07-03 DIAGNOSIS — I70.208 BRACHIAL ARTERY OCCLUSION, RIGHT: ICD-10-CM

## 2025-07-03 DIAGNOSIS — D68.61 ANTIPHOSPHOLIPID SYNDROME: Primary | ICD-10-CM

## 2025-07-03 PROCEDURE — 93793 ANTICOAG MGMT PT WARFARIN: CPT | Mod: S$GLB,,,

## 2025-07-03 NOTE — PROGRESS NOTES
[email or web address]  INR result remains at 2.9 - therapeutic.  Test date: 7/02/2025.  Continue warfarin 5 mg every Mon, Wed, Fri; and 7.5 mg all other days.  Retest INR weekly - 7/09/2025.

## 2025-07-09 ENCOUNTER — ANTI-COAG VISIT (OUTPATIENT)
Dept: CARDIOLOGY | Facility: CLINIC | Age: 60
End: 2025-07-09
Payer: MEDICARE

## 2025-07-09 DIAGNOSIS — D68.61 ANTIPHOSPHOLIPID SYNDROME: Primary | ICD-10-CM

## 2025-07-09 DIAGNOSIS — I70.208 BRACHIAL ARTERY OCCLUSION, RIGHT: ICD-10-CM

## 2025-07-09 LAB — INR PPP: 2.8

## 2025-07-09 PROCEDURE — 93793 ANTICOAG MGMT PT WARFARIN: CPT | Mod: S$GLB,,,

## 2025-07-09 NOTE — PROGRESS NOTES
[facility]. INR at goal-2.8. Medications and chart reviewed. No changes noted to necessitate adjustment of warfarin or follow-up plan. See calendar.  Will remain on current warfarin dose - retest INR in week.     18-Jan-2024

## 2025-07-16 ENCOUNTER — ANTI-COAG VISIT (OUTPATIENT)
Dept: CARDIOLOGY | Facility: CLINIC | Age: 60
End: 2025-07-16
Payer: MEDICARE

## 2025-07-16 DIAGNOSIS — I70.208 BRACHIAL ARTERY OCCLUSION, RIGHT: ICD-10-CM

## 2025-07-16 DIAGNOSIS — D68.61 ANTIPHOSPHOLIPID SYNDROME: Primary | ICD-10-CM

## 2025-07-16 DIAGNOSIS — Z79.01 LONG TERM (CURRENT) USE OF ANTICOAGULANTS: ICD-10-CM

## 2025-07-16 LAB — INR PPP: 2.9

## 2025-07-16 PROCEDURE — 93793 ANTICOAG MGMT PT WARFARIN: CPT | Mod: S$GLB,,,

## 2025-07-16 NOTE — PROGRESS NOTES
[facility] fax report received.  INR result 2.9 - therapeutic.  Continue 5 mg every Mon, Wed, Fri; and 7.5 mg all other days.  Retest INR weekly - 7/23/2025.

## 2025-07-23 LAB — INR PPP: 3.4

## 2025-07-24 ENCOUNTER — ANTI-COAG VISIT (OUTPATIENT)
Dept: CARDIOLOGY | Facility: CLINIC | Age: 60
End: 2025-07-24
Payer: MEDICARE

## 2025-07-24 DIAGNOSIS — I70.208 BRACHIAL ARTERY OCCLUSION, RIGHT: ICD-10-CM

## 2025-07-24 DIAGNOSIS — Z79.01 LONG TERM (CURRENT) USE OF ANTICOAGULANTS: ICD-10-CM

## 2025-07-24 DIAGNOSIS — D68.61 ANTIPHOSPHOLIPID SYNDROME: Primary | ICD-10-CM

## 2025-07-24 PROCEDURE — 93793 ANTICOAG MGMT PT WARFARIN: CPT | Mod: S$GLB,,,

## 2025-07-24 NOTE — PROGRESS NOTES
[facility] fax report received.  INR result: 3.4 - therapeutic.  Test date: 7/23/2025.  No change in dose - continue warfarin 5 mg every Mon, Wed, Fri; and 7.5 mg all other days.  Retest INR weekly - 7/30/2025.

## 2025-07-30 ENCOUNTER — ANTI-COAG VISIT (OUTPATIENT)
Dept: CARDIOLOGY | Facility: CLINIC | Age: 60
End: 2025-07-30
Payer: MEDICARE

## 2025-07-30 DIAGNOSIS — D68.61 ANTIPHOSPHOLIPID SYNDROME: Primary | ICD-10-CM

## 2025-07-30 DIAGNOSIS — I70.208 BRACHIAL ARTERY OCCLUSION, RIGHT: ICD-10-CM

## 2025-07-30 LAB — INR PPP: 2.7

## 2025-07-30 PROCEDURE — 93793 ANTICOAG MGMT PT WARFARIN: CPT | Mod: S$GLB,,,

## 2025-07-30 NOTE — PROGRESS NOTES
[facility].  INR at goal-2.7. Medications and chart reviewed. No changes noted to necessitate adjustment of warfarin or follow-up plan. See calendar.  Retest INR in 1 week.

## 2025-08-06 ENCOUNTER — ANTI-COAG VISIT (OUTPATIENT)
Dept: CARDIOLOGY | Facility: CLINIC | Age: 60
End: 2025-08-06
Payer: MEDICARE

## 2025-08-06 DIAGNOSIS — Z79.01 LONG TERM (CURRENT) USE OF ANTICOAGULANTS: ICD-10-CM

## 2025-08-06 DIAGNOSIS — D68.61 ANTIPHOSPHOLIPID SYNDROME: Primary | ICD-10-CM

## 2025-08-06 DIAGNOSIS — I70.208 BRACHIAL ARTERY OCCLUSION, RIGHT: ICD-10-CM

## 2025-08-06 LAB — INR PPP: 2.7

## 2025-08-06 PROCEDURE — 93793 ANTICOAG MGMT PT WARFARIN: CPT | Mod: S$GLB,,,

## 2025-08-06 NOTE — PROGRESS NOTES
[facility] fax report received.  INR remains therapeutic at 2.7.  Continue current dose of warfarin 5 mg every Mon, Wed, Fri; and 7.5 mg all other days.  Retest INR weekly - 8/13/2025.

## 2025-08-13 ENCOUNTER — ANTI-COAG VISIT (OUTPATIENT)
Dept: CARDIOLOGY | Facility: CLINIC | Age: 60
End: 2025-08-13
Payer: MEDICARE

## 2025-08-13 ENCOUNTER — PATIENT MESSAGE (OUTPATIENT)
Dept: ADMINISTRATIVE | Facility: HOSPITAL | Age: 60
End: 2025-08-13
Payer: MEDICARE

## 2025-08-13 DIAGNOSIS — D68.61 ANTIPHOSPHOLIPID SYNDROME: Primary | ICD-10-CM

## 2025-08-13 DIAGNOSIS — Z79.01 LONG TERM (CURRENT) USE OF ANTICOAGULANTS: ICD-10-CM

## 2025-08-13 DIAGNOSIS — I70.208 BRACHIAL ARTERY OCCLUSION, RIGHT: ICD-10-CM

## 2025-08-13 LAB — INR PPP: 2.9

## 2025-08-13 PROCEDURE — 93793 ANTICOAG MGMT PT WARFARIN: CPT | Mod: S$GLB,,,

## 2025-08-20 ENCOUNTER — ANTI-COAG VISIT (OUTPATIENT)
Dept: CARDIOLOGY | Facility: CLINIC | Age: 60
End: 2025-08-20
Payer: MEDICARE

## 2025-08-20 DIAGNOSIS — Z79.01 LONG TERM (CURRENT) USE OF ANTICOAGULANTS: ICD-10-CM

## 2025-08-20 DIAGNOSIS — D68.61 ANTIPHOSPHOLIPID SYNDROME: Primary | ICD-10-CM

## 2025-08-20 DIAGNOSIS — I70.208 BRACHIAL ARTERY OCCLUSION, RIGHT: ICD-10-CM

## 2025-08-20 LAB — INR PPP: 2.7

## 2025-08-20 PROCEDURE — 93793 ANTICOAG MGMT PT WARFARIN: CPT | Mod: S$GLB,,,

## 2025-08-27 ENCOUNTER — ANTI-COAG VISIT (OUTPATIENT)
Dept: CARDIOLOGY | Facility: CLINIC | Age: 60
End: 2025-08-27
Payer: MEDICARE

## 2025-08-27 DIAGNOSIS — I70.208 BRACHIAL ARTERY OCCLUSION, RIGHT: ICD-10-CM

## 2025-08-27 DIAGNOSIS — D68.61 ANTIPHOSPHOLIPID SYNDROME: Primary | ICD-10-CM

## 2025-08-27 LAB — INR PPP: 2.9

## 2025-08-27 PROCEDURE — 93793 ANTICOAG MGMT PT WARFARIN: CPT | Mod: S$GLB,,,

## 2025-09-03 ENCOUNTER — ANTI-COAG VISIT (OUTPATIENT)
Dept: CARDIOLOGY | Facility: CLINIC | Age: 60
End: 2025-09-03
Payer: MEDICARE

## 2025-09-03 DIAGNOSIS — I70.208 BRACHIAL ARTERY OCCLUSION, RIGHT: ICD-10-CM

## 2025-09-03 DIAGNOSIS — D68.61 ANTIPHOSPHOLIPID SYNDROME: Primary | ICD-10-CM

## 2025-09-03 DIAGNOSIS — Z79.01 LONG TERM (CURRENT) USE OF ANTICOAGULANTS: ICD-10-CM

## 2025-09-03 LAB — INR PPP: 3.4

## 2025-09-03 PROCEDURE — 93793 ANTICOAG MGMT PT WARFARIN: CPT | Mod: S$GLB,,,

## (undated) DEVICE — MANIFOLD 4 PORT

## (undated) DEVICE — GOWN POLY REINF BRTH SLV XL

## (undated) DEVICE — STRAP ARMBOARD DISP 1.5X32IN

## (undated) DEVICE — SUT PROLENE 6-0 BV-1

## (undated) DEVICE — APPLIER LIGACLIP SM 9.38IN

## (undated) DEVICE — APPLIER CLIP LIAGCLIP 9.375IN

## (undated) DEVICE — SYR ONLY LUER LOCK 20CC

## (undated) DEVICE — PACK BASIC SETUP SC BR

## (undated) DEVICE — SOL NS 1000CC

## (undated) DEVICE — CATH FOGARTY ART EMB 3FR 80CM

## (undated) DEVICE — SYR 3CC LUER LOC

## (undated) DEVICE — SEE MEDLINE ITEM 157216

## (undated) DEVICE — BOOT SUTURE AID

## (undated) DEVICE — APPLICATOR CHLORAPREP ORN 26ML

## (undated) DEVICE — BANDAGE ACE DOUBLE STER 6IN

## (undated) DEVICE — STOPCOCK 1 WAY PLASTIC

## (undated) DEVICE — SPONGE DERMACEA GAUZE 4X4

## (undated) DEVICE — BANDAGE MATRIX HK LOOP 4IN 5YD

## (undated) DEVICE — TOWEL OR DISP STRL BLUE 4/PK

## (undated) DEVICE — SYR 30CC LUER LOCK

## (undated) DEVICE — COVER LIGHT HANDLE 80/CA

## (undated) DEVICE — CATH FOGARTY ART EMB 2FR 60CM

## (undated) DEVICE — GAUZE SPONGE 4X4 12PLY

## (undated) DEVICE — ELECTRODE REM PLYHSV RETURN 9

## (undated) DEVICE — SUT VICRYL 2-0 27 CT-1

## (undated) DEVICE — SYR LUER LOCK 1CC

## (undated) DEVICE — TUBING MEDI-VAC 20FT .25IN

## (undated) DEVICE — GLOVE SURGICAL LATEX SZ 7

## (undated) DEVICE — STAPLER SKIN PROXIMATE REG

## (undated) DEVICE — PACK ECLIPSE UNIVERSAL STERILE

## (undated) DEVICE — SYR 10CC LUER LOCK

## (undated) DEVICE — GEL AQUASONIC 100 STERILE20GM

## (undated) DEVICE — DEV-O-LOOPS MINI BLUE

## (undated) DEVICE — DRAPE U SPLIT SHEET 54X76IN

## (undated) DEVICE — HEMOSTAT SURGICEL 4X8IN

## (undated) DEVICE — SPONGE LAP 18X18 PREWASHED

## (undated) DEVICE — HEMOSTAT SURGICEL 2X3IN